# Patient Record
Sex: FEMALE | Race: BLACK OR AFRICAN AMERICAN | NOT HISPANIC OR LATINO
[De-identification: names, ages, dates, MRNs, and addresses within clinical notes are randomized per-mention and may not be internally consistent; named-entity substitution may affect disease eponyms.]

---

## 2017-03-30 ENCOUNTER — TRANSCRIPTION ENCOUNTER (OUTPATIENT)
Age: 62
End: 2017-03-30

## 2017-08-02 ENCOUNTER — OUTPATIENT (OUTPATIENT)
Dept: OUTPATIENT SERVICES | Facility: HOSPITAL | Age: 62
LOS: 1 days | End: 2017-08-02
Payer: MEDICARE

## 2017-08-02 ENCOUNTER — APPOINTMENT (OUTPATIENT)
Dept: ULTRASOUND IMAGING | Facility: IMAGING CENTER | Age: 62
End: 2017-08-02
Payer: MEDICARE

## 2017-08-02 DIAGNOSIS — Z90.710 ACQUIRED ABSENCE OF BOTH CERVIX AND UTERUS: Chronic | ICD-10-CM

## 2017-08-02 DIAGNOSIS — R94.5 ABNORMAL RESULTS OF LIVER FUNCTION STUDIES: ICD-10-CM

## 2017-08-02 DIAGNOSIS — Z86.010 PERSONAL HISTORY OF COLONIC POLYPS: ICD-10-CM

## 2017-08-02 DIAGNOSIS — M67.40 GANGLION, UNSPECIFIED SITE: Chronic | ICD-10-CM

## 2017-08-02 DIAGNOSIS — K21.9 GASTRO-ESOPHAGEAL REFLUX DISEASE WITHOUT ESOPHAGITIS: ICD-10-CM

## 2017-08-02 DIAGNOSIS — Z96.659 PRESENCE OF UNSPECIFIED ARTIFICIAL KNEE JOINT: Chronic | ICD-10-CM

## 2017-08-02 PROCEDURE — 76705 ECHO EXAM OF ABDOMEN: CPT

## 2017-08-02 PROCEDURE — 76705 ECHO EXAM OF ABDOMEN: CPT | Mod: 26,RT

## 2017-09-18 ENCOUNTER — TRANSCRIPTION ENCOUNTER (OUTPATIENT)
Age: 62
End: 2017-09-18

## 2018-01-02 ENCOUNTER — EMERGENCY (EMERGENCY)
Facility: HOSPITAL | Age: 63
LOS: 1 days | Discharge: HOME | End: 2018-01-02
Attending: EMERGENCY MEDICINE | Admitting: EMERGENCY MEDICINE
Payer: MEDICARE

## 2018-01-02 VITALS
SYSTOLIC BLOOD PRESSURE: 126 MMHG | RESPIRATION RATE: 20 BRPM | DIASTOLIC BLOOD PRESSURE: 73 MMHG | OXYGEN SATURATION: 98 % | HEART RATE: 89 BPM | TEMPERATURE: 98 F

## 2018-01-02 DIAGNOSIS — Z90.710 ACQUIRED ABSENCE OF BOTH CERVIX AND UTERUS: Chronic | ICD-10-CM

## 2018-01-02 DIAGNOSIS — Z96.659 PRESENCE OF UNSPECIFIED ARTIFICIAL KNEE JOINT: Chronic | ICD-10-CM

## 2018-01-02 DIAGNOSIS — M67.40 GANGLION, UNSPECIFIED SITE: Chronic | ICD-10-CM

## 2018-01-02 PROCEDURE — 99282 EMERGENCY DEPT VISIT SF MDM: CPT | Mod: 25,GC

## 2018-01-02 NOTE — ED PROVIDER NOTE - MEDICAL DECISION MAKING DETAILS
62F pmhx Obesity, HTN, HLD, DM, DENITA, Hypothyroid presents to ED with chief complaint of toothache. Toothache and gum pain yesterday, currently without any significant pain. No other issues at this time, ROS otherwise negative. Exam as above, no acute intraoral findings noted, patient in no distress, vitals stable. Will recommend pain control as needed, follow up this week with dental clinic. Case discussed with Attending SUSAN Samson MD, PGY1

## 2018-01-02 NOTE — ED PROVIDER NOTE - ENMT, MLM
Airway patent. Mouth with normal mucosa. Throat has no vesicles, no oropharyngeal exudates and uvula is midline. See above.

## 2018-01-02 NOTE — ED ADULT TRIAGE NOTE - CHIEF COMPLAINT QUOTE
Pt. presents to the [facility] ED with upper left tooth and gum pain. NAD noted. Pt. appears comfortable at triage.

## 2018-01-02 NOTE — ED PROVIDER NOTE - ATTENDING CONTRIBUTION TO CARE
pt presenting with L upper molar pain.  Started last night with eating hot food and has since resolved but is concerned it will return since nasal night CPAP machine was causing problems.  No fevers no problems swallowing.    Gen:  Well appearning in NAD  Head:  NC/AT  ENT:  Poor dentition.  L upper second molar with visible dilma.  No signs of abscess.  Resp: No distress   Ext: no deformities  Skin: warm and dry as visualized     pt with dental pain.  No sign of deep space abscess.  Will provide information with dental clinic follow up and contact information

## 2018-01-02 NOTE — ED PROVIDER NOTE - PLAN OF CARE
Follow up with Dental Clinic this week - call [phone] to make an appointment.  Return to ED for severe pain or other concerning symptoms.   Tylenol for pain control as needed per dosing on bottle.

## 2018-01-02 NOTE — ED PROVIDER NOTE - PHYSICAL EXAMINATION
Obese female  No apparent distress. Resting on bed.  Oral exam: No obvious periapical abscess noted on exam. Significant filled caries noted. Multiple cracked teeth noted, multiple teeth with decay noted bilaterally. No tooth #16 noted, decay and cracking noted to tooth #14, 15. No pain to palpation along gums upper/lower bilaterally.. No pain to forceful pressure on upper left molars. No pain with biting on tongue blade. Reports feeling more 'sensitive'. No patient discomfort noted during exam.

## 2018-01-02 NOTE — ED PROVIDER NOTE - CARE PLAN
Principal Discharge DX:	Toothache Principal Discharge DX:	Toothache  Instructions for follow-up, activity and diet:	Follow up with Dental Clinic this week - call [phone] to make an appointment.  Return to ED for severe pain or other concerning symptoms.   Tylenol for pain control as needed per dosing on bottle.

## 2018-01-02 NOTE — ED PROVIDER NOTE - OBJECTIVE STATEMENT
62F pmhx Obesity, HTN, HLD, DM, DENITA, Hypothyroid presents to ED with chief complaint of toothache. Per patient, was eating hot food yesterday and began to have pain along her upper left gum and molars. Pain was moderate intensity, at times felt pain radiating up to left eye and down left face. Patient reports pain was ongoing all day and worsening at night with use of her CPAP machine. Presents to ED this AM and reports that she does not currently have any pain, but that the upper left gum and back teeth do feel more sensitive than usual. Denies any fevers or chills, nausea or vomiting, diarrhea or constipation, chest pain, shortness of breath, abdominal pain, blurry vision, dizziness, headache, recent oral or facial trauma. Has not seen a dentist in over 1 year. Reports hx of extensive dental work, cavities. Denies tobacco or drug use, reports intermittent heavy alcohol use. No allergies. Meds include hyzar, glipizide, omeprazole, losartan.

## 2018-01-02 NOTE — ED PROVIDER NOTE - CONSTITUTIONAL, MLM
normal... Well appearing, OBESE, well nourished, awake, alert, oriented to person, place, time/situation and in no apparent distress.

## 2019-02-09 ENCOUNTER — EMERGENCY (EMERGENCY)
Facility: HOSPITAL | Age: 64
LOS: 1 days | Discharge: HOME | End: 2019-02-09
Attending: EMERGENCY MEDICINE | Admitting: EMERGENCY MEDICINE
Payer: MEDICARE

## 2019-02-09 VITALS
OXYGEN SATURATION: 97 % | RESPIRATION RATE: 19 BRPM | TEMPERATURE: 98 F | HEART RATE: 95 BPM | SYSTOLIC BLOOD PRESSURE: 116 MMHG | DIASTOLIC BLOOD PRESSURE: 73 MMHG

## 2019-02-09 VITALS
OXYGEN SATURATION: 98 % | HEIGHT: 63 IN | HEART RATE: 86 BPM | WEIGHT: 268.08 LBS | TEMPERATURE: 97 F | RESPIRATION RATE: 20 BRPM | SYSTOLIC BLOOD PRESSURE: 116 MMHG | DIASTOLIC BLOOD PRESSURE: 57 MMHG

## 2019-02-09 DIAGNOSIS — Z90.710 ACQUIRED ABSENCE OF BOTH CERVIX AND UTERUS: Chronic | ICD-10-CM

## 2019-02-09 DIAGNOSIS — Z96.659 PRESENCE OF UNSPECIFIED ARTIFICIAL KNEE JOINT: Chronic | ICD-10-CM

## 2019-02-09 DIAGNOSIS — M67.40 GANGLION, UNSPECIFIED SITE: Chronic | ICD-10-CM

## 2019-02-09 LAB
ALBUMIN SERPL ELPH-MCNC: 4.4 G/DL — SIGNIFICANT CHANGE UP (ref 3.3–5)
ALP SERPL-CCNC: 61 U/L — SIGNIFICANT CHANGE UP (ref 40–120)
ALT FLD-CCNC: 19 U/L — SIGNIFICANT CHANGE UP (ref 10–45)
ANION GAP SERPL CALC-SCNC: 14 MMOL/L — SIGNIFICANT CHANGE UP (ref 5–17)
AST SERPL-CCNC: 23 U/L — SIGNIFICANT CHANGE UP (ref 10–40)
BASOPHILS # BLD AUTO: 0.1 K/UL — SIGNIFICANT CHANGE UP (ref 0–0.2)
BASOPHILS NFR BLD AUTO: 1.7 % — SIGNIFICANT CHANGE UP (ref 0–2)
BILIRUB SERPL-MCNC: 0.7 MG/DL — SIGNIFICANT CHANGE UP (ref 0.2–1.2)
BUN SERPL-MCNC: 19 MG/DL — SIGNIFICANT CHANGE UP (ref 7–23)
CALCIUM SERPL-MCNC: 10.3 MG/DL — SIGNIFICANT CHANGE UP (ref 8.4–10.5)
CHLORIDE SERPL-SCNC: 102 MMOL/L — SIGNIFICANT CHANGE UP (ref 96–108)
CO2 SERPL-SCNC: 29 MMOL/L — SIGNIFICANT CHANGE UP (ref 22–31)
CREAT SERPL-MCNC: 0.78 MG/DL — SIGNIFICANT CHANGE UP (ref 0.5–1.3)
EOSINOPHIL # BLD AUTO: 0.1 K/UL — SIGNIFICANT CHANGE UP (ref 0–0.5)
EOSINOPHIL NFR BLD AUTO: 2 % — SIGNIFICANT CHANGE UP (ref 0–6)
ERYTHROCYTE [SEDIMENTATION RATE] IN BLOOD: 23 MM/HR — HIGH (ref 0–20)
GLUCOSE SERPL-MCNC: 114 MG/DL — HIGH (ref 70–99)
HCT VFR BLD CALC: 42.2 % — SIGNIFICANT CHANGE UP (ref 34.5–45)
HGB BLD-MCNC: 14.6 G/DL — SIGNIFICANT CHANGE UP (ref 11.5–15.5)
LYMPHOCYTES # BLD AUTO: 1.5 K/UL — SIGNIFICANT CHANGE UP (ref 1–3.3)
LYMPHOCYTES # BLD AUTO: 36.3 % — SIGNIFICANT CHANGE UP (ref 13–44)
MCHC RBC-ENTMCNC: 32.4 PG — SIGNIFICANT CHANGE UP (ref 27–34)
MCHC RBC-ENTMCNC: 34.5 GM/DL — SIGNIFICANT CHANGE UP (ref 32–36)
MCV RBC AUTO: 94 FL — SIGNIFICANT CHANGE UP (ref 80–100)
MONOCYTES # BLD AUTO: 0.6 K/UL — SIGNIFICANT CHANGE UP (ref 0–0.9)
MONOCYTES NFR BLD AUTO: 14.4 % — HIGH (ref 2–14)
NEUTROPHILS # BLD AUTO: 1.9 K/UL — SIGNIFICANT CHANGE UP (ref 1.8–7.4)
NEUTROPHILS NFR BLD AUTO: 45.6 % — SIGNIFICANT CHANGE UP (ref 43–77)
PLATELET # BLD AUTO: 260 K/UL — SIGNIFICANT CHANGE UP (ref 150–400)
POTASSIUM SERPL-MCNC: 3.9 MMOL/L — SIGNIFICANT CHANGE UP (ref 3.5–5.3)
POTASSIUM SERPL-SCNC: 3.9 MMOL/L — SIGNIFICANT CHANGE UP (ref 3.5–5.3)
PROT SERPL-MCNC: 7.8 G/DL — SIGNIFICANT CHANGE UP (ref 6–8.3)
RBC # BLD: 4.49 M/UL — SIGNIFICANT CHANGE UP (ref 3.8–5.2)
RBC # FLD: 12.5 % — SIGNIFICANT CHANGE UP (ref 10.3–14.5)
SODIUM SERPL-SCNC: 145 MMOL/L — SIGNIFICANT CHANGE UP (ref 135–145)
WBC # BLD: 4.1 K/UL — SIGNIFICANT CHANGE UP (ref 3.8–10.5)
WBC # FLD AUTO: 4.1 K/UL — SIGNIFICANT CHANGE UP (ref 3.8–10.5)

## 2019-02-09 PROCEDURE — 85027 COMPLETE CBC AUTOMATED: CPT

## 2019-02-09 PROCEDURE — 85652 RBC SED RATE AUTOMATED: CPT

## 2019-02-09 PROCEDURE — 99284 EMERGENCY DEPT VISIT MOD MDM: CPT | Mod: 25

## 2019-02-09 PROCEDURE — 36415 COLL VENOUS BLD VENIPUNCTURE: CPT

## 2019-02-09 PROCEDURE — 70450 CT HEAD/BRAIN W/O DYE: CPT

## 2019-02-09 PROCEDURE — 99284 EMERGENCY DEPT VISIT MOD MDM: CPT | Mod: GC

## 2019-02-09 PROCEDURE — 80053 COMPREHEN METABOLIC PANEL: CPT

## 2019-02-09 PROCEDURE — 70450 CT HEAD/BRAIN W/O DYE: CPT | Mod: 26

## 2019-02-09 RX ORDER — ACETAMINOPHEN 500 MG
650 TABLET ORAL ONCE
Qty: 0 | Refills: 0 | Status: DISCONTINUED | OUTPATIENT
Start: 2019-02-09 | End: 2019-02-13

## 2019-02-09 NOTE — ED PROVIDER NOTE - PROGRESS NOTE DETAILS
Dr. Oz Friedman, PGY-1: test results unremarkable for emergent pathology. pt informed of these results. ready for DC.

## 2019-02-09 NOTE — ED ADULT NURSE NOTE - OBJECTIVE STATEMENT
63 year old female presents to the ED ambulatory through waiting room complaining of frontal headache and right eye pressure x 1 week. PMH of HTN, HLD, GERD, hypothyroid, sleep apnea. Pt. states the intensity has not increased or decreased and she has not taken anything for pain, nor was she was doing anything specific when the pain started. Has never experienced this before. She localizes the pain to the R frontal and temporal region when asked. Denies visual changes. Feels that her R eyelid is more closed compared to the other. Denies fevers. Reports body aches and joint pain at night. Was seen by her PMD last week and blood work was done, was told by PMD that she did not have blood work consistent w/ temporal arteritis. Was seen by her neurologist yesterday who scheduled her for an MRI for next week. 20g peripheral IV placed in R ac and labs drawn and sent to lab. Patient undressed and placed into gown, call bell in hand and side rails up with bed in lowest position for safety. blanket provided. Comfort and safety provided.

## 2019-02-09 NOTE — ED PROVIDER NOTE - NS ED ROS FT
GENERAL: No fever or chills  EYES: No change in vision  HEENT: No trouble swallowing or speaking  CARDIAC: No chest pain  PULMONARY: No cough or SOB  GI: No abdominal pain, no nausea or no vomiting, no diarrhea or constipation  : No changes in urination  SKIN: No rashes  NEURO: +headache, no numbness  MSK: per HPI  Otherwise as HPI or negative.

## 2019-02-09 NOTE — ED PROVIDER NOTE - OBJECTIVE STATEMENT
64 y/o F w/ PMH of HTN, HLD, GERD, hypothyroid, sleep apnea presenting w/ headache and eye pain. Headache and eye pain is located on the R side. These symptoms began 1 week ago and have been constant since then. The intensity has not increased or decreased. She was not doing anything specific when the pain started. Has never experienced this before. She localizes the pain to the R frontal and temporal region when asked. Denies visual changes. Feels that her R eyelid is more closed compared to the other. Denies fevers. Reports body aches and joint pain at night. Was seen by her PMD last week and blood work was done, was told by PMD that she did not have blood work consistent w/ temporal arteritis. Was seen by her neurologist yesterday who scheduled her for an MRI for next week.    PMD- Dr. Lamont Ibarra  Neuro- Dr. Mallory

## 2019-02-09 NOTE — ED PROVIDER NOTE - MEDICAL DECISION MAKING DETAILS
62 y/o morbidly obese female w/ hx of DM, HTN, hypothyroid, sleep apnea presenting w/ 1 week hx of unilateral R temporal HA and joint pain w/ muscle aches. No fevers or chills. Seen by neurologist who recommended she have MRI brain which she is scheduled for next week. She is worried and wants to have MRI done today. PE is unremarkable. No tenderness in temporal area b/l. Non focal neuro exam. R/O temporal arteritis. Will obtain blood work, sed rate, give tylenol, CT non con head, re-eval.-ZR

## 2019-02-09 NOTE — ED PROVIDER NOTE - PHYSICAL EXAMINATION
Gen: NAD, AOx3, able to make needs known, non-toxic  Head: NCAT  HEENT: EOMI, oral mucosa moist, normal conjunctiva  Lung: CTAB, no respiratory distress, no wheezes/rhonchi/rales B/L, speaking in full sentences  CV: RRR, no murmurs  Abd: soft, NTND, no guarding  MSK: no visible deformities. no pain palpated along temporal areas b/l  Neuro: CN 2-12 intact b/l. str 5/5 x4 extremities. No focal sensory or motor deficits  Skin: Warm, well perfused  Psych: normal affect

## 2019-02-09 NOTE — ED ADULT NURSE NOTE - CHPI ED NUR SYMPTOMS NEG
no weakness/no tingling/no nausea/no pain/no vomiting/no dizziness/no fever/no chills/no decreased eating/drinking

## 2019-02-09 NOTE — ED ADULT NURSE NOTE - NSIMPLEMENTINTERV_GEN_ALL_ED
Implemented All Universal Safety Interventions:  [place] to call system. Call bell, personal items and telephone within reach. Instruct patient to call for assistance. Room bathroom lighting operational. Non-slip footwear when patient is off stretcher. Physically safe environment: no spills, clutter or unnecessary equipment. Stretcher in lowest position, wheels locked, appropriate side rails in place.

## 2019-02-09 NOTE — ED PROVIDER NOTE - NSFOLLOWUPINSTRUCTIONS_ED_ALL_ED_FT
1) Follow up with your doctor after receiving your MRI. Please make sure you get your MRI as scheduled by your neurologist.  2) Return to the ED immediately for new or worsening symptoms   3) Your test results from your ED visit were discussed with you prior to discharge  4) 1) Follow up with your doctor after receiving your MRI. Please make sure you get your MRI as scheduled by your neurologist.  2) Return to the ED immediately for new or worsening symptoms including severe headache, slurred speech or difficulty with speech, severe weakness in one or more extremities, or any other concerning symptoms   3) Your test results from your ED visit were discussed with you prior to discharge  4) Please take tylenol 650 mg every 4 hours as needed for pain. Please do not exceed more than 4,000mg of Tylenol in a day  5) You were provided with a copy of your test results

## 2019-02-13 ENCOUNTER — OUTPATIENT (OUTPATIENT)
Dept: OUTPATIENT SERVICES | Facility: HOSPITAL | Age: 64
LOS: 1 days | End: 2019-02-13
Payer: MEDICARE

## 2019-02-13 ENCOUNTER — APPOINTMENT (OUTPATIENT)
Dept: ULTRASOUND IMAGING | Facility: IMAGING CENTER | Age: 64
End: 2019-02-13
Payer: MEDICARE

## 2019-02-13 ENCOUNTER — APPOINTMENT (OUTPATIENT)
Dept: MRI IMAGING | Facility: IMAGING CENTER | Age: 64
End: 2019-02-13

## 2019-02-13 DIAGNOSIS — Z96.659 PRESENCE OF UNSPECIFIED ARTIFICIAL KNEE JOINT: Chronic | ICD-10-CM

## 2019-02-13 DIAGNOSIS — Z90.710 ACQUIRED ABSENCE OF BOTH CERVIX AND UTERUS: Chronic | ICD-10-CM

## 2019-02-13 DIAGNOSIS — M67.40 GANGLION, UNSPECIFIED SITE: Chronic | ICD-10-CM

## 2019-02-13 DIAGNOSIS — Z00.8 ENCOUNTER FOR OTHER GENERAL EXAMINATION: ICD-10-CM

## 2019-02-13 PROCEDURE — 70551 MRI BRAIN STEM W/O DYE: CPT | Mod: 26

## 2019-02-13 PROCEDURE — 76536 US EXAM OF HEAD AND NECK: CPT | Mod: 26

## 2019-02-13 PROCEDURE — 70551 MRI BRAIN STEM W/O DYE: CPT

## 2019-02-13 PROCEDURE — 76536 US EXAM OF HEAD AND NECK: CPT

## 2019-07-31 ENCOUNTER — APPOINTMENT (OUTPATIENT)
Dept: CT IMAGING | Facility: IMAGING CENTER | Age: 64
End: 2019-07-31
Payer: MEDICARE

## 2019-07-31 ENCOUNTER — OUTPATIENT (OUTPATIENT)
Dept: OUTPATIENT SERVICES | Facility: HOSPITAL | Age: 64
LOS: 1 days | End: 2019-07-31
Payer: MEDICARE

## 2019-07-31 DIAGNOSIS — Z00.8 ENCOUNTER FOR OTHER GENERAL EXAMINATION: ICD-10-CM

## 2019-07-31 DIAGNOSIS — Z90.710 ACQUIRED ABSENCE OF BOTH CERVIX AND UTERUS: Chronic | ICD-10-CM

## 2019-07-31 DIAGNOSIS — Z96.659 PRESENCE OF UNSPECIFIED ARTIFICIAL KNEE JOINT: Chronic | ICD-10-CM

## 2019-07-31 DIAGNOSIS — M67.40 GANGLION, UNSPECIFIED SITE: Chronic | ICD-10-CM

## 2019-07-31 PROCEDURE — 74178 CT ABD&PLV WO CNTR FLWD CNTR: CPT | Mod: 26

## 2019-07-31 PROCEDURE — 74178 CT ABD&PLV WO CNTR FLWD CNTR: CPT

## 2020-11-06 ENCOUNTER — RESULT REVIEW (OUTPATIENT)
Age: 65
End: 2020-11-06

## 2020-11-06 ENCOUNTER — APPOINTMENT (OUTPATIENT)
Dept: OBGYN | Facility: CLINIC | Age: 65
End: 2020-11-06
Payer: MEDICARE

## 2020-11-06 PROCEDURE — G0101: CPT

## 2020-12-05 ENCOUNTER — APPOINTMENT (OUTPATIENT)
Dept: MAMMOGRAPHY | Facility: IMAGING CENTER | Age: 65
End: 2020-12-05
Payer: MEDICARE

## 2020-12-05 ENCOUNTER — OUTPATIENT (OUTPATIENT)
Dept: OUTPATIENT SERVICES | Facility: HOSPITAL | Age: 65
LOS: 1 days | End: 2020-12-05
Payer: MEDICARE

## 2020-12-05 DIAGNOSIS — Z96.659 PRESENCE OF UNSPECIFIED ARTIFICIAL KNEE JOINT: Chronic | ICD-10-CM

## 2020-12-05 DIAGNOSIS — M67.40 GANGLION, UNSPECIFIED SITE: Chronic | ICD-10-CM

## 2020-12-05 DIAGNOSIS — Z90.710 ACQUIRED ABSENCE OF BOTH CERVIX AND UTERUS: Chronic | ICD-10-CM

## 2020-12-05 DIAGNOSIS — Z00.00 ENCOUNTER FOR GENERAL ADULT MEDICAL EXAMINATION WITHOUT ABNORMAL FINDINGS: ICD-10-CM

## 2020-12-05 PROCEDURE — 77067 SCR MAMMO BI INCL CAD: CPT | Mod: 26

## 2020-12-05 PROCEDURE — 77067 SCR MAMMO BI INCL CAD: CPT

## 2020-12-05 PROCEDURE — 77063 BREAST TOMOSYNTHESIS BI: CPT | Mod: 26

## 2020-12-05 PROCEDURE — 77063 BREAST TOMOSYNTHESIS BI: CPT

## 2022-06-28 NOTE — ED PROVIDER NOTE - NS ED ROS FT
Med history complete, reviewed medications and allergies with patient and confirmed medication list with doctor first med profile, all medication related questions answered  
toothache, gum pain: upper left

## 2022-07-29 ENCOUNTER — NON-APPOINTMENT (OUTPATIENT)
Age: 67
End: 2022-07-29

## 2022-07-29 ENCOUNTER — APPOINTMENT (OUTPATIENT)
Dept: OPHTHALMOLOGY | Facility: CLINIC | Age: 67
End: 2022-07-29

## 2022-07-29 PROCEDURE — 92004 COMPRE OPH EXAM NEW PT 1/>: CPT

## 2022-09-08 ENCOUNTER — OUTPATIENT (OUTPATIENT)
Dept: OUTPATIENT SERVICES | Facility: HOSPITAL | Age: 67
LOS: 1 days | End: 2022-09-08
Payer: MEDICARE

## 2022-09-08 ENCOUNTER — OUTPATIENT (OUTPATIENT)
Dept: OUTPATIENT SERVICES | Facility: HOSPITAL | Age: 67
LOS: 1 days | End: 2022-09-08

## 2022-09-08 VITALS
OXYGEN SATURATION: 96 % | HEART RATE: 88 BPM | TEMPERATURE: 98 F | DIASTOLIC BLOOD PRESSURE: 98 MMHG | RESPIRATION RATE: 16 BRPM | SYSTOLIC BLOOD PRESSURE: 146 MMHG | HEIGHT: 63 IN | WEIGHT: 235.01 LBS

## 2022-09-08 DIAGNOSIS — R31.29 OTHER MICROSCOPIC HEMATURIA: ICD-10-CM

## 2022-09-08 DIAGNOSIS — M67.40 GANGLION, UNSPECIFIED SITE: Chronic | ICD-10-CM

## 2022-09-08 DIAGNOSIS — Z96.659 PRESENCE OF UNSPECIFIED ARTIFICIAL KNEE JOINT: Chronic | ICD-10-CM

## 2022-09-08 DIAGNOSIS — Z90.710 ACQUIRED ABSENCE OF BOTH CERVIX AND UTERUS: Chronic | ICD-10-CM

## 2022-09-08 DIAGNOSIS — G47.33 OBSTRUCTIVE SLEEP APNEA (ADULT) (PEDIATRIC): ICD-10-CM

## 2022-09-08 DIAGNOSIS — I10 ESSENTIAL (PRIMARY) HYPERTENSION: ICD-10-CM

## 2022-09-08 DIAGNOSIS — K76.0 FATTY (CHANGE OF) LIVER, NOT ELSEWHERE CLASSIFIED: ICD-10-CM

## 2022-09-08 DIAGNOSIS — Z11.52 ENCOUNTER FOR SCREENING FOR COVID-19: ICD-10-CM

## 2022-09-08 DIAGNOSIS — Z01.818 ENCOUNTER FOR OTHER PREPROCEDURAL EXAMINATION: ICD-10-CM

## 2022-09-08 LAB
A1C WITH ESTIMATED AVERAGE GLUCOSE RESULT: 5.7 % — HIGH (ref 4–5.6)
ESTIMATED AVERAGE GLUCOSE: 117 MG/DL — HIGH (ref 68–114)
SARS-COV-2 RNA SPEC QL NAA+PROBE: SIGNIFICANT CHANGE UP

## 2022-09-08 PROCEDURE — C9803: CPT

## 2022-09-08 PROCEDURE — G0463: CPT

## 2022-09-08 PROCEDURE — U0005: CPT

## 2022-09-08 PROCEDURE — U0003: CPT

## 2022-09-08 PROCEDURE — 83036 HEMOGLOBIN GLYCOSYLATED A1C: CPT

## 2022-09-08 NOTE — H&P PST ADULT - NSICDXPASTSURGICALHX_GEN_ALL_CORE_FT
PAST SURGICAL HISTORY:  Ganglion cyst     S/P hysterectomy 1984    S/P knee replacement 2010 and 2011

## 2022-09-08 NOTE — H&P PST ADULT - ATTENDING COMMENTS
Atypical cytology during microhematuria workup and erythematous patches seen on office cystoscopy. OR today for bladder biopsy and fulguration.  R/b/A discussed. Consent reviewed and signed.    Christopher Monique MD  [facility]  [address], [facility] [address], [place] [address]  Office: [phone] Fax: [phone]

## 2022-09-08 NOTE — H&P PST ADULT - NEGATIVE ALLERGY TYPES
no indoor environmental allergies/no reactions to medicines/no reactions to food/no reactions to animals

## 2022-09-08 NOTE — H&P PST ADULT - NSICDXFAMILYHX_GEN_ALL_CORE_FT
FAMILY HISTORY:  Father  Still living? No  FH: hypertension, Age at diagnosis: Age Unknown  FH: liver cancer, Age at diagnosis: Age Unknown    Mother  Still living? Yes, Estimated age: Age Unknown  FH: hypertension, Age at diagnosis: Age Unknown  FH: type 2 diabetes, Age at diagnosis: Age Unknown    Sibling  Still living? No  FH: breast cancer, Age at diagnosis: Age Unknown

## 2022-09-08 NOTE — H&P PST ADULT - HISTORY OF PRESENT ILLNESS
Ms. Lewis, retired , 66 years old with PMH HTN, HLD, T2DM, DENITA on CPAP, Obesity, hypothyroidism on Synthroid, OA s/p bilateral knee replacements, dry eyes, every day ETOH drinking (quit for 2 years over COVID years) and recently resumed, has up to 8 glasses Bacardi Rum who most recently had c/o increased urinary frequency, cystoscopy revealing lesions on bladder wall now scheduled for cystoscopy and bladder biopsy with fulguration on 9/12/2022.    Denies s/s of COVID, no recent international travel or travel outside [facility] in last 2 weeks Ms. Lewis, retired , 66 years old with PMH HTN, HLD, T2DM, DENITA on CPAP, Obesity, hypothyroidism on Synthroid, OA s/p bilateral knee replacements, dry eyes, every day ETOH drinking (quit for 2 years over COVID years) and recently resumed, has up to 8 glasses Bacardi Rum who most recently had c/o increased urinary frequency, cystoscopy revealing lesions on bladder wall now scheduled for cystoscopy and bladder biopsy with fulguration on 9/12/2022.    Denies s/s of COVID, no recent international travel or travel outside [facility] in last 2 weeks  ******Pt saw a cardiologist, pt need a cardiac cath prior to surgery, Called [facility] co-ordinator for Dr. mancini - Relayed the message , surgery to be cancelled for 9/12/2022.********

## 2022-09-08 NOTE — H&P PST ADULT - RESPIRATORY
clear to auscultation bilaterally/no wheezes/no rhonchi/breath sounds equal/good air movement/respirations non-labored

## 2022-09-08 NOTE — H&P PST ADULT - NSICDXPASTMEDICALHX_GEN_ALL_CORE_FT
PAST MEDICAL HISTORY:  Chronic GERD     Diabetes 1.5, managed as type 2     ETOH abuse     HTN (hypertension)     Hypothyroid     Meningitis

## 2022-09-08 NOTE — H&P PST ADULT - ACTIVITY
ADLs, helps care for elderly mother, bathing, climbs stairs 2 flights, brings mom to all her appointments

## 2022-09-08 NOTE — H&P PST ADULT - PROBLEM SELECTOR PLAN 1
Scheduled for cystoscopy, bladder biopsy with fulguration on 9/12/2022  Preop instructions given  Urine cx in chart from PCP, negative  FS upon admission

## 2022-09-08 NOTE — H&P PST ADULT - ENMT COMMENTS
finger rub heard bilaterally, no redness/inflammation of mouth/pharynx.  FROM on neck.  No carotid bruits

## 2022-09-08 NOTE — H&P PST ADULT - PROBLEM SELECTOR PLAN 4
Encouraged to decrease ETOH intake prior to surgery  CMP results in chart  Email sent to surgeon and anesthesiologist

## 2022-09-13 PROBLEM — K21.9 GASTRO-ESOPHAGEAL REFLUX DISEASE WITHOUT ESOPHAGITIS: Chronic | Status: ACTIVE | Noted: 2022-09-08

## 2022-09-13 PROBLEM — G03.9 MENINGITIS, UNSPECIFIED: Chronic | Status: ACTIVE | Noted: 2022-09-08

## 2022-09-13 PROBLEM — F10.10 ALCOHOL ABUSE, UNCOMPLICATED: Chronic | Status: ACTIVE | Noted: 2022-09-08

## 2022-09-14 ENCOUNTER — NON-APPOINTMENT (OUTPATIENT)
Age: 67
End: 2022-09-14

## 2022-09-14 ENCOUNTER — APPOINTMENT (OUTPATIENT)
Dept: OPHTHALMOLOGY | Facility: CLINIC | Age: 67
End: 2022-09-14

## 2022-09-14 PROCEDURE — 92014 COMPRE OPH EXAM EST PT 1/>: CPT

## 2022-09-26 ENCOUNTER — OUTPATIENT (OUTPATIENT)
Dept: OUTPATIENT SERVICES | Facility: HOSPITAL | Age: 67
LOS: 1 days | End: 2022-09-26
Payer: MEDICARE

## 2022-09-26 DIAGNOSIS — Z11.52 ENCOUNTER FOR SCREENING FOR COVID-19: ICD-10-CM

## 2022-09-26 DIAGNOSIS — Z96.659 PRESENCE OF UNSPECIFIED ARTIFICIAL KNEE JOINT: Chronic | ICD-10-CM

## 2022-09-26 DIAGNOSIS — M67.40 GANGLION, UNSPECIFIED SITE: Chronic | ICD-10-CM

## 2022-09-26 DIAGNOSIS — Z90.710 ACQUIRED ABSENCE OF BOTH CERVIX AND UTERUS: Chronic | ICD-10-CM

## 2022-09-26 LAB — SARS-COV-2 RNA SPEC QL NAA+PROBE: SIGNIFICANT CHANGE UP

## 2022-09-26 PROCEDURE — C9803: CPT

## 2022-09-26 PROCEDURE — U0005: CPT

## 2022-09-26 PROCEDURE — U0003: CPT

## 2022-09-28 ENCOUNTER — TRANSCRIPTION ENCOUNTER (OUTPATIENT)
Age: 67
End: 2022-09-28

## 2022-09-29 ENCOUNTER — TRANSCRIPTION ENCOUNTER (OUTPATIENT)
Age: 67
End: 2022-09-29

## 2022-09-29 ENCOUNTER — OUTPATIENT (OUTPATIENT)
Dept: OUTPATIENT SERVICES | Facility: HOSPITAL | Age: 67
LOS: 1 days | End: 2022-09-29
Payer: MEDICARE

## 2022-09-29 ENCOUNTER — RESULT REVIEW (OUTPATIENT)
Age: 67
End: 2022-09-29

## 2022-09-29 VITALS
SYSTOLIC BLOOD PRESSURE: 142 MMHG | OXYGEN SATURATION: 96 % | DIASTOLIC BLOOD PRESSURE: 58 MMHG | RESPIRATION RATE: 18 BRPM | TEMPERATURE: 98 F | HEART RATE: 82 BPM

## 2022-09-29 VITALS
DIASTOLIC BLOOD PRESSURE: 94 MMHG | HEART RATE: 82 BPM | OXYGEN SATURATION: 95 % | RESPIRATION RATE: 16 BRPM | SYSTOLIC BLOOD PRESSURE: 164 MMHG | TEMPERATURE: 98 F

## 2022-09-29 DIAGNOSIS — Z90.710 ACQUIRED ABSENCE OF BOTH CERVIX AND UTERUS: Chronic | ICD-10-CM

## 2022-09-29 DIAGNOSIS — M67.40 GANGLION, UNSPECIFIED SITE: Chronic | ICD-10-CM

## 2022-09-29 DIAGNOSIS — R31.29 OTHER MICROSCOPIC HEMATURIA: ICD-10-CM

## 2022-09-29 DIAGNOSIS — Z96.659 PRESENCE OF UNSPECIFIED ARTIFICIAL KNEE JOINT: Chronic | ICD-10-CM

## 2022-09-29 LAB — GLUCOSE BLDC GLUCOMTR-MCNC: 92 MG/DL — SIGNIFICANT CHANGE UP (ref 70–99)

## 2022-09-29 PROCEDURE — 82962 GLUCOSE BLOOD TEST: CPT

## 2022-09-29 PROCEDURE — 88305 TISSUE EXAM BY PATHOLOGIST: CPT

## 2022-09-29 PROCEDURE — 94660 CPAP INITIATION&MGMT: CPT

## 2022-09-29 PROCEDURE — 88305 TISSUE EXAM BY PATHOLOGIST: CPT | Mod: 26

## 2022-09-29 PROCEDURE — 52224 CYSTOSCOPY AND TREATMENT: CPT

## 2022-09-29 DEVICE — CATH STEERABLE IRR ASPIRATION 70CM
Type: IMPLANTABLE DEVICE | Status: NON-FUNCTIONAL
Removed: 2022-09-29

## 2022-09-29 RX ORDER — PHENAZOPYRIDINE HCL 100 MG
100 TABLET ORAL EVERY 8 HOURS
Refills: 0 | Status: DISCONTINUED | OUTPATIENT
Start: 2022-09-29 | End: 2022-10-13

## 2022-09-29 RX ORDER — ONDANSETRON 8 MG/1
4 TABLET, FILM COATED ORAL ONCE
Refills: 0 | Status: DISCONTINUED | OUTPATIENT
Start: 2022-09-29 | End: 2022-09-29

## 2022-09-29 RX ORDER — FENTANYL CITRATE 50 UG/ML
25 INJECTION INTRAVENOUS
Refills: 0 | Status: DISCONTINUED | OUTPATIENT
Start: 2022-09-29 | End: 2022-09-29

## 2022-09-29 RX ADMIN — Medication 100 MILLIGRAM(S): at 17:56

## 2022-09-29 NOTE — ASU DISCHARGE PLAN (ADULT/PEDIATRIC) - CARE PROVIDER_API CALL
Christopher Monique (MD)  Urology  [address], [address]  [place] [address]  Phone: [phone]  Fax: [phone]  Follow Up Time: 1 week

## 2022-09-29 NOTE — ASU DISCHARGE PLAN (ADULT/PEDIATRIC) - NS MD DC FALL RISK RISK
For information on Fall & Injury Prevention, visit: [email or web address] OR  [email or web address] OR  https://www.cdc.gov/steadi/patient.html

## 2022-09-29 NOTE — ASU PATIENT PROFILE, ADULT - NS PRO ABUSE SCREEN AFRAID ANYONE YN
For information on Fall & Injury Prevention, visit: [email or web address] OR  [email or web address] OR  https://www.cdc.gov/steadi/patient.html no

## 2022-09-29 NOTE — ASU DISCHARGE PLAN (ADULT/PEDIATRIC) - ASU DC SPECIAL INSTRUCTIONSFT
GENERAL: It is common to have blood in your urine after your procedure. It may be pink or even red; inform your doctor if you have a significant amount of clot in the urine or if you are unable to void at all or if your catheter stops draining. The urine may clear and then become bloody again especially as you are more physically active. It is not uncommon to have some burning when you urinate, this will gradually improve. With a catheter in place, it is not uncommon to have occasional leakage or urine or blood around the catheter. Please call your urologist if this is excessive and/or the urine is not draining through the catheter into the bag.  BATHING: You may shower or bathe. If going home with taylor, shower only until catheter is removed.  DIET: You may resume your regular diet and regular medication regimen.  PAIN: You may take Tylenol (acetaminophen) 650-975mg and/or Motrin (ibuprofen) 400-600mg, both available over the counter, for pain every 6 hours as needed. Do not exceed 4000mg of Tylenol (acetaminophen) daily. You may alternate these medications such that you take one or the other every 3 hours for around the clock pain coverage.  ANTIBIOTICS: You may be given a prescription for an antibiotic, please take this medication as instructed and be sure to complete the entire course.  STOOL SOFTENERS: Do not allow yourself to become constipated as straining may cause bleeding. Take stool softeners or a laxative (ex. Miralax, Colace, Senokot, ExLax, etc), available over the counter, if needed.  ACTIVITY: No heavy lifting or strenuous exercise until you are evaluated at your post-operative appointment. Otherwise, you may return to your usual level of physical activity.  ANTICOAGULATION: If you are taking any blood thinning medications, please discuss with your urologist prior to restarting these medications unless otherwise specified.  FOLLOW-UP: If you did not already schedule your post-operative appointment, please call your urologist to schedule and follow-up appointment.  CALL YOUR UROLOGIST IF: You have any bleeding that does not stop, inability to void >8 hours, fever over 100.4 F, chills, persistent nausea/vomiting, changes in your incision concerning for infection, or if your pain is not controlled on your discharge pain medications.

## 2022-09-29 NOTE — ASU PATIENT PROFILE, ADULT - FALL HARM RISK - UNIVERSAL INTERVENTIONS
Bed in lowest position, wheels locked, appropriate side rails in place/Call bell, personal items and telephone in reach/Instruct patient to call for assistance before getting out of bed or chair/Non-slip footwear when patient is out of bed/[place] to call system/Physically safe environment - no spills, clutter or unnecessary equipment/Purposeful Proactive Rounding/Room/bathroom lighting operational, light cord in reach

## 2022-10-04 LAB — SURGICAL PATHOLOGY STUDY: SIGNIFICANT CHANGE UP

## 2023-01-27 ENCOUNTER — APPOINTMENT (OUTPATIENT)
Dept: OPHTHALMOLOGY | Facility: CLINIC | Age: 68
End: 2023-01-27

## 2023-03-11 ENCOUNTER — OUTPATIENT (OUTPATIENT)
Dept: OUTPATIENT SERVICES | Facility: HOSPITAL | Age: 68
LOS: 1 days | End: 2023-03-11
Payer: MEDICARE

## 2023-03-11 ENCOUNTER — APPOINTMENT (OUTPATIENT)
Dept: MAMMOGRAPHY | Facility: IMAGING CENTER | Age: 68
End: 2023-03-11
Payer: MEDICARE

## 2023-03-11 DIAGNOSIS — Z00.8 ENCOUNTER FOR OTHER GENERAL EXAMINATION: ICD-10-CM

## 2023-03-11 DIAGNOSIS — Z90.710 ACQUIRED ABSENCE OF BOTH CERVIX AND UTERUS: Chronic | ICD-10-CM

## 2023-03-11 DIAGNOSIS — Z96.659 PRESENCE OF UNSPECIFIED ARTIFICIAL KNEE JOINT: Chronic | ICD-10-CM

## 2023-03-11 DIAGNOSIS — M67.40 GANGLION, UNSPECIFIED SITE: Chronic | ICD-10-CM

## 2023-03-11 PROCEDURE — 77063 BREAST TOMOSYNTHESIS BI: CPT | Mod: 26

## 2023-03-11 PROCEDURE — 77063 BREAST TOMOSYNTHESIS BI: CPT

## 2023-03-11 PROCEDURE — 77067 SCR MAMMO BI INCL CAD: CPT

## 2023-03-11 PROCEDURE — 77067 SCR MAMMO BI INCL CAD: CPT | Mod: 26

## 2023-04-07 ENCOUNTER — OUTPATIENT (OUTPATIENT)
Dept: OUTPATIENT SERVICES | Facility: HOSPITAL | Age: 68
LOS: 1 days | End: 2023-04-07
Payer: MEDICARE

## 2023-04-07 VITALS
DIASTOLIC BLOOD PRESSURE: 88 MMHG | HEART RATE: 78 BPM | WEIGHT: 240.08 LBS | HEIGHT: 64 IN | TEMPERATURE: 98 F | RESPIRATION RATE: 16 BRPM | OXYGEN SATURATION: 97 % | SYSTOLIC BLOOD PRESSURE: 134 MMHG

## 2023-04-07 DIAGNOSIS — E66.01 MORBID (SEVERE) OBESITY DUE TO EXCESS CALORIES: ICD-10-CM

## 2023-04-07 DIAGNOSIS — Z98.890 OTHER SPECIFIED POSTPROCEDURAL STATES: Chronic | ICD-10-CM

## 2023-04-07 DIAGNOSIS — M67.40 GANGLION, UNSPECIFIED SITE: Chronic | ICD-10-CM

## 2023-04-07 DIAGNOSIS — E11.9 TYPE 2 DIABETES MELLITUS WITHOUT COMPLICATIONS: ICD-10-CM

## 2023-04-07 DIAGNOSIS — Z96.651 PRESENCE OF RIGHT ARTIFICIAL KNEE JOINT: Chronic | ICD-10-CM

## 2023-04-07 DIAGNOSIS — R31.29 OTHER MICROSCOPIC HEMATURIA: ICD-10-CM

## 2023-04-07 DIAGNOSIS — Z96.659 PRESENCE OF UNSPECIFIED ARTIFICIAL KNEE JOINT: Chronic | ICD-10-CM

## 2023-04-07 DIAGNOSIS — G47.33 OBSTRUCTIVE SLEEP APNEA (ADULT) (PEDIATRIC): ICD-10-CM

## 2023-04-07 DIAGNOSIS — Z90.710 ACQUIRED ABSENCE OF BOTH CERVIX AND UTERUS: Chronic | ICD-10-CM

## 2023-04-07 DIAGNOSIS — Z96.652 PRESENCE OF LEFT ARTIFICIAL KNEE JOINT: Chronic | ICD-10-CM

## 2023-04-07 DIAGNOSIS — F10.10 ALCOHOL ABUSE, UNCOMPLICATED: ICD-10-CM

## 2023-04-07 DIAGNOSIS — Z01.818 ENCOUNTER FOR OTHER PREPROCEDURAL EXAMINATION: ICD-10-CM

## 2023-04-07 LAB
A1C WITH ESTIMATED AVERAGE GLUCOSE RESULT: 5.8 % — HIGH (ref 4–5.6)
ALBUMIN SERPL ELPH-MCNC: 4.5 G/DL — SIGNIFICANT CHANGE UP (ref 3.3–5)
ALP SERPL-CCNC: 68 U/L — SIGNIFICANT CHANGE UP (ref 40–120)
ALT FLD-CCNC: 15 U/L — SIGNIFICANT CHANGE UP (ref 10–45)
ANION GAP SERPL CALC-SCNC: 11 MMOL/L — SIGNIFICANT CHANGE UP (ref 5–17)
AST SERPL-CCNC: 23 U/L — SIGNIFICANT CHANGE UP (ref 10–40)
BILIRUB SERPL-MCNC: 0.5 MG/DL — SIGNIFICANT CHANGE UP (ref 0.2–1.2)
BUN SERPL-MCNC: 23 MG/DL — SIGNIFICANT CHANGE UP (ref 7–23)
CALCIUM SERPL-MCNC: 9.5 MG/DL — SIGNIFICANT CHANGE UP (ref 8.4–10.5)
CHLORIDE SERPL-SCNC: 104 MMOL/L — SIGNIFICANT CHANGE UP (ref 96–108)
CO2 SERPL-SCNC: 28 MMOL/L — SIGNIFICANT CHANGE UP (ref 22–31)
CREAT SERPL-MCNC: 0.84 MG/DL — SIGNIFICANT CHANGE UP (ref 0.5–1.3)
EGFR: 76 ML/MIN/1.73M2 — SIGNIFICANT CHANGE UP
ESTIMATED AVERAGE GLUCOSE: 120 MG/DL — HIGH (ref 68–114)
GLUCOSE SERPL-MCNC: 84 MG/DL — SIGNIFICANT CHANGE UP (ref 70–99)
HCT VFR BLD CALC: 43.7 % — SIGNIFICANT CHANGE UP (ref 34.5–45)
HGB BLD-MCNC: 14.2 G/DL — SIGNIFICANT CHANGE UP (ref 11.5–15.5)
MCHC RBC-ENTMCNC: 30.9 PG — SIGNIFICANT CHANGE UP (ref 27–34)
MCHC RBC-ENTMCNC: 32.5 GM/DL — SIGNIFICANT CHANGE UP (ref 32–36)
MCV RBC AUTO: 95.2 FL — SIGNIFICANT CHANGE UP (ref 80–100)
NRBC # BLD: 0 /100 WBCS — SIGNIFICANT CHANGE UP (ref 0–0)
PLATELET # BLD AUTO: 242 K/UL — SIGNIFICANT CHANGE UP (ref 150–400)
POTASSIUM SERPL-MCNC: 3.8 MMOL/L — SIGNIFICANT CHANGE UP (ref 3.5–5.3)
POTASSIUM SERPL-SCNC: 3.8 MMOL/L — SIGNIFICANT CHANGE UP (ref 3.5–5.3)
PROT SERPL-MCNC: 7.4 G/DL — SIGNIFICANT CHANGE UP (ref 6–8.3)
RBC # BLD: 4.59 M/UL — SIGNIFICANT CHANGE UP (ref 3.8–5.2)
RBC # FLD: 14.1 % — SIGNIFICANT CHANGE UP (ref 10.3–14.5)
SODIUM SERPL-SCNC: 143 MMOL/L — SIGNIFICANT CHANGE UP (ref 135–145)
WBC # BLD: 4.22 K/UL — SIGNIFICANT CHANGE UP (ref 3.8–10.5)
WBC # FLD AUTO: 4.22 K/UL — SIGNIFICANT CHANGE UP (ref 3.8–10.5)

## 2023-04-07 PROCEDURE — 36415 COLL VENOUS BLD VENIPUNCTURE: CPT

## 2023-04-07 PROCEDURE — 87086 URINE CULTURE/COLONY COUNT: CPT

## 2023-04-07 PROCEDURE — 85027 COMPLETE CBC AUTOMATED: CPT

## 2023-04-07 PROCEDURE — 83036 HEMOGLOBIN GLYCOSYLATED A1C: CPT

## 2023-04-07 PROCEDURE — 80053 COMPREHEN METABOLIC PANEL: CPT

## 2023-04-07 PROCEDURE — G0463: CPT

## 2023-04-07 RX ORDER — SODIUM CHLORIDE 9 MG/ML
3 INJECTION INTRAMUSCULAR; INTRAVENOUS; SUBCUTANEOUS EVERY 8 HOURS
Refills: 0 | Status: DISCONTINUED | OUTPATIENT
Start: 2023-04-20 | End: 2023-05-04

## 2023-04-07 RX ORDER — LIDOCAINE HCL 20 MG/ML
0.2 VIAL (ML) INJECTION ONCE
Refills: 0 | Status: DISCONTINUED | OUTPATIENT
Start: 2023-04-20 | End: 2023-05-04

## 2023-04-07 RX ORDER — LISINOPRIL 2.5 MG/1
1 TABLET ORAL
Qty: 0 | Refills: 0 | DISCHARGE

## 2023-04-07 RX ORDER — ROSUVASTATIN CALCIUM 5 MG/1
1 TABLET ORAL
Qty: 0 | Refills: 0 | DISCHARGE

## 2023-04-07 NOTE — H&P PST ADULT - PROBLEM SELECTOR PLAN 3
Pt advised to reduce/limit ETOH intake prior to surgery  Dr. Kearney (Anesthesiologist) made aware  Surgeon notified via email Pt advised to reduce/limit ETOH intake prior to surgery  Dr. Kearney (Anesthesiologist) made aware

## 2023-04-07 NOTE — H&P PST ADULT - ATTENDING COMMENTS
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 OR today for TURBT. R/B/A discussed. Consent reviewed and signed.    Christopher Monique MD  [facility]  [address], [facility] [address], [place] [address]  Office: [phone] Fax: [phone]

## 2023-04-07 NOTE — H&P PST ADULT - PROBLEM SELECTOR PLAN 2
HGA1C ordered and obtained at [facility]  FS on admit  Hold Glipizide with last dose on 4/19/23 AM

## 2023-04-07 NOTE — H&P PST ADULT - PROBLEM SELECTOR PLAN 1
Pt is scheduled for a TURBT with Dr. Monique on 4/20/23  CBC, BMP, Urine Culture ordered and obtained at [facility]  Pending M/E 4/14/23  Pre-op eval with cardiologist and diagnostic work-up from prior surgery requested  Pt advised to hold ASA 81 mg x 7 days with last dose on 4/13/23

## 2023-04-07 NOTE — H&P PST ADULT - TEMPERATURE IN FAHRENHEIT (DEGREES F)
Problem: Patient Care Overview  Goal: Plan of Care/Patient Progress Review  Discharge Planner PT   Patient plan for discharge: hopeful to return home with spouse, when able  Current status: PT 7B: Eval complete, though limited. Treatment provided. Pt required ARIK supine>sit, ARIK-MODA sit>supine and CGA-SBA for scooting up in bed. Pt declined standing or walking trial, but reported feeling too tired to attempt. Will order walker for room in case pt may require; nursing notes mention balance concerns with walking in the room. Chair placed in room and encouraged pt to sit up later when further rested. Also encouraged pt to attempt walks with nursing. Pt performed supine strength ex with PT guidance, to reduce loss of strength with stay.   Barriers to return to prior living situation: weakness, reduced activity tolerance  Recommendations for discharge: further TBD, as pt reports she was tired, did not sleep well and feels weak, she had reduced activity tolerance. There is possibility Pt may require TCU if unable to safely meet basic mobility goals for home discharge, pending progress. Otherwise if pt able to meet goals but with further concerns may yet benefit from home PT referral.  Rationale for recommendations: see above       Entered by: Tabitha Lees 03/05/2018 9:43 AM            98

## 2023-04-07 NOTE — H&P PST ADULT - OTHER CARE PROVIDERS
Dr. Marcus Hager (Cardiologist) [phone] - Last visit 8/2023 for pre-op evaluation; Dr. Farhad Santos () [phone] - F/U 2022; Dr. Lucas Meneses (Pul) [phone] - last visit 1/2023 for F/U Dr. Marcus Hager (Cardiologist) [phone] - Last visit 8/2022 for pre-op evaluation; Dr. Farhad Santos () [phone] - F/U 2022; Dr. Lucas Meneses (Pul) [phone] - last visit 1/2023 for F/U

## 2023-04-07 NOTE — H&P PST ADULT - ASSESSMENT
DASI score: 5.72  DASI activity: Able to walk 2-3 blocks, ADLs, Grocery Shopping, 1 Flight of Stairs   Loose teeth or denture: Denies

## 2023-04-07 NOTE — H&P PST ADULT - CARDIOVASCULAR COMMENTS
HELM when walking < 1 block or climbing 1 flight of stairs - Pt denies chest pain, dyspnea at rest, dizziness or syncopal epidoes

## 2023-04-07 NOTE — H&P PST ADULT - NSALCOHOLMD_GEN_A_CORE_SD
email sent to surgeon's office/anesthesiologist email sent to surgeon/anesthesiologist Dr. Kearney made aware

## 2023-04-07 NOTE — H&P PST ADULT - NSICDXPASTSURGICALHX_GEN_ALL_CORE_FT
Gabapentin Counseling: I discussed with the patient the risks of gabapentin including but not limited to dizziness, somnolence, fatigue and ataxia. PAST SURGICAL HISTORY:  H/O excision of ganglion cyst     H/O total knee replacement, left     H/O transurethral resection of bladder tumor (TURBT)     History of vocal cord polypectomy     S/P hysterectomy 1984    S/P total knee replacement, right

## 2023-04-07 NOTE — H&P PST ADULT - NSICDXPASTMEDICALHX_GEN_ALL_CORE_FT
PAST MEDICAL HISTORY:  Cancer of bladder     Chronic GERD     Dry eyes     ETOH abuse     Fatty liver     Fibroids     HTN (hypertension)     Hypothyroid     Meningitis     Other microscopic hematuria     Trace cataracts     Type 2 diabetes mellitus

## 2023-04-07 NOTE — H&P PST ADULT - HISTORY OF PRESENT ILLNESS
67 year old female with PMH HTN, HLD, DMT2 (HGA1C 5.7 9/2022), DENITA on CPAP, Obesity, Hypothyroidism, Vocal Cord Polyp s/p Polypectomy (~1990), OA s/p B/L TKR, ETOH Abuse (daily) reports that she recently had a bladder tumor resection in September 2022 s/p BCG instillations x 6 and had a F/U cystoscopy in the office revealed a bladder lesion. Pt now presents to PST for scheduled Transurethral Resection of Bladder Tumor with Dr. Monique on 4/20/23.    **Of note, first TURBT procedure was cancelled due to abnormal cardiac work-up s/p cardiac angiogram (negative) and procedure was rescheduled for 9/29/22.**    Fully vaccinated with Covid Series  No recent hospitalizations over the last 3 months  Denies recent travel, exposure or Covid symptoms  No Covid + infections in the past 67 year old female with PMH HTN, HLD, DMT2 (HGA1C 5.7 9/2022), DENITA on CPAP, Obesity (BMI 41.2), Fatty Liver (monitored by GI), Hypothyroidism, Vocal Cord Polyp s/p Polypectomy (~1990), OA s/p B/L TKR and ETOH Abuse (8 liquor drinks daily) reports that she recently had a bladder tumor resection in September 2022 s/p BCG instillations x 6 and had a F/U cystoscopy in the office that revealed a subsequent bladder lesion. Pt denies hematuria, dysuria, flank pain, fever or chills. She does report increased urinary urgency and nocturia 2x per night. Pt now presents to PST for scheduled Transurethral Resection of Bladder Tumor with Dr. Monique on 4/20/23.    **As per patient, first TURBT procedure was cancelled due to abnormal stress test s/p cardiac angiogram (negative) and procedure was rescheduled for 9/29/22.**    Fully vaccinated with Covid Series  No recent hospitalizations over the last 3 months  Denies recent travel, exposure or Covid symptoms  No Covid + infections in the past 67 year old female with PMH HTN, HLD, DMT2 (HGA1C 5.7 9/2022), DENITA on CPAP, Obesity (BMI 41.2), Fatty Liver (monitored by GI), Hypothyroidism, Vocal Cord Polyp s/p Polypectomy (~1990 - no voice changes), OA s/p B/L TKR and ETOH Abuse (8 liquor drinks daily) reports that she recently had a bladder tumor resection in September 2022 s/p BCG instillations x 6 and had a F/U cystoscopy in the office that revealed a subsequent bladder lesion. Pt denies hematuria, dysuria, flank pain, fever or chills. She does report increased urinary urgency and nocturia 2x per night. Pt now presents to PST for scheduled Transurethral Resection of Bladder Tumor with Dr. Monique on 4/20/23.    **As per patient, first TURBT procedure was cancelled due to abnormal stress test s/p cardiac angiogram (negative) and procedure was rescheduled for 9/29/22.**    Fully vaccinated with Covid Series  No recent hospitalizations over the last 3 months  Denies recent travel, exposure or Covid symptoms  No Covid + infections in the past

## 2023-04-07 NOTE — H&P PST ADULT - PRO ARRIVE FROM
12/13/19 0840   Patient Assessment/Suction   Level of Consciousness (AVPU) alert   Respiratory Effort Normal;Unlabored   Expansion/Accessory Muscles/Retractions no retractions;no use of accessory muscles   All Lung Fields Breath Sounds coarse   Suction Method tracheal   $ Suction Charges Inline Suction Procedure Stat Charge   Secretions Amount moderate   Secretions Color white;yellow   Secretions Characteristics thick   PRE-TX-O2   O2 Device (Oxygen Therapy) Aerosol T-Tube  (emptied drain cup)   $ Is the patient on Low Flow Oxygen? Yes   Oxygen Concentration (%) 30   SpO2 95 %   Pulse Oximetry Type Continuous   $ Pulse Oximetry - Multiple Charge Pulse Oximetry - Multiple   Pulse (!) 55   Resp 20   Ready to Wean/Extubation Screen   FIO2<=50 (chart decimal) 0.3      home

## 2023-04-08 LAB
CULTURE RESULTS: SIGNIFICANT CHANGE UP
SPECIMEN SOURCE: SIGNIFICANT CHANGE UP

## 2023-04-19 ENCOUNTER — TRANSCRIPTION ENCOUNTER (OUTPATIENT)
Age: 68
End: 2023-04-19

## 2023-04-20 ENCOUNTER — TRANSCRIPTION ENCOUNTER (OUTPATIENT)
Age: 68
End: 2023-04-20

## 2023-04-20 ENCOUNTER — OUTPATIENT (OUTPATIENT)
Dept: INPATIENT UNIT | Facility: HOSPITAL | Age: 68
LOS: 1 days | End: 2023-04-20
Payer: MEDICARE

## 2023-04-20 VITALS
SYSTOLIC BLOOD PRESSURE: 111 MMHG | TEMPERATURE: 98 F | RESPIRATION RATE: 16 BRPM | DIASTOLIC BLOOD PRESSURE: 53 MMHG | OXYGEN SATURATION: 100 % | HEART RATE: 73 BPM

## 2023-04-20 VITALS
WEIGHT: 240.08 LBS | OXYGEN SATURATION: 97 % | SYSTOLIC BLOOD PRESSURE: 101 MMHG | DIASTOLIC BLOOD PRESSURE: 58 MMHG | RESPIRATION RATE: 18 BRPM | TEMPERATURE: 98 F | HEART RATE: 89 BPM | HEIGHT: 64 IN

## 2023-04-20 DIAGNOSIS — Z96.651 PRESENCE OF RIGHT ARTIFICIAL KNEE JOINT: Chronic | ICD-10-CM

## 2023-04-20 DIAGNOSIS — Z98.890 OTHER SPECIFIED POSTPROCEDURAL STATES: Chronic | ICD-10-CM

## 2023-04-20 DIAGNOSIS — Z96.652 PRESENCE OF LEFT ARTIFICIAL KNEE JOINT: Chronic | ICD-10-CM

## 2023-04-20 DIAGNOSIS — C67.9 MALIGNANT NEOPLASM OF BLADDER, UNSPECIFIED: ICD-10-CM

## 2023-04-20 DIAGNOSIS — R31.29 OTHER MICROSCOPIC HEMATURIA: ICD-10-CM

## 2023-04-20 DIAGNOSIS — Z90.710 ACQUIRED ABSENCE OF BOTH CERVIX AND UTERUS: Chronic | ICD-10-CM

## 2023-04-20 LAB — GLUCOSE BLDC GLUCOMTR-MCNC: 94 MG/DL — SIGNIFICANT CHANGE UP (ref 70–99)

## 2023-04-20 PROCEDURE — 82962 GLUCOSE BLOOD TEST: CPT

## 2023-04-20 PROCEDURE — 52234 CYSTOSCOPY AND TREATMENT: CPT

## 2023-04-20 PROCEDURE — C9399: CPT

## 2023-04-20 RX ORDER — SODIUM CHLORIDE 9 MG/ML
1000 INJECTION, SOLUTION INTRAVENOUS
Refills: 0 | Status: DISCONTINUED | OUTPATIENT
Start: 2023-04-20 | End: 2023-05-04

## 2023-04-20 RX ORDER — CEFAZOLIN SODIUM 1 G
2000 VIAL (EA) INJECTION ONCE
Refills: 0 | Status: COMPLETED | OUTPATIENT
Start: 2023-04-20 | End: 2023-04-20

## 2023-04-20 RX ORDER — FLUTICASONE PROPIONATE 50 MCG
1 SPRAY, SUSPENSION NASAL
Qty: 0 | Refills: 0 | DISCHARGE

## 2023-04-20 RX ORDER — FENTANYL CITRATE 50 UG/ML
50 INJECTION INTRAVENOUS
Refills: 0 | Status: DISCONTINUED | OUTPATIENT
Start: 2023-04-20 | End: 2023-04-20

## 2023-04-20 NOTE — ASU DISCHARGE PLAN (ADULT/PEDIATRIC) - DO NOT DRIVE IF TAKING PAIN MEDICATION
From  Dona Reddy To Sent  1/10/2018  5:59 PM   Sorry just one last thing! My body isn't currently going through a period of severe symptoms!     
We might have to have her start monitoring her symptoms and only send one message right before her follow up.  Please advise.  
noted  
NULL

## 2023-04-20 NOTE — PACU DISCHARGE NOTE - HYDRATION STATUS:
Chief Complaint   Patient presents with     Blood Draw     Labs drawn with  by rn.  VS taken.     Labs drawn with  by rn.  Pt tolerated well.  VS taken.  Pt checked in for next appt.    Jess Ferrera RN     Satisfactory

## 2023-04-20 NOTE — BRIEF OPERATIVE NOTE - NSICDXBRIEFPROCEDURE_GEN_ALL_CORE_FT
PROCEDURES:  Transurethral resection of bladder tumor (TURBT) with biopsy 20-Apr-2023 08:26:47  Christopher Monique

## 2023-04-20 NOTE — ASU DISCHARGE PLAN (ADULT/PEDIATRIC) - CARE PROVIDER_API CALL
Christopher Monique (MD)  Urology  [address], [address]  [place] [address]  Phone: [phone]  Fax: [phone]  Established Patient  Follow Up Time: 2 weeks

## 2023-04-20 NOTE — ASU PATIENT PROFILE, ADULT - NSICDXPASTSURGICALHX_GEN_ALL_CORE_FT
PAST SURGICAL HISTORY:  H/O excision of ganglion cyst     H/O total knee replacement, left     H/O transurethral resection of bladder tumor (TURBT)     History of vocal cord polypectomy     S/P hysterectomy 1984    S/P total knee replacement, right

## 2023-04-25 LAB — SURGICAL PATHOLOGY STUDY: SIGNIFICANT CHANGE UP

## 2023-04-26 ENCOUNTER — NON-APPOINTMENT (OUTPATIENT)
Age: 68
End: 2023-04-26

## 2023-04-26 ENCOUNTER — APPOINTMENT (OUTPATIENT)
Dept: OPHTHALMOLOGY | Facility: CLINIC | Age: 68
End: 2023-04-26
Payer: MEDICARE

## 2023-04-26 PROCEDURE — 92012 INTRM OPH EXAM EST PATIENT: CPT

## 2024-01-17 ENCOUNTER — EMERGENCY (EMERGENCY)
Facility: HOSPITAL | Age: 69
LOS: 1 days | Discharge: HOME | End: 2024-01-17
Payer: MEDICARE

## 2024-01-17 VITALS
DIASTOLIC BLOOD PRESSURE: 78 MMHG | HEART RATE: 73 BPM | OXYGEN SATURATION: 99 % | SYSTOLIC BLOOD PRESSURE: 120 MMHG | TEMPERATURE: 99 F | RESPIRATION RATE: 17 BRPM

## 2024-01-17 VITALS
RESPIRATION RATE: 18 BRPM | TEMPERATURE: 99 F | HEART RATE: 102 BPM | HEIGHT: 63 IN | SYSTOLIC BLOOD PRESSURE: 122 MMHG | DIASTOLIC BLOOD PRESSURE: 64 MMHG | OXYGEN SATURATION: 97 % | WEIGHT: 229.94 LBS

## 2024-01-17 DIAGNOSIS — Z96.652 PRESENCE OF LEFT ARTIFICIAL KNEE JOINT: Chronic | ICD-10-CM

## 2024-01-17 DIAGNOSIS — Z90.710 ACQUIRED ABSENCE OF BOTH CERVIX AND UTERUS: Chronic | ICD-10-CM

## 2024-01-17 DIAGNOSIS — Z98.890 OTHER SPECIFIED POSTPROCEDURAL STATES: Chronic | ICD-10-CM

## 2024-01-17 DIAGNOSIS — Z96.651 PRESENCE OF RIGHT ARTIFICIAL KNEE JOINT: Chronic | ICD-10-CM

## 2024-01-17 PROBLEM — R31.29 OTHER MICROSCOPIC HEMATURIA: Chronic | Status: ACTIVE | Noted: 2023-04-07

## 2024-01-17 PROBLEM — H04.123 DRY EYE SYNDROME OF BILATERAL LACRIMAL GLANDS: Chronic | Status: ACTIVE | Noted: 2023-04-07

## 2024-01-17 PROBLEM — C67.9 MALIGNANT NEOPLASM OF BLADDER, UNSPECIFIED: Chronic | Status: ACTIVE | Noted: 2023-04-07

## 2024-01-17 PROBLEM — E11.9 TYPE 2 DIABETES MELLITUS WITHOUT COMPLICATIONS: Chronic | Status: ACTIVE | Noted: 2023-04-07

## 2024-01-17 PROBLEM — H26.9 UNSPECIFIED CATARACT: Chronic | Status: ACTIVE | Noted: 2023-04-07

## 2024-01-17 PROBLEM — D21.9 BENIGN NEOPLASM OF CONNECTIVE AND OTHER SOFT TISSUE, UNSPECIFIED: Chronic | Status: ACTIVE | Noted: 2023-04-07

## 2024-01-17 PROBLEM — K76.0 FATTY (CHANGE OF) LIVER, NOT ELSEWHERE CLASSIFIED: Chronic | Status: ACTIVE | Noted: 2023-04-07

## 2024-01-17 PROCEDURE — 99283 EMERGENCY DEPT VISIT LOW MDM: CPT | Mod: FS

## 2024-01-17 PROCEDURE — 99283 EMERGENCY DEPT VISIT LOW MDM: CPT

## 2024-01-17 NOTE — ED PROVIDER NOTE - CLINICAL SUMMARY MEDICAL DECISION MAKING FREE TEXT BOX
Medical decision making:  Likely chemical versus viral conjunctivitis.  There may be some concern for exposure to bacteria-contaminated eyedrops that have been recalled, but there is no clinical evidence for scleral or corneal ulcer at this time.    That being said, the patient warrants further evaluation by ophthalmology, in clinic, which we will help arrange on an expedited outpatient basis.

## 2024-01-17 NOTE — ED PROVIDER NOTE - PHYSICAL EXAMINATION
Gen: AAO x 3, NAD  Skin: No rashes or lesions  HEENT: NC/AT, PERRLA, EOMI, MMM; IOP 10 b/l; no corneal abrasion or ulcer under fluorescin exam   Resp: unlabored CTAB  Cardiac: rrr s1s2, no murmurs, rubs or gallops  GI: ND, +BS, Soft, NT  Ext: no pedal edema, FROM in all extremities  Neuro: no focal deficits Gen: AAO x 3, NAD  Skin: No rashes or lesions  HEENT: NC/AT, PERRLA, EOMI, MMM; IOP 10 b/l; no corneal abrasion or ulcer under fluorescin exam; b/l 20/20   Resp: unlabored CTAB  Cardiac: rrr s1s2, no murmurs, rubs or gallops  GI: ND, +BS, Soft, NT  Ext: no pedal edema, FROM in all extremities  Neuro: no focal deficits

## 2024-01-17 NOTE — ED PROVIDER NOTE - NS ED ROS FT
Constitutional: No fever or chills  Eyes: No visual changes, +eye pain +redness  HEENT: No throat pain, ear pain, nasal pain. No nose bleeding.  CV: No chest pain or lower extremity edema  Resp: No SOB no cough  GI: No abd pain. No nausea or vomiting. No diarrhea. No constipation.   : No dysuria, hematuria.   MSK: No musculoskeletal pain  Skin: No rash  Neuro: No headache. No numbness or tingling. No weakness.

## 2024-01-17 NOTE — ED PROVIDER NOTE - NSFOLLOWUPINSTRUCTIONS_ED_ALL_ED_FT
Conjunctivitis    Conjunctivitis is an inflammation of the clear membrane that covers the white part of your eye and the inner surface of your eyelid (conjunctiva). Symptoms include eye redness, eye pain, tearing and drainage, crusting of eyelids, swollen eyelids, and light sensitivity. Conjunctivitis may be contagious and easily spread from person to person. It can be caused by a virus, bacteria, or as part of an allergic reaction; the treatment depends on the type of conjunctivitis suspected. Avoid touching or rubbing your eyes and wipe away any drainage gently with a warm wet washcloth. Do not wear contact lenses until the inflammation is gone – wear glasses until your health care provider says it is safe to wear contact again. Do not share towels or washcloths that may spread the infection and wash your hands frequently.    SEEK IMMEDIATE MEDICAL CARE IF YOU HAVE ANY OF THE FOLLOWING SYMPTOMS: increasing pain, blurry vision, blindness, fever, or facial pain/redness/swelling.     PLEASE follow up with your eye doctor as soon as possible. You can follow up with our eye clinic   [phone]; STOP using the over the counter Lubricating drops and STOP sharing the other drops with your mother.

## 2024-01-17 NOTE — ED ADULT NURSE NOTE - NSFALLUNIVINTERV_ED_ALL_ED
Bed/Stretcher in lowest position, wheels locked, appropriate side rails in place/Call bell, personal items and telephone in reach/Instruct patient to call for assistance before getting out of bed/chair/stretcher/Non-slip footwear applied when patient is off stretcher/[place] to call system/Physically safe environment - no spills, clutter or unnecessary equipment/Purposeful proactive rounding/Room/bathroom lighting operational, light cord in reach

## 2024-01-17 NOTE — ED PROVIDER NOTE - ATTENDING APP SHARED VISIT CONTRIBUTION OF CARE
Attending MD Ling:  patient seen and evaluated with the PA.  I was present for key portions of the History and Physical, and I agree with the Impression and Plan.      Patient is a 68-year-old female complaining of 6-7 she has days of bilateral eye purulent discharge and itchiness.  No blurry vision, no double vision, no eye pain.  Sensation is more irritation than it is pain.    Of note, patient utilizes lubricating eyedrops purchased from [employer] and [employer].  Unclear which date these were purchased.  Medical history: No diabetes, no chronic eye problems.    VS: wnl.   Gen:  Well appearing in NAD.    Head:  NC/AT.  Resp: No distress.    Ext: no deformities.  Skin: warm and dry as visualized.    Neuro: alert and oriented to person, place, time.  Eye exam:   Intraocular pressure: OD 16, OS 16  Extraocular movement is intact, pupils equal round reactive to light  Acuity: 20/20 OD and OS 20/20  Fluorescein exam: No corneal abrasions/ulcers, no scleral uptake appreciated    Medical decision making:  Likely chemical versus viral conjunctivitis.  Mother may be some concern for exposure to bacteria-contaminated eyedrops that have been recalled, there is no clinical evidence for scleral or corneal ulcer at this time.    That being said, the patient warrants further evaluation by ophthalmology, in clinic, which we will help arrange on an expedited basis. Attending MD Ling:  patient seen and evaluated with the PA.  I was present for key portions of the History and Physical, and I agree with the Impression and Plan.      Patient is a 68-year-old female complaining of 6-7 she has days of bilateral eye purulent discharge and itchiness.  No blurry vision, no double vision, no eye pain.  Sensation is more irritation than it is pain.    Of note, patient utilizes lubricating eyedrops purchased from [employer] and [employer].  Unclear which date these were purchased.  Medical history: No diabetes, no chronic eye problems.    VS: wnl.   Gen:  Well appearing in NAD.    Head:  NC/AT.  Resp: No distress.    Ext: no deformities.  Skin: warm and dry as visualized.    Neuro: alert and oriented to person, place, time.  Eye exam:   Intraocular pressure: OD 16, OS 16  Extraocular movement is intact, pupils equal round reactive to light  Acuity: 20/20 OD and OS 20/20  Fluorescein exam: No corneal abrasions/ulcers, no scleral uptake appreciated    Medical decision making:  Likely chemical versus viral conjunctivitis.  There may be some concern for exposure to bacteria-contaminated eyedrops that have been recalled, but there is no clinical evidence for scleral or corneal ulcer at this time.    That being said, the patient warrants further evaluation by ophthalmology, in clinic, which we will help arrange on an expedited outpatient basis.

## 2024-01-17 NOTE — ED PROVIDER NOTE - PROGRESS NOTE DETAILS
spoke to Ophto and pt would like to follow up with her  doctor. offered follow up today at the Eye clinic but declined. Advised to stop using the lubricating drops and sharing the other drops. Strict return precautions advised. Case discussed with Dr. Ling. JESSI Barboza

## 2024-01-17 NOTE — ED PROVIDER NOTE - OBJECTIVE STATEMENT
68yof pmhx of HTN HLD DM Hypothyroid, hx of dry eyes, using Restasis (rx of Mother- also here for similar symx) and Drugstore brand lubricant drops here with one week of watery discharge from b/l eyes with itchiness and crusting with purulent discharge in the morning only. NO vision changes. no URI symx. No cough cold or congestion. No fever or chills. no headache.

## 2024-01-17 NOTE — ED ADULT NURSE NOTE - OBJECTIVE STATEMENT
Pt is a 68 yr old female coming from home for eye redness. Pt endorses 1 week of eye redness and itchiness- endorses having dry eye in the past and using OTC drops/her family's eye drops. PT denies any other complaints at this time. pt is ambulatory with her rollator and independent.

## 2024-01-17 NOTE — ED PROVIDER NOTE - NSICDXFAMILYHX_GEN_ALL_CORE_FT
FAMILY HISTORY:  Father  Still living? No  FH: hypertension, Age at diagnosis: Age Unknown  FH: liver cancer, Age at diagnosis: Age Unknown    Mother  Still living? Yes, Estimated age: Age Unknown  Family history of DVT, Age at diagnosis: Age Unknown  FH: hypertension, Age at diagnosis: Age Unknown  FH: type 2 diabetes, Age at diagnosis: Age Unknown    Sibling  Still living? No  FH: breast cancer, Age at diagnosis: Age Unknown

## 2024-01-24 ENCOUNTER — APPOINTMENT (OUTPATIENT)
Dept: OPHTHALMOLOGY | Facility: CLINIC | Age: 69
End: 2024-01-24
Payer: MEDICARE

## 2024-01-24 ENCOUNTER — NON-APPOINTMENT (OUTPATIENT)
Age: 69
End: 2024-01-24

## 2024-01-24 PROCEDURE — 92014 COMPRE OPH EXAM EST PT 1/>: CPT

## 2024-05-17 ENCOUNTER — OUTPATIENT (OUTPATIENT)
Dept: OUTPATIENT SERVICES | Facility: HOSPITAL | Age: 69
LOS: 1 days | End: 2024-05-17
Payer: MEDICARE

## 2024-05-17 VITALS
RESPIRATION RATE: 18 BRPM | TEMPERATURE: 98 F | WEIGHT: 216.05 LBS | HEIGHT: 63 IN | SYSTOLIC BLOOD PRESSURE: 124 MMHG | HEART RATE: 83 BPM | OXYGEN SATURATION: 96 % | DIASTOLIC BLOOD PRESSURE: 80 MMHG

## 2024-05-17 DIAGNOSIS — R31.29 OTHER MICROSCOPIC HEMATURIA: ICD-10-CM

## 2024-05-17 DIAGNOSIS — Z01.818 ENCOUNTER FOR OTHER PREPROCEDURAL EXAMINATION: ICD-10-CM

## 2024-05-17 DIAGNOSIS — Z98.890 OTHER SPECIFIED POSTPROCEDURAL STATES: Chronic | ICD-10-CM

## 2024-05-17 DIAGNOSIS — Z96.651 PRESENCE OF RIGHT ARTIFICIAL KNEE JOINT: Chronic | ICD-10-CM

## 2024-05-17 DIAGNOSIS — Z96.652 PRESENCE OF LEFT ARTIFICIAL KNEE JOINT: Chronic | ICD-10-CM

## 2024-05-17 DIAGNOSIS — Z90.710 ACQUIRED ABSENCE OF BOTH CERVIX AND UTERUS: Chronic | ICD-10-CM

## 2024-05-17 DIAGNOSIS — G47.33 OBSTRUCTIVE SLEEP APNEA (ADULT) (PEDIATRIC): ICD-10-CM

## 2024-05-17 LAB
A1C WITH ESTIMATED AVERAGE GLUCOSE RESULT: 5.4 % — SIGNIFICANT CHANGE UP (ref 4–5.6)
ALBUMIN SERPL ELPH-MCNC: 4.4 G/DL — SIGNIFICANT CHANGE UP (ref 3.3–5)
ALP SERPL-CCNC: 93 U/L — SIGNIFICANT CHANGE UP (ref 40–120)
ALT FLD-CCNC: 20 U/L — SIGNIFICANT CHANGE UP (ref 10–45)
ANION GAP SERPL CALC-SCNC: 15 MMOL/L — SIGNIFICANT CHANGE UP (ref 5–17)
AST SERPL-CCNC: 27 U/L — SIGNIFICANT CHANGE UP (ref 10–40)
BILIRUB SERPL-MCNC: 1 MG/DL — SIGNIFICANT CHANGE UP (ref 0.2–1.2)
BUN SERPL-MCNC: 15 MG/DL — SIGNIFICANT CHANGE UP (ref 7–23)
CALCIUM SERPL-MCNC: 10.5 MG/DL — SIGNIFICANT CHANGE UP (ref 8.4–10.5)
CHLORIDE SERPL-SCNC: 105 MMOL/L — SIGNIFICANT CHANGE UP (ref 96–108)
CO2 SERPL-SCNC: 25 MMOL/L — SIGNIFICANT CHANGE UP (ref 22–31)
CREAT SERPL-MCNC: 1.2 MG/DL — SIGNIFICANT CHANGE UP (ref 0.5–1.3)
EGFR: 49 ML/MIN/1.73M2 — LOW
ESTIMATED AVERAGE GLUCOSE: 108 MG/DL — SIGNIFICANT CHANGE UP (ref 68–114)
GLUCOSE SERPL-MCNC: 73 MG/DL — SIGNIFICANT CHANGE UP (ref 70–99)
HCT VFR BLD CALC: 42.4 % — SIGNIFICANT CHANGE UP (ref 34.5–45)
HCV AB S/CO SERPL IA: 0.09 S/CO — SIGNIFICANT CHANGE UP (ref 0–0.99)
HCV AB SERPL-IMP: SIGNIFICANT CHANGE UP
HGB BLD-MCNC: 13.8 G/DL — SIGNIFICANT CHANGE UP (ref 11.5–15.5)
INR BLD: 1.07 RATIO — SIGNIFICANT CHANGE UP (ref 0.85–1.18)
MCHC RBC-ENTMCNC: 31 PG — SIGNIFICANT CHANGE UP (ref 27–34)
MCHC RBC-ENTMCNC: 32.5 GM/DL — SIGNIFICANT CHANGE UP (ref 32–36)
MCV RBC AUTO: 95.3 FL — SIGNIFICANT CHANGE UP (ref 80–100)
NRBC # BLD: 0 /100 WBCS — SIGNIFICANT CHANGE UP (ref 0–0)
PLATELET # BLD AUTO: 271 K/UL — SIGNIFICANT CHANGE UP (ref 150–400)
POTASSIUM SERPL-MCNC: 3.2 MMOL/L — LOW (ref 3.5–5.3)
POTASSIUM SERPL-SCNC: 3.2 MMOL/L — LOW (ref 3.5–5.3)
PROT SERPL-MCNC: 7.8 G/DL — SIGNIFICANT CHANGE UP (ref 6–8.3)
PROTHROM AB SERPL-ACNC: 11.7 SEC — SIGNIFICANT CHANGE UP (ref 9.5–13)
RBC # BLD: 4.45 M/UL — SIGNIFICANT CHANGE UP (ref 3.8–5.2)
RBC # FLD: 14.1 % — SIGNIFICANT CHANGE UP (ref 10.3–14.5)
SODIUM SERPL-SCNC: 145 MMOL/L — SIGNIFICANT CHANGE UP (ref 135–145)
WBC # BLD: 4.87 K/UL — SIGNIFICANT CHANGE UP (ref 3.8–10.5)
WBC # FLD AUTO: 4.87 K/UL — SIGNIFICANT CHANGE UP (ref 3.8–10.5)

## 2024-05-17 PROCEDURE — 87086 URINE CULTURE/COLONY COUNT: CPT

## 2024-05-17 PROCEDURE — 85610 PROTHROMBIN TIME: CPT

## 2024-05-17 PROCEDURE — 86803 HEPATITIS C AB TEST: CPT

## 2024-05-17 PROCEDURE — G0463: CPT

## 2024-05-17 PROCEDURE — 83036 HEMOGLOBIN GLYCOSYLATED A1C: CPT

## 2024-05-17 PROCEDURE — 36415 COLL VENOUS BLD VENIPUNCTURE: CPT

## 2024-05-17 PROCEDURE — 80053 COMPREHEN METABOLIC PANEL: CPT

## 2024-05-17 PROCEDURE — 85027 COMPLETE CBC AUTOMATED: CPT

## 2024-05-17 RX ORDER — LEVOTHYROXINE SODIUM 125 MCG
1 TABLET ORAL
Qty: 0 | Refills: 0 | DISCHARGE

## 2024-05-17 RX ORDER — BENAZEPRIL HYDROCHLORIDE 40 MG/1
1 TABLET ORAL
Refills: 0 | DISCHARGE

## 2024-05-17 RX ORDER — LIDOCAINE HCL 20 MG/ML
0.2 VIAL (ML) INJECTION ONCE
Refills: 0 | Status: DISCONTINUED | OUTPATIENT
Start: 2024-05-30 | End: 2024-06-13

## 2024-05-17 RX ORDER — SODIUM CHLORIDE 9 MG/ML
3 INJECTION INTRAMUSCULAR; INTRAVENOUS; SUBCUTANEOUS EVERY 8 HOURS
Refills: 0 | Status: DISCONTINUED | OUTPATIENT
Start: 2024-05-30 | End: 2024-06-13

## 2024-05-17 RX ORDER — AMLODIPINE BESYLATE 2.5 MG/1
1 TABLET ORAL
Refills: 0 | DISCHARGE

## 2024-05-17 NOTE — H&P PST ADULT - FUNCTIONAL STATUS
LATOSHA METS: 5.07, takes her time but is able to walk 2 blocks and climb a flight of stairs without SOB/chest pain but does get HELM if she exerts herself further than that, takes care of her home and her 93 year old mother/4-10 METS

## 2024-05-17 NOTE — H&P PST ADULT - HEMATOLOGY/LYMPHATICS
negative 5-Fu Counseling: 5-Fluorouracil Counseling:  I discussed with the patient the risks of 5-fluorouracil including but not limited to erythema, scaling, itching, weeping, crusting, and pain.

## 2024-05-17 NOTE — H&P PST ADULT - NSICDXPROCEDURE_GEN_ALL_CORE_FT
PROCEDURES:  Transurethral resection of bladder tumor (TURBT) with biopsy 17-May-2024 07:46:32  Benita Diggs

## 2024-05-17 NOTE — H&P PST ADULT - HISTORY OF PRESENT ILLNESS
68 year old female with PMHx of HTN, HLD, T2DM, fatty liver disease, hypothyroidism, dry eyes (on daily drops), DENITA on CPAP, vocal cord polyp s/p polypectomy (~1990, no voice changes), OA s/p B/L TKR, ETOH abuse, bladder tumor s/p TURBT (4/2023) now presents to [facility] for cystoscopy, transurethral resection of bladder tumor with bipolar left stent insertion, retrograde pyelogram with fluoroscopy on 5/30/24. Patient denies fever, chills, SOB, chest pain.

## 2024-05-17 NOTE — H&P PST ADULT - ATTENDING COMMENTS
Recurrent bladder tumor. OR today for TURBT and possible left stent insertion, RTPG. R/B discussed. Consent reviewed and signed. Marked on left.    Christopher Monique MD  [facility]  [address], [address], [place] [address]  Office: [phone] Fax: [phone]

## 2024-05-17 NOTE — H&P PST ADULT - NSICDXPASTMEDICALHX_GEN_ALL_CORE_FT
PAST MEDICAL HISTORY:  Cancer of bladder     Chronic GERD     Dry eyes     ETOH abuse     Fatty liver     Fibroids     HTN (hypertension)     Hypothyroid     Meningitis     Microscopic hematuria     DENITA on CPAP     Other microscopic hematuria     Trace cataracts     Type 2 diabetes mellitus

## 2024-05-17 NOTE — H&P PST ADULT - PROBLEM SELECTOR PLAN 1
Planned for cystoscopy, transurethral resection of bladder tumor with bipolar left stent insertion and retrograde pyelogram with fluoroscopy on 5/30/24. Surgical instructions reviewed and provided to patient. CBC, CMP, HgbA1C, Hepatitis C, PT/INR, Urine Culture obtained at [facility].    Patient to be seen by PCP for medical evaluation 5/28. Patient to also obtain Cardiac evaluation as well.     Last dose of Glipizide: 5/29/24  Continue all other medications

## 2024-05-18 LAB
CULTURE RESULTS: SIGNIFICANT CHANGE UP
SPECIMEN SOURCE: SIGNIFICANT CHANGE UP

## 2024-05-29 ENCOUNTER — TRANSCRIPTION ENCOUNTER (OUTPATIENT)
Age: 69
End: 2024-05-29

## 2024-05-30 ENCOUNTER — TRANSCRIPTION ENCOUNTER (OUTPATIENT)
Age: 69
End: 2024-05-30

## 2024-05-30 ENCOUNTER — OUTPATIENT (OUTPATIENT)
Dept: OUTPATIENT SERVICES | Facility: HOSPITAL | Age: 69
LOS: 1 days | End: 2024-05-30
Payer: MEDICARE

## 2024-05-30 VITALS
SYSTOLIC BLOOD PRESSURE: 128 MMHG | DIASTOLIC BLOOD PRESSURE: 71 MMHG | RESPIRATION RATE: 17 BRPM | OXYGEN SATURATION: 96 % | HEART RATE: 66 BPM

## 2024-05-30 VITALS
OXYGEN SATURATION: 97 % | TEMPERATURE: 98 F | HEIGHT: 63 IN | SYSTOLIC BLOOD PRESSURE: 116 MMHG | WEIGHT: 216.05 LBS | HEART RATE: 72 BPM | RESPIRATION RATE: 16 BRPM | DIASTOLIC BLOOD PRESSURE: 75 MMHG

## 2024-05-30 DIAGNOSIS — D49.4 NEOPLASM OF UNSPECIFIED BEHAVIOR OF BLADDER: ICD-10-CM

## 2024-05-30 DIAGNOSIS — Z98.890 OTHER SPECIFIED POSTPROCEDURAL STATES: Chronic | ICD-10-CM

## 2024-05-30 DIAGNOSIS — Z96.651 PRESENCE OF RIGHT ARTIFICIAL KNEE JOINT: Chronic | ICD-10-CM

## 2024-05-30 DIAGNOSIS — R31.29 OTHER MICROSCOPIC HEMATURIA: ICD-10-CM

## 2024-05-30 DIAGNOSIS — Z90.710 ACQUIRED ABSENCE OF BOTH CERVIX AND UTERUS: Chronic | ICD-10-CM

## 2024-05-30 DIAGNOSIS — Z96.652 PRESENCE OF LEFT ARTIFICIAL KNEE JOINT: Chronic | ICD-10-CM

## 2024-05-30 LAB — GLUCOSE BLDC GLUCOMTR-MCNC: 79 MG/DL — SIGNIFICANT CHANGE UP (ref 70–99)

## 2024-05-30 PROCEDURE — 88305 TISSUE EXAM BY PATHOLOGIST: CPT

## 2024-05-30 PROCEDURE — C2617: CPT

## 2024-05-30 PROCEDURE — 82962 GLUCOSE BLOOD TEST: CPT

## 2024-05-30 PROCEDURE — C1769: CPT

## 2024-05-30 PROCEDURE — C1758: CPT

## 2024-05-30 PROCEDURE — 76000 FLUOROSCOPY <1 HR PHYS/QHP: CPT

## 2024-05-30 PROCEDURE — 52235 CYSTOSCOPY AND TREATMENT: CPT

## 2024-05-30 PROCEDURE — 88305 TISSUE EXAM BY PATHOLOGIST: CPT | Mod: 26

## 2024-05-30 DEVICE — STENT URET INLAY OPTIMA 6FRX24CM
Type: IMPLANTABLE DEVICE | Status: NON-FUNCTIONAL
Removed: 2024-05-30

## 2024-05-30 DEVICE — GWIRE ZEBRA 038/150 STRT
Type: IMPLANTABLE DEVICE | Status: NON-FUNCTIONAL
Removed: 2024-05-30

## 2024-05-30 DEVICE — GUIDEWIRE SENSOR DUAL-FLEX NITINOL STRAIGHT .035" X 150CM
Type: IMPLANTABLE DEVICE | Status: NON-FUNCTIONAL
Removed: 2024-05-30

## 2024-05-30 DEVICE — URETERAL CATH OPEN END 5FR 70CM
Type: IMPLANTABLE DEVICE | Status: NON-FUNCTIONAL
Removed: 2024-05-30

## 2024-05-30 RX ORDER — ROSUVASTATIN CALCIUM 5 MG/1
1 TABLET ORAL
Refills: 0 | DISCHARGE

## 2024-05-30 RX ORDER — LEVOTHYROXINE SODIUM 125 MCG
1 TABLET ORAL
Refills: 0 | DISCHARGE

## 2024-05-30 RX ORDER — OXYCODONE HYDROCHLORIDE 5 MG/1
5 TABLET ORAL ONCE
Refills: 0 | Status: DISCONTINUED | OUTPATIENT
Start: 2024-05-30 | End: 2024-05-30

## 2024-05-30 RX ORDER — ONDANSETRON 8 MG/1
4 TABLET, FILM COATED ORAL ONCE
Refills: 0 | Status: DISCONTINUED | OUTPATIENT
Start: 2024-05-30 | End: 2024-06-13

## 2024-05-30 RX ORDER — CEFAZOLIN SODIUM 1 G
2000 VIAL (EA) INJECTION ONCE
Refills: 0 | Status: COMPLETED | OUTPATIENT
Start: 2024-05-30 | End: 2024-05-30

## 2024-05-30 RX ORDER — AMLODIPINE BESYLATE AND BENAZEPRIL HYDROCHLORIDE 10; 20 MG/1; MG/1
1 CAPSULE ORAL
Refills: 0 | DISCHARGE

## 2024-05-30 RX ORDER — ASPIRIN/CALCIUM CARB/MAGNESIUM 324 MG
1 TABLET ORAL
Refills: 0 | DISCHARGE

## 2024-05-30 RX ORDER — OMEPRAZOLE 10 MG/1
1 CAPSULE, DELAYED RELEASE ORAL
Qty: 0 | Refills: 0 | DISCHARGE

## 2024-05-30 RX ORDER — OXYCODONE HYDROCHLORIDE 5 MG/1
10 TABLET ORAL ONCE
Refills: 0 | Status: DISCONTINUED | OUTPATIENT
Start: 2024-05-30 | End: 2024-05-30

## 2024-05-30 RX ORDER — CYCLOSPORINE 0.5 MG/ML
1 EMULSION OPHTHALMIC
Qty: 0 | Refills: 0 | DISCHARGE

## 2024-05-30 RX ORDER — IPRATROPIUM/ALBUTEROL SULFATE 18-103MCG
3 AEROSOL WITH ADAPTER (GRAM) INHALATION ONCE
Refills: 0 | Status: DISCONTINUED | OUTPATIENT
Start: 2024-05-30 | End: 2024-06-13

## 2024-05-30 RX ADMIN — OXYCODONE HYDROCHLORIDE 5 MILLIGRAM(S): 5 TABLET ORAL at 13:01

## 2024-05-30 RX ADMIN — OXYCODONE HYDROCHLORIDE 5 MILLIGRAM(S): 5 TABLET ORAL at 12:28

## 2024-05-30 NOTE — ASU DISCHARGE PLAN (ADULT/PEDIATRIC) - NURSING INSTRUCTIONS
OK to take Tylenol/Acetaminophen 1000 mg  at 4 PM for pain and every 6 hours after as needed. OK to take Motrin/Ibuprofen 400 mg  anytime after discharge from Unit  for pain and every 6 hours after as needed.

## 2024-05-30 NOTE — BRIEF OPERATIVE NOTE - NSICDXBRIEFPROCEDURE_GEN_ALL_CORE_FT
PROCEDURES:  Transurethral resection of bladder tumor (TURBT) with biopsy 30-May-2024 11:13:41  Christopher Monique  Cystoureteroscopy, with retrograde pyelogram and stent insertion 30-May-2024 11:13:54  Christopher Monique

## 2024-05-30 NOTE — ASU DISCHARGE PLAN (ADULT/PEDIATRIC) - CARE PROVIDER_API CALL
Christopher Monique  Urology  [address], [address]  [place] [address]  Phone: [phone]  Fax: [phone]  Established Patient  Follow Up Time: 1 week

## 2024-05-30 NOTE — ASU DISCHARGE PLAN (ADULT/PEDIATRIC) - ASU DC SPECIAL INSTRUCTIONSFT
You've been discharged with a catheter. Empty leg bag when filled. Hydrate well. Follow up next week for catheter and stent removal in office.

## 2024-05-30 NOTE — ASU PATIENT PROFILE, ADULT - NSALCOHOLFREQ_GEN_A_CORE_SD
29), Weight in k.8 (-28), Weight in k.9 (-4 or more times/wk daily

## 2024-06-03 LAB — SURGICAL PATHOLOGY STUDY: SIGNIFICANT CHANGE UP

## 2024-08-15 ENCOUNTER — INPATIENT (INPATIENT)
Facility: HOSPITAL | Age: 69
LOS: 28 days | Discharge: NURSING, REHAB OR LONG-TERM CARE | End: 2024-09-13
Attending: STUDENT IN AN ORGANIZED HEALTH CARE EDUCATION/TRAINING PROGRAM | Admitting: STUDENT IN AN ORGANIZED HEALTH CARE EDUCATION/TRAINING PROGRAM
Payer: MEDICARE

## 2024-08-15 VITALS
HEART RATE: 98 BPM | DIASTOLIC BLOOD PRESSURE: 47 MMHG | TEMPERATURE: 99 F | OXYGEN SATURATION: 100 % | RESPIRATION RATE: 20 BRPM | SYSTOLIC BLOOD PRESSURE: 68 MMHG

## 2024-08-15 DIAGNOSIS — Z96.652 PRESENCE OF LEFT ARTIFICIAL KNEE JOINT: Chronic | ICD-10-CM

## 2024-08-15 DIAGNOSIS — Z98.890 OTHER SPECIFIED POSTPROCEDURAL STATES: Chronic | ICD-10-CM

## 2024-08-15 DIAGNOSIS — Z90.710 ACQUIRED ABSENCE OF BOTH CERVIX AND UTERUS: Chronic | ICD-10-CM

## 2024-08-15 DIAGNOSIS — Z96.651 PRESENCE OF RIGHT ARTIFICIAL KNEE JOINT: Chronic | ICD-10-CM

## 2024-08-15 LAB
ALBUMIN SERPL ELPH-MCNC: 3 G/DL — LOW (ref 3.3–5)
ALP SERPL-CCNC: 75 U/L — SIGNIFICANT CHANGE UP (ref 40–120)
ALT FLD-CCNC: 12 U/L — SIGNIFICANT CHANGE UP (ref 4–33)
ANION GAP SERPL CALC-SCNC: 21 MMOL/L — HIGH (ref 7–14)
ANISOCYTOSIS BLD QL: SLIGHT — SIGNIFICANT CHANGE UP
APTT BLD: 31 SEC — SIGNIFICANT CHANGE UP (ref 24.5–35.6)
AST SERPL-CCNC: 26 U/L — SIGNIFICANT CHANGE UP (ref 4–32)
BASOPHILS # BLD AUTO: 0 K/UL — SIGNIFICANT CHANGE UP (ref 0–0.2)
BASOPHILS NFR BLD AUTO: 0 % — SIGNIFICANT CHANGE UP (ref 0–2)
BILIRUB SERPL-MCNC: 1 MG/DL — SIGNIFICANT CHANGE UP (ref 0.2–1.2)
BLOOD GAS VENOUS COMPREHENSIVE RESULT: SIGNIFICANT CHANGE UP
BUN SERPL-MCNC: 46 MG/DL — HIGH (ref 7–23)
CALCIUM SERPL-MCNC: 8.9 MG/DL — SIGNIFICANT CHANGE UP (ref 8.4–10.5)
CHLORIDE SERPL-SCNC: 100 MMOL/L — SIGNIFICANT CHANGE UP (ref 98–107)
CO2 SERPL-SCNC: 18 MMOL/L — LOW (ref 22–31)
CREAT SERPL-MCNC: 5.34 MG/DL — HIGH (ref 0.5–1.3)
EGFR: 8 ML/MIN/1.73M2 — LOW
EOSINOPHIL # BLD AUTO: 0 K/UL — SIGNIFICANT CHANGE UP (ref 0–0.5)
EOSINOPHIL NFR BLD AUTO: 0 % — SIGNIFICANT CHANGE UP (ref 0–6)
GIANT PLATELETS BLD QL SMEAR: PRESENT — SIGNIFICANT CHANGE UP
GLUCOSE SERPL-MCNC: 96 MG/DL — SIGNIFICANT CHANGE UP (ref 70–99)
HCT VFR BLD CALC: 31.2 % — LOW (ref 34.5–45)
HGB BLD-MCNC: 10.2 G/DL — LOW (ref 11.5–15.5)
IANC: 21.91 K/UL — HIGH (ref 1.8–7.4)
INR BLD: 1.06 RATIO — SIGNIFICANT CHANGE UP (ref 0.85–1.18)
LYMPHOCYTES # BLD AUTO: 1.13 K/UL — SIGNIFICANT CHANGE UP (ref 1–3.3)
LYMPHOCYTES # BLD AUTO: 4.4 % — LOW (ref 13–44)
MACROCYTES BLD QL: SLIGHT — SIGNIFICANT CHANGE UP
MANUAL SMEAR VERIFICATION: SIGNIFICANT CHANGE UP
MCHC RBC-ENTMCNC: 30.7 PG — SIGNIFICANT CHANGE UP (ref 27–34)
MCHC RBC-ENTMCNC: 32.7 GM/DL — SIGNIFICANT CHANGE UP (ref 32–36)
MCV RBC AUTO: 94 FL — SIGNIFICANT CHANGE UP (ref 80–100)
MONOCYTES # BLD AUTO: 2.25 K/UL — HIGH (ref 0–0.9)
MONOCYTES NFR BLD AUTO: 8.8 % — SIGNIFICANT CHANGE UP (ref 2–14)
NEUTROPHILS # BLD AUTO: 22.23 K/UL — HIGH (ref 1.8–7.4)
NEUTROPHILS NFR BLD AUTO: 79.8 % — HIGH (ref 43–77)
NEUTS BAND # BLD: 7 % — HIGH (ref 0–6)
PLAT MORPH BLD: NORMAL — SIGNIFICANT CHANGE UP
PLATELET # BLD AUTO: 486 K/UL — HIGH (ref 150–400)
PLATELET COUNT - ESTIMATE: NORMAL — SIGNIFICANT CHANGE UP
POLYCHROMASIA BLD QL SMEAR: SLIGHT — SIGNIFICANT CHANGE UP
POTASSIUM SERPL-MCNC: 3.8 MMOL/L — SIGNIFICANT CHANGE UP (ref 3.5–5.3)
POTASSIUM SERPL-SCNC: 3.8 MMOL/L — SIGNIFICANT CHANGE UP (ref 3.5–5.3)
PROT SERPL-MCNC: 6.9 G/DL — SIGNIFICANT CHANGE UP (ref 6–8.3)
PROTHROM AB SERPL-ACNC: 11.9 SEC — SIGNIFICANT CHANGE UP (ref 9.5–13)
RBC # BLD: 3.32 M/UL — LOW (ref 3.8–5.2)
RBC # FLD: 14.3 % — SIGNIFICANT CHANGE UP (ref 10.3–14.5)
RBC BLD AUTO: ABNORMAL
SODIUM SERPL-SCNC: 139 MMOL/L — SIGNIFICANT CHANGE UP (ref 135–145)
WBC # BLD: 25.61 K/UL — HIGH (ref 3.8–10.5)
WBC # FLD AUTO: 25.61 K/UL — HIGH (ref 3.8–10.5)

## 2024-08-15 PROCEDURE — 99285 EMERGENCY DEPT VISIT HI MDM: CPT

## 2024-08-15 PROCEDURE — 74177 CT ABD & PELVIS W/CONTRAST: CPT | Mod: 26,MC

## 2024-08-15 RX ORDER — PIPERACILLIN SODIUM AND TAZOBACTAM SODIUM 3; .375 G/15ML; G/15ML
3.38 INJECTION, POWDER, FOR SOLUTION INTRAVENOUS ONCE
Refills: 0 | Status: COMPLETED | OUTPATIENT
Start: 2024-08-15 | End: 2024-08-15

## 2024-08-15 RX ORDER — SODIUM CHLORIDE 9 MG/ML
1000 INJECTION INTRAMUSCULAR; INTRAVENOUS; SUBCUTANEOUS ONCE
Refills: 0 | Status: COMPLETED | OUTPATIENT
Start: 2024-08-15 | End: 2024-08-15

## 2024-08-15 RX ORDER — VANCOMYCIN/0.9 % SOD CHLORIDE 1.75G/25
1000 PLASTIC BAG, INJECTION (ML) INTRAVENOUS ONCE
Refills: 0 | Status: COMPLETED | OUTPATIENT
Start: 2024-08-15 | End: 2024-08-15

## 2024-08-15 RX ORDER — ACETAMINOPHEN 325 MG/1
1000 TABLET ORAL ONCE
Refills: 0 | Status: COMPLETED | OUTPATIENT
Start: 2024-08-15 | End: 2024-08-15

## 2024-08-15 RX ADMIN — PIPERACILLIN SODIUM AND TAZOBACTAM SODIUM 200 GRAM(S): 3; .375 INJECTION, POWDER, FOR SOLUTION INTRAVENOUS at 22:45

## 2024-08-15 RX ADMIN — ACETAMINOPHEN 400 MILLIGRAM(S): 325 TABLET ORAL at 22:16

## 2024-08-15 RX ADMIN — Medication 250 MILLIGRAM(S): at 23:40

## 2024-08-15 RX ADMIN — SODIUM CHLORIDE 1000 MILLILITER(S): 9 INJECTION INTRAMUSCULAR; INTRAVENOUS; SUBCUTANEOUS at 23:01

## 2024-08-15 RX ADMIN — SODIUM CHLORIDE 1000 MILLILITER(S): 9 INJECTION INTRAMUSCULAR; INTRAVENOUS; SUBCUTANEOUS at 21:20

## 2024-08-15 NOTE — ED PROVIDER NOTE - PHYSICAL EXAMINATION
GENERAL: Alert. No acute distress.   EYES: EOMI grossly normal. Anicteric.   HENT: dry mucous membranes.   RESP: No conversation dyspnea, no resp distress, CTAB   CARDIOVASCULAR:regular rate, tachycardia  ABDOMEN: soft, non distended, generalized tender to palpation  MSK: ROM grossly normal in all 4 extremities. No deformities  SKIN: warm and dry  NEUROLOGIC: Alert and oriented x3,   PSYCHIATRIC: Cooperative. Appropriate mood and affect

## 2024-08-15 NOTE — ED PROVIDER NOTE - PROGRESS NOTE DETAILS
Milli De La O) [facility] PGY-3: oral temp boarderline, pt refused rectal temp. Will treat fever, emprical coverage

## 2024-08-15 NOTE — ED ADULT TRIAGE NOTE - CHIEF COMPLAINT QUOTE
Pt is coming from home s/p bladder removal on 08/06 for bladder cancer. pt has ileal conduit, has history of HTN, Type DM, hypothyroidism, sleep apnea comes in generalized weakness since 4 dauys ago, constipated for 4 days, BP was 88/40 in the field , BP 68./47 upon arrival. pt is sent to room 1. Fs 180 in the field

## 2024-08-15 NOTE — ED ADULT NURSE REASSESSMENT NOTE - NS ED NURSE REASSESS COMMENT FT1
pt returned from ct scan via stretcher in NAD, RR even and unlabored. pt reports chills, oral temp as documented. pt refusing rectal temp. MD made aware. medicated as per orders.

## 2024-08-15 NOTE — ED ADULT NURSE NOTE - NSFALLRISKINTERV_ED_ALL_ED
Assistance OOB with selected safe patient handling equipment if applicable/Assistance with ambulation/Communicate fall risk and risk factors to all staff, patient, and family/Provide patient with walking aids/Provide visual cue: yellow wristband, yellow gown, etc/Reinforce activity limits and safety measures with patient and family/Call bell, personal items and telephone in reach/Instruct patient to call for assistance before getting out of bed/chair/stretcher/Non-slip footwear applied when patient is off stretcher/[place] to call system/Physically safe environment - no spills, clutter or unnecessary equipment/Purposeful Proactive Rounding/Room/bathroom lighting operational, light cord in reach

## 2024-08-15 NOTE — ED PROVIDER NOTE - NS ED ROS FT
CONSTITUTIONAL: No fever, +chill  HEENT: Denies changes in vision and hearing.  RESPIRATORY: Denies SOB and cough.  CV: Deneis CP.   GI: + abdominal pain, nausea, vomiting and constipation  : hematuria, dysuria  SKIN: Denies rash   NEUROLOGICAL: lightheadedness

## 2024-08-15 NOTE — ED PROVIDER NOTE - ATTENDING CONTRIBUTION TO CARE
67 yo F with h/o HTN and bladder cancer, s/p bladder removal with ileal conduit construction on Aug 6 at [facility] who presents to the ED c/o 4 days of generalized weakness and decreased appetite. Pt left the hospital after surgery 5 days ago and has been feeling poorly since then. Reports that she hasn't really been eating or drinking anything and is now feeling very weak. She does have a h/o HTN and took her antihypertensives at home this morning despite feeling like her blood pressure was already low. She is having abdominal pain since the surgery but no n/v/d, fever, chills. In the ED pt hypotensive, tachycardic, and borderline febrile- septic work-up initiated including fluids, broad spectrum abx, and CT abd/pelvis given recent surgery

## 2024-08-15 NOTE — ED PROVIDER NOTE - OBJECTIVE STATEMENT
69 yo F, with PmHx of HTN and bladder cancer, s/p bladder removal with ileal conduit construction on Aug 6 at [facility], here for generalized weakness. Per pt, she had her surgery on Aug 6th, d/c home on Aug 10th. Since d/c, only have 1-2 bowel movement, and had significantly decrease PO intake due to decreased apptite. No fever, some chill. +bloody discharge from urethra, some burning. Today, called EMS as she felt very weak and unwell. BP 80s/50s for EMS. Unable to get access. Bought into room 1, attg and myself called to bedside.

## 2024-08-15 NOTE — ED PROVIDER NOTE - CLINICAL SUMMARY MEDICAL DECISION MAKING FREE TEXT BOX
67 yo F, with PmHx of HTN and bladder cancer, s/p bladder removal with ileal conduit construction on Aug 6 at [facility], here for generalized weakness. hypotensive, tachycardic. ddx: sepsis, post op complication, dehydration. Also feel today presentation is worsen by pt taking her HTN medication while already normaltensive this AM. Likely admit for sepsis.

## 2024-08-15 NOTE — ED PROVIDER NOTE - NS_EDPROVIDERDISPOUSERTYPE_ED_A_ED
Dr. Sharp -- see Nethub message are you able to send a new RX or do we need patient to call insurance to check alternatives?  .Breann Yanez R.N.    Dr. Sharp,     I just spoke with my 3 month prescription supplier, [facility]. They advised the JESSICA Patch is out of stock until October.      The women I spoke to said they have other brands of estradiol transdermal systems in stock. Upon receipt of a new prescription request, the order will be filled.     Best regards,  Mariam Arellano    
New script sent  
Attending Attestation (For Attendings USE Only)...

## 2024-08-15 NOTE — ED ADULT NURSE NOTE - OBJECTIVE STATEMENT
hx of bladder removal surgery on 8/6 d/t bladder CA. pt reports chills and generalized weakness since discharged from hospital. pt noted to be hypotensive by EMS. pt A&OX4, able to speak in clear completes sentences, VALLECILLO equal bilaterally. pt connected to cardiac monitor, vitals as documented. 20G iv placed to right AC, blood drawn and sent to lab. iv fluids infusing well without complications, iv site WNL. safety measures maintained, side rails up x2. call bell in place at bedside

## 2024-08-16 DIAGNOSIS — A41.9 SEPSIS, UNSPECIFIED ORGANISM: ICD-10-CM

## 2024-08-16 PROBLEM — R31.29 OTHER MICROSCOPIC HEMATURIA: Chronic | Status: ACTIVE | Noted: 2024-05-17

## 2024-08-16 PROBLEM — G47.33 OBSTRUCTIVE SLEEP APNEA (ADULT) (PEDIATRIC): Chronic | Status: ACTIVE | Noted: 2024-05-17

## 2024-08-16 LAB
ADD ON TEST-SPECIMEN IN LAB: SIGNIFICANT CHANGE UP
ANION GAP SERPL CALC-SCNC: 18 MMOL/L — HIGH (ref 7–14)
APPEARANCE UR: ABNORMAL
APTT BLD: 29.5 SEC — SIGNIFICANT CHANGE UP (ref 24.5–35.6)
BACTERIA # UR AUTO: ABNORMAL /HPF
BILIRUB UR-MCNC: ABNORMAL
BLD GP AB SCN SERPL QL: NEGATIVE — SIGNIFICANT CHANGE UP
BLOOD GAS ARTERIAL COMPREHENSIVE RESULT: SIGNIFICANT CHANGE UP
BUN SERPL-MCNC: 41 MG/DL — HIGH (ref 7–23)
CALCIUM SERPL-MCNC: 8.4 MG/DL — SIGNIFICANT CHANGE UP (ref 8.4–10.5)
CHLORIDE SERPL-SCNC: 100 MMOL/L — SIGNIFICANT CHANGE UP (ref 98–107)
CO2 SERPL-SCNC: 18 MMOL/L — LOW (ref 22–31)
COARSE GRAN CASTS #/AREA URNS AUTO: PRESENT
COLOR SPEC: ABNORMAL
CREAT SERPL-MCNC: 4.42 MG/DL — HIGH (ref 0.5–1.3)
DIFF PNL FLD: ABNORMAL
EGFR: 10 ML/MIN/1.73M2 — LOW
GLUCOSE BLDC GLUCOMTR-MCNC: 111 MG/DL — HIGH (ref 70–99)
GLUCOSE BLDC GLUCOMTR-MCNC: 60 MG/DL — LOW (ref 70–99)
GLUCOSE BLDC GLUCOMTR-MCNC: 87 MG/DL — SIGNIFICANT CHANGE UP (ref 70–99)
GLUCOSE BLDC GLUCOMTR-MCNC: 97 MG/DL — SIGNIFICANT CHANGE UP (ref 70–99)
GLUCOSE SERPL-MCNC: 88 MG/DL — SIGNIFICANT CHANGE UP (ref 70–99)
GLUCOSE UR QL: NEGATIVE MG/DL — SIGNIFICANT CHANGE UP
HCT VFR BLD CALC: 29.6 % — LOW (ref 34.5–45)
HGB BLD-MCNC: 10 G/DL — LOW (ref 11.5–15.5)
INR BLD: 1.09 RATIO — SIGNIFICANT CHANGE UP (ref 0.85–1.18)
KETONES UR-MCNC: NEGATIVE MG/DL — SIGNIFICANT CHANGE UP
LEUKOCYTE ESTERASE UR-ACNC: ABNORMAL
MAGNESIUM SERPL-MCNC: 1.6 MG/DL — SIGNIFICANT CHANGE UP (ref 1.6–2.6)
MCHC RBC-ENTMCNC: 31.3 PG — SIGNIFICANT CHANGE UP (ref 27–34)
MCHC RBC-ENTMCNC: 33.8 GM/DL — SIGNIFICANT CHANGE UP (ref 32–36)
MCV RBC AUTO: 92.5 FL — SIGNIFICANT CHANGE UP (ref 80–100)
MRSA PCR RESULT.: SIGNIFICANT CHANGE UP
NITRITE UR-MCNC: POSITIVE
NRBC # BLD: 0 /100 WBCS — SIGNIFICANT CHANGE UP (ref 0–0)
NRBC # FLD: 0 K/UL — SIGNIFICANT CHANGE UP (ref 0–0)
NT-PROBNP SERPL-SCNC: 230 PG/ML — SIGNIFICANT CHANGE UP
PH UR: 6 — SIGNIFICANT CHANGE UP (ref 5–8)
PHOSPHATE SERPL-MCNC: 3.1 MG/DL — SIGNIFICANT CHANGE UP (ref 2.5–4.5)
PLATELET # BLD AUTO: 556 K/UL — HIGH (ref 150–400)
POTASSIUM SERPL-MCNC: 3 MMOL/L — LOW (ref 3.5–5.3)
POTASSIUM SERPL-SCNC: 3 MMOL/L — LOW (ref 3.5–5.3)
PROT UR-MCNC: 100 MG/DL
PROTHROM AB SERPL-ACNC: 12.3 SEC — SIGNIFICANT CHANGE UP (ref 9.5–13)
RBC # BLD: 3.2 M/UL — LOW (ref 3.8–5.2)
RBC # FLD: 14.3 % — SIGNIFICANT CHANGE UP (ref 10.3–14.5)
RBC CASTS # UR COMP ASSIST: >50 /HPF — SIGNIFICANT CHANGE UP (ref 0–4)
REVIEW: SIGNIFICANT CHANGE UP
RH IG SCN BLD-IMP: POSITIVE — SIGNIFICANT CHANGE UP
S AUREUS DNA NOSE QL NAA+PROBE: SIGNIFICANT CHANGE UP
SODIUM SERPL-SCNC: 136 MMOL/L — SIGNIFICANT CHANGE UP (ref 135–145)
SP GR SPEC: 1.03 — SIGNIFICANT CHANGE UP (ref 1–1.03)
SQUAMOUS # UR AUTO: 13 /HPF — HIGH (ref 0–5)
TROPONIN T, HIGH SENSITIVITY RESULT: 46 NG/L — SIGNIFICANT CHANGE UP
UROBILINOGEN FLD QL: 1 MG/DL — SIGNIFICANT CHANGE UP (ref 0.2–1)
WBC # BLD: 36.31 K/UL — HIGH (ref 3.8–10.5)
WBC # FLD AUTO: 36.31 K/UL — HIGH (ref 3.8–10.5)
WBC UR QL: >50 /HPF — SIGNIFICANT CHANGE UP (ref 0–5)

## 2024-08-16 PROCEDURE — 99292 CRITICAL CARE ADDL 30 MIN: CPT | Mod: 25

## 2024-08-16 PROCEDURE — 36556 INSERT NON-TUNNEL CV CATH: CPT | Mod: GC

## 2024-08-16 PROCEDURE — 36620 INSERTION CATHETER ARTERY: CPT

## 2024-08-16 PROCEDURE — 71045 X-RAY EXAM CHEST 1 VIEW: CPT | Mod: 26

## 2024-08-16 PROCEDURE — 76937 US GUIDE VASCULAR ACCESS: CPT | Mod: 26

## 2024-08-16 PROCEDURE — 99291 CRITICAL CARE FIRST HOUR: CPT | Mod: 25

## 2024-08-16 PROCEDURE — 99292 CRITICAL CARE ADDL 30 MIN: CPT | Mod: FS,25

## 2024-08-16 PROCEDURE — 99291 CRITICAL CARE FIRST HOUR: CPT | Mod: FS,25

## 2024-08-16 PROCEDURE — 99223 1ST HOSP IP/OBS HIGH 75: CPT

## 2024-08-16 RX ORDER — SODIUM CHLORIDE 9 MG/ML
10 INJECTION INTRAMUSCULAR; INTRAVENOUS; SUBCUTANEOUS
Refills: 0 | Status: DISCONTINUED | OUTPATIENT
Start: 2024-08-16 | End: 2024-08-18

## 2024-08-16 RX ORDER — DEXTROSE 15 G/33 G
12.5 GEL IN PACKET (GRAM) ORAL ONCE
Refills: 0 | Status: DISCONTINUED | OUTPATIENT
Start: 2024-08-16 | End: 2024-08-16

## 2024-08-16 RX ORDER — POTASSIUM CHLORIDE 10 MEQ
20 TABLET, EXT RELEASE, PARTICLES/CRYSTALS ORAL ONCE
Refills: 0 | Status: COMPLETED | OUTPATIENT
Start: 2024-08-16 | End: 2024-08-16

## 2024-08-16 RX ORDER — MEROPENEM 500 MG/10ML
500 INJECTION, POWDER, FOR SOLUTION INTRAVENOUS EVERY 12 HOURS
Refills: 0 | Status: COMPLETED | OUTPATIENT
Start: 2024-08-16 | End: 2024-08-19

## 2024-08-16 RX ORDER — ACETAMINOPHEN 325 MG/1
1000 TABLET ORAL EVERY 6 HOURS
Refills: 0 | Status: DISCONTINUED | OUTPATIENT
Start: 2024-08-16 | End: 2024-08-20

## 2024-08-16 RX ORDER — ROPIVACAINE IN 0.9% SOD CHL/PF 0.1 %
0.05 PLASTIC BAG, INJECTION (ML) EPIDURAL
Qty: 8 | Refills: 0 | Status: DISCONTINUED | OUTPATIENT
Start: 2024-08-16 | End: 2024-08-16

## 2024-08-16 RX ORDER — DEXTROSE 15 G/33 G
25 GEL IN PACKET (GRAM) ORAL ONCE
Refills: 0 | Status: DISCONTINUED | OUTPATIENT
Start: 2024-08-16 | End: 2024-08-16

## 2024-08-16 RX ORDER — HYDROMORPHONE HYDROCHLORIDE 2 MG/1
1 TABLET ORAL EVERY 4 HOURS
Refills: 0 | Status: DISCONTINUED | OUTPATIENT
Start: 2024-08-16 | End: 2024-08-16

## 2024-08-16 RX ORDER — FLUCONAZOLE 150 MG/1
TABLET ORAL
Refills: 0 | Status: DISCONTINUED | OUTPATIENT
Start: 2024-08-16 | End: 2024-08-19

## 2024-08-16 RX ORDER — GLUCAGON INJECTION, SOLUTION 1 MG/.2ML
1 INJECTION, SOLUTION SUBCUTANEOUS ONCE
Refills: 0 | Status: DISCONTINUED | OUTPATIENT
Start: 2024-08-16 | End: 2024-08-18

## 2024-08-16 RX ORDER — GLUCAGON INJECTION, SOLUTION 1 MG/.2ML
1 INJECTION, SOLUTION SUBCUTANEOUS ONCE
Refills: 0 | Status: DISCONTINUED | OUTPATIENT
Start: 2024-08-16 | End: 2024-08-16

## 2024-08-16 RX ORDER — FLUCONAZOLE 150 MG/1
200 TABLET ORAL EVERY 24 HOURS
Refills: 0 | Status: DISCONTINUED | OUTPATIENT
Start: 2024-08-17 | End: 2024-08-19

## 2024-08-16 RX ORDER — PIPERACILLIN SODIUM AND TAZOBACTAM SODIUM 3; .375 G/15ML; G/15ML
3.38 INJECTION, POWDER, FOR SOLUTION INTRAVENOUS EVERY 12 HOURS
Refills: 0 | Status: DISCONTINUED | OUTPATIENT
Start: 2024-08-16 | End: 2024-08-16

## 2024-08-16 RX ORDER — PANTOPRAZOLE SODIUM 40 MG
40 TABLET, DELAYED RELEASE (ENTERIC COATED) ORAL
Refills: 0 | Status: DISCONTINUED | OUTPATIENT
Start: 2024-08-16 | End: 2024-08-20

## 2024-08-16 RX ORDER — DEXTROSE 15 G/33 G
15 GEL IN PACKET (GRAM) ORAL ONCE
Refills: 0 | Status: DISCONTINUED | OUTPATIENT
Start: 2024-08-16 | End: 2024-08-16

## 2024-08-16 RX ORDER — CHLORHEXIDINE GLUCONATE 40 MG/ML
1 SOLUTION TOPICAL
Refills: 0 | Status: DISCONTINUED | OUTPATIENT
Start: 2024-08-16 | End: 2024-08-19

## 2024-08-16 RX ORDER — LEVOTHYROXINE SODIUM 100 MCG
100 TABLET ORAL DAILY
Refills: 0 | Status: DISCONTINUED | OUTPATIENT
Start: 2024-08-16 | End: 2024-08-21

## 2024-08-16 RX ORDER — HYDROMORPHONE HYDROCHLORIDE 2 MG/1
0.5 TABLET ORAL EVERY 4 HOURS
Refills: 0 | Status: DISCONTINUED | OUTPATIENT
Start: 2024-08-16 | End: 2024-08-16

## 2024-08-16 RX ORDER — ACETAMINOPHEN 325 MG/1
1000 TABLET ORAL ONCE
Refills: 0 | Status: DISCONTINUED | OUTPATIENT
Start: 2024-08-16 | End: 2024-08-16

## 2024-08-16 RX ORDER — LEVOTHYROXINE SODIUM 100 MCG
70 TABLET ORAL AT BEDTIME
Refills: 0 | Status: DISCONTINUED | OUTPATIENT
Start: 2024-08-16 | End: 2024-08-16

## 2024-08-16 RX ORDER — LIDOCAINE HCL 20 MG/ML
30 VIAL (ML) INJECTION ONCE
Refills: 0 | Status: COMPLETED | OUTPATIENT
Start: 2024-08-16 | End: 2024-08-16

## 2024-08-16 RX ORDER — FENTANYL CITRATE 50 UG/ML
50 INJECTION INTRAMUSCULAR; INTRAVENOUS ONCE
Refills: 0 | Status: DISCONTINUED | OUTPATIENT
Start: 2024-08-16 | End: 2024-08-16

## 2024-08-16 RX ORDER — ONDANSETRON 2 MG/ML
4 INJECTION, SOLUTION INTRAMUSCULAR; INTRAVENOUS ONCE
Refills: 0 | Status: COMPLETED | OUTPATIENT
Start: 2024-08-16 | End: 2024-08-16

## 2024-08-16 RX ORDER — CYCLOSPORINE 0.05 %
1 DROPPERETTE, SINGLE-USE DROP DISPENSER OPHTHALMIC (EYE)
Refills: 0 | Status: DISCONTINUED | OUTPATIENT
Start: 2024-08-16 | End: 2024-09-13

## 2024-08-16 RX ORDER — ASPIRIN 81 MG
81 TABLET, DELAYED RELEASE (ENTERIC COATED) ORAL DAILY
Refills: 0 | Status: DISCONTINUED | OUTPATIENT
Start: 2024-08-16 | End: 2024-08-21

## 2024-08-16 RX ORDER — HEPARIN SODIUM,BOVINE 1000/ML
5000 VIAL (ML) INJECTION EVERY 8 HOURS
Refills: 0 | Status: DISCONTINUED | OUTPATIENT
Start: 2024-08-16 | End: 2024-08-17

## 2024-08-16 RX ORDER — POTASSIUM CHLORIDE 10 MEQ
20 TABLET, EXT RELEASE, PARTICLES/CRYSTALS ORAL
Refills: 0 | Status: DISCONTINUED | OUTPATIENT
Start: 2024-08-16 | End: 2024-08-16

## 2024-08-16 RX ORDER — FLUCONAZOLE 150 MG/1
200 TABLET ORAL ONCE
Refills: 0 | Status: COMPLETED | OUTPATIENT
Start: 2024-08-16 | End: 2024-08-16

## 2024-08-16 RX ORDER — OXYCODONE HYDROCHLORIDE 5 MG/1
2.5 TABLET ORAL EVERY 4 HOURS
Refills: 0 | Status: DISCONTINUED | OUTPATIENT
Start: 2024-08-16 | End: 2024-08-20

## 2024-08-16 RX ORDER — OXYCODONE HYDROCHLORIDE 5 MG/1
5 TABLET ORAL EVERY 4 HOURS
Refills: 0 | Status: DISCONTINUED | OUTPATIENT
Start: 2024-08-16 | End: 2024-08-20

## 2024-08-16 RX ORDER — CHLORHEXIDINE GLUCONATE 40 MG/ML
1 SOLUTION TOPICAL DAILY
Refills: 0 | Status: DISCONTINUED | OUTPATIENT
Start: 2024-08-16 | End: 2024-08-20

## 2024-08-16 RX ORDER — ACETAMINOPHEN 325 MG/1
1000 TABLET ORAL EVERY 6 HOURS
Refills: 0 | Status: DISCONTINUED | OUTPATIENT
Start: 2024-08-16 | End: 2024-08-16

## 2024-08-16 RX ORDER — ROPIVACAINE IN 0.9% SOD CHL/PF 0.1 %
0.05 PLASTIC BAG, INJECTION (ML) EPIDURAL
Qty: 16 | Refills: 0 | Status: DISCONTINUED | OUTPATIENT
Start: 2024-08-16 | End: 2024-08-18

## 2024-08-16 RX ADMIN — ONDANSETRON 4 MILLIGRAM(S): 2 INJECTION, SOLUTION INTRAMUSCULAR; INTRAVENOUS at 01:23

## 2024-08-16 RX ADMIN — Medication 120 MILLILITER(S): at 05:47

## 2024-08-16 RX ADMIN — Medication 5000 UNIT(S): at 23:02

## 2024-08-16 RX ADMIN — OXYCODONE HYDROCHLORIDE 2.5 MILLIGRAM(S): 5 TABLET ORAL at 18:38

## 2024-08-16 RX ADMIN — ACETAMINOPHEN 400 MILLIGRAM(S): 325 TABLET ORAL at 05:47

## 2024-08-16 RX ADMIN — Medication 25 GRAM(S): at 14:31

## 2024-08-16 RX ADMIN — Medication 6.56 MICROGRAM(S)/KG/MIN: at 02:47

## 2024-08-16 RX ADMIN — FLUCONAZOLE 100 MILLIGRAM(S): 150 TABLET ORAL at 14:15

## 2024-08-16 RX ADMIN — Medication 100 MILLIEQUIVALENT(S): at 12:15

## 2024-08-16 RX ADMIN — ACETAMINOPHEN 1000 MILLIGRAM(S): 325 TABLET ORAL at 23:02

## 2024-08-16 RX ADMIN — OXYCODONE HYDROCHLORIDE 2.5 MILLIGRAM(S): 5 TABLET ORAL at 18:08

## 2024-08-16 RX ADMIN — HYDROMORPHONE HYDROCHLORIDE 0.5 MILLIGRAM(S): 2 TABLET ORAL at 10:58

## 2024-08-16 RX ADMIN — HYDROMORPHONE HYDROCHLORIDE 0.5 MILLIGRAM(S): 2 TABLET ORAL at 10:28

## 2024-08-16 RX ADMIN — Medication 5000 UNIT(S): at 12:16

## 2024-08-16 RX ADMIN — Medication 1000 MILLILITER(S): at 08:15

## 2024-08-16 RX ADMIN — Medication 81 MILLIGRAM(S): at 12:16

## 2024-08-16 RX ADMIN — Medication 4.36 MICROGRAM(S)/KG/MIN: at 12:16

## 2024-08-16 RX ADMIN — Medication 4.36 MICROGRAM(S)/KG/MIN: at 19:33

## 2024-08-16 RX ADMIN — Medication 6.56 MICROGRAM(S)/KG/MIN: at 05:46

## 2024-08-16 RX ADMIN — ACETAMINOPHEN 1000 MILLIGRAM(S): 325 TABLET ORAL at 23:32

## 2024-08-16 RX ADMIN — PIPERACILLIN SODIUM AND TAZOBACTAM SODIUM 25 GRAM(S): 3; .375 INJECTION, POWDER, FOR SOLUTION INTRAVENOUS at 06:30

## 2024-08-16 RX ADMIN — Medication 40 MILLIGRAM(S): at 23:02

## 2024-08-16 RX ADMIN — MEROPENEM 100 MILLIGRAM(S): 500 INJECTION, POWDER, FOR SOLUTION INTRAVENOUS at 18:10

## 2024-08-16 RX ADMIN — ACETAMINOPHEN 1000 MILLIGRAM(S): 325 TABLET ORAL at 18:08

## 2024-08-16 RX ADMIN — FENTANYL CITRATE 50 MICROGRAM(S): 50 INJECTION INTRAMUSCULAR; INTRAVENOUS at 01:24

## 2024-08-16 RX ADMIN — ACETAMINOPHEN 1000 MILLIGRAM(S): 325 TABLET ORAL at 07:00

## 2024-08-16 RX ADMIN — Medication 25 GRAM(S): at 12:15

## 2024-08-16 RX ADMIN — ACETAMINOPHEN 400 MILLIGRAM(S): 325 TABLET ORAL at 12:16

## 2024-08-16 RX ADMIN — Medication 5000 UNIT(S): at 05:46

## 2024-08-16 RX ADMIN — CHLORHEXIDINE GLUCONATE 1 APPLICATION(S): 40 SOLUTION TOPICAL at 12:16

## 2024-08-16 NOTE — H&P ADULT - NSHPLABSRESULTS_GEN_ALL_CORE
10.2   25.61 )-----------( 486      ( 15 Aug 2024 21:05 )             31.2   Comprehensive Metabolic Panel (08.15.24 @ 21:05)    Sodium: 139 mmol/L    Potassium: 3.8: SPECIMEN MILDLY HEMOLYZED mmol/L    Chloride: 100 mmol/L    Carbon Dioxide: 18 mmol/L    Anion Gap: 21 mmol/L    Blood Urea Nitrogen: 46 mg/dL    Creatinine: 5.34 mg/dL    Glucose: 96 mg/dL    Calcium: 8.9 mg/dL    Protein Total: 6.9: SPECIMEN MILDLY HEMOLYZED g/dL    Albumin: 3.0 g/dL    Bilirubin Total: 1.0 mg/dL    Alkaline Phosphatase: 75 U/L    Aspartate Aminotransferase (AST/SGOT): 26: SPECIMEN MILDLY HEMOLYZED U/L    Alanine Aminotransferase (ALT/SGPT): 12: SPECIMEN MILDLY HEMOLYZED U/L    eGFR: 8: The estimated glomerular filtration rate (eGFR) is calculated using the  2021 CKD-EPI creatinine equation, which does not have a coefficient for  race and is validated in individuals 18 years of age and older (N Engl J  Med 2021; 385:5684-7974). Creatinine-based eGFR may be inaccurate in  various situations including but not limited to extremes of muscle mass,  altered dietary protein intake, or medications that affect renal tubular  creatinine secretion. mL/min/1.73m2  < from: CT Abdomen and Pelvis w/ IV Cont (08.15.24 @ 21:53) >      IMPRESSION:  Expected postoperative changes status post cystectomywith ileal conduit   creation including small amount of complex fluid and free air in the   pelvis. No organized fluid collection.  Distended ileal conduit with narrowing at the exiting ostomy site and   proximally at the site of ureteral insertion. Mild bilateral   hydroureteronephrosis with delayed nephrograms. No significant   perinephric stranding.    < end of copied text >

## 2024-08-16 NOTE — CONSULT NOTE ADULT - SUBJECTIVE AND OBJECTIVE BOX
=====================================================                                                             SICU Consultation Note                                                             =====================================================  Patient is a 68y old  Female who presents with a chief complaint of Sepsis s/p cystectomy (16 Aug 2024 03:34)    HPI: 68y female  ***      Allergies: No Known Allergies    PAST MEDICAL & SURGICAL HISTORY:  Hypothyroid      HTN (hypertension)      Chronic GERD      Meningitis      ETOH abuse      Type 2 diabetes mellitus      Trace cataracts      Dry eyes      Fatty liver      Other microscopic hematuria      Cancer of bladder      Fibroids      Microscopic hematuria      DENITA on CPAP      S/P hysterectomy  1984      History of vocal cord polypectomy      H/O total knee replacement, left      S/P total knee replacement, right      H/O excision of ganglion cyst      H/O transurethral resection of bladder tumor (TURBT)        FAMILY HISTORY:  FH: type 2 diabetes (Mother)    FH: hypertension (Mother, Father)    FH: liver cancer (Father)    FH: breast cancer (Sibling)    Family history of DVT (Mother)      SOCIAL HISTORY:    ADVANCE DIRECTIVES: Presumed Full Code    REVIEW OF SYSTEMS:    General: Non-Contributory  Skin/Breast: Non-Contributory  Ophthalmologic: Non-Contributory  ENMT: Non-Contributory  Respiratory and Thorax: Non-Contributory  Cardiovascular: Non-Contributory  Gastrointestinal: Non-Contributory  Genitourinary: Non-Contributory  Musculoskeletal: Non-Contributory  Neurological: Non-Contributory  Psychiatric: Non-Contributory  Hematology/Lymphatics: Non-Contributory  Endocrine: Non-Contributory  Allergic/Immunologic: Non-Contributory  HOME MEDICATIONS:   CURRENT MEDICATIONS:   --------------------------------------------------------------------------------------  Neurologic Medications    Respiratory Medications    Cardiovascular Medications  norepinephrine Infusion 0.05 MICROgram(s)/kG/Min IV Continuous <Continuous>    Gastrointestinal Medications    Genitourinary Medications    Hematologic/Oncologic Medications  aspirin  chewable 81 milliGRAM(s) Oral daily    Antimicrobial/Immunologic Medications    Endocrine/Metabolic Medications    Topical/Other Medications    --------------------------------------------------------------------------------------  VITAL SIGNS, INS/OUTS (last 24 hours):  --------------------------------------------------------------------------------------  ((Insert SICU Vitals / Is+Os here)) ***  --------------------------------------------------------------------------------------    EXAM  NEUROLOGY  RASS:   	GCS:    Exam: Normal, NAD, alert, oriented x 3, no focal deficits.   HEENT  Exam: Normocephalic, atraumatic.  EOMI   RESPIRATORY  Exam: Lungs clear to auscultation, Normal expansion/effort.    Mechanical Ventilation:   CARDIOVASCULAR  Exam: S1, S2.  Regular rate and rhythm.  Peripheral edema    GI/NUTRITION  Exam: Abdomen soft, Non-tender, Non-distended.  Gastrostomy / Jejunostomy / Nasogastric tube in place.  Colostomy / Ileostomy.    Wound:    Current Diet:  NPO  VASCULAR  Exam: Extremities warm, pink, well-perfused.   MUSCULOSKELETAL  Exam: All extremities moving spontaneously without limitations.    SKIN:  Exam: Good skin turgor, no skin breakdown.    METABOLIC/FLUIDS/ELECTROLYTES    HEMATOLOGIC  [x] DVT Prophylaxis: aspirin  chewable 81 milliGRAM(s) Oral daily    Transfusions:	[] PRBC	[] Platelets		[] FFP	[] Cryoprecipitate  INFECTIOUS DISEASE  Antimicrobials/Immunologic Medications:    Day #  	of    ***  Tubes/Lines/Drains  ***  [x] Peripheral IV  [] Central Venous Line     	[] R	[] L	[] IJ	[] Fem	[] SC	Date Placed:   [] Arterial Line		[] R	[] L	[] Fem	[] Rad	[] Ax	Date Placed:   [] PICC:         	[] Midline		[] Mediport  [] Urinary Catheter		Date Placed:     LABS  --------------------------------------------------------------------------------------  ((Insert SICU Labs here))***  --------------------------------------------------------------------------------------  OTHER LABS  IMAGING RESULTS  Echo:   CT:                                                                 =====================================================                                                             SICU Consultation Note                                                             =====================================================  Patient is a 68y old  Female who presents with a chief complaint of Sepsis s/p cystectomy (16 Aug 2024 03:34)    HPI: 67 yo F, with PmHx of HTN and bladder cancer, s/p bladder removal with ileal conduit construction on Aug 6 at [facility], here for generalized weakness. Per pt, she had her surgery on Aug 6th, d/c home on Aug 10th. Since d/c, only have 1-2 bowel movement, and had significantly decrease PO intake due to decreased apptite. No fever, some chill. +bloody discharge from urethra, some burning. Today, called EMS as she felt very weak and unwell. BP 80s/50s for EMS. Patient s/p cystectomy with Dr. Thompson at [facility] on August 6th. Patient states she had been doing well until about the last week. This last week she has been having decreased PO intake and on and off fevers. She states her surgeon knew of this any only recommended tylenol. Also states she had stents previously that came out on their own. She was supposed to see him tomorrow. Presents to the ED now due to lower abdominal pain.       Allergies: No Known Allergies    PAST MEDICAL & SURGICAL HISTORY:  Hypothyroid      HTN (hypertension)      Chronic GERD      Meningitis      ETOH abuse      Type 2 diabetes mellitus      Trace cataracts      Dry eyes      Fatty liver      Other microscopic hematuria      Cancer of bladder      Fibroids      Microscopic hematuria      DENITA on CPAP      S/P hysterectomy  1984      History of vocal cord polypectomy      H/O total knee replacement, left      S/P total knee replacement, right      H/O excision of ganglion cyst      H/O transurethral resection of bladder tumor (TURBT)        FAMILY HISTORY:  FH: type 2 diabetes (Mother)    FH: hypertension (Mother, Father)    FH: liver cancer (Father)    FH: breast cancer (Sibling)    Family history of DVT (Mother)      SOCIAL HISTORY:    ADVANCE DIRECTIVES: Presumed Full Code    REVIEW OF SYSTEMS:    General: Non-Contributory  Skin/Breast: Non-Contributory  Ophthalmologic: Non-Contributory  ENMT: Non-Contributory  Respiratory and Thorax: Non-Contributory  Cardiovascular: Non-Contributory  Gastrointestinal: Non-Contributory  Genitourinary: Non-Contributory  Musculoskeletal: Non-Contributory  Neurological: Non-Contributory  Psychiatric: Non-Contributory  Hematology/Lymphatics: Non-Contributory  Endocrine: Non-Contributory  Allergic/Immunologic: Non-Contributory  HOME MEDICATIONS:   CURRENT MEDICATIONS:   --------------------------------------------------------------------------------------  Neurologic Medications    Respiratory Medications    Cardiovascular Medications  norepinephrine Infusion 0.05 MICROgram(s)/kG/Min IV Continuous <Continuous>    Gastrointestinal Medications    Genitourinary Medications    Hematologic/Oncologic Medications  aspirin  chewable 81 milliGRAM(s) Oral daily    Antimicrobial/Immunologic Medications    Endocrine/Metabolic Medications    Topical/Other Medications    --------------------------------------------------------------------------------------  VITAL SIGNS, INS/OUTS (last 24 hours):  --------------------------------------------------------------------------------------  ((Insert SICU Vitals / Is+Os here)) ***  --------------------------------------------------------------------------------------    EXAM  NEUROLOGY  RASS:   	GCS:    Exam: Normal, NAD, alert, oriented x 3, no focal deficits.   HEENT  Exam: Normocephalic, atraumatic.  EOMI   RESPIRATORY  Exam: Lungs clear to auscultation, Normal expansion/effort.    Mechanical Ventilation:   CARDIOVASCULAR  Exam: S1, S2.  Regular rate and rhythm.  Peripheral edema    GI/NUTRITION  Exam: Abdomen soft, Non-tender, Non-distended.  Gastrostomy / Jejunostomy / Nasogastric tube in place.  Colostomy / Ileostomy.    Wound:    Current Diet:  NPO  VASCULAR  Exam: Extremities warm, pink, well-perfused.   MUSCULOSKELETAL  Exam: All extremities moving spontaneously without limitations.    SKIN:  Exam: Good skin turgor, no skin breakdown.    METABOLIC/FLUIDS/ELECTROLYTES    HEMATOLOGIC  [x] DVT Prophylaxis: aspirin  chewable 81 milliGRAM(s) Oral daily    Transfusions:	[] PRBC	[] Platelets		[] FFP	[] Cryoprecipitate  INFECTIOUS DISEASE  Antimicrobials/Immunologic Medications:    Day #  	of    ***  Tubes/Lines/Drains  ***  [x] Peripheral IV  [] Central Venous Line     	[] R	[] L	[] IJ	[] Fem	[] SC	Date Placed:   [] Arterial Line		[] R	[] L	[] Fem	[] Rad	[] Ax	Date Placed:   [] PICC:         	[] Midline		[] Mediport  [] Urinary Catheter		Date Placed:     LABS  --------------------------------------------------------------------------------------  ((Insert SICU Labs here))***  --------------------------------------------------------------------------------------  OTHER LABS  IMAGING RESULTS  Echo:   CT:                                                                 =====================================================                                                             SICU Consultation Note                                                             =====================================================  Patient is a 68y old  Female who presents with a chief complaint of Sepsis s/p cystectomy (16 Aug 2024 03:34)    HPI: 69 yo F, with PmHx of HTN and bladder cancer, s/p bladder removal with ileal conduit construction on Aug 6 at [facility], here for generalized weakness. Per pt, she had her surgery on Aug 6th, d/c home on Aug 10th. Since d/c, only have 1-2 bowel movement, and had significantly decrease PO intake due to decreased apptite. No fever, some chill. +bloody discharge from urethra, some burning. Today, called EMS as she felt very weak and unwell. BP 80s/50s for EMS. Patient s/p cystectomy with Dr. Thompson at [facility] on August 6th. Patient states she had been doing well until about the last week. This last week she has been having decreased PO intake and on and off fevers. She states her surgeon knew of this any only recommended tylenol. Also states she had stents previously that came out on their own. She was supposed to see him tomorrow. Presents to the ED now due to lower abdominal pain.       Allergies: No Known Allergies    PAST MEDICAL & SURGICAL HISTORY:  Hypothyroid      HTN (hypertension)      Chronic GERD      Meningitis      ETOH abuse      Type 2 diabetes mellitus      Trace cataracts      Dry eyes      Fatty liver      Other microscopic hematuria      Cancer of bladder      Fibroids      Microscopic hematuria      DENITA on CPAP      S/P hysterectomy  1984      History of vocal cord polypectomy      H/O total knee replacement, left      S/P total knee replacement, right      H/O excision of ganglion cyst      H/O transurethral resection of bladder tumor (TURBT)        FAMILY HISTORY:  FH: type 2 diabetes (Mother)    FH: hypertension (Mother, Father)    FH: liver cancer (Father)    FH: breast cancer (Sibling)    Family history of DVT (Mother)      SOCIAL HISTORY:    ADVANCE DIRECTIVES: Presumed Full Code    REVIEW OF SYSTEMS:    General: Non-Contributory  Skin/Breast: Non-Contributory  Ophthalmologic: Non-Contributory  ENMT: Non-Contributory  Respiratory and Thorax: Non-Contributory  Cardiovascular: Non-Contributory  Gastrointestinal: Non-Contributory  Genitourinary: Non-Contributory  Musculoskeletal: Non-Contributory  Neurological: Non-Contributory  Psychiatric: Non-Contributory  Hematology/Lymphatics: Non-Contributory  Endocrine: Non-Contributory  Allergic/Immunologic: Non-Contributory  HOME MEDICATIONS:   CURRENT MEDICATIONS:   --------------------------------------------------------------------------------------  Neurologic Medications    Respiratory Medications    Cardiovascular Medications  norepinephrine Infusion 0.05 MICROgram(s)/kG/Min IV Continuous <Continuous>    Gastrointestinal Medications    Genitourinary Medications    Hematologic/Oncologic Medications  aspirin  chewable 81 milliGRAM(s) Oral daily    Antimicrobial/Immunologic Medications    Endocrine/Metabolic Medications    Topical/Other Medications    --------------------------------------------------------------------------------------  VITAL SIGNS, INS/OUTS (last 24 hours):  --------------------------------------------------------------------------------------  ICU Vital Signs Last 24 Hrs  T(C): 37.4 (15 Aug 2024 21:00), Max: 37.4 (15 Aug 2024 21:00)  T(F): 99.4 (15 Aug 2024 21:00), Max: 99.4 (15 Aug 2024 21:00)  HR: 104 (16 Aug 2024 03:20) (90 - 113)  BP: 115/49 (16 Aug 2024 03:20) (68/47 - 115/49)  BP(mean): 63 (16 Aug 2024 03:20) (51 - 76)  ABP: --  ABP(mean): --  RR: 29 (16 Aug 2024 03:20) (18 - 29)  SpO2: 97% (16 Aug 2024 03:20) (95% - 100%)    O2 Parameters below as of 16 Aug 2024 03:20  Patient On (Oxygen Delivery Method): BiPAP/CPAP    --------------------------------------------------------------------------------------    EXAM  NEUROLOGY  RASS:   	GCS:    Exam: Normal, NAD, alert, oriented x 3, no focal deficits.   HEENT  Exam: Normocephalic, atraumatic.  EOMI   RESPIRATORY  Exam: Lungs clear to auscultation, Normal expansion/effort.    Mechanical Ventilation:   CARDIOVASCULAR  Exam: S1, S2.  Regular rate and rhythm.  Peripheral edema    GI/NUTRITION  Exam: Abdomen soft, Non-tender, Non-distended.  Gastrostomy / Jejunostomy / Nasogastric tube in place.  Colostomy / Ileostomy.    Wound:    Current Diet:  NPO  VASCULAR  Exam: Extremities warm, pink, well-perfused.   MUSCULOSKELETAL  Exam: All extremities moving spontaneously without limitations.    SKIN:  Exam: Good skin turgor, no skin breakdown.    METABOLIC/FLUIDS/ELECTROLYTES    HEMATOLOGIC  [x] DVT Prophylaxis: aspirin  chewable 81 milliGRAM(s) Oral daily    Transfusions:	[] PRBC	[] Platelets		[] FFP	[] Cryoprecipitate  INFECTIOUS DISEASE  Antimicrobials/Immunologic Medications:    Day #  	of    ***  Tubes/Lines/Drains  ***  [x] Peripheral IV  [] Central Venous Line     	[] R	[] L	[] IJ	[] Fem	[] SC	Date Placed:   [] Arterial Line		[] R	[] L	[] Fem	[] Rad	[] Ax	Date Placed:   [] PICC:         	[] Midline		[] Mediport  [] Urinary Catheter		Date Placed:     LABS  --------------------------------------------------------------------------------------                        10.2   25.61 )-----------( 486      ( 15 Aug 2024 21:05 )             31.2     08-15    139  |  100  |  46<H>  ----------------------------<  96  3.8   |  18<L>  |  5.34<H>    Ca    8.9      15 Aug 2024 21:05    TPro  6.9  /  Alb  3.0<L>  /  TBili  1.0  /  DBili  x   /  AST  26  /  ALT  12  /  AlkPhos  75  08-15    --------------------------------------------------------------------------------------  OTHER LABS  IMAGING RESULTS  Echo:   CT:                                                                 =====================================================                                                             SICU Consultation Note                                                             =====================================================  Patient is a 68y old  Female who presents with a chief complaint of Sepsis s/p cystectomy (16 Aug 2024 03:34)    HPI: 69 yo F, with PmHx of HTN and bladder cancer, s/p cystectomy with ileal conduit construction on Aug 6 at [facility] presenting with hypotension and feeling of weakness at home. States patient's sister who is a nurse took her BP, states blood pressure was systolics 70s. Also states has had poor PO intake and weakness since surgery         Allergies: No Known Allergies    PAST MEDICAL & SURGICAL HISTORY:  Hypothyroid      HTN (hypertension)      Chronic GERD      Meningitis      ETOH abuse      Type 2 diabetes mellitus      Trace cataracts      Dry eyes      Fatty liver      Other microscopic hematuria      Cancer of bladder      Fibroids      Microscopic hematuria      DENITA on CPAP      S/P hysterectomy  1984      History of vocal cord polypectomy      H/O total knee replacement, left      S/P total knee replacement, right      H/O excision of ganglion cyst      H/O transurethral resection of bladder tumor (TURBT)        FAMILY HISTORY:  FH: type 2 diabetes (Mother)    FH: hypertension (Mother, Father)    FH: liver cancer (Father)    FH: breast cancer (Sibling)    Family history of DVT (Mother)      SOCIAL HISTORY:    ADVANCE DIRECTIVES: Presumed Full Code    REVIEW OF SYSTEMS:    General: Non-Contributory  Skin/Breast: Non-Contributory  Ophthalmologic: Non-Contributory  ENMT: Non-Contributory  Respiratory and Thorax: Non-Contributory  Cardiovascular: Non-Contributory  Gastrointestinal: Non-Contributory  Genitourinary: Non-Contributory  Musculoskeletal: Non-Contributory  Neurological: Non-Contributory  Psychiatric: Non-Contributory  Hematology/Lymphatics: Non-Contributory  Endocrine: Non-Contributory  Allergic/Immunologic: Non-Contributory  HOME MEDICATIONS:   CURRENT MEDICATIONS:   --------------------------------------------------------------------------------------  Neurologic Medications    Respiratory Medications    Cardiovascular Medications  norepinephrine Infusion 0.05 MICROgram(s)/kG/Min IV Continuous <Continuous>    Gastrointestinal Medications    Genitourinary Medications    Hematologic/Oncologic Medications  aspirin  chewable 81 milliGRAM(s) Oral daily    Antimicrobial/Immunologic Medications    Endocrine/Metabolic Medications    Topical/Other Medications    --------------------------------------------------------------------------------------  VITAL SIGNS, INS/OUTS (last 24 hours):  --------------------------------------------------------------------------------------  ICU Vital Signs Last 24 Hrs  T(C): 37.4 (15 Aug 2024 21:00), Max: 37.4 (15 Aug 2024 21:00)  T(F): 99.4 (15 Aug 2024 21:00), Max: 99.4 (15 Aug 2024 21:00)  HR: 104 (16 Aug 2024 03:20) (90 - 113)  BP: 115/49 (16 Aug 2024 03:20) (68/47 - 115/49)  BP(mean): 63 (16 Aug 2024 03:20) (51 - 76)  ABP: --  ABP(mean): --  RR: 29 (16 Aug 2024 03:20) (18 - 29)  SpO2: 97% (16 Aug 2024 03:20) (95% - 100%)    O2 Parameters below as of 16 Aug 2024 03:20  Patient On (Oxygen Delivery Method): BiPAP/CPAP    --------------------------------------------------------------------------------------    EXAM  NEUROLOGY  RASS:   	GCS:    Exam: Normal, NAD, alert, oriented x 3, no focal deficits.   HEENT  Exam: Normocephalic, atraumatic.  EOMI   RESPIRATORY  Exam: Lungs clear to auscultation, Normal expansion/effort.    Mechanical Ventilation:   CARDIOVASCULAR  Exam: S1, S2.  Regular rate and rhythm.  Peripheral edema    GI/NUTRITION  Exam: Abdomen soft, Non-tender, Non-distended.  Gastrostomy / Jejunostomy / Nasogastric tube in place.  Colostomy / Ileostomy.    Wound:    Current Diet:  NPO  VASCULAR  Exam: Extremities warm, pink, well-perfused.   MUSCULOSKELETAL  Exam: All extremities moving spontaneously without limitations.    SKIN:  Exam: Good skin turgor, no skin breakdown.    METABOLIC/FLUIDS/ELECTROLYTES    HEMATOLOGIC  [x] DVT Prophylaxis: aspirin  chewable 81 milliGRAM(s) Oral daily    Transfusions:	[] PRBC	[] Platelets		[] FFP	[] Cryoprecipitate  INFECTIOUS DISEASE  Antimicrobials/Immunologic Medications:    Day #  	of    ***  Tubes/Lines/Drains  ***  [x] Peripheral IV  [] Central Venous Line     	[] R	[] L	[] IJ	[] Fem	[] SC	Date Placed:   [] Arterial Line		[] R	[] L	[] Fem	[] Rad	[] Ax	Date Placed:   [] PICC:         	[] Midline		[] Mediport  [] Urinary Catheter		Date Placed:     LABS  --------------------------------------------------------------------------------------                        10.2   25.61 )-----------( 486      ( 15 Aug 2024 21:05 )             31.2     08-15    139  |  100  |  46<H>  ----------------------------<  96  3.8   |  18<L>  |  5.34<H>    Ca    8.9      15 Aug 2024 21:05    TPro  6.9  /  Alb  3.0<L>  /  TBili  1.0  /  DBili  x   /  AST  26  /  ALT  12  /  AlkPhos  75  08-15    --------------------------------------------------------------------------------------  OTHER LABS  IMAGING RESULTS  Echo:   CT:                                                                 =====================================================                                                             SICU Consultation Note                                                             =====================================================  Patient is a 68y old  Female who presents with a chief complaint of Sepsis s/p cystectomy (16 Aug 2024 03:34)    HPI: 69 yo F, with PmHx of HTN and bladder cancer, s/p cystectomy with ileal conduit construction on Aug 6 at [facility] presenting with hypotension and feeling of weakness at home. States patient's sister who is a nurse took her BP, states blood pressure was systolics 70s. Also states has had poor PO intake and weakness since surgery         Allergies: No Known Allergies    PAST MEDICAL & SURGICAL HISTORY:  Hypothyroid      HTN (hypertension)      Chronic GERD      Meningitis      ETOH abuse      Type 2 diabetes mellitus      Trace cataracts      Dry eyes      Fatty liver      Other microscopic hematuria      Cancer of bladder      Fibroids      Microscopic hematuria      DENITA on CPAP      S/P hysterectomy  1984      History of vocal cord polypectomy      H/O total knee replacement, left      S/P total knee replacement, right      H/O excision of ganglion cyst      H/O transurethral resection of bladder tumor (TURBT)        FAMILY HISTORY:  FH: type 2 diabetes (Mother)    FH: hypertension (Mother, Father)    FH: liver cancer (Father)    FH: breast cancer (Sibling)    Family history of DVT (Mother)      SOCIAL HISTORY:    ADVANCE DIRECTIVES: Presumed Full Code    REVIEW OF SYSTEMS:    General: Non-Contributory  Skin/Breast: Non-Contributory  Ophthalmologic: Non-Contributory  ENMT: Non-Contributory  Respiratory and Thorax: Non-Contributory  Cardiovascular: Non-Contributory  Gastrointestinal: Non-Contributory  Genitourinary: Non-Contributory  Musculoskeletal: Non-Contributory  Neurological: Non-Contributory  Psychiatric: Non-Contributory  Hematology/Lymphatics: Non-Contributory  Endocrine: Non-Contributory  Allergic/Immunologic: Non-Contributory  HOME MEDICATIONS:   CURRENT MEDICATIONS:   --------------------------------------------------------------------------------------  Neurologic Medications    Respiratory Medications    Cardiovascular Medications  norepinephrine Infusion 0.05 MICROgram(s)/kG/Min IV Continuous <Continuous>    Gastrointestinal Medications    Genitourinary Medications    Hematologic/Oncologic Medications  aspirin  chewable 81 milliGRAM(s) Oral daily    Antimicrobial/Immunologic Medications    Endocrine/Metabolic Medications    Topical/Other Medications    --------------------------------------------------------------------------------------  VITAL SIGNS, INS/OUTS (last 24 hours):  --------------------------------------------------------------------------------------  ICU Vital Signs Last 24 Hrs  T(C): 37.4 (15 Aug 2024 21:00), Max: 37.4 (15 Aug 2024 21:00)  T(F): 99.4 (15 Aug 2024 21:00), Max: 99.4 (15 Aug 2024 21:00)  HR: 104 (16 Aug 2024 03:20) (90 - 113)  BP: 115/49 (16 Aug 2024 03:20) (68/47 - 115/49)  BP(mean): 63 (16 Aug 2024 03:20) (51 - 76)  ABP: --  ABP(mean): --  RR: 29 (16 Aug 2024 03:20) (18 - 29)  SpO2: 97% (16 Aug 2024 03:20) (95% - 100%)    O2 Parameters below as of 16 Aug 2024 03:20  Patient On (Oxygen Delivery Method): BiPAP/CPAP    --------------------------------------------------------------------------------------    EXAM  NEUROLOGY  Exam: Normal, NAD, alert, oriented x 3, no focal deficits.   HEENT  Exam: Normocephalic, atraumatic.  EOMI   RESPIRATORY  Exam: Lungs clear to auscultation, Normal expansion/effort.  on BiPAP  CARDIOVASCULAR  Exam: S1, S2.  Regular rate and rhythm.      GI/NUTRITION  Exam: Abdomen soft, Non-tender, Non-distended.     Current Diet:  NPO  VASCULAR  Exam: Extremities warm, pink, well-perfused.   MUSCULOSKELETAL  Exam: All extremities moving spontaneously without limitations.    SKIN:  Exam: Good skin turgor, no skin breakdown.    METABOLIC/FLUIDS/ELECTROLYTES    HEMATOLOGIC  [x] DVT Prophylaxis: aspirin  chewable 81 milliGRAM(s) Oral daily    Transfusions:	[] PRBC	[] Platelets		[] FFP	[] Cryoprecipitate  INFECTIOUS DISEASE  Antimicrobials/Immunologic Medications:      Tubes/Lines/Drains  ***  [x] Peripheral IV  [] Central Venous Line     	[] R	[] L	[] IJ	[] Fem	[] SC	Date Placed:   [X] Arterial Line		[] R	[] L	[] Fem	[] Rad	[] Ax	Date Placed:   [] PICC:         	[] Midline		[] Mediport  [] Urinary Catheter		Date Placed:     LABS  --------------------------------------------------------------------------------------                        10.2   25.61 )-----------( 486      ( 15 Aug 2024 21:05 )             31.2     08-15    139  |  100  |  46<H>  ----------------------------<  96  3.8   |  18<L>  |  5.34<H>    Ca    8.9      15 Aug 2024 21:05    TPro  6.9  /  Alb  3.0<L>  /  TBili  1.0  /  DBili  x   /  AST  26  /  ALT  12  /  AlkPhos  75  08-15    --------------------------------------------------------------------------------------  OTHER LABS  IMAGING RESULTS  Echo:   CT:

## 2024-08-16 NOTE — H&P ADULT - ASSESSMENT
67 yo F, with PmHx of HTN and bladder cancer, s/p bladder removal with ileal conduit construction on Aug 6 at [facility], with Dr. Thompson here for generalized weakness.    CT with mild hydro and complex fluid and free air in the pelvis. No organized fluid collection. Cr 5.4. High suspicion for urine leak given patients elevated Cr and respiratory status.     Plan  - admit to Dr. Alcazar  - SICU consult given requiring pressors   - f/u cultures  - urology to place taylor in conduit  - trend Cr  - zosyn     Discussed with Dr. Alcazar

## 2024-08-16 NOTE — H&P ADULT - ATTENDING COMMENTS
67 yo F h/o HTN and bladder CA, s/p cystectomy/IC creation on 8/6/2024 at [facility] with Dr. Thompson (d/c on 8/10) presented to ED 8/16 w/ generalized weakness and some lower abdominal pain.  Sepsis 2/2 UTI/pyelo and urinary retention with poss stoma stenosis  IC Stoma output has increased with fluids and stomal catheter  Cont abx, supportive care  F/u cxs  Appreciate SICU care.

## 2024-08-16 NOTE — PROGRESS NOTE ADULT - SUBJECTIVE AND OBJECTIVE BOX
Subjective: Pt offers no complaints this AM, states has not had BM since d/c from OSH 8/10 and no flatus x 2-3 days, N/V      Objective:    Vital signs  T(C): , Max: 38.1 (08-16-24 @ 05:25)  HR: 107 (08-16-24 @ 07:00)  BP: 107/62 (08-16-24 @ 06:15)  SpO2: 100% (08-16-24 @ 07:00)  Wt(kg): --    Output   IC: 150  08-15 @ 07:01  -  08-16 @ 07:00  --------------------------------------------------------  IN: 821.6 mL / OUT: 150 mL / NET: 671.6 mL        Gen: NAD on BiPAP  Abd: incisions without infection, some resolving lower abdominal ecchymoses, soft, minimal LLQ tenderness, stoma with some fibrinous exudate, 14fr catheter placed easily with prompt return of concentrated urine, no CVAT      Labs                        10.0   36.31 )-----------( 556      ( 16 Aug 2024 06:11 )             29.6     16 Aug 2024 06:11    136    |  100    |  41     ----------------------------<  88     3.0     |  18     |  4.42     Ca    8.4        16 Aug 2024 06:11  Phos  3.1       16 Aug 2024 06:11  Mg     1.60      16 Aug 2024 06:11         Subjective: Pt offers no complaints this AM, states has not had BM since d/c from OSH 8/10 and no flatus x 2-3 days, no N/V now      Objective:    Vital signs  T(C): , Max: 38.1 (08-16-24 @ 05:25)  HR: 107 (08-16-24 @ 07:00)  BP: 107/62 (08-16-24 @ 06:15)  SpO2: 100% (08-16-24 @ 07:00)  Wt(kg): --    Output   IC: 150  08-15 @ 07:01  -  08-16 @ 07:00  --------------------------------------------------------  IN: 821.6 mL / OUT: 150 mL / NET: 671.6 mL        Gen: NAD on BiPAP  Abd: incisions without infection, some resolving lower abdominal ecchymoses, soft, minimal LLQ tenderness, stoma with some fibrinous exudate, 14fr catheter placed easily with prompt return of concentrated urine, no CVAT      Labs                        10.0   36.31 )-----------( 556      ( 16 Aug 2024 06:11 )             29.6     16 Aug 2024 06:11    136    |  100    |  41     ----------------------------<  88     3.0     |  18     |  4.42     Ca    8.4        16 Aug 2024 06:11  Phos  3.1       16 Aug 2024 06:11  Mg     1.60      16 Aug 2024 06:11

## 2024-08-16 NOTE — ED ADULT NURSE REASSESSMENT NOTE - NS ED NURSE REASSESS COMMENT FT1
urology consulted. pt placed on bipap by RT, tolerating well. pt appears more comfortable at this time, denies pain. vitals as documented, MD made aware. urology consulted. pt placed on bipap by RT, tolerating well. pt appears more comfortable at this time, denies pain. vitals as documented, + hypotension and tachycardia noted, MD made aware. pt remains A&OX4, states increasing dizziness. safety measures maintained, side rails up x2

## 2024-08-16 NOTE — PROGRESS NOTE ADULT - SUBJECTIVE AND OBJECTIVE BOX
Patient s/p cystectomy and ileal conduit, stents dislodged, with mild hydronephrosis, requiring levophed postoperatively.  N mentating, pain controlled  resp on bipap, wean to nasal cannula  cv levophed 0.5, wean down, plan to start vasopressin, place central line, vasoplegic shock, given 1 additional liter fluid bolus  gi npo, ivf   gu/renal ileal conduit functioning  heme vte ppx  id meropenem and diflucan, f/u urine cultures  endo no changes    The patient is a critical care patient with life threatening hemodynamic and metabolic instability in SICU.  I have personally interviewed when possible and examined the patient, reviewed data and laboratory tests/x-rays and all pertinent electronic images.  I was physically present for the key portions of the evaluation and management (E/M) service provided.   The SICU team has a constant risk benefit analyzes discussion with the primary team, all consultants, House Staff and PA's on all decisions.  These diagnoses are unrelated to the surgical procedure noted above.  I meet with family if needed to get further history, discuss the case and make care decisions for this patient who might not be able to participate.  Time involved in performance of separately billable procedures was not counted toward my critical care time. There is no overlap.  I spent 55-75 minutes ( 0800Hrs-0915Hrs in AM/ 1600Hrs-1715Hrs in PM, or other time indicated) of critical care time for the diagnoses, assessment, plan and interventions.  This time excludes time spent on separate procedures and teaching.

## 2024-08-16 NOTE — PATIENT PROFILE ADULT - NSPRESCRALCFREQ_GEN_A_NUR
patient has not drank anything since July 28th, 2024. Previously drank over ten drinks a day for over 50 years/4 or more times a week

## 2024-08-16 NOTE — H&P ADULT - HISTORY OF PRESENT ILLNESS
67 yo F, with PmHx of HTN and bladder cancer, s/p bladder removal with ileal conduit construction on Aug 6 at [facility], here for generalized weakness. Per pt, she had her surgery on Aug 6th, d/c home on Aug 10th. Since d/c, only have 1-2 bowel movement, and had significantly decrease PO intake due to decreased apptite. No fever, some chill. +bloody discharge from urethra, some burning. Today, called EMS as she felt very weak and unwell. BP 80s/50s for EMS. Unable to get access. Bought into room 1, attg and myself called to bedside.    Patient s/p cystectomy with Dr. Thompson at [facility] on August 6th. Patient states she had been doing well until about the last week. This last week she has been having decreased PO intake and on and off fevers. She states her surgeon knew of this any only recommended tylenol. Also states she had stents previously that came out on their own. She was supposed to see him tomorrow. Presents to the ED now due to lower abdominal pain.

## 2024-08-16 NOTE — PROGRESS NOTE ADULT - ASSESSMENT
69 yo F h/o HTN and bladder CA, s/p cystectomy/IC creation on 8/6/2024 at [facility] with Dr. Thompson (d/c on 8/10) presented to ED 8/16 w/ generalized weakness and some lower abdominal pain. Since d/c, only have 1-2 bowel movement, and had significantly decrease PO intake due to decreased appetite, intermittent fevers/chills, +bloody discharge from urethra, some burning. Pt stated stents fell out yesterday.  BP 80s/50s for EMS.  Pt remained hypotensive in ED, started on pressors, cultures sent, placed on zosyn and BiPAP and admitted to SICU. CT revealed: Expected postoperative changes status post cystectomy with ileal conduit creation including small amount of complex fluid and free air in the pelvis. No organized fluid collection. Distended ileal conduit with narrowing at the exiting ostomy site and proximally at the site of ureteral insertion. Mild bilateral hydroureteronephrosis with delayed nephrograms. No significant perinephric stranding.    8/16: still requiring pressor support and on BiPAP, pt states she feels better,14fr catheter placed in stoma, Bcx with GPC/GNR, Cr to 4.42 from 5.34    Plan:  -AM labs reviewed, hypokalemia, hypomagnesemia  -BCx: GPC/GNR  -pressor and respiratory support prn  -continue zosyn  -please sent Ucx obtained from catheterized stoma  -IVF  -bowel regimen  -appreciate SICU care  -message left for Dr. Thompson  -DVT prophy, IS, OOB       69 yo F h/o HTN and bladder CA, s/p cystectomy/IC creation on 8/6/2024 at [facility] with Dr. Thompson (d/c on 8/10) presented to ED 8/16 w/ generalized weakness and some lower abdominal pain. Since d/c, only have 1-2 bowel movement, and had significantly decrease PO intake due to decreased appetite, intermittent fevers/chills, +bloody discharge from urethra, some burning. Pt stated stents fell out yesterday.  BP 80s/50s for EMS.  Pt remained hypotensive in ED, started on pressors, cultures sent, placed on zosyn and BiPAP and admitted to SICU. CT revealed: Expected postoperative changes status post cystectomy with ileal conduit creation including small amount of complex fluid and free air in the pelvis. No organized fluid collection. Distended ileal conduit with narrowing at the exiting ostomy site and proximally at the site of ureteral insertion. Mild bilateral hydroureteronephrosis with delayed nephrograms. No significant perinephric stranding.    8/16: still requiring pressor support and on BiPAP, pt states she feels better,14fr catheter placed in stoma, Bcx with GPC/GNR, Cr to 4.42 from 5.34    Plan:  -AM labs reviewed, hypokalemia, hypomagnesemia  -BCx: GPC/GNR  -pressor and respiratory support prn  -continue zosyn  -please sent Ucx obtained from catheterized stoma  -IVF  -strict I&Os  -NPO for now  -bowel regimen  -appreciate SICU care  -message left for Dr. Thompson  -DVT prophy, IS, OOB       67 yo F h/o HTN and bladder CA, s/p cystectomy/IC creation on 8/6/2024 at [facility] with Dr. Thompson (d/c on 8/10) presented to ED 8/16 w/ generalized weakness and some lower abdominal pain. Since d/c, only have 1-2 bowel movement, and had significantly decrease PO intake due to decreased appetite, intermittent fevers/chills, +bloody discharge from urethra, some burning. Pt stated stents fell out yesterday.  BP 80s/50s for EMS.  Pt remained hypotensive in ED, started on pressors, cultures sent, placed on zosyn and BiPAP and admitted to SICU. CT revealed: Expected postoperative changes status post cystectomy with ileal conduit creation including small amount of complex fluid and free air in the pelvis. No organized fluid collection. Distended ileal conduit with narrowing at the exiting ostomy site and proximally at the site of ureteral insertion. Mild bilateral hydroureteronephrosis with delayed nephrograms. No significant perinephric stranding.    8/16: still requiring pressor support and on BiPAP, pt states she feels better,14fr catheter placed in stoma, Bcx with GPC/GNR, Cr to 4.42 from 5.34    Plan:  -AM labs reviewed, hypokalemia, hypomagnesemia  -BCx: GPC/GNR  -pressor and respiratory support prn  -continue zosyn  -please sent Ucx that we obtained from catheterized stoma  -IVF  -strict I&Os  -NPO for now  -bowel regimen  -appreciate SICU care  -message left for Dr. Thompson  -DVT prophy, IS, OOB       69 yo F h/o HTN and bladder CA, s/p cystectomy/IC creation on 8/6/2024 at [facility] with Dr. Thompson (d/c on 8/10) presented to ED 8/16 w/ generalized weakness and some lower abdominal pain. Since d/c, only have 1-2 bowel movement, and had significantly decrease PO intake due to decreased appetite, intermittent fevers/chills, +bloody discharge from urethra, some burning. Pt stated stents fell out yesterday.  BP 80s/50s for EMS.  Pt remained hypotensive in ED, started on pressors, cultures sent, placed on zosyn and BiPAP and admitted to SICU. CT revealed: Expected postoperative changes status post cystectomy with ileal conduit creation including small amount of complex fluid and free air in the pelvis. No organized fluid collection. Distended ileal conduit with narrowing at the exiting ostomy site and proximally at the site of ureteral insertion. Mild bilateral hydroureteronephrosis with delayed nephrograms. No significant perinephric stranding.    8/16: still requiring pressor support and on BiPAP, pt states she feels better,14fr catheter placed in stoma, Bcx with GPC/GNR, Cr to 4.42 from 5.34    Plan:  -AM labs reviewed, hypokalemia, hypomagnesemia  -BCx, ucx pend  -pressor and respiratory support prn  -continue zosyn  -please sent Ucx that we obtained from catheterized stoma  -IVF  -strict I&Os  -NPO for now  -bowel regimen  -appreciate SICU care  -message left for Dr. Thompson  -DVT prophy, IS, OOB

## 2024-08-16 NOTE — ED ADULT NURSE REASSESSMENT NOTE - NS ED NURSE REASSESS COMMENT FT1
report received from break coverage RN. pt uncomfortable in stretcher, repositioned and pillow and blankets provided. reporting 7/10 abdominal pain, MD made aware. vitals as documented. maintained on cardiac monitor. iv abx infusing well without complications, iv site WNL. side rails up x2 report received from break coverage RN. pt uncomfortable in stretcher, repositioned and pillow and blankets provided. reporting 7/10 abdominal pain, MD made aware. vitals as documented. maintained on cardiac monitor. iv abx infusing well without complications, iv site WNL. side rails up x2. unable to send urine specimen d/t not having urostomy changing equipment for clean catch, MD made aware

## 2024-08-16 NOTE — H&P ADULT - NSHPPHYSICALEXAM_GEN_ALL_CORE
Gen: Laying in bed, NAD,   Neuro: alert and awake  Resp: Unlabored breathing  CV: normal rate, regular rhythm  Abd: soft, pain to palpation, ileal conduit with cranberry color urine, pink stoma  Ext: no tenderness on active motion

## 2024-08-16 NOTE — ED ADULT NURSE REASSESSMENT NOTE - NS ED NURSE REASSESS COMMENT FT1
pt dry heaving, MD made aware, pt medicated as per orders. pt uncomfortable in stretcher and having respiratory distress, pt placed on 15L NRB, MD made aware and at bedside. skin dry and cold, + tachycardia noted in 120's. pt reporting pain, verbal order from MD to continue with pain meds, iv flushing well. pt titrated down to 6L NC. pt states hx of sleep apnea, o2 sat decreasing to 80s when drowsy, o2 sat increasing to 95% when awake, MD made aware. side rails up x2

## 2024-08-16 NOTE — CONSULT NOTE ADULT - ASSESSMENT
ASSESSMENT:  68y Female ***    PLAN:  NEUROLOGIC   - Pain control:   -     RESPIRATORY   - Monitor SpO2 goal >92%  - Incentive spirometry     CARDIOVASCULAR   - Monitor hemodynamics   -     GASTROINTESTINAL   - Diet:   -     /RENAL   - IV fluids: LR @ 125cc/hr  - Maintain taylor catheter, strict Is/Os  - Monitor electrolytes, replete PRN    HEMATOLOGIC  - Monitor H/H   - DVT ppx: Lovenox/SQH & SCD's    INFECTIOUS DISEASE  - Monitor fever / WBC    ENDOCRINE  - Monitor gluc    LINES  - IJ CVC (  /  )  - A line (  /  )  - Taylor (  /  )  - PIV     DISPO:    ASSESSMENT:  69 yo F, with PmHx of HTN and bladder cancer, s/p bladder removal with ileal conduit construction on Aug 6 at [facility], here for generalized weakness. Per pt, she had her surgery on Aug 6th, d/c home on Aug 10th. Since d/c, only have 1-2 bowel movement, and had significantly decrease PO intake due to decreased apptite. No fever, some chill. +bloody discharge from urethra, some burning. Today, called EMS as she felt very weak and unwell. BP 80s/50s for EMS. Patient s/p cystectomy with Dr. Thompson at [facility] on August 6th. Patient states she had been doing well until about the last week. This last week she has been having decreased PO intake and on and off fevers. She states her surgeon knew of this any only recommended tylenol. Also states she had stents previously that came out on their own. She was supposed to see him tomorrow. Presents to the ED now due to lower abdominal pain. SICU consulted for hypotension requiring pressors and desaturation.     PLAN:  NEUROLOGIC   - Pain control:   -     RESPIRATORY   - Monitor SpO2 goal >92%  - Incentive spirometry     CARDIOVASCULAR   - Monitor hemodynamics   -     GASTROINTESTINAL   - Diet:   -     /RENAL   - IV fluids: LR @ 125cc/hr  - Maintain taylor catheter, strict Is/Os  - Monitor electrolytes, replete PRN    HEMATOLOGIC  - Monitor H/H   - DVT ppx: Lovenox/SQH & SCD's    INFECTIOUS DISEASE  - Monitor fever / WBC    ENDOCRINE  - Monitor gluc    LINES  - IJ CVC (  /  )  - A line (  /  )  - Tayolr (  /  )  - PIV     DISPO: SICU   ASSESSMENT:  67 yo F, with PmHx of HTN and bladder cancer, s/p bladder removal with ileal conduit construction on Aug 6 at [facility], here for generalized weakness. Per pt, she had her surgery on Aug 6th, d/c home on Aug 10th. Since d/c, only have 1-2 bowel movement, and had significantly decrease PO intake due to decreased apptite. No fever, some chill. +bloody discharge from urethra, some burning. Today, called EMS as she felt very weak and unwell. BP 80s/50s for EMS. Patient s/p cystectomy with Dr. Thompson at [facility] on August 6th. Patient states she had been doing well until about the last week. This last week she has been having decreased PO intake and on and off fevers. She states her surgeon knew of this any only recommended tylenol. Also states she had stents previously that came out on their own. She was supposed to see him tomorrow. Presents to the ED now due to lower abdominal pain. SICU consulted for hypotension requiring pressors and desaturation on BiPAP.     PLAN:  NEUROLOGIC   - Pain control: Tylenol, Dilaudid  - AOx3    RESPIRATORY   - Monitor SpO2 goal >92%  - Incentive spirometry     CARDIOVASCULAR   - Monitor hemodynamics   - Levo 0.5  - ASA    GASTROINTESTINAL   - Diet: NPO    /RENAL   - IV fluids: LR @ 120  - Maintain strict Is/Os  - Monitor electrolytes, replete PRN    HEMATOLOGIC  - Monitor H/H   - DVT ppx: SQH & SCD's    INFECTIOUS DISEASE  - Monitor fever / WBC  - Zosyn    ENDOCRINE  - Monitor gluc  - Levothyroxine    LINES  - A line   - PIV     DISPO: SICU

## 2024-08-16 NOTE — ED ADULT NURSE REASSESSMENT NOTE - NS ED NURSE REASSESS COMMENT FT1
assumed pt care at 3am for titration of levo gtt. pt s/p bladder removal with ileal conduit. pt on BiPAP and tolerating it well. stoma dressing leaking at multiple sites, old dressing removed, site cleaned, Cavilon applied and new dressing placed. pt care provided and pt repositioned in bed. assumed pt care at 3am for titration of levo gtt. pt s/p bladder removal with ileal conduit. pt on BiPAP and tolerating it well. stoma dressing leaking at multiple sites, old dressing removed, site cleaned, Cavilon applied and new dressing placed. stoma appears healthy has dark red/brown liquid output. pt care provided and pt repositioned in bed. assumed pt care at 3am for titration of levo gtt, using a starting weight of 70kg. pt s/p bladder removal with ileal conduit. pt on BiPAP and tolerating it well. stoma dressing leaking at multiple sites, old dressing removed, site cleaned, Cavilon applied and new dressing placed. stoma appears healthy has dark red/brown liquid output. pt care provided and pt repositioned in bed.

## 2024-08-16 NOTE — CONSULT NOTE ADULT - SUBJECTIVE AND OBJECTIVE BOX
Vascular & Interventional Radiology Brief Consult Note    Evaluate for Procedure: Pelvic fluid drainage    HPI: 69 yo F, with PmHx of HTN and bladder cancer, s/p bladder removal with ileal conduit construction on Aug 6 at [facility], pod 10, with Dr. Thompson here for generalized weakness.   IR consulted for pelvic fluid drainage. Febrile on admission.    Allergies: No Known Allergies    Medications (Abx/Cardiac/Anticoagulation/Blood Products)    heparin   Injectable: 5000 Unit(s) SubCutaneous (08-16 @ 05:46)  norepinephrine Infusion: 6.56 mL/Hr IV Continuous (08-16 @ 02:48)  piperacillin/tazobactam IVPB..: 25 mL/Hr IV Intermittent (08-16 @ 06:30)  piperacillin/tazobactam IVPB...: 200 mL/Hr IV Intermittent (08-15 @ 22:45)  vancomycin  IVPB.: 250 mL/Hr IV Intermittent (08-15 @ 23:40)    Data:  160  93  T(C): 38.1  HR: 104  BP: 107/62  RR: 17  SpO2: 100%    -WBC 36.31 / HgB 10.0 / Hct 29.6 / Plt 556  -Na 136 / Cl 100 / BUN 41 / Glucose 88  -K 3.0 / CO2 18 / Cr 4.42  -ALT -- / Alk Phos -- / T.Bili --  -INR1.09    Imaging: Reviewed with attending. Small volume of pelvic fluid, not well accessible via drainage. No organized fluid collection.  "IMPRESSION:  Expected postoperative changes status post cystectomy with ileal conduit creation including small amount of complex fluid and free air in the pelvis. No organized fluid collection."    Assessment/Plan:   -69 yo F, with PmHx of HTN and bladder cancer, s/p bladder removal with ileal conduit construction on Aug 6 at [facility] pod 10, with Dr. Thompson here for generalized weakness.   IR consulted for pelvic fluid drainage.  No organized fluid collection for drainage. Fluid may be related to postoperative changes.   IR will defer procedure.  left message for     ---------------------------------------------  - Nonemergent consults:  [facility] order "Consult- Interventional Radiology", no page required  - Emergent issues (pager): [facility] [phone]; [facility] [phone]; [phone]; DO NOT PAGE FOR SCHEDULING QUESTIONS  - Scheduling questions 8am-6pm : [facility] [phone]; [facility] [phone],     Please note that urgent / emergent procedures take priority in the hospital. If a procedure is non-urgent and outpatient scheduling is preferable, please contact the following:     For outpatient IR Booking:   [facility]: [phone]  [facility]: [phone]           Vascular & Interventional Radiology Brief Consult Note    Evaluate for Procedure: Pelvic fluid drainage    HPI: 69 yo F, with PmHx of HTN and bladder cancer, s/p bladder removal with ileal conduit construction on Aug 6 at [facility], pod 10, with Dr. Thompson here for generalized weakness.   IR consulted for pelvic fluid drainage. Febrile on admission.    Allergies: No Known Allergies    Medications (Abx/Cardiac/Anticoagulation/Blood Products)    heparin   Injectable: 5000 Unit(s) SubCutaneous (08-16 @ 05:46)  norepinephrine Infusion: 6.56 mL/Hr IV Continuous (08-16 @ 02:48)  piperacillin/tazobactam IVPB..: 25 mL/Hr IV Intermittent (08-16 @ 06:30)  piperacillin/tazobactam IVPB...: 200 mL/Hr IV Intermittent (08-15 @ 22:45)  vancomycin  IVPB.: 250 mL/Hr IV Intermittent (08-15 @ 23:40)    Data:  160  93  T(C): 38.1  HR: 104  BP: 107/62  RR: 17  SpO2: 100%    -WBC 36.31 / HgB 10.0 / Hct 29.6 / Plt 556  -Na 136 / Cl 100 / BUN 41 / Glucose 88  -K 3.0 / CO2 18 / Cr 4.42  -ALT -- / Alk Phos -- / T.Bili --  -INR1.09    Imaging: Reviewed with attending. Small volume of pelvic fluid, not well accessible via drainage. No organized fluid collection.  "IMPRESSION:  Expected postoperative changes status post cystectomy with ileal conduit creation including small amount of complex fluid and free air in the pelvis. No organized fluid collection."    Assessment/Plan:   -69 yo F, with PmHx of HTN and bladder cancer, s/p bladder removal with ileal conduit construction on Aug 6 at [facility] pod 10, with Dr. Thompson here for generalized weakness.   IR consulted for pelvic fluid drainage.  No organized fluid collection for drainage. Fluid may be related to postoperative changes.   IR will defer procedure.  Discussed with primary team    ---------------------------------------------  - Nonemergent consults:  [facility] order "Consult- Interventional Radiology", no page required  - Emergent issues (pager): [facility] [phone]; [facility] [phone]; [phone]; DO NOT PAGE FOR SCHEDULING QUESTIONS  - Scheduling questions 8am-6pm : [facility] [phone]; [facility] [phone],     Please note that urgent / emergent procedures take priority in the hospital. If a procedure is non-urgent and outpatient scheduling is preferable, please contact the following:     For outpatient IR Booking:   [facility]: [phone]  [facility]: [phone]

## 2024-08-16 NOTE — ED ADULT NURSE REASSESSMENT NOTE - NS ED NURSE REASSESS COMMENT FT1
report given to SICU RN by Berta ED RN. pt transported to SICU via stretcher in NAD with ED RN, ED tech, and RT at bedside. safety measures maintained, side rails up x2

## 2024-08-16 NOTE — ED ADULT NURSE REASSESSMENT NOTE - NS ED NURSE REASSESS COMMENT FT1
pt reports stated weight of 200lbs, weight changed in Alaris pump and dose changed to match previous rate.

## 2024-08-17 LAB
A1C WITH ESTIMATED AVERAGE GLUCOSE RESULT: 5.1 % — SIGNIFICANT CHANGE UP (ref 4–5.6)
ADD ON TEST-SPECIMEN IN LAB: SIGNIFICANT CHANGE UP
ANION GAP SERPL CALC-SCNC: 15 MMOL/L — HIGH (ref 7–14)
BLOOD GAS ARTERIAL - LYTES,HGB,ICA,LACT RESULT: SIGNIFICANT CHANGE UP
BUN SERPL-MCNC: 42 MG/DL — HIGH (ref 7–23)
CALCIUM SERPL-MCNC: 8.1 MG/DL — LOW (ref 8.4–10.5)
CHLORIDE SERPL-SCNC: 101 MMOL/L — SIGNIFICANT CHANGE UP (ref 98–107)
CHLORIDE UR-SCNC: 29 MMOL/L — SIGNIFICANT CHANGE UP
CO2 SERPL-SCNC: 18 MMOL/L — LOW (ref 22–31)
CREAT ?TM UR-MCNC: 88 MG/DL — SIGNIFICANT CHANGE UP
CREAT SERPL-MCNC: 4.68 MG/DL — HIGH (ref 0.5–1.3)
EGFR: 10 ML/MIN/1.73M2 — LOW
ESTIMATED AVERAGE GLUCOSE: 100 — SIGNIFICANT CHANGE UP
GLUCOSE BLDC GLUCOMTR-MCNC: 104 MG/DL — HIGH (ref 70–99)
GLUCOSE BLDC GLUCOMTR-MCNC: 116 MG/DL — HIGH (ref 70–99)
GLUCOSE BLDC GLUCOMTR-MCNC: 80 MG/DL — SIGNIFICANT CHANGE UP (ref 70–99)
GLUCOSE BLDC GLUCOMTR-MCNC: 88 MG/DL — SIGNIFICANT CHANGE UP (ref 70–99)
GLUCOSE SERPL-MCNC: 110 MG/DL — HIGH (ref 70–99)
HCT VFR BLD CALC: 26.5 % — LOW (ref 34.5–45)
HGB BLD-MCNC: 9.2 G/DL — LOW (ref 11.5–15.5)
MAGNESIUM SERPL-MCNC: 2.4 MG/DL — SIGNIFICANT CHANGE UP (ref 1.6–2.6)
MCHC RBC-ENTMCNC: 31.6 PG — SIGNIFICANT CHANGE UP (ref 27–34)
MCHC RBC-ENTMCNC: 34.7 GM/DL — SIGNIFICANT CHANGE UP (ref 32–36)
MCV RBC AUTO: 91.1 FL — SIGNIFICANT CHANGE UP (ref 80–100)
NRBC # BLD: 0 /100 WBCS — SIGNIFICANT CHANGE UP (ref 0–0)
NRBC # FLD: 0 K/UL — SIGNIFICANT CHANGE UP (ref 0–0)
PHOSPHATE SERPL-MCNC: 4.1 MG/DL — SIGNIFICANT CHANGE UP (ref 2.5–4.5)
PLATELET # BLD AUTO: 447 K/UL — HIGH (ref 150–400)
POTASSIUM SERPL-MCNC: 4.4 MMOL/L — SIGNIFICANT CHANGE UP (ref 3.5–5.3)
POTASSIUM SERPL-SCNC: 4.4 MMOL/L — SIGNIFICANT CHANGE UP (ref 3.5–5.3)
POTASSIUM UR-SCNC: 25.5 MMOL/L — SIGNIFICANT CHANGE UP
RBC # BLD: 2.91 M/UL — LOW (ref 3.8–5.2)
RBC # FLD: 14.6 % — HIGH (ref 10.3–14.5)
SODIUM SERPL-SCNC: 134 MMOL/L — LOW (ref 135–145)
SODIUM UR-SCNC: 56 MMOL/L — SIGNIFICANT CHANGE UP
WBC # BLD: 35.39 K/UL — HIGH (ref 3.8–10.5)
WBC # FLD AUTO: 35.39 K/UL — HIGH (ref 3.8–10.5)

## 2024-08-17 PROCEDURE — 99232 SBSQ HOSP IP/OBS MODERATE 35: CPT

## 2024-08-17 PROCEDURE — 76770 US EXAM ABDO BACK WALL COMP: CPT | Mod: 26

## 2024-08-17 PROCEDURE — 99291 CRITICAL CARE FIRST HOUR: CPT | Mod: 25

## 2024-08-17 RX ORDER — VASOPRESSIN 20 [USP'U]/ML
0.03 INJECTION INTRAVENOUS
Qty: 40 | Refills: 0 | Status: DISCONTINUED | OUTPATIENT
Start: 2024-08-17 | End: 2024-08-18

## 2024-08-17 RX ORDER — ALBUMIN (HUMAN) 5 G/20ML
250 SOLUTION INTRAVENOUS ONCE
Refills: 0 | Status: COMPLETED | OUTPATIENT
Start: 2024-08-17 | End: 2024-08-17

## 2024-08-17 RX ADMIN — ACETAMINOPHEN 1000 MILLIGRAM(S): 325 TABLET ORAL at 19:00

## 2024-08-17 RX ADMIN — ACETAMINOPHEN 1000 MILLIGRAM(S): 325 TABLET ORAL at 11:03

## 2024-08-17 RX ADMIN — Medication 100 MICROGRAM(S): at 06:16

## 2024-08-17 RX ADMIN — ACETAMINOPHEN 1000 MILLIGRAM(S): 325 TABLET ORAL at 18:01

## 2024-08-17 RX ADMIN — VASOPRESSIN 4.5 UNIT(S)/MIN: 20 INJECTION INTRAVENOUS at 22:45

## 2024-08-17 RX ADMIN — Medication 81 MILLIGRAM(S): at 11:03

## 2024-08-17 RX ADMIN — Medication 1 DROP(S): at 18:02

## 2024-08-17 RX ADMIN — OXYCODONE HYDROCHLORIDE 5 MILLIGRAM(S): 5 TABLET ORAL at 03:48

## 2024-08-17 RX ADMIN — Medication 5000 UNIT(S): at 06:16

## 2024-08-17 RX ADMIN — ALBUMIN (HUMAN) 125 MILLILITER(S): 5 SOLUTION INTRAVENOUS at 08:26

## 2024-08-17 RX ADMIN — ACETAMINOPHEN 1000 MILLIGRAM(S): 325 TABLET ORAL at 06:46

## 2024-08-17 RX ADMIN — Medication 1 DROP(S): at 06:18

## 2024-08-17 RX ADMIN — Medication 4.36 MICROGRAM(S)/KG/MIN: at 12:03

## 2024-08-17 RX ADMIN — VASOPRESSIN 4.5 UNIT(S)/MIN: 20 INJECTION INTRAVENOUS at 18:01

## 2024-08-17 RX ADMIN — Medication 100 MILLILITER(S): at 10:03

## 2024-08-17 RX ADMIN — ACETAMINOPHEN 1000 MILLIGRAM(S): 325 TABLET ORAL at 23:08

## 2024-08-17 RX ADMIN — MEROPENEM 100 MILLIGRAM(S): 500 INJECTION, POWDER, FOR SOLUTION INTRAVENOUS at 06:18

## 2024-08-17 RX ADMIN — Medication 4.36 MICROGRAM(S)/KG/MIN: at 22:45

## 2024-08-17 RX ADMIN — CHLORHEXIDINE GLUCONATE 1 APPLICATION(S): 40 SOLUTION TOPICAL at 11:22

## 2024-08-17 RX ADMIN — MEROPENEM 100 MILLIGRAM(S): 500 INJECTION, POWDER, FOR SOLUTION INTRAVENOUS at 18:02

## 2024-08-17 RX ADMIN — Medication 1000 MILLILITER(S): at 10:03

## 2024-08-17 RX ADMIN — Medication 500 MILLILITER(S): at 21:00

## 2024-08-17 RX ADMIN — Medication 100 MILLILITER(S): at 18:02

## 2024-08-17 RX ADMIN — VASOPRESSIN 4.5 UNIT(S)/MIN: 20 INJECTION INTRAVENOUS at 11:00

## 2024-08-17 RX ADMIN — OXYCODONE HYDROCHLORIDE 5 MILLIGRAM(S): 5 TABLET ORAL at 13:52

## 2024-08-17 RX ADMIN — FLUCONAZOLE 100 MILLIGRAM(S): 150 TABLET ORAL at 11:00

## 2024-08-17 RX ADMIN — ACETAMINOPHEN 1000 MILLIGRAM(S): 325 TABLET ORAL at 06:16

## 2024-08-17 RX ADMIN — OXYCODONE HYDROCHLORIDE 5 MILLIGRAM(S): 5 TABLET ORAL at 13:22

## 2024-08-17 RX ADMIN — OXYCODONE HYDROCHLORIDE 5 MILLIGRAM(S): 5 TABLET ORAL at 04:18

## 2024-08-17 RX ADMIN — Medication 4.36 MICROGRAM(S)/KG/MIN: at 18:01

## 2024-08-17 RX ADMIN — Medication 40 MILLIGRAM(S): at 06:16

## 2024-08-17 NOTE — DIETITIAN INITIAL EVALUATION ADULT - PERTINENT LABORATORY DATA
08-17    134<L>  |  101  |  42<H>  ----------------------------<  110<H>  4.4   |  18<L>  |  4.68<H>    Ca    8.1<L>      17 Aug 2024 00:54  Phos  4.1     08-17  Mg     2.40     08-17    TPro  6.9  /  Alb  3.0<L>  /  TBili  1.0  /  DBili  x   /  AST  26  /  ALT  12  /  AlkPhos  75  08-15  POCT Blood Glucose.: 88 mg/dL (08-17-24 @ 07:57)  A1C with Estimated Average Glucose Result: 5.1 % (08-17-24 @ 00:54)  A1C with Estimated Average Glucose Result: 5.4 % (05-17-24 @ 09:46)

## 2024-08-17 NOTE — DIETITIAN INITIAL EVALUATION ADULT - NS FNS DIET ORDER
Diet, Regular:   Supplement Feeding Modality:  Oral  Ensure Enlive Cans or Servings Per Day:  2       Frequency:  Two Times a day (08-16-24 @ 11:56)

## 2024-08-17 NOTE — PROGRESS NOTE ADULT - SUBJECTIVE AND OBJECTIVE BOX
SICU Daily Progress Note  =====================================================  Interval/Overnight Events:         ALLERGIES:  No Known Allergies      --------------------------------------------------------------------------------------    MEDICATIONS:    Neurologic Medications  acetaminophen     Tablet .. 1000 milliGRAM(s) Oral every 6 hours  oxyCODONE    IR 2.5 milliGRAM(s) Oral every 4 hours PRN Moderate Pain (4 - 6)  oxyCODONE    IR 5 milliGRAM(s) Oral every 4 hours PRN Severe Pain (7 - 10)    Respiratory Medications    Cardiovascular Medications  norepinephrine Infusion 0.05 MICROgram(s)/kG/Min IV Continuous <Continuous>    Gastrointestinal Medications  pantoprazole    Tablet 40 milliGRAM(s) Oral before breakfast  sodium chloride 0.9% lock flush 10 milliLiter(s) IV Push every 1 hour PRN Pre/post blood products, medications, blood draw, and to maintain line patency    Genitourinary Medications    Hematologic/Oncologic Medications  aspirin  chewable 81 milliGRAM(s) Oral daily  heparin   Injectable 5000 Unit(s) SubCutaneous every 8 hours    Antimicrobial/Immunologic Medications  fluconAZOLE IVPB      fluconAZOLE IVPB 200 milliGRAM(s) IV Intermittent every 24 hours  meropenem  IVPB 500 milliGRAM(s) IV Intermittent every 12 hours    Endocrine/Metabolic Medications  atorvastatin 40 milliGRAM(s) Oral at bedtime  glucagon  Injectable 1 milliGRAM(s) IntraMuscular once  insulin lispro (ADMELOG) corrective regimen sliding scale   SubCutaneous three times a day before meals  insulin lispro (ADMELOG) corrective regimen sliding scale   SubCutaneous at bedtime  levothyroxine 100 MICROGram(s) Oral daily    Topical/Other Medications  chlorhexidine 2% Cloths 1 Application(s) Topical daily  chlorhexidine 4% Liquid 1 Application(s) Topical <User Schedule>  cycloSPORINE (RESTASIS) 0.05% Emulsion 1 Drop(s) Both EYES two times a day    --------------------------------------------------------------------------------------    VITAL SIGNS:  T(C): 36.8 (08-17-24 @ 00:00), Max: 38.1 (08-16-24 @ 05:25)  HR: 87 (08-17-24 @ 01:00) (80 - 115)  BP: 91/64 (08-17-24 @ 01:00) (77/52 - 117/42)  RR: 9 (08-17-24 @ 01:00) (9 - 30)  SpO2: 100% (08-17-24 @ 01:00) (96% - 100%)  --------------------------------------------------------------------------------------    INS AND OUTS:    08-15-24 @ 07:01  -  08-16-24 @ 07:00  --------------------------------------------------------  IN: 802.4 mL / OUT: 150 mL / NET: 652.4 mL    08-16-24 @ 07:01  -  08-17-24 @ 01:42  --------------------------------------------------------  IN: 2854.2 mL / OUT: 920 mL / NET: 1934.2 mL      --------------------------------------------------------------------------------------    EXAM    NEURO: NAD,   HEENT: NC/AT  RESPIRATORY: nonlabored respirations, normal CW expansion  CARDIO: RRR, S1S2  ABDOMEN: soft, nontender, nondistended, +/- NGT, ostomy, surgical dressing c/d/i, TOLU drain output  EXTREMITIES: normal strength, no deformities    --------------------------------------------------------------------------------------    LABS      -------------------------------------------------------------------------------------- SICU Daily Progress Note  =====================================================  Interval/Overnight Events:       - remains on vaso  - 4L NC  - CPAP tonight  - Gabriela and diflucan (per Jay)  - Urology to place taylor    ALLERGIES:  No Known Allergies      --------------------------------------------------------------------------------------    MEDICATIONS:    Neurologic Medications  acetaminophen     Tablet .. 1000 milliGRAM(s) Oral every 6 hours  oxyCODONE    IR 2.5 milliGRAM(s) Oral every 4 hours PRN Moderate Pain (4 - 6)  oxyCODONE    IR 5 milliGRAM(s) Oral every 4 hours PRN Severe Pain (7 - 10)    Respiratory Medications    Cardiovascular Medications  norepinephrine Infusion 0.05 MICROgram(s)/kG/Min IV Continuous <Continuous>    Gastrointestinal Medications  pantoprazole    Tablet 40 milliGRAM(s) Oral before breakfast  sodium chloride 0.9% lock flush 10 milliLiter(s) IV Push every 1 hour PRN Pre/post blood products, medications, blood draw, and to maintain line patency    Genitourinary Medications    Hematologic/Oncologic Medications  aspirin  chewable 81 milliGRAM(s) Oral daily  heparin   Injectable 5000 Unit(s) SubCutaneous every 8 hours    Antimicrobial/Immunologic Medications  fluconAZOLE IVPB      fluconAZOLE IVPB 200 milliGRAM(s) IV Intermittent every 24 hours  meropenem  IVPB 500 milliGRAM(s) IV Intermittent every 12 hours    Endocrine/Metabolic Medications  atorvastatin 40 milliGRAM(s) Oral at bedtime  glucagon  Injectable 1 milliGRAM(s) IntraMuscular once  insulin lispro (ADMELOG) corrective regimen sliding scale   SubCutaneous three times a day before meals  insulin lispro (ADMELOG) corrective regimen sliding scale   SubCutaneous at bedtime  levothyroxine 100 MICROGram(s) Oral daily    Topical/Other Medications  chlorhexidine 2% Cloths 1 Application(s) Topical daily  chlorhexidine 4% Liquid 1 Application(s) Topical <User Schedule>  cycloSPORINE (RESTASIS) 0.05% Emulsion 1 Drop(s) Both EYES two times a day    --------------------------------------------------------------------------------------    VITAL SIGNS:  T(C): 36.8 (08-17-24 @ 00:00), Max: 38.1 (08-16-24 @ 05:25)  HR: 87 (08-17-24 @ 01:00) (80 - 115)  BP: 91/64 (08-17-24 @ 01:00) (77/52 - 117/42)  RR: 9 (08-17-24 @ 01:00) (9 - 30)  SpO2: 100% (08-17-24 @ 01:00) (96% - 100%)  --------------------------------------------------------------------------------------    INS AND OUTS:    08-15-24 @ 07:01  -  08-16-24 @ 07:00  --------------------------------------------------------  IN: 802.4 mL / OUT: 150 mL / NET: 652.4 mL    08-16-24 @ 07:01  -  08-17-24 @ 01:42  --------------------------------------------------------  IN: 2854.2 mL / OUT: 920 mL / NET: 1934.2 mL      --------------------------------------------------------------------------------------    EXAM    NEUROLOGY  Exam: Normal, NAD, alert, oriented x 3, no focal deficits.   HEENT  Exam: Normocephalic, atraumatic.  EOMI   RESPIRATORY  Exam: Lungs clear to auscultation, Normal expansion/effort.  on BiPAP  CARDIOVASCULAR  Exam: S1, S2.  Regular rate and rhythm.      GI/NUTRITION  Exam: Abdomen soft, Non-tender, Non-distended.     Current Diet:  NPO  VASCULAR  Exam: Extremities warm, pink, well-perfused.   MUSCULOSKELETAL  Exam: All extremities moving spontaneously without limitations.    SKIN:  Exam: Good skin turgor, no skin breakdown.     --------------------------------------------------------------------------------------    LABS                          9.2    35.39 )-----------( 447      ( 17 Aug 2024 00:54 )             26.5     08-17    x   |  x   |  42<H>  ----------------------------<  110<H>  x    |  18<L>  |  4.68<H>    Ca    8.1<L>      17 Aug 2024 00:54  Phos  3.1     08-16  Mg     2.40     08-17    TPro  6.9  /  Alb  3.0<L>  /  TBili  1.0  /  DBili  x   /  AST  26  /  ALT  12  /  AlkPhos  75  08-15    --------------------------------------------------------------------------------------

## 2024-08-17 NOTE — PROGRESS NOTE ADULT - SUBJECTIVE AND OBJECTIVE BOX
Overnight events:  - remains on vaso  - 4L NC  - CPAP tonight  - Gabriela and diflucan (per Jay)  - d/c UO, given albumin and IVF restarted    Subjective:  Pt offers no complaints, tolerating diet    Objective:    Vital signs  T(C): , Max: 37.6 (08-16-24 @ 16:00)  HR: 89 (08-17-24 @ 10:00)  BP: 82/42 (08-17-24 @ 08:15)  SpO2: 97% (08-17-24 @ 10:00)  Wt(kg): --    Output   IC: 280  08-16 @ 07:01  -  08-17 @ 07:00  --------------------------------------------------------  IN: 3045.8 mL / OUT: 1005 mL / NET: 2040.8 mL    08-17 @ 07:01  -  08-17 @ 10:31  --------------------------------------------------------  IN: 389.3 mL / OUT: 25 mL / NET: 364.3 mL        Gen: NAD on nasal canula  Abd: incisions without infection, some resolving lower abdominal ecchymoses, soft, minimal LLQ tenderness, stoma with some fibrinous exudate, 14fr catheter advanced easily after balloon deflated, balloon reinflated w/ 2cc no CVAT      Labs                        9.2    35.39 )-----------( 447      ( 17 Aug 2024 00:54 )             26.5     17 Aug 2024 00:54    134    |  101    |  42     ----------------------------<  110    4.4     |  18     |  4.68     Ca    8.1        17 Aug 2024 00:54  Phos  4.1       17 Aug 2024 00:54  Mg     2.40      17 Aug 2024 00:54

## 2024-08-17 NOTE — PROGRESS NOTE ADULT - ASSESSMENT
ASSESSMENT:  67 yo F, with PmHx of HTN and bladder cancer, s/p bladder removal with ileal conduit construction on Aug 6 at [facility], here for generalized weakness. Per pt, she had her surgery on Aug 6th, d/c home on Aug 10th. Since d/c, only have 1-2 bowel movement, and had significantly decrease PO intake due to decreased apptite. No fever, some chill. +bloody discharge from urethra, some burning. Today, called EMS as she felt very weak and unwell. BP 80s/50s for EMS. Patient s/p cystectomy with Dr. Thompson at [facility] on August 6th. Patient states she had been doing well until about the last week. This last week she has been having decreased PO intake and on and off fevers. She states her surgeon knew of this any only recommended tylenol. Also states she had stents previously that came out on their own. She was supposed to see him tomorrow. Presents to the ED now due to lower abdominal pain. SICU consulted for hypotension requiring pressors and desaturation on BiPAP.     PLAN  NEUROLOGIC   - Pain control: Tylenol, Oxy  - AOx3    RESPIRATORY   - Monitor SpO2 goal >92%  - Incentive spirometry   - BIPAP >> 4L NC  - CPAP at night    CARDIOVASCULAR   - Monitor hemodynamics   - Levo 0.2  - ASA    GASTROINTESTINAL   - Diet: Reg    /RENAL   - IVL  - Maintain strict Is/Os  - Monitor electrolytes, replete PRN  - UA/Cx from conduit sent    HEMATOLOGIC  - Monitor H/H   - DVT ppx: SQH & SCD's    INFECTIOUS DISEASE  - Monitor fever / WBC  - Zosyn >> Gabriela  - add Diflucan per Dr. Thompson    ENDOCRINE  - Monitor gluc  - Levothyroxine    LINES  - A line   - PIV   - R IJ CVC    DISPO: SICU

## 2024-08-17 NOTE — PROGRESS NOTE ADULT - ASSESSMENT
69 yo F h/o HTN and bladder CA, s/p cystectomy/IC creation on 8/6/2024 at [facility] with Dr. Thompson (d/c on 8/10) presented to ED 8/16 w/ generalized weakness and some lower abdominal pain. Since d/c, only have 1-2 bowel movement, and had significantly decrease PO intake due to decreased appetite, intermittent fevers/chills, +bloody discharge from urethra, some burning. Pt stated stents fell out yesterday.  BP 80s/50s for EMS.  Pt remained hypotensive in ED, started on pressors, cultures sent, placed on zosyn and BiPAP and admitted to SICU. CT revealed: Expected postoperative changes status post cystectomy with ileal conduit creation including small amount of complex fluid and free air in the pelvis. No organized fluid collection. Distended ileal conduit with narrowing at the exiting ostomy site and proximally at the site of ureteral insertion. Mild bilateral hydroureteronephrosis with delayed nephrograms. No significant perinephric stranding.    8/16: still requiring pressor support and on BiPAP, pt states she feels better,14fr catheter placed in stoma, Bcx with GPC/GNR, Cr to 4.42 from 5.34, abx changed to leila and diflucan  8/17: still requiring pressor support and supplement oxygen but feels well, decr UO overnight, given albumin and IVF restarted, Cr up slightly to 4.68, electrolytes normal    Plan:  -AM labs reviewed  -please order RBUS  -IR consult for b/l nephroureteral tubes  -BCx; NGTD  -f/u Ucx  -pressor and respiratory support prn  -continue diflucan and meropenem  -IVF  -strict I&Os  -NPO for now pending IR consult  -bowel regimen  -appreciate SICU care  -IS, OOB

## 2024-08-17 NOTE — DIETITIAN NUTRITION RISK NOTIFICATION - TREATMENT: THE FOLLOWING DIET HAS BEEN RECOMMENDED
Diet, Regular:   Supplement Feeding Modality:  Oral  Ensure Enlive Cans or Servings Per Day:  2       Frequency:  Two Times a day (08-16-24 @ 11:56) [Active]

## 2024-08-17 NOTE — DIETITIAN INITIAL EVALUATION ADULT - ORAL INTAKE PTA/DIET HISTORY
Pt with recent surgery and hospitalizations which have led to poor po intakes for one month and subsequent reported weight loss 30 lbs x 7 months.  Prior to cancer dx and sx Pt reports to eating better.

## 2024-08-17 NOTE — CHART NOTE - NSCHARTNOTEFT_GEN_A_CORE
IR Follow-Up     69 yo female s/p ideal conduit with sepsis, decreased urine output. conduit catheterized. imaging reviewed & discussed with urology. no need for PCNs at this time. reconsult PRN    --  Rakesh Birmingham DO/KARLOS/JUAN C, PGY-6 Chief Resident  Vascular and Interventional Radiology   Available on [employer] Teams    For EMERGENT inquiries/questions:  IR Pager ([facility]): [phone]  IR Pager ([facility]): [phone] ; [phone]    For non-emergent consults/questions:   Please place a sunrise order "Consult- Interventional Radiology" with an appropriate callback number    For questions about scheduling during appropriate work hours, call IR :  [facility]: [phone]  [facility]: [phone]    For outpatient IR booking:  [facility]: [phone]  [facility]: [phone]

## 2024-08-17 NOTE — DIETITIAN INITIAL EVALUATION ADULT - PERTINENT MEDS FT
MEDICATIONS  (STANDING):  acetaminophen     Tablet .. 1000 milliGRAM(s) Oral every 6 hours  aspirin  chewable 81 milliGRAM(s) Oral daily  atorvastatin 40 milliGRAM(s) Oral at bedtime  chlorhexidine 2% Cloths 1 Application(s) Topical daily  chlorhexidine 4% Liquid 1 Application(s) Topical <User Schedule>  cycloSPORINE (RESTASIS) 0.05% Emulsion 1 Drop(s) Both EYES two times a day  fluconAZOLE IVPB      fluconAZOLE IVPB 200 milliGRAM(s) IV Intermittent every 24 hours  glucagon  Injectable 1 milliGRAM(s) IntraMuscular once  heparin   Injectable 5000 Unit(s) SubCutaneous every 8 hours  insulin lispro (ADMELOG) corrective regimen sliding scale   SubCutaneous three times a day before meals  insulin lispro (ADMELOG) corrective regimen sliding scale   SubCutaneous at bedtime  lactated ringers. 1000 milliLiter(s) (100 mL/Hr) IV Continuous <Continuous>  levothyroxine 100 MICROGram(s) Oral daily  meropenem  IVPB 500 milliGRAM(s) IV Intermittent every 12 hours  norepinephrine Infusion 0.05 MICROgram(s)/kG/Min (4.36 mL/Hr) IV Continuous <Continuous>  pantoprazole    Tablet 40 milliGRAM(s) Oral before breakfast    MEDICATIONS  (PRN):  oxyCODONE    IR 2.5 milliGRAM(s) Oral every 4 hours PRN Moderate Pain (4 - 6)  oxyCODONE    IR 5 milliGRAM(s) Oral every 4 hours PRN Severe Pain (7 - 10)  sodium chloride 0.9% lock flush 10 milliLiter(s) IV Push every 1 hour PRN Pre/post blood products, medications, blood draw, and to maintain line patency

## 2024-08-17 NOTE — DIETITIAN INITIAL EVALUATION ADULT - ADD RECOMMEND
Recommend daily multivitamin supplement.  Recommend daily multivitamin supplement.     Consider adding bowel regimen.

## 2024-08-17 NOTE — DIETITIAN INITIAL EVALUATION ADULT - OTHER INFO
Patient endorsed poor appetite since last surgery and hospitalization at outside hospital 11 days ago.  Pt has not have had a bowel movement in the last 5 days. Denies chewing, swallowing difficulties. Reports positive nausea but no emesis. Current stated weight 200 lbs. HIE weight hx reviewed, (1/24) 229 lbs. (5/24) 216 lbs.   Pt is ordered for Regular diet and Ensure Plus 2x daily. Encouraged increasing PO intakes as able and supplementing with therapeutic drink. Pt prefers chocolate flavor.

## 2024-08-18 LAB
ANION GAP SERPL CALC-SCNC: 15 MMOL/L — HIGH (ref 7–14)
BUN SERPL-MCNC: 50 MG/DL — HIGH (ref 7–23)
CALCIUM SERPL-MCNC: 8 MG/DL — LOW (ref 8.4–10.5)
CHLORIDE SERPL-SCNC: 101 MMOL/L — SIGNIFICANT CHANGE UP (ref 98–107)
CO2 SERPL-SCNC: 20 MMOL/L — LOW (ref 22–31)
CREAT SERPL-MCNC: 4.95 MG/DL — HIGH (ref 0.5–1.3)
EGFR: 9 ML/MIN/1.73M2 — LOW
GLUCOSE BLDC GLUCOMTR-MCNC: 106 MG/DL — HIGH (ref 70–99)
GLUCOSE BLDC GLUCOMTR-MCNC: 113 MG/DL — HIGH (ref 70–99)
GLUCOSE BLDC GLUCOMTR-MCNC: 40 MG/DL — CRITICAL LOW (ref 70–99)
GLUCOSE BLDC GLUCOMTR-MCNC: 59 MG/DL — LOW (ref 70–99)
GLUCOSE BLDC GLUCOMTR-MCNC: 61 MG/DL — LOW (ref 70–99)
GLUCOSE BLDC GLUCOMTR-MCNC: 84 MG/DL — SIGNIFICANT CHANGE UP (ref 70–99)
GLUCOSE BLDC GLUCOMTR-MCNC: 92 MG/DL — SIGNIFICANT CHANGE UP (ref 70–99)
GLUCOSE SERPL-MCNC: 97 MG/DL — SIGNIFICANT CHANGE UP (ref 70–99)
HCT VFR BLD CALC: 25.2 % — LOW (ref 34.5–45)
HGB BLD-MCNC: 8.4 G/DL — LOW (ref 11.5–15.5)
MAGNESIUM SERPL-MCNC: 2.2 MG/DL — SIGNIFICANT CHANGE UP (ref 1.6–2.6)
MCHC RBC-ENTMCNC: 31.1 PG — SIGNIFICANT CHANGE UP (ref 27–34)
MCHC RBC-ENTMCNC: 33.3 GM/DL — SIGNIFICANT CHANGE UP (ref 32–36)
MCV RBC AUTO: 93.3 FL — SIGNIFICANT CHANGE UP (ref 80–100)
NRBC # BLD: 0 /100 WBCS — SIGNIFICANT CHANGE UP (ref 0–0)
NRBC # FLD: 0 K/UL — SIGNIFICANT CHANGE UP (ref 0–0)
PHOSPHATE SERPL-MCNC: 5.3 MG/DL — HIGH (ref 2.5–4.5)
PLATELET # BLD AUTO: 366 K/UL — SIGNIFICANT CHANGE UP (ref 150–400)
POTASSIUM SERPL-MCNC: 4.9 MMOL/L — SIGNIFICANT CHANGE UP (ref 3.5–5.3)
POTASSIUM SERPL-SCNC: 4.9 MMOL/L — SIGNIFICANT CHANGE UP (ref 3.5–5.3)
RBC # BLD: 2.7 M/UL — LOW (ref 3.8–5.2)
RBC # FLD: 15.8 % — HIGH (ref 10.3–14.5)
SODIUM SERPL-SCNC: 136 MMOL/L — SIGNIFICANT CHANGE UP (ref 135–145)
WBC # BLD: 30.27 K/UL — HIGH (ref 3.8–10.5)
WBC # FLD AUTO: 30.27 K/UL — HIGH (ref 3.8–10.5)

## 2024-08-18 PROCEDURE — 74176 CT ABD & PELVIS W/O CONTRAST: CPT | Mod: 26

## 2024-08-18 PROCEDURE — 99291 CRITICAL CARE FIRST HOUR: CPT

## 2024-08-18 PROCEDURE — 99232 SBSQ HOSP IP/OBS MODERATE 35: CPT

## 2024-08-18 RX ORDER — HEPARIN SODIUM,BOVINE 1000/ML
5000 VIAL (ML) INJECTION EVERY 8 HOURS
Refills: 0 | Status: DISCONTINUED | OUTPATIENT
Start: 2024-08-18 | End: 2024-08-20

## 2024-08-18 RX ORDER — ROPIVACAINE IN 0.9% SOD CHL/PF 0.1 %
0.05 PLASTIC BAG, INJECTION (ML) EPIDURAL
Qty: 16 | Refills: 0 | Status: DISCONTINUED | OUTPATIENT
Start: 2024-08-18 | End: 2024-08-19

## 2024-08-18 RX ORDER — ROPIVACAINE IN 0.9% SOD CHL/PF 0.1 %
0.02 PLASTIC BAG, INJECTION (ML) EPIDURAL
Qty: 8 | Refills: 0 | Status: DISCONTINUED | OUTPATIENT
Start: 2024-08-18 | End: 2024-08-18

## 2024-08-18 RX ADMIN — MEROPENEM 100 MILLIGRAM(S): 500 INJECTION, POWDER, FOR SOLUTION INTRAVENOUS at 06:31

## 2024-08-18 RX ADMIN — MEROPENEM 100 MILLIGRAM(S): 500 INJECTION, POWDER, FOR SOLUTION INTRAVENOUS at 18:16

## 2024-08-18 RX ADMIN — OXYCODONE HYDROCHLORIDE 5 MILLIGRAM(S): 5 TABLET ORAL at 06:48

## 2024-08-18 RX ADMIN — Medication 4.36 MICROGRAM(S)/KG/MIN: at 18:16

## 2024-08-18 RX ADMIN — CHLORHEXIDINE GLUCONATE 1 APPLICATION(S): 40 SOLUTION TOPICAL at 12:28

## 2024-08-18 RX ADMIN — Medication 40 MILLIGRAM(S): at 22:59

## 2024-08-18 RX ADMIN — OXYCODONE HYDROCHLORIDE 5 MILLIGRAM(S): 5 TABLET ORAL at 19:02

## 2024-08-18 RX ADMIN — Medication 1 DROP(S): at 18:15

## 2024-08-18 RX ADMIN — ACETAMINOPHEN 1000 MILLIGRAM(S): 325 TABLET ORAL at 06:24

## 2024-08-18 RX ADMIN — OXYCODONE HYDROCHLORIDE 5 MILLIGRAM(S): 5 TABLET ORAL at 19:30

## 2024-08-18 RX ADMIN — OXYCODONE HYDROCHLORIDE 5 MILLIGRAM(S): 5 TABLET ORAL at 06:27

## 2024-08-18 RX ADMIN — ACETAMINOPHEN 1000 MILLIGRAM(S): 325 TABLET ORAL at 23:02

## 2024-08-18 RX ADMIN — ACETAMINOPHEN 1000 MILLIGRAM(S): 325 TABLET ORAL at 12:23

## 2024-08-18 RX ADMIN — Medication 5000 UNIT(S): at 22:59

## 2024-08-18 RX ADMIN — Medication 1 DROP(S): at 06:31

## 2024-08-18 RX ADMIN — Medication 4.36 MICROGRAM(S)/KG/MIN: at 19:04

## 2024-08-18 RX ADMIN — FLUCONAZOLE 100 MILLIGRAM(S): 150 TABLET ORAL at 12:23

## 2024-08-18 RX ADMIN — Medication 40 MILLIGRAM(S): at 06:24

## 2024-08-18 RX ADMIN — Medication 81 MILLIGRAM(S): at 12:23

## 2024-08-18 RX ADMIN — Medication 500 MILLILITER(S): at 02:30

## 2024-08-18 RX ADMIN — ACETAMINOPHEN 1000 MILLIGRAM(S): 325 TABLET ORAL at 18:15

## 2024-08-18 NOTE — PROGRESS NOTE ADULT - SUBJECTIVE AND OBJECTIVE BOX
SICU Daily Progress Note  =====================================================  Interval/Overnight Events:  - Remains on levo/vaso  - UOP decreased to 0/hr        - Uro concerned for obstruction: US negative for BL hydro        - R/O anastomotic leak: CT w/ contrast in stoma (ordered, not performed)  - 2L NC  - +1L LR +250 5% Albumin for increasing pressors and decreasing output in AM  - R radial a line dc'd 2/2 dysfunction -- cuff pressures correlating  - 500cc LR bolus overnight    Allergies: No Known Allergies      MEDICATIONS:   --------------------------------------------------------------------------------------  Neurologic Medications  acetaminophen     Tablet .. 1000 milliGRAM(s) Oral every 6 hours  oxyCODONE    IR 5 milliGRAM(s) Oral every 4 hours PRN Severe Pain (7 - 10)  oxyCODONE    IR 2.5 milliGRAM(s) Oral every 4 hours PRN Moderate Pain (4 - 6)    Respiratory Medications    Cardiovascular Medications  norepinephrine Infusion 0.05 MICROgram(s)/kG/Min IV Continuous <Continuous>    Gastrointestinal Medications  lactated ringers. 1000 milliLiter(s) IV Continuous <Continuous>  pantoprazole    Tablet 40 milliGRAM(s) Oral before breakfast  sodium chloride 0.9% lock flush 10 milliLiter(s) IV Push every 1 hour PRN Pre/post blood products, medications, blood draw, and to maintain line patency    Genitourinary Medications    Hematologic/Oncologic Medications  aspirin  chewable 81 milliGRAM(s) Oral daily    Antimicrobial/Immunologic Medications  fluconAZOLE IVPB      fluconAZOLE IVPB 200 milliGRAM(s) IV Intermittent every 24 hours  meropenem  IVPB 500 milliGRAM(s) IV Intermittent every 12 hours    Endocrine/Metabolic Medications  atorvastatin 40 milliGRAM(s) Oral at bedtime  glucagon  Injectable 1 milliGRAM(s) IntraMuscular once  insulin lispro (ADMELOG) corrective regimen sliding scale   SubCutaneous three times a day before meals  insulin lispro (ADMELOG) corrective regimen sliding scale   SubCutaneous at bedtime  levothyroxine 100 MICROGram(s) Oral daily  vasopressin Infusion 0.03 Unit(s)/Min IV Continuous <Continuous>    Topical/Other Medications  chlorhexidine 2% Cloths 1 Application(s) Topical daily  chlorhexidine 4% Liquid 1 Application(s) Topical <User Schedule>  cycloSPORINE (RESTASIS) 0.05% Emulsion 1 Drop(s) Both EYES two times a day    --------------------------------------------------------------------------------------    VITAL SIGNS, INS/OUTS (last 24 hours):  --------------------------------------------------------------------------------------  T(C): 36.6 (08-17-24 @ 20:00), Max: 37.5 (08-17-24 @ 04:00)  HR: 73 (08-17-24 @ 21:15) (73 - 99)  BP: 85/62 (08-17-24 @ 21:15) (82/42 - 106/74)  BP(mean): 72 (08-17-24 @ 21:15) (52 - 86)  ABP: 113/105 (08-17-24 @ 15:15) (72/45 - 121/65)  ABP(mean): 109 (08-17-24 @ 15:15) (57 - 113)  RR: 15 (08-17-24 @ 21:15) (9 - 28)  SpO2: 100% (08-17-24 @ 21:15) (94% - 100%)  CVP(mm Hg): --  CI: --  CAPILLARY BLOOD GLUCOSE      POCT Blood Glucose.: 116 mg/dL (17 Aug 2024 22:44)  POCT Blood Glucose.: 104 mg/dL (17 Aug 2024 17:58)  POCT Blood Glucose.: 80 mg/dL (17 Aug 2024 11:29)  POCT Blood Glucose.: 88 mg/dL (17 Aug 2024 07:57)   N/A    08-16 @ 07:01  -  08-17 @ 07:00  --------------------------------------------------------  IN:    IV PiggyBack: 250 mL    Lactated Ringers: 1200 mL    Lactated Ringers Bolus: 1000 mL    Norepinephrine: 184.7 mL    Norepinephrine: 361.1 mL    Oral Fluid: 50 mL  Total IN: 3045.8 mL    OUT:    Urostomy (mL): 1005 mL  Total OUT: 1005 mL    Total NET: 2040.8 mL      08-17 @ 07:01 - 08-18 @ 00:02  --------------------------------------------------------  IN:    IV PiggyBack: 300 mL    Lactated Ringers: 1300 mL    Lactated Ringers Bolus: 1500 mL    Norepinephrine: 129.7 mL    Oral Fluid: 50 mL    Vasopressin: 54 mL  Total IN: 3333.7 mL    OUT:    Urostomy (mL): 335 mL  Total OUT: 335 mL    Total NET: 2998.7 mL        --------------------------------------------------------------------------------------    EXAM  NEUROLOGY  Exam: Normal, NAD, alert, oriented x3, no focal deficits.   HEENT  Exam: Normocephalic, atraumatic, EOMI.    RESPIRATORY  Exam: Lungs clear to auscultation, Normal expansion/effort.   CARDIOVASCULAR  Exam: S1, S2.  Regular rate and rhythm.     GI/NUTRITION  Exam: Abdomen soft, Non-tender, Non-distended.  Ileal conduit  Current Diet:  Reg CC  VASCULAR  Exam: Extremities warm, pink, well-perfused.   MUSCULOSKELETAL  Exam: All extremities moving spontaneously without limitations.   SKIN  Exam: Good skin turgor, no skin breakdown.     METABOLIC/FLUIDS/ELECTROLYTES  lactated ringers. 1000 milliLiter(s) IV Continuous <Continuous>      HEMATOLOGIC  [x] VTE Prophylaxis: aspirin  chewable 81 milliGRAM(s) Oral daily    Transfusions:	[] PRBC	[] Platelets		[] FFP	[] Cryoprecipitate    INFECTIOUS DISEASE  Antimicrobials/Immunologic Medications:  fluconAZOLE IVPB      fluconAZOLE IVPB 200 milliGRAM(s) IV Intermittent every 24 hours  meropenem  IVPB 500 milliGRAM(s) IV Intermittent every 12 hours      LABS  --------------------------------------------------------------------------------------  CBC (08-17 @ 00:54)                              9.2<L>                         35.39<H>  )----------------(  447<H>     --    % Neutrophils, --    % Lymphocytes, ANC: --                                  26.5<L>                BMP (08-17 @ 00:54)             134<L>  |  101     |  42<H> 		Ca++ --      Ca 8.1<L>             ---------------------------------( 110<H>		Mg 2.40               4.4     |  18<L>   |  4.68<H>			Ph 4.1           ABG (08-17 @ 00:54)     7.32<L> / 41 / 142<H> / 21 / -4.7<L> / 99.1<H>%     Lactate:         -> .Blood Blood-Peripheral Culture (08-15 @ 21:15)     NG    NG    No growth at 24 hours    -> .Blood Blood-Peripheral Culture (08-15 @ 21:05)     NG    NG    No growth at 24 hours      --------------------------------------------------------------------------------------    OTHER LABORATORY:     IMAGING STUDIES:   CXR:    SICU Daily Progress Note  =====================================================  Interval/Overnight Events:  - Off vaso, on levo  - UOP decreased to 0/hr        - Uro concerned for obstruction: US negative for BL hydro        - R/O anastomotic leak: CT w/ contrast in stoma (ordered, not performed)  - 2L NC  - +1L LR +250 5% Albumin for increasing pressors and decreasing output in AM  - R radial a line dc'd 2/2 dysfunction -- cuff pressures correlating  - 500cc x2 LR bolus overnight    Allergies: No Known Allergies      MEDICATIONS:   --------------------------------------------------------------------------------------  Neurologic Medications  acetaminophen     Tablet .. 1000 milliGRAM(s) Oral every 6 hours  oxyCODONE    IR 5 milliGRAM(s) Oral every 4 hours PRN Severe Pain (7 - 10)  oxyCODONE    IR 2.5 milliGRAM(s) Oral every 4 hours PRN Moderate Pain (4 - 6)    Respiratory Medications    Cardiovascular Medications  norepinephrine Infusion 0.05 MICROgram(s)/kG/Min IV Continuous <Continuous>    Gastrointestinal Medications  lactated ringers. 1000 milliLiter(s) IV Continuous <Continuous>  pantoprazole    Tablet 40 milliGRAM(s) Oral before breakfast  sodium chloride 0.9% lock flush 10 milliLiter(s) IV Push every 1 hour PRN Pre/post blood products, medications, blood draw, and to maintain line patency    Genitourinary Medications    Hematologic/Oncologic Medications  aspirin  chewable 81 milliGRAM(s) Oral daily    Antimicrobial/Immunologic Medications  fluconAZOLE IVPB      fluconAZOLE IVPB 200 milliGRAM(s) IV Intermittent every 24 hours  meropenem  IVPB 500 milliGRAM(s) IV Intermittent every 12 hours    Endocrine/Metabolic Medications  atorvastatin 40 milliGRAM(s) Oral at bedtime  glucagon  Injectable 1 milliGRAM(s) IntraMuscular once  insulin lispro (ADMELOG) corrective regimen sliding scale   SubCutaneous three times a day before meals  insulin lispro (ADMELOG) corrective regimen sliding scale   SubCutaneous at bedtime  levothyroxine 100 MICROGram(s) Oral daily  vasopressin Infusion 0.03 Unit(s)/Min IV Continuous <Continuous>    Topical/Other Medications  chlorhexidine 2% Cloths 1 Application(s) Topical daily  chlorhexidine 4% Liquid 1 Application(s) Topical <User Schedule>  cycloSPORINE (RESTASIS) 0.05% Emulsion 1 Drop(s) Both EYES two times a day    --------------------------------------------------------------------------------------    VITAL SIGNS, INS/OUTS (last 24 hours):  --------------------------------------------------------------------------------------  T(C): 36.6 (08-17-24 @ 20:00), Max: 37.5 (08-17-24 @ 04:00)  HR: 73 (08-17-24 @ 21:15) (73 - 99)  BP: 85/62 (08-17-24 @ 21:15) (82/42 - 106/74)  BP(mean): 72 (08-17-24 @ 21:15) (52 - 86)  ABP: 113/105 (08-17-24 @ 15:15) (72/45 - 121/65)  ABP(mean): 109 (08-17-24 @ 15:15) (57 - 113)  RR: 15 (08-17-24 @ 21:15) (9 - 28)  SpO2: 100% (08-17-24 @ 21:15) (94% - 100%)  CVP(mm Hg): --  CI: --  CAPILLARY BLOOD GLUCOSE      POCT Blood Glucose.: 116 mg/dL (17 Aug 2024 22:44)  POCT Blood Glucose.: 104 mg/dL (17 Aug 2024 17:58)  POCT Blood Glucose.: 80 mg/dL (17 Aug 2024 11:29)  POCT Blood Glucose.: 88 mg/dL (17 Aug 2024 07:57)   N/A    08-16 @ 07:01  -  08-17 @ 07:00  --------------------------------------------------------  IN:    IV PiggyBack: 250 mL    Lactated Ringers: 1200 mL    Lactated Ringers Bolus: 1000 mL    Norepinephrine: 184.7 mL    Norepinephrine: 361.1 mL    Oral Fluid: 50 mL  Total IN: 3045.8 mL    OUT:    Urostomy (mL): 1005 mL  Total OUT: 1005 mL    Total NET: 2040.8 mL      08-17 @ 07:01  -  08-18 @ 00:02  --------------------------------------------------------  IN:    IV PiggyBack: 300 mL    Lactated Ringers: 1300 mL    Lactated Ringers Bolus: 1500 mL    Norepinephrine: 129.7 mL    Oral Fluid: 50 mL    Vasopressin: 54 mL  Total IN: 3333.7 mL    OUT:    Urostomy (mL): 335 mL  Total OUT: 335 mL    Total NET: 2998.7 mL        --------------------------------------------------------------------------------------    EXAM  NEUROLOGY  Exam: Normal, NAD, alert, oriented x3, no focal deficits.   HEENT  Exam: Normocephalic, atraumatic, EOMI.    RESPIRATORY  Exam: Lungs clear to auscultation, Normal expansion/effort.   CARDIOVASCULAR  Exam: S1, S2.  Regular rate and rhythm.     GI/NUTRITION  Exam: Abdomen soft, Non-tender, Non-distended.  Ileal conduit  Current Diet:  Reg CC  VASCULAR  Exam: Extremities warm, pink, well-perfused.   MUSCULOSKELETAL  Exam: All extremities moving spontaneously without limitations.   SKIN  Exam: Good skin turgor, no skin breakdown.     METABOLIC/FLUIDS/ELECTROLYTES  lactated ringers. 1000 milliLiter(s) IV Continuous <Continuous>      HEMATOLOGIC  [x] VTE Prophylaxis: aspirin  chewable 81 milliGRAM(s) Oral daily    Transfusions:	[] PRBC	[] Platelets		[] FFP	[] Cryoprecipitate    INFECTIOUS DISEASE  Antimicrobials/Immunologic Medications:  fluconAZOLE IVPB      fluconAZOLE IVPB 200 milliGRAM(s) IV Intermittent every 24 hours  meropenem  IVPB 500 milliGRAM(s) IV Intermittent every 12 hours      LABS  --------------------------------------------------------------------------------------  CBC (08-17 @ 00:54)                              9.2<L>                         35.39<H>  )----------------(  447<H>     --    % Neutrophils, --    % Lymphocytes, ANC: --                                  26.5<L>                BMP (08-17 @ 00:54)             134<L>  |  101     |  42<H> 		Ca++ --      Ca 8.1<L>             ---------------------------------( 110<H>		Mg 2.40               4.4     |  18<L>   |  4.68<H>			Ph 4.1           ABG (08-17 @ 00:54)     7.32<L> / 41 / 142<H> / 21 / -4.7<L> / 99.1<H>%     Lactate:         -> .Blood Blood-Peripheral Culture (08-15 @ 21:15)     NG    NG    No growth at 24 hours    -> .Blood Blood-Peripheral Culture (08-15 @ 21:05)     NG    NG    No growth at 24 hours      --------------------------------------------------------------------------------------    OTHER LABORATORY:     IMAGING STUDIES:   CXR:    SICU Daily Progress Note  =====================================================  Interval/Overnight Events:  - Off vaso, on levo  - UOP decreased to 0/hr        - Uro concerned for obstruction: US negative for BL hydro        - IR consulted for nephrostomies: Uro and IR deferring 2/2 to no hydro        - R/O anastomotic leak: CT w/ contrast in stoma (ordered, not performed)  - 2L NC  - +1L LR +250 5% Albumin for increasing pressors and decreasing output in AM  - R radial a line dc'd 2/2 dysfunction -- cuff pressures correlating  - 500cc x2 LR bolus overnight    Allergies: No Known Allergies      MEDICATIONS:   --------------------------------------------------------------------------------------  Neurologic Medications  acetaminophen     Tablet .. 1000 milliGRAM(s) Oral every 6 hours  oxyCODONE    IR 5 milliGRAM(s) Oral every 4 hours PRN Severe Pain (7 - 10)  oxyCODONE    IR 2.5 milliGRAM(s) Oral every 4 hours PRN Moderate Pain (4 - 6)    Respiratory Medications    Cardiovascular Medications  norepinephrine Infusion 0.05 MICROgram(s)/kG/Min IV Continuous <Continuous>    Gastrointestinal Medications  lactated ringers. 1000 milliLiter(s) IV Continuous <Continuous>  pantoprazole    Tablet 40 milliGRAM(s) Oral before breakfast  sodium chloride 0.9% lock flush 10 milliLiter(s) IV Push every 1 hour PRN Pre/post blood products, medications, blood draw, and to maintain line patency    Genitourinary Medications    Hematologic/Oncologic Medications  aspirin  chewable 81 milliGRAM(s) Oral daily    Antimicrobial/Immunologic Medications  fluconAZOLE IVPB      fluconAZOLE IVPB 200 milliGRAM(s) IV Intermittent every 24 hours  meropenem  IVPB 500 milliGRAM(s) IV Intermittent every 12 hours    Endocrine/Metabolic Medications  atorvastatin 40 milliGRAM(s) Oral at bedtime  glucagon  Injectable 1 milliGRAM(s) IntraMuscular once  insulin lispro (ADMELOG) corrective regimen sliding scale   SubCutaneous three times a day before meals  insulin lispro (ADMELOG) corrective regimen sliding scale   SubCutaneous at bedtime  levothyroxine 100 MICROGram(s) Oral daily  vasopressin Infusion 0.03 Unit(s)/Min IV Continuous <Continuous>    Topical/Other Medications  chlorhexidine 2% Cloths 1 Application(s) Topical daily  chlorhexidine 4% Liquid 1 Application(s) Topical <User Schedule>  cycloSPORINE (RESTASIS) 0.05% Emulsion 1 Drop(s) Both EYES two times a day    --------------------------------------------------------------------------------------    VITAL SIGNS, INS/OUTS (last 24 hours):  --------------------------------------------------------------------------------------  T(C): 36.6 (08-17-24 @ 20:00), Max: 37.5 (08-17-24 @ 04:00)  HR: 73 (08-17-24 @ 21:15) (73 - 99)  BP: 85/62 (08-17-24 @ 21:15) (82/42 - 106/74)  BP(mean): 72 (08-17-24 @ 21:15) (52 - 86)  ABP: 113/105 (08-17-24 @ 15:15) (72/45 - 121/65)  ABP(mean): 109 (08-17-24 @ 15:15) (57 - 113)  RR: 15 (08-17-24 @ 21:15) (9 - 28)  SpO2: 100% (08-17-24 @ 21:15) (94% - 100%)  CVP(mm Hg): --  CI: --  CAPILLARY BLOOD GLUCOSE      POCT Blood Glucose.: 116 mg/dL (17 Aug 2024 22:44)  POCT Blood Glucose.: 104 mg/dL (17 Aug 2024 17:58)  POCT Blood Glucose.: 80 mg/dL (17 Aug 2024 11:29)  POCT Blood Glucose.: 88 mg/dL (17 Aug 2024 07:57)   N/A    08-16 @ 07:01 - 08-17 @ 07:00  --------------------------------------------------------  IN:    IV PiggyBack: 250 mL    Lactated Ringers: 1200 mL    Lactated Ringers Bolus: 1000 mL    Norepinephrine: 184.7 mL    Norepinephrine: 361.1 mL    Oral Fluid: 50 mL  Total IN: 3045.8 mL    OUT:    Urostomy (mL): 1005 mL  Total OUT: 1005 mL    Total NET: 2040.8 mL      08-17 @ 07:01  -  08-18 @ 00:02  --------------------------------------------------------  IN:    IV PiggyBack: 300 mL    Lactated Ringers: 1300 mL    Lactated Ringers Bolus: 1500 mL    Norepinephrine: 129.7 mL    Oral Fluid: 50 mL    Vasopressin: 54 mL  Total IN: 3333.7 mL    OUT:    Urostomy (mL): 335 mL  Total OUT: 335 mL    Total NET: 2998.7 mL        --------------------------------------------------------------------------------------    EXAM  NEUROLOGY  Exam: Normal, NAD, alert, oriented x3, no focal deficits.   HEENT  Exam: Normocephalic, atraumatic, EOMI.    RESPIRATORY  Exam: Lungs clear to auscultation, Normal expansion/effort.   CARDIOVASCULAR  Exam: S1, S2.  Regular rate and rhythm.     GI/NUTRITION  Exam: Abdomen soft, Non-tender, Non-distended.  Ileal conduit  Current Diet:  Reg CC  VASCULAR  Exam: Extremities warm, pink, well-perfused.   MUSCULOSKELETAL  Exam: All extremities moving spontaneously without limitations.   SKIN  Exam: Good skin turgor, no skin breakdown.     METABOLIC/FLUIDS/ELECTROLYTES  lactated ringers. 1000 milliLiter(s) IV Continuous <Continuous>      HEMATOLOGIC  [x] VTE Prophylaxis: aspirin  chewable 81 milliGRAM(s) Oral daily    Transfusions:	[] PRBC	[] Platelets		[] FFP	[] Cryoprecipitate    INFECTIOUS DISEASE  Antimicrobials/Immunologic Medications:  fluconAZOLE IVPB      fluconAZOLE IVPB 200 milliGRAM(s) IV Intermittent every 24 hours  meropenem  IVPB 500 milliGRAM(s) IV Intermittent every 12 hours      LABS  --------------------------------------------------------------------------------------  CBC (08-17 @ 00:54)                              9.2<L>                         35.39<H>  )----------------(  447<H>     --    % Neutrophils, --    % Lymphocytes, ANC: --                                  26.5<L>                BMP (08-17 @ 00:54)             134<L>  |  101     |  42<H> 		Ca++ --      Ca 8.1<L>             ---------------------------------( 110<H>		Mg 2.40               4.4     |  18<L>   |  4.68<H>			Ph 4.1           ABG (08-17 @ 00:54)     7.32<L> / 41 / 142<H> / 21 / -4.7<L> / 99.1<H>%     Lactate:         -> .Blood Blood-Peripheral Culture (08-15 @ 21:15)     NG    NG    No growth at 24 hours    -> .Blood Blood-Peripheral Culture (08-15 @ 21:05)     NG    NG    No growth at 24 hours      --------------------------------------------------------------------------------------    OTHER LABORATORY:     IMAGING STUDIES:   CXR:

## 2024-08-18 NOTE — PROGRESS NOTE ADULT - ASSESSMENT
67 yo F h/o HTN and bladder CA, s/p cystectomy/IC creation on 8/6/2024 at [facility] with Dr. Thompson (d/c on 8/10) presented to ED 8/16 w/ generalized weakness and some lower abdominal pain. Since d/c, only have 1-2 bowel movement, and had significantly decrease PO intake due to decreased appetite, intermittent fevers/chills, +bloody discharge from urethra, some burning. Pt stated stents fell out yesterday.  BP 80s/50s for EMS.  Pt remained hypotensive in ED, started on pressors, cultures sent, placed on zosyn and BiPAP and admitted to SICU. CT revealed: Expected postoperative changes status post cystectomy with ileal conduit creation including small amount of complex fluid and free air in the pelvis. No organized fluid collection. Distended ileal conduit with narrowing at the exiting ostomy site and proximally at the site of ureteral insertion. Mild bilateral hydroureteronephrosis with delayed nephrograms. No significant perinephric stranding.    8/16: still requiring pressor support and on BiPAP, pt states she feels better,14fr catheter placed in stoma, Bcx with GPC/GNR, Cr to 4.42 from 5.34, abx changed to leila and diflucan  8/17: still requiring pressor support and supplement oxygen but feels well, decr UO overnight, given albumin and IVF restarted, Cr up slightly to 4.68, electrolytes normal  8/18: RBUS done, no signs of hydro. IR consulted, no need for NTs as no hydro. UOP still very low (overnight 10 cc/hour), given 2 boluses with no improvement. Cr up to 4.95 from 4.68. CT loopogram of conduit pending today to evaluate for leak. still requiring pressors. Ucx from conduit now growing e.coli, sensitivities pending.     Plan:  -AM labs reviewed  -BCx; NGTD  -f/u Ucx: e.coli   -f/u CT   -pressor and respiratory support prn  -continue diflucan and meropenem  -IVF  -strict I&Os  -bowel regimen  -appreciate SICU care  -IS, OOB

## 2024-08-18 NOTE — PROGRESS NOTE ADULT - SUBJECTIVE AND OBJECTIVE BOX
Overnight events:  - Off vaso, on levo  - UOP decreased to 0/hr        - Uro concerned for obstruction: US negative for BL hydro        - IR consulted for nephrostomies: Uro and IR deferring 2/2 to no hydro        - R/O anastomotic leak: CT w/ contrast in stoma (ordered, not performed)  - 2L NC  - +1L LR +250 5% Albumin for increasing pressors and decreasing output in AM  - R radial a line dc'd 2/2 dysfunction -- cuff pressures correlating  - 500cc x2 LR bolus overnight    Subjective:  Pt offers no complaints, tolerating diet      Vital signs  T(F): , Max: 98.3 (08-18-24 @ 04:00)  HR: 88 (08-18-24 @ 08:00)  BP: 88/65 (08-18-24 @ 08:00)  SpO2: 100% (08-18-24 @ 08:00)  Wt(kg): --    Output     OUT:    Urostomy (mL): 391 mL  Total OUT: 391 mL    Total NET: -391 mL      OUT:    Urostomy (mL): 15 mL  Total OUT: 15 mL    Total NET: -15 mL        Gen: NAD on nasal canula  Abd: incisions without infection, some resolving lower abdominal ecchymoses, soft, minimal LLQ tenderness, stoma with some fibrinous exudate, 14fr catheter in place in ostomy bag     Labs      08-18 @ 01:00    WBC 30.27 / Hct 25.2  / SCr 4.95     08-17 @ 00:54    WBC 35.39 / Hct 26.5  / SCr 4.68         Culture - Urine (collected 08-16-24 @ 10:30)  Source: .Urine Ileal Conduit  Preliminary Report (08-18-24 @ 06:56):    50,000 - 99,000 CFU/mL Escherichia coli    Culture - Blood (collected 08-15-24 @ 21:15)  Source: .Blood Blood-Peripheral  Preliminary Report (08-18-24 @ 01:02):    No growth at 48 Hours    Culture - Blood (collected 08-15-24 @ 21:05)  Source: .Blood Blood-Peripheral  Preliminary Report (08-18-24 @ 01:02):    No growth at 48 Hours        Urine Cx: ?  Blood Cx: ?    Imaging

## 2024-08-18 NOTE — PROGRESS NOTE ADULT - ASSESSMENT
67 yo F, with PmHx of HTN and bladder cancer, s/p bladder removal with ileal conduit construction on Aug 6 at [facility], here for generalized weakness. Per pt, she had her surgery on Aug 6th, d/c home on Aug 10th. Since d/c, only have 1-2 bowel movement, and had significantly decrease PO intake due to decreased apptite. No fever, some chill. +bloody discharge from urethra, some burning. Today, called EMS as she felt very weak and unwell. BP 80s/50s for EMS. Patient s/p cystectomy with Dr. Thompson at [facility] on August 6th. Patient states she had been doing well until about the last week. This last week she has been having decreased PO intake and on and off fevers. Also states she had stents previously that came out on their own. Presents to the ED due to lower abdominal pain. SICU consulted for hypotension requiring pressors and desaturation on BiPAP.     NEUROLOGIC   - Pain control: Tylenol, Oxy  - AOx3  - Recent history of alcohol use    RESPIRATORY   - Monitor SpO2 goal >92%  - Incentive spirometry   - 2L NC  - CPAP at night    CARDIOVASCULAR   - Monitor hemodynamics   - Levo 0.07 / Vaso .03  - ASA    GASTROINTESTINAL   - Diet: Reg, CC    /RENAL  - CT w/ ileal conduit contrast, pending  - US kidney: negative for hydronephrosis   - LR @ 100  - Maintain strict Is/Os  - Monitor electrolytes, replete PRN  - UA positive   - Urine cx pending    HEMATOLOGIC  - Monitor H/H   - DVT ppx: SQH & SCD's    INFECTIOUS DISEASE  - Monitor fever / WBC  - Meropenem  - Diflucan    ENDOCRINE  - Monitor gluc  - LAINE  - Levothyroxine    LINES  - PIV   - R IJ CVC    DISPO: SICU

## 2024-08-18 NOTE — PROGRESS NOTE ADULT - ATTENDING COMMENTS
I have personally interviewed and examined this patient, reviewed pertinent labs and imaging on SICU rounds.    50   minutes in total were spent in providing direct critical care for the diagnoses, assessment and plan outlined below.  This patient suffers from a critical illness that acutely impairs one or more vital organ systems such that there is a high probability of life threatening or imminent deterioration of the patient's condition.  These diagnoses are unrelated to the surgical procedure.    Additionally, time spent in teaching or the performance of separately billable procedures was not counted toward my critical care time.  There is no overlap.  Time included review of vitals, labs, imaging, discussion with consultants (ABDON), and coordination with the radiology suite for CT scan.    68F with HTN and bladder cancer, s/p bladder removal with ileal conduit construction on Aug 6 at [facility] ([facility], he is aware), here for management of septic shock with hypotension and BENIGNO.    ICU Vital Signs Last 24 Hrs  T(C): 36.8 (18 Aug 2024 04:00), Max: 36.8 (18 Aug 2024 04:00)  T(F): 98.3 (18 Aug 2024 04:00), Max: 98.3 (18 Aug 2024 04:00)  HR: 79 (18 Aug 2024 11:00) (68 - 93)  BP: 62/47 (18 Aug 2024 11:00) (62/47 - 117/73)-->back on norepi infusion  BP(mean): 52 (18 Aug 2024 11:00) (52 - 90)  ABP: 113/105 (17 Aug 2024 15:15) (101/57 - 113/105)  ABP(mean): 109 (17 Aug 2024 15:15) (75 - 113)  RR: 30 (18 Aug 2024 11:00) (14 - 30)  SpO2: 99% (18 Aug 2024 11:00) (94% - 100%)    O2 Parameters below as of 18 Aug 2024 07:00  Patient On (Oxygen Delivery Method): nasal cannula  O2 Flow (L/min): 2    awake, alert  RRR  decreased BS bases  appropriate surgical incisional tenderness, urostomy pink                          8.4    30.27 )-----------( 366      ( 18 Aug 2024 01:00 )             25.2   08-18    136  |  101  |  50<H>  ----------------------------<  97  4.9   |  20<L>  |  4.95<H>    Ca    8.0<L>      18 Aug 2024 01:00  Phos  5.3     08-18  Mg     2.20     08-18    ABG - ( 17 Aug 2024 00:54 )  pH, Arterial: 7.32  pH, Blood: x     /  pCO2: 41    /  pO2: 142   / HCO3: 21    / Base Excess: -4.7  /  SaO2: 99.1      MEDICATIONS  (STANDING):  acetaminophen     Tablet .. 1000 milliGRAM(s) Oral every 6 hours  aspirin  chewable 81 milliGRAM(s) Oral daily  atorvastatin 40 milliGRAM(s) Oral at bedtime  chlorhexidine 2% Cloths 1 Application(s) Topical daily  chlorhexidine 4% Liquid 1 Application(s) Topical <User Schedule>  cycloSPORINE (RESTASIS) 0.05% Emulsion 1 Drop(s) Both EYES two times a day  fluconAZOLE IVPB      fluconAZOLE IVPB 200 milliGRAM(s) IV Intermittent every 24 hours  insulin lispro (ADMELOG) corrective regimen sliding scale   SubCutaneous three times a day before meals  insulin lispro (ADMELOG) corrective regimen sliding scale   SubCutaneous at bedtime  lactated ringers. 1000 milliLiter(s) (100 mL/Hr) IV Continuous <Continuous>  levothyroxine 100 MICROGram(s) Oral daily  meropenem  IVPB 500 milliGRAM(s) IV Intermittent every 12 hours  norepinephrine Infusion 0.02 MICROgram(s)/kG/Min (3.49 mL/Hr) IV Continuous <Continuous>  pantoprazole    Tablet 40 milliGRAM(s) Oral before breakfast    CT scan images reviewed, pending final radiology read

## 2024-08-18 NOTE — PHYSICAL THERAPY INITIAL EVALUATION ADULT - GAIT DEVIATIONS NOTED, PT EVAL
forward flexed posture with downward gaze/decreased velocity of limb motion/decreased step length/decreased weight-shifting ability

## 2024-08-18 NOTE — PHYSICAL THERAPY INITIAL EVALUATION ADULT - PATIENT PROFILE REVIEW, REHAB EVAL
PT initial evaluation received and chart review completed. Pt agreeable to participate in PT evaluation. ACTIVITY:/yes PT initial evaluation received and chart review completed. Pt agreeable to participate in PT evaluation. ACTIVITY: OOB WITH ASSISTANCE/yes

## 2024-08-18 NOTE — PROGRESS NOTE ADULT - ATTENDING COMMENTS
Sepsis 2/2 UTI, BENIGNO  CT pouchogram - prelim reviewed, no obvious significant extravasation, possible tiny wisp but no collection noted, B/l VUR noted explaining b/l hydro  Appr SICU care, f/u cxs, IV abx

## 2024-08-19 LAB
ALBUMIN SERPL ELPH-MCNC: 2.5 G/DL — LOW (ref 3.3–5)
ALP SERPL-CCNC: 247 U/L — HIGH (ref 40–120)
ALT FLD-CCNC: 21 U/L — SIGNIFICANT CHANGE UP (ref 4–33)
ANION GAP SERPL CALC-SCNC: 15 MMOL/L — HIGH (ref 7–14)
ANION GAP SERPL CALC-SCNC: 16 MMOL/L — HIGH (ref 7–14)
ANION GAP SERPL CALC-SCNC: 19 MMOL/L — HIGH (ref 7–14)
APTT BLD: 49.3 SEC — HIGH (ref 24.5–35.6)
AST SERPL-CCNC: 111 U/L — HIGH (ref 4–32)
BILIRUB SERPL-MCNC: 0.5 MG/DL — SIGNIFICANT CHANGE UP (ref 0.2–1.2)
BLOOD GAS ARTERIAL - LYTES,HGB,ICA,LACT RESULT: SIGNIFICANT CHANGE UP
BLOOD GAS VENOUS COMPREHENSIVE RESULT: SIGNIFICANT CHANGE UP
BUN SERPL-MCNC: 59 MG/DL — HIGH (ref 7–23)
BUN SERPL-MCNC: 65 MG/DL — HIGH (ref 7–23)
BUN SERPL-MCNC: 67 MG/DL — HIGH (ref 7–23)
CALCIUM SERPL-MCNC: 7.7 MG/DL — LOW (ref 8.4–10.5)
CALCIUM SERPL-MCNC: 7.8 MG/DL — LOW (ref 8.4–10.5)
CALCIUM SERPL-MCNC: 7.9 MG/DL — LOW (ref 8.4–10.5)
CHLORIDE SERPL-SCNC: 101 MMOL/L — SIGNIFICANT CHANGE UP (ref 98–107)
CHLORIDE SERPL-SCNC: 102 MMOL/L — SIGNIFICANT CHANGE UP (ref 98–107)
CHLORIDE SERPL-SCNC: 102 MMOL/L — SIGNIFICANT CHANGE UP (ref 98–107)
CO2 SERPL-SCNC: 15 MMOL/L — LOW (ref 22–31)
CO2 SERPL-SCNC: 16 MMOL/L — LOW (ref 22–31)
CO2 SERPL-SCNC: 18 MMOL/L — LOW (ref 22–31)
CREAT SERPL-MCNC: 5.84 MG/DL — HIGH (ref 0.5–1.3)
CREAT SERPL-MCNC: 6.46 MG/DL — HIGH (ref 0.5–1.3)
CREAT SERPL-MCNC: 6.52 MG/DL — HIGH (ref 0.5–1.3)
EGFR: 6 ML/MIN/1.73M2 — LOW
EGFR: 7 ML/MIN/1.73M2 — LOW
EGFR: 7 ML/MIN/1.73M2 — LOW
GLUCOSE BLDC GLUCOMTR-MCNC: 57 MG/DL — LOW (ref 70–99)
GLUCOSE BLDC GLUCOMTR-MCNC: 65 MG/DL — LOW (ref 70–99)
GLUCOSE BLDC GLUCOMTR-MCNC: 82 MG/DL — SIGNIFICANT CHANGE UP (ref 70–99)
GLUCOSE BLDC GLUCOMTR-MCNC: 84 MG/DL — SIGNIFICANT CHANGE UP (ref 70–99)
GLUCOSE BLDC GLUCOMTR-MCNC: 84 MG/DL — SIGNIFICANT CHANGE UP (ref 70–99)
GLUCOSE BLDC GLUCOMTR-MCNC: 93 MG/DL — SIGNIFICANT CHANGE UP (ref 70–99)
GLUCOSE SERPL-MCNC: 100 MG/DL — HIGH (ref 70–99)
GLUCOSE SERPL-MCNC: 100 MG/DL — HIGH (ref 70–99)
GLUCOSE SERPL-MCNC: 99 MG/DL — SIGNIFICANT CHANGE UP (ref 70–99)
HCT VFR BLD CALC: 23.4 % — LOW (ref 34.5–45)
HCT VFR BLD CALC: 23.8 % — LOW (ref 34.5–45)
HCT VFR BLD CALC: 25.5 % — LOW (ref 34.5–45)
HGB BLD-MCNC: 7.9 G/DL — LOW (ref 11.5–15.5)
HGB BLD-MCNC: 8.3 G/DL — LOW (ref 11.5–15.5)
HGB BLD-MCNC: 8.5 G/DL — LOW (ref 11.5–15.5)
INR BLD: 1.17 RATIO — SIGNIFICANT CHANGE UP (ref 0.85–1.18)
MAGNESIUM SERPL-MCNC: 2.1 MG/DL — SIGNIFICANT CHANGE UP (ref 1.6–2.6)
MAGNESIUM SERPL-MCNC: 2.2 MG/DL — SIGNIFICANT CHANGE UP (ref 1.6–2.6)
MAGNESIUM SERPL-MCNC: 2.2 MG/DL — SIGNIFICANT CHANGE UP (ref 1.6–2.6)
MCHC RBC-ENTMCNC: 30.8 PG — SIGNIFICANT CHANGE UP (ref 27–34)
MCHC RBC-ENTMCNC: 30.9 PG — SIGNIFICANT CHANGE UP (ref 27–34)
MCHC RBC-ENTMCNC: 31 PG — SIGNIFICANT CHANGE UP (ref 27–34)
MCHC RBC-ENTMCNC: 33.3 GM/DL — SIGNIFICANT CHANGE UP (ref 32–36)
MCHC RBC-ENTMCNC: 33.8 GM/DL — SIGNIFICANT CHANGE UP (ref 32–36)
MCHC RBC-ENTMCNC: 34.9 GM/DL — SIGNIFICANT CHANGE UP (ref 32–36)
MCV RBC AUTO: 88.8 FL — SIGNIFICANT CHANGE UP (ref 80–100)
MCV RBC AUTO: 91.4 FL — SIGNIFICANT CHANGE UP (ref 80–100)
MCV RBC AUTO: 92.4 FL — SIGNIFICANT CHANGE UP (ref 80–100)
NRBC # BLD: 0 /100 WBCS — SIGNIFICANT CHANGE UP (ref 0–0)
NRBC # FLD: 0 K/UL — SIGNIFICANT CHANGE UP (ref 0–0)
PHOSPHATE SERPL-MCNC: 5.4 MG/DL — HIGH (ref 2.5–4.5)
PHOSPHATE SERPL-MCNC: 5.5 MG/DL — HIGH (ref 2.5–4.5)
PHOSPHATE SERPL-MCNC: 5.6 MG/DL — HIGH (ref 2.5–4.5)
PLATELET # BLD AUTO: 350 K/UL — SIGNIFICANT CHANGE UP (ref 150–400)
PLATELET # BLD AUTO: 384 K/UL — SIGNIFICANT CHANGE UP (ref 150–400)
PLATELET # BLD AUTO: 388 K/UL — SIGNIFICANT CHANGE UP (ref 150–400)
POTASSIUM SERPL-MCNC: 4.6 MMOL/L — SIGNIFICANT CHANGE UP (ref 3.5–5.3)
POTASSIUM SERPL-MCNC: 4.7 MMOL/L — SIGNIFICANT CHANGE UP (ref 3.5–5.3)
POTASSIUM SERPL-MCNC: 4.9 MMOL/L — SIGNIFICANT CHANGE UP (ref 3.5–5.3)
POTASSIUM SERPL-SCNC: 4.6 MMOL/L — SIGNIFICANT CHANGE UP (ref 3.5–5.3)
POTASSIUM SERPL-SCNC: 4.7 MMOL/L — SIGNIFICANT CHANGE UP (ref 3.5–5.3)
POTASSIUM SERPL-SCNC: 4.9 MMOL/L — SIGNIFICANT CHANGE UP (ref 3.5–5.3)
PROT SERPL-MCNC: 5.9 G/DL — LOW (ref 6–8.3)
PROTHROM AB SERPL-ACNC: 13 SEC — SIGNIFICANT CHANGE UP (ref 9.5–13)
RBC # BLD: 2.56 M/UL — LOW (ref 3.8–5.2)
RBC # BLD: 2.68 M/UL — LOW (ref 3.8–5.2)
RBC # BLD: 2.76 M/UL — LOW (ref 3.8–5.2)
RBC # FLD: 16 % — HIGH (ref 10.3–14.5)
RBC # FLD: 16.1 % — HIGH (ref 10.3–14.5)
RBC # FLD: 16.3 % — HIGH (ref 10.3–14.5)
SODIUM SERPL-SCNC: 133 MMOL/L — LOW (ref 135–145)
SODIUM SERPL-SCNC: 135 MMOL/L — SIGNIFICANT CHANGE UP (ref 135–145)
SODIUM SERPL-SCNC: 136 MMOL/L — SIGNIFICANT CHANGE UP (ref 135–145)
WBC # BLD: 14.16 K/UL — HIGH (ref 3.8–10.5)
WBC # BLD: 15.09 K/UL — HIGH (ref 3.8–10.5)
WBC # BLD: 18.8 K/UL — HIGH (ref 3.8–10.5)
WBC # FLD AUTO: 14.16 K/UL — HIGH (ref 3.8–10.5)
WBC # FLD AUTO: 15.09 K/UL — HIGH (ref 3.8–10.5)
WBC # FLD AUTO: 18.8 K/UL — HIGH (ref 3.8–10.5)

## 2024-08-19 PROCEDURE — 99291 CRITICAL CARE FIRST HOUR: CPT | Mod: 24

## 2024-08-19 PROCEDURE — 99232 SBSQ HOSP IP/OBS MODERATE 35: CPT

## 2024-08-19 RX ORDER — CHLORHEXIDINE GLUCONATE 40 MG/ML
1 SOLUTION TOPICAL DAILY
Refills: 0 | Status: DISCONTINUED | OUTPATIENT
Start: 2024-08-19 | End: 2024-08-30

## 2024-08-19 RX ORDER — ALBUMIN (HUMAN) 5 G/20ML
250 SOLUTION INTRAVENOUS ONCE
Refills: 0 | Status: COMPLETED | OUTPATIENT
Start: 2024-08-19 | End: 2024-08-19

## 2024-08-19 RX ORDER — ROPIVACAINE IN 0.9% SOD CHL/PF 0.1 %
0.05 PLASTIC BAG, INJECTION (ML) EPIDURAL
Qty: 16 | Refills: 0 | Status: DISCONTINUED | OUTPATIENT
Start: 2024-08-19 | End: 2024-08-27

## 2024-08-19 RX ORDER — ROPIVACAINE IN 0.9% SOD CHL/PF 0.1 %
0.02 PLASTIC BAG, INJECTION (ML) EPIDURAL
Qty: 8 | Refills: 0 | Status: DISCONTINUED | OUTPATIENT
Start: 2024-08-19 | End: 2024-08-19

## 2024-08-19 RX ADMIN — Medication 1 DROP(S): at 21:43

## 2024-08-19 RX ADMIN — CHLORHEXIDINE GLUCONATE 1 APPLICATION(S): 40 SOLUTION TOPICAL at 12:00

## 2024-08-19 RX ADMIN — ACETAMINOPHEN 1000 MILLIGRAM(S): 325 TABLET ORAL at 11:50

## 2024-08-19 RX ADMIN — Medication 40 MILLIGRAM(S): at 21:46

## 2024-08-19 RX ADMIN — OXYCODONE HYDROCHLORIDE 2.5 MILLIGRAM(S): 5 TABLET ORAL at 09:54

## 2024-08-19 RX ADMIN — Medication 5000 UNIT(S): at 21:43

## 2024-08-19 RX ADMIN — Medication 5000 UNIT(S): at 14:33

## 2024-08-19 RX ADMIN — Medication 40 MILLIGRAM(S): at 06:09

## 2024-08-19 RX ADMIN — ACETAMINOPHEN 1000 MILLIGRAM(S): 325 TABLET ORAL at 06:09

## 2024-08-19 RX ADMIN — ACETAMINOPHEN 1000 MILLIGRAM(S): 325 TABLET ORAL at 18:04

## 2024-08-19 RX ADMIN — MEROPENEM 100 MILLIGRAM(S): 500 INJECTION, POWDER, FOR SOLUTION INTRAVENOUS at 06:09

## 2024-08-19 RX ADMIN — Medication 5000 UNIT(S): at 06:09

## 2024-08-19 RX ADMIN — Medication 1 DROP(S): at 06:09

## 2024-08-19 RX ADMIN — Medication 81 MILLIGRAM(S): at 11:51

## 2024-08-19 RX ADMIN — OXYCODONE HYDROCHLORIDE 2.5 MILLIGRAM(S): 5 TABLET ORAL at 10:15

## 2024-08-19 RX ADMIN — ALBUMIN (HUMAN) 125 MILLILITER(S): 5 SOLUTION INTRAVENOUS at 16:33

## 2024-08-19 RX ADMIN — Medication 4.36 MICROGRAM(S)/KG/MIN: at 19:31

## 2024-08-19 NOTE — PROGRESS NOTE ADULT - SUBJECTIVE AND OBJECTIVE BOX
SICU Daily Progress Note  =====================================================  Interval/Overnight Events:  - CT cystogram completed 8/18 - f/u read  - Off all pressors in early AM, needed small dose for CT, held 8/18 PM  - Hypoglycemic x2 -> improved with food/juice  - IVL  - SQH restarted    Allergies: No Known Allergies      MEDICATIONS:   --------------------------------------------------------------------------------------  Neurologic Medications  acetaminophen     Tablet .. 1000 milliGRAM(s) Oral every 6 hours  oxyCODONE    IR 2.5 milliGRAM(s) Oral every 4 hours PRN Moderate Pain (4 - 6)  oxyCODONE    IR 5 milliGRAM(s) Oral every 4 hours PRN Severe Pain (7 - 10)    Respiratory Medications    Cardiovascular Medications  norepinephrine Infusion 0.05 MICROgram(s)/kG/Min IV Continuous <Continuous>    Gastrointestinal Medications  pantoprazole    Tablet 40 milliGRAM(s) Oral before breakfast    Genitourinary Medications    Hematologic/Oncologic Medications  aspirin  chewable 81 milliGRAM(s) Oral daily  heparin   Injectable 5000 Unit(s) SubCutaneous every 8 hours    Antimicrobial/Immunologic Medications  fluconAZOLE IVPB      fluconAZOLE IVPB 200 milliGRAM(s) IV Intermittent every 24 hours  meropenem  IVPB 500 milliGRAM(s) IV Intermittent every 12 hours    Endocrine/Metabolic Medications  atorvastatin 40 milliGRAM(s) Oral at bedtime  insulin lispro (ADMELOG) corrective regimen sliding scale   SubCutaneous three times a day before meals  insulin lispro (ADMELOG) corrective regimen sliding scale   SubCutaneous at bedtime  levothyroxine 100 MICROGram(s) Oral daily    Topical/Other Medications  chlorhexidine 2% Cloths 1 Application(s) Topical daily  chlorhexidine 4% Liquid 1 Application(s) Topical <User Schedule>  cycloSPORINE (RESTASIS) 0.05% Emulsion 1 Drop(s) Both EYES two times a day    --------------------------------------------------------------------------------------    VITAL SIGNS, INS/OUTS (last 24 hours):  --------------------------------------------------------------------------------------  T(C): 36.2 (08-18-24 @ 20:00), Max: 36.8 (08-18-24 @ 04:00)  HR: 89 (08-18-24 @ 23:30) (79 - 99)  BP: 96/66 (08-18-24 @ 23:00) (62/47 - 134/69)  BP(mean): 76 (08-18-24 @ 23:00) (52 - 99)  ABP: --  ABP(mean): --  RR: 18 (08-18-24 @ 23:00) (13 - 30)  SpO2: 100% (08-18-24 @ 23:30) (97% - 100%)  CVP(mm Hg): --  CI: --  CAPILLARY BLOOD GLUCOSE      POCT Blood Glucose.: 84 mg/dL (18 Aug 2024 22:57)  POCT Blood Glucose.: 106 mg/dL (18 Aug 2024 17:05)  POCT Blood Glucose.: 92 mg/dL (18 Aug 2024 13:20)  POCT Blood Glucose.: 59 mg/dL (18 Aug 2024 12:20)  POCT Blood Glucose.: 40 mg/dL (18 Aug 2024 12:18)  POCT Blood Glucose.: 113 mg/dL (18 Aug 2024 09:25)  POCT Blood Glucose.: 61 mg/dL (18 Aug 2024 08:09)   N/A    08-17 @ 07:01 - 08-18 @ 07:00  --------------------------------------------------------  IN:    IV PiggyBack: 300 mL    Lactated Ringers: 2200 mL    Lactated Ringers Bolus: 2000 mL    Norepinephrine: 172.9 mL    Oral Fluid: 50 mL    Vasopressin: 54 mL  Total IN: 4776.9 mL    OUT:    Urostomy (mL): 391 mL  Total OUT: 391 mL    Total NET: 4385.9 mL      08-18 @ 07:01 - 08-19 @ 00:13  --------------------------------------------------------  IN:    IV PiggyBack: 50 mL    Lactated Ringers: 500 mL    Norepinephrine: 44 mL  Total IN: 594 mL    OUT:    Urostomy (mL): 91 mL  Total OUT: 91 mL    Total NET: 503 mL        --------------------------------------------------------------------------------------    EXAM  NEUROLOGY  Exam: Normal, NAD, alert, oriented x3, no focal deficits.   HEENT  Exam: Normocephalic, atraumatic, EOMI.  RESPIRATORY  Exam: Normal expansion/effort.  CARDIOVASCULAR  Exam: Regular rate and rhythm.     GI/NUTRITION  Exam: Abdomen soft, Non-tender, Non-distended. Ileal conduit   VASCULAR  Exam: Extremities warm, pink, well-perfused.  MUSCULOSKELETAL  Exam: All extremities moving spontaneously without limitations.  SKIN  Exam: Good skin turgor, no skin breakdown.     METABOLIC/FLUIDS/ELECTROLYTES      HEMATOLOGIC  [x] VTE Prophylaxis: aspirin  chewable 81 milliGRAM(s) Oral daily  heparin   Injectable 5000 Unit(s) SubCutaneous every 8 hours    Transfusions:	[] PRBC	[] Platelets		[] FFP	[] Cryoprecipitate    INFECTIOUS DISEASE  Antimicrobials/Immunologic Medications:  fluconAZOLE IVPB      fluconAZOLE IVPB 200 milliGRAM(s) IV Intermittent every 24 hours  meropenem  IVPB 500 milliGRAM(s) IV Intermittent every 12 hours    LABS  --------------------------------------------------------------------------------------  CBC (08-18 @ 01:00)                              8.4<L>                         30.27<H>  )----------------(  366        --    % Neutrophils, --    % Lymphocytes, ANC: --                                  25.2<L>                BMP (08-18 @ 01:00)             136     |  101     |  50<H> 		Ca++ --      Ca 8.0<L>             ---------------------------------( 97    		Mg 2.20               4.9     |  20<L>   |  4.95<H>			Ph 5.3<H>            -> .Urine Ileal Conduit Culture (08-16 @ 10:30)     NG    NG    50,000 - 99,000 CFU/mL Escherichia coli<!>    -> .Blood Blood-Peripheral Culture (08-15 @ 21:15)     NG    NG    No growth at 48 Hours    -> .Blood Blood-Peripheral Culture (08-15 @ 21:05)     NG    NG    No growth at 48 Hours      --------------------------------------------------------------------------------------    OTHER LABORATORY:     IMAGING STUDIES:   CXR:

## 2024-08-19 NOTE — SBIRT NOTE ADULT - NSSBIRTALCNOACTINTDET_GEN_A_CORE
Patient long history of alcohol use. Patient reports last drink was 7/28/24. Patient reports hx of inpatient rehab admissions. Patient declined any referrals or interventions at this time. SW provided emotional support.

## 2024-08-19 NOTE — PROGRESS NOTE ADULT - ATTENDING COMMENTS
I have personally interviewed and examined this patient, reviewed pertinent labs and imaging on SICU rounds.    50   minutes in total were spent in providing direct critical care for the diagnoses, assessment and plan outlined below.  This patient suffers from a critical illness that acutely impairs one or more vital organ systems such that there is a high probability of life threatening or imminent deterioration of the patient's condition.  These diagnoses are unrelated to the surgical procedure.    Additionally, time spent in teaching or the performance of separately billable procedures was not counted toward my critical care time.  There is no overlap.  Time included review of vitals, labs, imaging, discussion with consultants.    68F HTN bladder cancer, s/p bladder removal with ileal conduit construction on Aug 6 at [facility], here for management of postop E. Coli urosepsis.  Oliguric BENIGNO, presumed ATN.  On weaning dose of vasopressor.    ICU Vital Signs Last 24 Hrs  T(C): 36.1 (19 Aug 2024 12:00), Max: 36.8 (19 Aug 2024 00:00)  T(F): 97 (19 Aug 2024 12:00), Max: 98.3 (19 Aug 2024 00:00)  HR: 98 (19 Aug 2024 12:00) (75 - 103)  BP: 96/68 (19 Aug 2024 12:00) (80/48 - 134/69)  BP(mean): 77 (19 Aug 2024 12:00) (60 - 99)  RR: 30 (19 Aug 2024 12:00) (13 - 30)  SpO2: 100% (19 Aug 2024 12:00) (81% - 100%)    O2 Parameters below as of 19 Aug 2024 04:00  Patient On (Oxygen Delivery Method): BiPAP/CPAP    O2 Concentration (%): 40    awake, cheerful  decreased BS at bases  RRR  urostomy pink  scant peripheral edema                          7.9    18.80 )-----------( 350      ( 19 Aug 2024 01:00 )             23.4   08-19    133<L>  |  101  |  59<H>  ----------------------------<  100<H>  4.6   |  16<L>  |  5.84<H>    Ca    7.7<L>      19 Aug 2024 01:00  Phos  5.5     08-19  Mg     2.10     08-19    imaging reviewed    MEDICATIONS  (STANDING):  acetaminophen     Tablet .. 1000 milliGRAM(s) Oral every 6 hours  aspirin  chewable 81 milliGRAM(s) Oral daily  atorvastatin 40 milliGRAM(s) Oral at bedtime  chlorhexidine 2% Cloths 1 Application(s) Topical daily  chlorhexidine 2% Cloths 1 Application(s) Topical daily  cycloSPORINE (RESTASIS) 0.05% Emulsion 1 Drop(s) Both EYES two times a day  heparin   Injectable 5000 Unit(s) SubCutaneous every 8 hours  insulin lispro (ADMELOG) corrective regimen sliding scale   SubCutaneous three times a day before meals  insulin lispro (ADMELOG) corrective regimen sliding scale   SubCutaneous at bedtime  levothyroxine 100 MICROGram(s) Oral daily  pantoprazole    Tablet 40 milliGRAM(s) Oral before breakfast

## 2024-08-19 NOTE — PROGRESS NOTE ADULT - SUBJECTIVE AND OBJECTIVE BOX
Subjective  Patient seen and examined at bedside  Tolerating diet.    Objective    Vital signs  T(F): , Max: 98.3 (08-19-24 @ 00:00)  HR: 90 (08-19-24 @ 08:58)  BP: 106/50 (08-19-24 @ 08:00)  SpO2: 100% (08-19-24 @ 08:58)  Wt(kg): --    Output     OUT:    Urostomy (mL): 103 mL  Total OUT: 103 mL    Total NET: -103 mL      OUT:    Urostomy (mL): 15 mL  Total OUT: 15 mL    Total NET: -15 mL          Gen: NAD  Abd: soft, nontender, nondistended, conduit stoma pink.   : taylor secured into ileal-conduit in place, w/ minimal urine drained.     Labs      08-19 @ 01:00    WBC 18.80 / Hct 23.4  / SCr 5.84     08-18 @ 01:00    WBC 30.27 / Hct 25.2  / SCr 4.95         Culture - Urine (collected 08-16-24 @ 10:30)  Source: .Urine Ileal Conduit  Preliminary Report (08-18-24 @ 06:56):    50,000 - 99,000 CFU/mL Escherichia coli    Culture - Blood (collected 08-15-24 @ 21:15)  Source: .Blood Blood-Peripheral  Preliminary Report (08-19-24 @ 01:01):    No growth at 72 Hours    Culture - Blood (collected 08-15-24 @ 21:05)  Source: .Blood Blood-Peripheral  Preliminary Report (08-19-24 @ 01:01):    No growth at 72 Hours        Urine Cx: ?  Blood Cx: ?    Imaging  CTAP 8/18  No evidence of anastomotic leak.

## 2024-08-19 NOTE — PROGRESS NOTE ADULT - ATTENDING COMMENTS
Sepsis 2/2 UTI, BENIGNO  CT pouchogram - reviewed no obvious significant extravasation, possible tiny wisp but no collection noted, B/l VUR noted explaining b/l hydro  Appr SICU care, f/u cxs, IV abx  Nephrology c/s

## 2024-08-19 NOTE — PROGRESS NOTE ADULT - ASSESSMENT
67 yo F, with PmHx of HTN and bladder cancer, s/p bladder removal with ileal conduit construction on Aug 6 at [facility], here for generalized weakness. Per pt, she had her surgery on Aug 6th, d/c home on Aug 10th. Since d/c, only have 1-2 bowel movement, and had significantly decrease PO intake due to decreased apptite. No fever, some chill. +bloody discharge from urethra, some burning. Today, called EMS as she felt very weak and unwell. BP 80s/50s for EMS. Patient s/p cystectomy with Dr. Thompson at [facility] on August 6th. Been having decreased PO intake and on and off fevers. Also states she had stents previously that came out on their own. Presents to the ED now due to lower abdominal pain. SICU consulted for hypotension requiring pressors and desaturation on BiPAP.     NEUROLOGIC   - Pain control: Tylenol, Oxy  - AOx3  - Recent history of alcohol use    RESPIRATORY   - Monitor SpO2 goal >92%  - Incentive spirometry   - 2L NC  - CPAP at night    CARDIOVASCULAR   - Monitor hemodynamics  - Pressors held 8/18 PM  - ASA    GASTROINTESTINAL   - Diet: Reg, CC    /RENAL  - CT w/ ileal conduit contrast completed -> no leak  - IVL  - US kidney: negative for hydronephrosis   - Maintain strict Is/Os  - Monitor electrolytes, replete PRN  - UA/Cx from conduit sent -> E. coli  - Urine lytes -> intrinsic    HEMATOLOGIC  - Monitor H/H   - DVT ppx: SQH & SCD's    INFECTIOUS DISEASE  - Monitor fever / WBC  - Meropenem  - Diflucan    ENDOCRINE  - Monitor gluc  - LAINE  - Levothyroxine    LINES  - PIV   - R IJ CVC    DISPO: SICU

## 2024-08-19 NOTE — PROGRESS NOTE ADULT - ASSESSMENT
69 yo F h/o HTN and bladder CA, s/p cystectomy/IC creation on 8/6/2024 at [facility] with Dr. Thompson (d/c on 8/10) presented to ED 8/16 w/ generalized weakness and some lower abdominal pain. Since d/c, only have 1-2 bowel movement, and had significantly decrease PO intake due to decreased appetite, intermittent fevers/chills, +bloody discharge from urethra, some burning. Pt stated stents fell out yesterday.  BP 80s/50s for EMS.  Pt remained hypotensive in ED, started on pressors, cultures sent, placed on zosyn and BiPAP and admitted to SICU. CT revealed: Expected postoperative changes status post cystectomy with ileal conduit creation including small amount of complex fluid and free air in the pelvis. No organized fluid collection. Distended ileal conduit with narrowing at the exiting ostomy site and proximally at the site of ureteral insertion. Mild bilateral hydroureteronephrosis with delayed nephrograms. No significant perinephric stranding.    8/16: still requiring pressor support and on BiPAP, pt states she feels better,14fr catheter placed in stoma, Bcx with GPC/GNR, Cr to 4.42 from 5.34, abx changed to leila and diflucan  8/17: still requiring pressor support and supplement oxygen but feels well, decr UO overnight, given albumin and IVF restarted, Cr up slightly to 4.68, electrolytes normal  8/18: RBUS done, no signs of hydro. IR consulted, no need for NTs as no hydro. UOP still very low (overnight 10 cc/hour), given 2 boluses with no improvement. Cr up to 4.95 from 4.68. CT loopogram of conduit pending today to evaluate for leak. still requiring pressors. Ucx from conduit now growing e.coli, sensitivities pending.   8/19: CT loopogram w/ no anastamotic leak. Cr continues to rise w/ low UOP. UA on admission w/ coarse granular cast. Constellation of signs and symptoms suggestive of Acute tubular necrosis    Plan:  -Recommend Nephrology consult to evaluate for ATN  -AM labs reviewed  -BCx; NGTD  -f/u Ucx: e.coli sens  -pressor and respiratory support prn  -continue diflucan and meropenem  -IVF  -strict I&Os  -bowel regimen  -appreciate SICU care  -IS, OOB

## 2024-08-20 DIAGNOSIS — N17.9 ACUTE KIDNEY FAILURE, UNSPECIFIED: ICD-10-CM

## 2024-08-20 DIAGNOSIS — E87.20 ACIDOSIS, UNSPECIFIED: ICD-10-CM

## 2024-08-20 LAB
-  AMOXICILLIN/CLAVULANIC ACID: SIGNIFICANT CHANGE UP
-  AMPICILLIN/SULBACTAM: SIGNIFICANT CHANGE UP
-  AMPICILLIN: SIGNIFICANT CHANGE UP
-  AZTREONAM: SIGNIFICANT CHANGE UP
-  CEFAZOLIN: SIGNIFICANT CHANGE UP
-  CEFEPIME: SIGNIFICANT CHANGE UP
-  CEFOXITIN: SIGNIFICANT CHANGE UP
-  CEFTRIAXONE: SIGNIFICANT CHANGE UP
-  CEFUROXIME: SIGNIFICANT CHANGE UP
-  CIPROFLOXACIN: SIGNIFICANT CHANGE UP
-  ERTAPENEM: SIGNIFICANT CHANGE UP
-  GENTAMICIN: SIGNIFICANT CHANGE UP
-  IMIPENEM: SIGNIFICANT CHANGE UP
-  LEVOFLOXACIN: SIGNIFICANT CHANGE UP
-  MEROPENEM: SIGNIFICANT CHANGE UP
-  NITROFURANTOIN: SIGNIFICANT CHANGE UP
-  PIPERACILLIN/TAZOBACTAM: SIGNIFICANT CHANGE UP
-  TOBRAMYCIN: SIGNIFICANT CHANGE UP
-  TRIMETHOPRIM/SULFAMETHOXAZOLE: SIGNIFICANT CHANGE UP
ALBUMIN SERPL ELPH-MCNC: 2.4 G/DL — LOW (ref 3.3–5)
ALP SERPL-CCNC: 262 U/L — HIGH (ref 40–120)
ALT FLD-CCNC: 22 U/L — SIGNIFICANT CHANGE UP (ref 4–33)
ANION GAP SERPL CALC-SCNC: 17 MMOL/L — HIGH (ref 7–14)
ANION GAP SERPL CALC-SCNC: 17 MMOL/L — HIGH (ref 7–14)
ANION GAP SERPL CALC-SCNC: 22 MMOL/L — HIGH (ref 7–14)
APTT BLD: 36.3 SEC — HIGH (ref 24.5–35.6)
AST SERPL-CCNC: 129 U/L — HIGH (ref 4–32)
BILIRUB SERPL-MCNC: 0.5 MG/DL — SIGNIFICANT CHANGE UP (ref 0.2–1.2)
BLOOD GAS ARTERIAL - LYTES,HGB,ICA,LACT RESULT: SIGNIFICANT CHANGE UP
BLOOD GAS ARTERIAL COMPREHENSIVE RESULT: SIGNIFICANT CHANGE UP
BUN SERPL-MCNC: 65 MG/DL — HIGH (ref 7–23)
BUN SERPL-MCNC: 71 MG/DL — HIGH (ref 7–23)
BUN SERPL-MCNC: 71 MG/DL — HIGH (ref 7–23)
CALCIUM SERPL-MCNC: 7.8 MG/DL — LOW (ref 8.4–10.5)
CALCIUM SERPL-MCNC: 7.9 MG/DL — LOW (ref 8.4–10.5)
CALCIUM SERPL-MCNC: 8 MG/DL — LOW (ref 8.4–10.5)
CHLORIDE SERPL-SCNC: 101 MMOL/L — SIGNIFICANT CHANGE UP (ref 98–107)
CHLORIDE SERPL-SCNC: 102 MMOL/L — SIGNIFICANT CHANGE UP (ref 98–107)
CHLORIDE SERPL-SCNC: 102 MMOL/L — SIGNIFICANT CHANGE UP (ref 98–107)
CO2 SERPL-SCNC: 15 MMOL/L — LOW (ref 22–31)
CO2 SERPL-SCNC: 15 MMOL/L — LOW (ref 22–31)
CO2 SERPL-SCNC: 16 MMOL/L — LOW (ref 22–31)
CREAT SERPL-MCNC: 6.61 MG/DL — HIGH (ref 0.5–1.3)
CREAT SERPL-MCNC: 6.71 MG/DL — HIGH (ref 0.5–1.3)
CREAT SERPL-MCNC: 6.83 MG/DL — HIGH (ref 0.5–1.3)
CULTURE RESULTS: ABNORMAL
EGFR: 6 ML/MIN/1.73M2 — LOW
GAS PNL BLDV: SIGNIFICANT CHANGE UP
GLUCOSE BLDC GLUCOMTR-MCNC: 108 MG/DL — HIGH (ref 70–99)
GLUCOSE BLDC GLUCOMTR-MCNC: 117 MG/DL — HIGH (ref 70–99)
GLUCOSE BLDC GLUCOMTR-MCNC: 124 MG/DL — HIGH (ref 70–99)
GLUCOSE BLDC GLUCOMTR-MCNC: 46 MG/DL — CRITICAL LOW (ref 70–99)
GLUCOSE BLDC GLUCOMTR-MCNC: 61 MG/DL — LOW (ref 70–99)
GLUCOSE BLDC GLUCOMTR-MCNC: 63 MG/DL — LOW (ref 70–99)
GLUCOSE BLDC GLUCOMTR-MCNC: 64 MG/DL — LOW (ref 70–99)
GLUCOSE BLDC GLUCOMTR-MCNC: 79 MG/DL — SIGNIFICANT CHANGE UP (ref 70–99)
GLUCOSE BLDC GLUCOMTR-MCNC: 83 MG/DL — SIGNIFICANT CHANGE UP (ref 70–99)
GLUCOSE SERPL-MCNC: 143 MG/DL — HIGH (ref 70–99)
GLUCOSE SERPL-MCNC: 56 MG/DL — LOW (ref 70–99)
GLUCOSE SERPL-MCNC: 92 MG/DL — SIGNIFICANT CHANGE UP (ref 70–99)
HCT VFR BLD CALC: 24.2 % — LOW (ref 34.5–45)
HCT VFR BLD CALC: 26.1 % — LOW (ref 34.5–45)
HGB BLD-MCNC: 8.3 G/DL — LOW (ref 11.5–15.5)
HGB BLD-MCNC: 9.1 G/DL — LOW (ref 11.5–15.5)
INR BLD: 1.21 RATIO — HIGH (ref 0.85–1.18)
MAGNESIUM SERPL-MCNC: 2 MG/DL — SIGNIFICANT CHANGE UP (ref 1.6–2.6)
MAGNESIUM SERPL-MCNC: 2.2 MG/DL — SIGNIFICANT CHANGE UP (ref 1.6–2.6)
MAGNESIUM SERPL-MCNC: 2.2 MG/DL — SIGNIFICANT CHANGE UP (ref 1.6–2.6)
MCHC RBC-ENTMCNC: 30.7 PG — SIGNIFICANT CHANGE UP (ref 27–34)
MCHC RBC-ENTMCNC: 31.1 PG — SIGNIFICANT CHANGE UP (ref 27–34)
MCHC RBC-ENTMCNC: 34.3 GM/DL — SIGNIFICANT CHANGE UP (ref 32–36)
MCHC RBC-ENTMCNC: 34.9 GM/DL — SIGNIFICANT CHANGE UP (ref 32–36)
MCV RBC AUTO: 89.1 FL — SIGNIFICANT CHANGE UP (ref 80–100)
MCV RBC AUTO: 89.6 FL — SIGNIFICANT CHANGE UP (ref 80–100)
METHOD TYPE: SIGNIFICANT CHANGE UP
NRBC # BLD: 0 /100 WBCS — SIGNIFICANT CHANGE UP (ref 0–0)
NRBC # BLD: 0 /100 WBCS — SIGNIFICANT CHANGE UP (ref 0–0)
NRBC # FLD: 0 K/UL — SIGNIFICANT CHANGE UP (ref 0–0)
NRBC # FLD: 0 K/UL — SIGNIFICANT CHANGE UP (ref 0–0)
ORGANISM # SPEC MICROSCOPIC CNT: ABNORMAL
ORGANISM # SPEC MICROSCOPIC CNT: ABNORMAL
PHOSPHATE SERPL-MCNC: 5.6 MG/DL — HIGH (ref 2.5–4.5)
PHOSPHATE SERPL-MCNC: 6 MG/DL — HIGH (ref 2.5–4.5)
PHOSPHATE SERPL-MCNC: 6.1 MG/DL — HIGH (ref 2.5–4.5)
PLATELET # BLD AUTO: 364 K/UL — SIGNIFICANT CHANGE UP (ref 150–400)
PLATELET # BLD AUTO: 384 K/UL — SIGNIFICANT CHANGE UP (ref 150–400)
POTASSIUM SERPL-MCNC: 4.5 MMOL/L — SIGNIFICANT CHANGE UP (ref 3.5–5.3)
POTASSIUM SERPL-MCNC: 4.6 MMOL/L — SIGNIFICANT CHANGE UP (ref 3.5–5.3)
POTASSIUM SERPL-MCNC: 4.7 MMOL/L — SIGNIFICANT CHANGE UP (ref 3.5–5.3)
POTASSIUM SERPL-SCNC: 4.5 MMOL/L — SIGNIFICANT CHANGE UP (ref 3.5–5.3)
POTASSIUM SERPL-SCNC: 4.6 MMOL/L — SIGNIFICANT CHANGE UP (ref 3.5–5.3)
POTASSIUM SERPL-SCNC: 4.7 MMOL/L — SIGNIFICANT CHANGE UP (ref 3.5–5.3)
PROT SERPL-MCNC: 5.9 G/DL — LOW (ref 6–8.3)
PROTHROM AB SERPL-ACNC: 13.6 SEC — HIGH (ref 9.5–13)
RBC # BLD: 2.7 M/UL — LOW (ref 3.8–5.2)
RBC # BLD: 2.93 M/UL — LOW (ref 3.8–5.2)
RBC # FLD: 16.4 % — HIGH (ref 10.3–14.5)
RBC # FLD: 16.8 % — HIGH (ref 10.3–14.5)
SODIUM SERPL-SCNC: 134 MMOL/L — LOW (ref 135–145)
SODIUM SERPL-SCNC: 134 MMOL/L — LOW (ref 135–145)
SODIUM SERPL-SCNC: 139 MMOL/L — SIGNIFICANT CHANGE UP (ref 135–145)
SPECIMEN SOURCE: SIGNIFICANT CHANGE UP
WBC # BLD: 13.83 K/UL — HIGH (ref 3.8–10.5)
WBC # BLD: 21.91 K/UL — HIGH (ref 3.8–10.5)
WBC # FLD AUTO: 13.83 K/UL — HIGH (ref 3.8–10.5)
WBC # FLD AUTO: 21.91 K/UL — HIGH (ref 3.8–10.5)

## 2024-08-20 PROCEDURE — 71045 X-RAY EXAM CHEST 1 VIEW: CPT | Mod: 26,76

## 2024-08-20 PROCEDURE — 36556 INSERT NON-TUNNEL CV CATH: CPT | Mod: GC

## 2024-08-20 PROCEDURE — 93010 ELECTROCARDIOGRAM REPORT: CPT | Mod: 76

## 2024-08-20 PROCEDURE — 99291 CRITICAL CARE FIRST HOUR: CPT | Mod: 24,25

## 2024-08-20 PROCEDURE — 99292 CRITICAL CARE ADDL 30 MIN: CPT | Mod: 25

## 2024-08-20 PROCEDURE — 71275 CT ANGIOGRAPHY CHEST: CPT | Mod: 26

## 2024-08-20 PROCEDURE — 99232 SBSQ HOSP IP/OBS MODERATE 35: CPT

## 2024-08-20 PROCEDURE — 74177 CT ABD & PELVIS W/CONTRAST: CPT | Mod: 26

## 2024-08-20 PROCEDURE — 99223 1ST HOSP IP/OBS HIGH 75: CPT

## 2024-08-20 RX ORDER — OXYCODONE HYDROCHLORIDE 5 MG/1
2.5 TABLET ORAL EVERY 4 HOURS
Refills: 0 | Status: DISCONTINUED | OUTPATIENT
Start: 2024-08-20 | End: 2024-08-20

## 2024-08-20 RX ORDER — ACETAMINOPHEN 325 MG/1
1000 TABLET ORAL EVERY 6 HOURS
Refills: 0 | Status: COMPLETED | OUTPATIENT
Start: 2024-08-20 | End: 2024-08-21

## 2024-08-20 RX ORDER — MIDAZOLAM HYDROCHLORIDE 5 MG/ML
2 INJECTION, SOLUTION INTRAMUSCULAR; INTRAVENOUS ONCE
Refills: 0 | Status: DISCONTINUED | OUTPATIENT
Start: 2024-08-20 | End: 2024-08-20

## 2024-08-20 RX ORDER — MIDAZOLAM HYDROCHLORIDE 5 MG/ML
1 INJECTION, SOLUTION INTRAMUSCULAR; INTRAVENOUS EVERY 4 HOURS
Refills: 0 | Status: DISCONTINUED | OUTPATIENT
Start: 2024-08-20 | End: 2024-08-21

## 2024-08-20 RX ORDER — DEXTROSE 15 G/33 G
50 GEL IN PACKET (GRAM) ORAL ONCE
Refills: 0 | Status: COMPLETED | OUTPATIENT
Start: 2024-08-20 | End: 2024-08-20

## 2024-08-20 RX ORDER — MEROPENEM 500 MG/10ML
500 INJECTION, POWDER, FOR SOLUTION INTRAVENOUS EVERY 24 HOURS
Refills: 0 | Status: DISCONTINUED | OUTPATIENT
Start: 2024-08-20 | End: 2024-08-21

## 2024-08-20 RX ORDER — CHLORHEXIDINE GLUCONATE 40 MG/ML
15 SOLUTION TOPICAL EVERY 12 HOURS
Refills: 0 | Status: DISCONTINUED | OUTPATIENT
Start: 2024-08-20 | End: 2024-08-23

## 2024-08-20 RX ORDER — HEPARIN SODIUM,BOVINE 1000/ML
1700 VIAL (ML) INJECTION
Qty: 25000 | Refills: 0 | Status: DISCONTINUED | OUTPATIENT
Start: 2024-08-20 | End: 2024-08-24

## 2024-08-20 RX ORDER — SODIUM BICARBONATE 84 MG/ML
0.16 INJECTION, SOLUTION INTRAVENOUS
Qty: 150 | Refills: 0 | Status: DISCONTINUED | OUTPATIENT
Start: 2024-08-20 | End: 2024-08-20

## 2024-08-20 RX ORDER — VASOPRESSIN 20 [USP'U]/ML
0.03 INJECTION INTRAVENOUS
Qty: 40 | Refills: 0 | Status: DISCONTINUED | OUTPATIENT
Start: 2024-08-20 | End: 2024-08-20

## 2024-08-20 RX ORDER — DEXMEDETOMIDINE HYDROCHLORIDE IN 0.9% SODIUM CHLORIDE 4 UG/ML
0.2 INJECTION INTRAVENOUS
Qty: 400 | Refills: 0 | Status: DISCONTINUED | OUTPATIENT
Start: 2024-08-20 | End: 2024-08-23

## 2024-08-20 RX ORDER — VASOPRESSIN 20 [USP'U]/ML
0.03 INJECTION INTRAVENOUS
Qty: 40 | Refills: 0 | Status: DISCONTINUED | OUTPATIENT
Start: 2024-08-20 | End: 2024-08-24

## 2024-08-20 RX ADMIN — ACETAMINOPHEN 1000 MILLIGRAM(S): 325 TABLET ORAL at 00:00

## 2024-08-20 RX ADMIN — Medication 81 MILLIGRAM(S): at 11:52

## 2024-08-20 RX ADMIN — Medication 4.36 MICROGRAM(S)/KG/MIN: at 19:40

## 2024-08-20 RX ADMIN — Medication 17 UNIT(S)/HR: at 23:32

## 2024-08-20 RX ADMIN — Medication 5000 UNIT(S): at 21:45

## 2024-08-20 RX ADMIN — Medication 75 MILLILITER(S): at 22:29

## 2024-08-20 RX ADMIN — Medication 4.36 MICROGRAM(S)/KG/MIN: at 07:58

## 2024-08-20 RX ADMIN — MIDAZOLAM HYDROCHLORIDE 2 MILLIGRAM(S): 5 INJECTION, SOLUTION INTRAMUSCULAR; INTRAVENOUS at 17:03

## 2024-08-20 RX ADMIN — SODIUM BICARBONATE 100 MEQ/KG/HR: 84 INJECTION, SOLUTION INTRAVENOUS at 07:58

## 2024-08-20 RX ADMIN — ACETAMINOPHEN 1000 MILLIGRAM(S): 325 TABLET ORAL at 11:53

## 2024-08-20 RX ADMIN — ACETAMINOPHEN 400 MILLIGRAM(S): 325 TABLET ORAL at 23:33

## 2024-08-20 RX ADMIN — ACETAMINOPHEN 1000 MILLIGRAM(S): 325 TABLET ORAL at 00:18

## 2024-08-20 RX ADMIN — VASOPRESSIN 4.5 UNIT(S)/MIN: 20 INJECTION INTRAVENOUS at 18:39

## 2024-08-20 RX ADMIN — Medication 5000 UNIT(S): at 14:20

## 2024-08-20 RX ADMIN — Medication 50 MILLILITER(S): at 22:20

## 2024-08-20 RX ADMIN — VASOPRESSIN 4.5 UNIT(S)/MIN: 20 INJECTION INTRAVENOUS at 19:40

## 2024-08-20 RX ADMIN — MIDAZOLAM HYDROCHLORIDE 2 MILLIGRAM(S): 5 INJECTION, SOLUTION INTRAMUSCULAR; INTRAVENOUS at 18:39

## 2024-08-20 RX ADMIN — ACETAMINOPHEN 1000 MILLIGRAM(S): 325 TABLET ORAL at 05:36

## 2024-08-20 RX ADMIN — Medication 5000 UNIT(S): at 05:37

## 2024-08-20 RX ADMIN — MEROPENEM 100 MILLIGRAM(S): 500 INJECTION, POWDER, FOR SOLUTION INTRAVENOUS at 11:52

## 2024-08-20 RX ADMIN — Medication 100 MICROGRAM(S): at 06:32

## 2024-08-20 RX ADMIN — Medication 1 DROP(S): at 05:37

## 2024-08-20 RX ADMIN — DEXMEDETOMIDINE HYDROCHLORIDE IN 0.9% SODIUM CHLORIDE 4.65 MICROGRAM(S)/KG/HR: 4 INJECTION INTRAVENOUS at 19:40

## 2024-08-20 RX ADMIN — Medication 40 MILLIGRAM(S): at 21:45

## 2024-08-20 RX ADMIN — DEXMEDETOMIDINE HYDROCHLORIDE IN 0.9% SODIUM CHLORIDE 4.65 MICROGRAM(S)/KG/HR: 4 INJECTION INTRAVENOUS at 18:39

## 2024-08-20 RX ADMIN — Medication 40 MILLIGRAM(S): at 05:36

## 2024-08-20 RX ADMIN — CHLORHEXIDINE GLUCONATE 1 APPLICATION(S): 40 SOLUTION TOPICAL at 11:53

## 2024-08-20 NOTE — PROGRESS NOTE ADULT - SUBJECTIVE AND OBJECTIVE BOX
Subjective  Patient seen and examined at the bedside. Pt aware she may need CRRT given her ATN and continued hypotension    Objective    Vital signs  T(F): , Max: 98.8 (08-20-24 @ 00:00)  HR: 88 (08-20-24 @ 10:00)  BP: 90/47 (08-20-24 @ 00:00)  SpO2: 100% (08-20-24 @ 10:00)  Wt(kg): --    Output     OUT:    Urostomy (mL): 220 mL  Total OUT: 220 mL    Total NET: -220 mL      OUT:    Urostomy (mL): 55 mL  Total OUT: 55 mL    Total NET: -55 mL          Gen: NAD  Abd: soft, nontender, nondistended, ileal conduit in place w/ taylor draining CYU. Stoma pink on appearance  : Condom catheter secured in place, draining CYU. Foreskin not retracted    Labs      08-20 @ 00:30    WBC 13.83 / Hct 24.2  / SCr 6.61     08-19 @ 19:33    WBC 14.16 / Hct 23.8  / SCr 6.52         Culture - Urine (collected 08-16-24 @ 10:30)  Source: .Urine Ileal Conduit  Final Report (08-20-24 @ 10:35):    50,000 - 99,000 CFU/mL Escherichia coli  Organism: Escherichia coli (08-20-24 @ 10:35)  Organism: Escherichia coli (08-20-24 @ 10:35)      -  Levofloxacin: R >4      -  Tobramycin: S <=2      -  Nitrofurantoin: S <=32 Should not be used to treat pyelonephritis      -  Aztreonam: S <=4      -  Gentamicin: S <=2      -  Cefazolin: S <=2 For uncomplicated UTI with K. pneumoniae, E. coli, or P. mirablis: NAHOMY <=16 is sensitive and NAHOMY >=32 is resistant. This also predicts results for oral agents cefaclor, cefdinir, cefpodoxime, cefprozil, cefuroxime axetil, cephalexin and locarbef for uncomplicated UTI. Note that some isolates may be susceptible to these agents while testing resistant to cefazolin.      -  Cefepime: S <=2      -  Piperacillin/Tazobactam: S <=8      -  Ciprofloxacin: R >2      -  Imipenem: S <=1      -  Ceftriaxone: S <=1      -  Ampicillin: S <=8 These ampicillin results predict results for amoxicillin      Method Type: NAHOMY      -  Meropenem: S <=1      -  Ampicillin/Sulbactam: S <=4/2      -  Cefoxitin: S <=8      -  Cefuroxime: S <=4      -  Amoxicillin/Clavulanic Acid: S <=8/4      -  Trimethoprim/Sulfamethoxazole: S <=0.5/9.5      -  Ertapenem: S <=0.5    Culture - Blood (collected 08-15-24 @ 21:15)  Source: .Blood Blood-Peripheral  Preliminary Report (08-20-24 @ 01:01):    No growth at 4 days    Culture - Blood (collected 08-15-24 @ 21:05)  Source: .Blood Blood-Peripheral  Preliminary Report (08-20-24 @ 01:01):    No growth at 4 days         Subjective  Patient seen and examined at the bedside. Pt aware she may need CRRT given her ATN and continued BENIGNO    Objective    Vital signs  T(F): , Max: 98.8 (08-20-24 @ 00:00)  HR: 88 (08-20-24 @ 10:00)  BP: 90/47 (08-20-24 @ 00:00)  SpO2: 100% (08-20-24 @ 10:00)  Wt(kg): --    Output     OUT:    Urostomy (mL): 220 mL  Total OUT: 220 mL    Total NET: -220 mL      OUT:    Urostomy (mL): 55 mL  Total OUT: 55 mL    Total NET: -55 mL          Gen: NAD  Abd: soft, nontender, nondistended, ileal conduit in place w/ taylor draining CYU. Stoma pink on appearance  : Condom catheter secured in place, draining CYU. Foreskin not retracted    Labs      08-20 @ 00:30    WBC 13.83 / Hct 24.2  / SCr 6.61     08-19 @ 19:33    WBC 14.16 / Hct 23.8  / SCr 6.52         Culture - Urine (collected 08-16-24 @ 10:30)  Source: .Urine Ileal Conduit  Final Report (08-20-24 @ 10:35):    50,000 - 99,000 CFU/mL Escherichia coli  Organism: Escherichia coli (08-20-24 @ 10:35)  Organism: Escherichia coli (08-20-24 @ 10:35)      -  Levofloxacin: R >4      -  Tobramycin: S <=2      -  Nitrofurantoin: S <=32 Should not be used to treat pyelonephritis      -  Aztreonam: S <=4      -  Gentamicin: S <=2      -  Cefazolin: S <=2 For uncomplicated UTI with K. pneumoniae, E. coli, or P. mirablis: NAHOMY <=16 is sensitive and NAHOMY >=32 is resistant. This also predicts results for oral agents cefaclor, cefdinir, cefpodoxime, cefprozil, cefuroxime axetil, cephalexin and locarbef for uncomplicated UTI. Note that some isolates may be susceptible to these agents while testing resistant to cefazolin.      -  Cefepime: S <=2      -  Piperacillin/Tazobactam: S <=8      -  Ciprofloxacin: R >2      -  Imipenem: S <=1      -  Ceftriaxone: S <=1      -  Ampicillin: S <=8 These ampicillin results predict results for amoxicillin      Method Type: NAHOMY      -  Meropenem: S <=1      -  Ampicillin/Sulbactam: S <=4/2      -  Cefoxitin: S <=8      -  Cefuroxime: S <=4      -  Amoxicillin/Clavulanic Acid: S <=8/4      -  Trimethoprim/Sulfamethoxazole: S <=0.5/9.5      -  Ertapenem: S <=0.5    Culture - Blood (collected 08-15-24 @ 21:15)  Source: .Blood Blood-Peripheral  Preliminary Report (08-20-24 @ 01:01):    No growth at 4 days    Culture - Blood (collected 08-15-24 @ 21:05)  Source: .Blood Blood-Peripheral  Preliminary Report (08-20-24 @ 01:01):    No growth at 4 days

## 2024-08-20 NOTE — CHART NOTE - NSCHARTNOTEFT_GEN_A_CORE
Notified by SICU team that patient with respiratory distress likely 2/2 volume overload vs r/o PE. Now currently intubated on levophed and planned for CTA Chest today. Will initiate CRRT this evening, goal net negative as tolerated. Consent obtained this morning and placed in patient's chart. Pt requires temporary HD catheter placement prior to initiation. Monitor labs q4-6 hours.

## 2024-08-20 NOTE — PROGRESS NOTE ADULT - SUBJECTIVE AND OBJECTIVE BOX
SICU Daily Progress Note  =====================================================  Interval/Overnight Events:  - levo 0.08  - A-line  - Cr uptrending (6.5) - nephro consulted   - if spikes fever, restart leila  - NC 3L    Allergies: No Known Allergies      MEDICATIONS:   --------------------------------------------------------------------------------------  Neurologic Medications  acetaminophen     Tablet .. 1000 milliGRAM(s) Oral every 6 hours  oxyCODONE    IR 2.5 milliGRAM(s) Oral every 4 hours PRN Moderate Pain (4 - 6)  oxyCODONE    IR 5 milliGRAM(s) Oral every 4 hours PRN Severe Pain (7 - 10)    Respiratory Medications    Cardiovascular Medications  norepinephrine Infusion 0.05 MICROgram(s)/kG/Min IV Continuous <Continuous>    Gastrointestinal Medications  pantoprazole    Tablet 40 milliGRAM(s) Oral before breakfast    Genitourinary Medications    Hematologic/Oncologic Medications  aspirin  chewable 81 milliGRAM(s) Oral daily  heparin   Injectable 5000 Unit(s) SubCutaneous every 8 hours    Antimicrobial/Immunologic Medications  fluconAZOLE IVPB      fluconAZOLE IVPB 200 milliGRAM(s) IV Intermittent every 24 hours  meropenem  IVPB 500 milliGRAM(s) IV Intermittent every 12 hours    Endocrine/Metabolic Medications  atorvastatin 40 milliGRAM(s) Oral at bedtime  insulin lispro (ADMELOG) corrective regimen sliding scale   SubCutaneous three times a day before meals  insulin lispro (ADMELOG) corrective regimen sliding scale   SubCutaneous at bedtime  levothyroxine 100 MICROGram(s) Oral daily    Topical/Other Medications  chlorhexidine 2% Cloths 1 Application(s) Topical daily  chlorhexidine 4% Liquid 1 Application(s) Topical <User Schedule>  cycloSPORINE (RESTASIS) 0.05% Emulsion 1 Drop(s) Both EYES two times a day    --------------------------------------------------------------------------------------    VITAL SIGNS, INS/OUTS (last 24 hours):  --------------------------------------------------------------------------------------  T(C): 36.2 (08-18-24 @ 20:00), Max: 36.8 (08-18-24 @ 04:00)  HR: 89 (08-18-24 @ 23:30) (79 - 99)  BP: 96/66 (08-18-24 @ 23:00) (62/47 - 134/69)  BP(mean): 76 (08-18-24 @ 23:00) (52 - 99)  ABP: --  ABP(mean): --  RR: 18 (08-18-24 @ 23:00) (13 - 30)  SpO2: 100% (08-18-24 @ 23:30) (97% - 100%)  CVP(mm Hg): --  CI: --  CAPILLARY BLOOD GLUCOSE      POCT Blood Glucose.: 84 mg/dL (18 Aug 2024 22:57)  POCT Blood Glucose.: 106 mg/dL (18 Aug 2024 17:05)  POCT Blood Glucose.: 92 mg/dL (18 Aug 2024 13:20)  POCT Blood Glucose.: 59 mg/dL (18 Aug 2024 12:20)  POCT Blood Glucose.: 40 mg/dL (18 Aug 2024 12:18)  POCT Blood Glucose.: 113 mg/dL (18 Aug 2024 09:25)  POCT Blood Glucose.: 61 mg/dL (18 Aug 2024 08:09)   N/A    08-17 @ 07:01  -  08-18 @ 07:00  --------------------------------------------------------  IN:    IV PiggyBack: 300 mL    Lactated Ringers: 2200 mL    Lactated Ringers Bolus: 2000 mL    Norepinephrine: 172.9 mL    Oral Fluid: 50 mL    Vasopressin: 54 mL  Total IN: 4776.9 mL    OUT:    Urostomy (mL): 391 mL  Total OUT: 391 mL    Total NET: 4385.9 mL      08-18 @ 07:01 - 08-19 @ 00:13  --------------------------------------------------------  IN:    IV PiggyBack: 50 mL    Lactated Ringers: 500 mL    Norepinephrine: 44 mL  Total IN: 594 mL    OUT:    Urostomy (mL): 91 mL  Total OUT: 91 mL    Total NET: 503 mL        --------------------------------------------------------------------------------------    EXAM  NEUROLOGY  Exam: Normal, NAD, alert, oriented x3, no focal deficits.   HEENT  Exam: Normocephalic, atraumatic, EOMI.  RESPIRATORY  Exam: Normal expansion/effort.  CARDIOVASCULAR  Exam: Regular rate and rhythm.     GI/NUTRITION  Exam: Abdomen soft, Non-tender, Non-distended. Ileal conduit   VASCULAR  Exam: Extremities warm, pink, well-perfused.  MUSCULOSKELETAL  Exam: All extremities moving spontaneously without limitations.  SKIN  Exam: Good skin turgor, no skin breakdown.     METABOLIC/FLUIDS/ELECTROLYTES      HEMATOLOGIC  [x] VTE Prophylaxis: aspirin  chewable 81 milliGRAM(s) Oral daily  heparin   Injectable 5000 Unit(s) SubCutaneous every 8 hours    Transfusions:	[] PRBC	[] Platelets		[] FFP	[] Cryoprecipitate    INFECTIOUS DISEASE  Antimicrobials/Immunologic Medications:  fluconAZOLE IVPB      fluconAZOLE IVPB 200 milliGRAM(s) IV Intermittent every 24 hours  meropenem  IVPB 500 milliGRAM(s) IV Intermittent every 12 hours    LABS  --------------------------------------------------------------------------------------  CBC (08-18 @ 01:00)                              8.4<L>                         30.27<H>  )----------------(  366        --    % Neutrophils, --    % Lymphocytes, ANC: --                                  25.2<L>                BMP (08-18 @ 01:00)             136     |  101     |  50<H> 		Ca++ --      Ca 8.0<L>             ---------------------------------( 97    		Mg 2.20               4.9     |  20<L>   |  4.95<H>			Ph 5.3<H>            -> .Urine Ileal Conduit Culture (08-16 @ 10:30)     NG    NG    50,000 - 99,000 CFU/mL Escherichia coli<!>    -> .Blood Blood-Peripheral Culture (08-15 @ 21:15)     NG    NG    No growth at 48 Hours    -> .Blood Blood-Peripheral Culture (08-15 @ 21:05)     NG    NG    No growth at 48 Hours      --------------------------------------------------------------------------------------    OTHER LABORATORY:     IMAGING STUDIES:   CXR:

## 2024-08-20 NOTE — PROGRESS NOTE ADULT - ASSESSMENT
67 yo F, with PmHx of HTN and bladder cancer, s/p bladder removal with ileal conduit construction on Aug 6 at [facility], here for generalized weakness. Per pt, she had her surgery on Aug 6th, d/c home on Aug 10th. Since d/c, only have 1-2 bowel movement, and had significantly decrease PO intake due to decreased apptite. No fever, some chill. +bloody discharge from urethra, some burning. Today, called EMS as she felt very weak and unwell. BP 80s/50s for EMS. Patient s/p cystectomy with Dr. Thompson at [facility] on August 6th. Been having decreased PO intake and on and off fevers. Also states she had stents previously that came out on their own. Presents to the ED now due to lower abdominal pain. SICU consulted for hypotension requiring pressors and desaturation on BiPAP.       NEUROLOGIC   - Pain control: Tylenol, wean Oxy 5mg --> 2.5mg  - AOx3  - Recent history of alcohol use    RESPIRATORY   - Monitor SpO2 goal >92%  - Incentive spirometry   - 3L NC  - CPAP at night  - DO NOT fluid overload!    CARDIOVASCULAR   - Monitor hemodynamics  - Levo 0.04  - ASA  - atorvastatin 40mg    GASTROINTESTINAL   - Diet: Reg, CC  - Protonix    /RENAL  - CT w/ ileal conduit contrast completed -> no leak  - IVL (DO NOT FLUID LOAD)  - US kidney: negative for hydronephrosis   - Maintain strict Is/Os  - Monitor standing weights  - Monitor electrolytes, replete PRN  - UA/Cx from conduit sent -> E. coli  - Urine lytes -> intrinsic    HEMATOLOGIC  - Monitor H/H   - DVT ppx: SQH & SCD's    INFECTIOUS DISEASE  - Monitor fever / WBC  - Meropenem complete (but if spikes fever, restart)  - d/c Diflucan    ENDOCRINE  - Monitor gluc  - LAINE  - Levothyroxine    LINES  - PIV   - R IJ CVC  - A line    DISPO: SICU   67 yo F, with PmHx of HTN and bladder cancer, s/p bladder removal with ileal conduit construction on Aug 6 at [facility], here for generalized weakness. Per pt, she had her surgery on Aug 6th, d/c home on Aug 10th. Since d/c, only have 1-2 bowel movement, and had significantly decrease PO intake due to decreased apptite. No fever, some chill. +bloody discharge from urethra, some burning. Today, called EMS as she felt very weak and unwell. BP 80s/50s for EMS. Patient s/p cystectomy with Dr. Thompson at [facility] on August 6th. Been having decreased PO intake and on and off fevers. Also states she had stents previously that came out on their own. Presents to the ED now due to lower abdominal pain. SICU consulted for hypotension requiring pressors and desaturation on BiPAP.       NEUROLOGIC   - Pain control: Tylenol, Oxy 5mg severe, 2.5mg moderate  - AOx3  - Recent history of alcohol use, SBIRT consulted, no referral or intervention    RESPIRATORY   - Monitor SpO2 goal >92%  - Incentive spirometry   - 3L NC  - CPAP at night  - DO NOT fluid overload!    CARDIOVASCULAR   - Monitor hemodynamics  - Levo 0.09  - ASA  - Atorvastatin 40mg    GASTROINTESTINAL   - Diet: Reg, CC  - Protonix    /RENAL  - CT w/ ileal conduit contrast completed -> no leak  - IVL (DO NOT FLUID LOAD)  - US kidney: negative for hydronephrosis   - Maintain strict Is/Os  - Monitor standing weights  - Monitor electrolytes, replete PRN  - UA/Cx from conduit sent -> E. coli  - Urine lytes -> intrinsic  - Nephro consult, no urgent CRRT, recommends Bicarb gtt (D5 + 150 mEq NaHCO3) @ 100ml/min    HEMATOLOGIC  - Monitor H/H   - DVT ppx: SQH & SCD's    INFECTIOUS DISEASE  - Monitor fever / WBC  - Restarted meropenem (8/16-8/27)  - D/c Diflucan    ENDOCRINE  - Monitor gluc  - LAINE  - Levothyroxine    LINES  - PIV   - R IJ CVC  - A line    DISPO: SICU

## 2024-08-20 NOTE — PROGRESS NOTE ADULT - ATTENDING COMMENTS
I have personally interviewed and examined this patient, reviewed pertinent labs and imaging on SICU rounds.    50  minutes in total were spent in providing direct critical care for the diagnoses, assessment and plan outlined below.  This patient suffers from a critical illness that acutely impairs one or more vital organ systems such that there is a high probability of life threatening or imminent deterioration of the patient's condition.  These diagnoses are unrelated to the surgical procedure.    Additionally, time spent in teaching or the performance of separately billable procedures was not counted toward my critical care time.  There is no overlap.  Time included review of vitals, labs, imaging, discussion with consultants/surrogate decision-makers.    68F HTN bladder cancer, s/p bladder removal with ileal conduit construction on Aug 6 at [facility], here for management of postop E. Coli urosepsis.  Oliguric BENIGNO, presumed ATN.  On weaning dose of vasopressor.    ICU Vital Signs Last 24 Hrs  T(C): 36.5 (20 Aug 2024 12:00), Max: 37.1 (20 Aug 2024 00:00)  T(F): 97.7 (20 Aug 2024 12:00), Max: 98.8 (20 Aug 2024 00:00)  HR: 82 (20 Aug 2024 12:00) (80 - 102)  BP: 90/47 (20 Aug 2024 00:00) (77/45 - 100/51)  BP(mean): 60 (20 Aug 2024 00:00) (55 - 69)  ABP: 105/59 (20 Aug 2024 12:00) (85/46 - 106/62)  ABP(mean): 79 (20 Aug 2024 12:00) (63 - 81)  RR: 21 (20 Aug 2024 12:00) (0 - 25)  SpO2: 100% (20 Aug 2024 12:00) (95% - 100%)    O2 Parameters below as of 20 Aug 2024 08:00  Patient On (Oxygen Delivery Method): nasal cannula  O2 Flow (L/min): 3    awake, alert, eating breakfast  RRR  distant BS  urostomy pink, dark urine                          8.3    13.83 )-----------( 384      ( 20 Aug 2024 00:30 )             24.2   08-20    134<L>  |  102  |  65<H>  ----------------------------<  92  4.7   |  15<L>  |  6.61<H>    Ca    7.9<L>      20 Aug 2024 00:30  Phos  5.6     08-20  Mg     2.20     08-20    TPro  5.9<L>  /  Alb  2.4<L>  /  TBili  0.5  /  DBili  x   /  AST  129<H>  /  ALT  22  /  AlkPhos  262<H>  08-20  MEDICATIONS  (STANDING):  acetaminophen     Tablet .. 1000 milliGRAM(s) Oral every 6 hours  aspirin  chewable 81 milliGRAM(s) Oral daily  atorvastatin 40 milliGRAM(s) Oral at bedtime  chlorhexidine 2% Cloths 1 Application(s) Topical daily  chlorhexidine 2% Cloths 1 Application(s) Topical daily  cycloSPORINE (RESTASIS) 0.05% Emulsion 1 Drop(s) Both EYES two times a day  heparin   Injectable 5000 Unit(s) SubCutaneous every 8 hours  insulin lispro (ADMELOG) corrective regimen sliding scale   SubCutaneous three times a day before meals  insulin lispro (ADMELOG) corrective regimen sliding scale   SubCutaneous at bedtime  levothyroxine 100 MICROGram(s) Oral daily  meropenem  IVPB 500 milliGRAM(s) IV Intermittent every 24 hours  norepinephrine Infusion 0.05 MICROgram(s)/kG/Min (4.36 mL/Hr) IV Continuous <Continuous>  pantoprazole    Tablet 40 milliGRAM(s) Oral before breakfast  sodium bicarbonate  Infusion 0.161 mEq/kG/Hr (100 mL/Hr) IV Continuous <Continuous>

## 2024-08-20 NOTE — CONSULT NOTE ADULT - ASSESSMENT
69 y/o F with Hx of DM, HTN, AUD with multiple rehab admissions, hypothyroidism, bladder cancer s/p cystectomy with ileal conduit construction on 8/6/24  presented with hypotension and lower abdominal pain with BENIGNO and metabolic acidosis.

## 2024-08-20 NOTE — CONSULT NOTE ADULT - SUBJECTIVE AND OBJECTIVE BOX
[facility] DIVISION OF KIDNEY DISEASES AND HYPERTENSION -- [phone]  -- INITIAL CONSULT NOTE  --------------------------------------------------------------------------------  HPI: 69 y/o F with Hx of HTN, DENITA on CPAP, Hypothyroidism, GERD, DM, AUD with multiple rehab admissions, bladder cancer s/p cystectomy with Ileal conduit creation on 8/6/24 in [facility]  (Dr. Thompson) and pt was discharged on 8/10/24. She was okay for 4-5 days after discharge and then pt was feeling weak afterwards at home and presented with hypotension ( Systolic 70s at home) and intermittent fevers along with lower abdominal pain. As the BP was low at home they called EMS and EMS BP was 80/50. She had 1-2 bowel movements after the discharge from [facility] and has poor PO intake.  Her sister was an RN and she was taking care of the patient after the discharge. There were 2 stents placed and they came out on their own and she was supposed to visit Urology on 8/16 at [facility].   Nephrology was consulted for management of BENIGNO and acidosis. Pt never had hx of kidney disease and never followed any nephrologist. She reports compliance with medications and CPAP. At the time of presentation Scr was 5.34 ( Aug 15, 2024) , She received IV abx and fluids and it initially improved to 4.42 and then started worsening gradually to 6.61 today. Pt is oliguric. She received IV contrast for CT on 8/15/24. There was a fluid collection and IR was consulted - mentioned that there is no  organized fluid collection for drainage. Fluid may be related to postoperative changes. Pt remained on IV vasopressors and her BP remained in 90/60 range. At the time of examination, pt is on CPAP and was resting comfortably. Denies any chest pain, abdominal pain, diarrhea, nausea, vomiting.    PAST HISTORY  --------------------------------------------------------------------------------  PAST MEDICAL & SURGICAL HISTORY:  Hypothyroid  HTN (hypertension)      Chronic GERD      Meningitis      ETOH abuse      Type 2 diabetes mellitus      Trace cataracts      Dry eyes      Fatty liver      Other microscopic hematuria      Cancer of bladder      Fibroids      Microscopic hematuria      DENITA on CPAP      S/P hysterectomy  1984      History of vocal cord polypectomy      H/O total knee replacement, left      S/P total knee replacement, right      H/O excision of ganglion cyst      H/O transurethral resection of bladder tumor (TURBT)        FAMILY HISTORY:  FH: type 2 diabetes (Mother)    FH: hypertension (Mother, Father)    FH: liver cancer (Father)    FH: breast cancer (Sibling)    Family history of DVT (Mother)      PAST SOCIAL HISTORY:    ALLERGIES & MEDICATIONS  --------------------------------------------------------------------------------  Allergies    No Known Allergies    Intolerances      Standing Inpatient Medications  acetaminophen     Tablet .. 1000 milliGRAM(s) Oral every 6 hours  aspirin  chewable 81 milliGRAM(s) Oral daily  atorvastatin 40 milliGRAM(s) Oral at bedtime  chlorhexidine 2% Cloths 1 Application(s) Topical daily  chlorhexidine 2% Cloths 1 Application(s) Topical daily  cycloSPORINE (RESTASIS) 0.05% Emulsion 1 Drop(s) Both EYES two times a day  heparin   Injectable 5000 Unit(s) SubCutaneous every 8 hours  insulin lispro (ADMELOG) corrective regimen sliding scale   SubCutaneous three times a day before meals  insulin lispro (ADMELOG) corrective regimen sliding scale   SubCutaneous at bedtime  levothyroxine 100 MICROGram(s) Oral daily  norepinephrine Infusion 0.05 MICROgram(s)/kG/Min IV Continuous <Continuous>  pantoprazole    Tablet 40 milliGRAM(s) Oral before breakfast    PRN Inpatient Medications  oxyCODONE    IR 5 milliGRAM(s) Oral every 4 hours PRN  oxyCODONE    IR 2.5 milliGRAM(s) Oral every 4 hours PRN      REVIEW OF SYSTEMS  --------------------------------------------------------------------------------  Gen: No fevers/chills  Skin: No rashes  Head/Eyes/Ears: Normal hearing,   Respiratory: No dyspnea, cough  CV: No chest pain  GI: No abdominal pain, diarrhea  : No dysuria, hematuria  MSK: No  edema  Heme: No easy bruising or bleeding  Psych: No significant depression    All other systems were reviewed and are negative, except as noted.    VITALS/PHYSICAL EXAM  --------------------------------------------------------------------------------  T(C): 37.1 (08-20-24 @ 00:00), Max: 37.1 (08-20-24 @ 00:00)  HR: 92 (08-20-24 @ 01:00) (75 - 103)  BP: 90/47 (08-20-24 @ 00:00) (77/45 - 134/61)  RR: 20 (08-20-24 @ 01:00) (0 - 30)  SpO2: 100% (08-20-24 @ 01:00) (81% - 100%)  Wt(kg): --        08-18-24 @ 07:01  -  08-19-24 @ 07:00  --------------------------------------------------------  IN: 602.1 mL / OUT: 103 mL / NET: 499.1 mL    08-19-24 @ 07:01  -  08-20-24 @ 02:07  --------------------------------------------------------  IN: 282.9 mL / OUT: 195 mL / NET: 87.9 mL      Physical Exam:  Gen: NAD  HEENT: MMM  Pulm: Lower zone decreased breath sounds  CV: S1S2  Abd: Soft, +BS, Ileal conduit on the Rt lower quadrant with taylor in place.   Ext: No LE edema B/L  Neuro: Awake  Skin: Warm and dry  Vascular access: Rt IJ CVC     LABS/STUDIES  --------------------------------------------------------------------------------              8.3    13.83 >-----------<  384      [08-20-24 @ 00:30]              24.2     134  |  102  |  65  ----------------------------<  92      [08-20-24 @ 00:30]  4.7   |  15  |  6.61        Ca     7.9     [08-20-24 @ 00:30]      Mg     2.20     [08-20-24 @ 00:30]      Phos  5.6     [08-20-24 @ 00:30]    TPro  5.9  /  Alb  2.4  /  TBili  0.5  /  DBili  x   /  AST  129  /  ALT  22  /  AlkPhos  262  [08-20-24 @ 00:30]    PT/INR: PT 13.0 , INR 1.17       [08-19-24 @ 19:33]  PTT: 49.3       [08-19-24 @ 19:33]      Creatinine Trend:  SCr 6.61 [08-20 @ 00:30]  SCr 6.52 [08-19 @ 19:33]  SCr 6.46 [08-19 @ 15:19]  SCr 5.84 [08-19 @ 01:00]  SCr 4.95 [08-18 @ 01:00]     Urine Creatinine 88      [08-17-24 @ 14:05]  Urine Sodium 56      [08-17-24 @ 14:05]  Urine Potassium 25.5      [08-17-24 @ 14:05]  Urine Chloride 29      [08-17-24 @ 14:05]      HCV 0.09, Nonreact      [05-17-24 @ 09:46]

## 2024-08-20 NOTE — PROGRESS NOTE ADULT - ASSESSMENT
67 yo F h/o HTN and bladder CA, s/p cystectomy/IC creation on 8/6/2024 at [facility] with Dr. Thompson (d/c on 8/10) presented to ED 8/16 w/ generalized weakness and some lower abdominal pain. Since d/c, only have 1-2 bowel movement, and had significantly decrease PO intake due to decreased appetite, intermittent fevers/chills, +bloody discharge from urethra, some burning. Pt stated stents fell out yesterday.  BP 80s/50s for EMS.  Pt remained hypotensive in ED, started on pressors, cultures sent, placed on zosyn and BiPAP and admitted to SICU. CT revealed: Expected postoperative changes status post cystectomy with ileal conduit creation including small amount of complex fluid and free air in the pelvis. No organized fluid collection. Distended ileal conduit with narrowing at the exiting ostomy site and proximally at the site of ureteral insertion. Mild bilateral hydroureteronephrosis with delayed nephrograms. No significant perinephric stranding.    8/16: still requiring pressor support and on BiPAP, pt states she feels better,14fr catheter placed in stoma, Bcx with GPC/GNR, Cr to 4.42 from 5.34, abx changed to leila and diflucan  8/17: still requiring pressor support and supplement oxygen but feels well, decr UO overnight, given albumin and IVF restarted, Cr up slightly to 4.68, electrolytes normal  8/18: RBUS done, no signs of hydro. IR consulted, no need for NTs as no hydro. UOP still very low (overnight 10 cc/hour), given 2 boluses with no improvement. Cr up to 4.95 from 4.68. CT loopogram of conduit pending today to evaluate for leak. still requiring pressors. Ucx from conduit now growing e.coli, sensitivities pending.   8/19: CT loopogram w/ no anastamotic leak. Cr continues to rise w/ low UOP. UA on admission w/ coarse granular cast. Constellation of signs and symptoms suggestive of Acute tubular necrosis  8/20 Urine culture w/ e.coli sensitivities w/ resistance to floroquinolones. Pt back on abx. Pt w/ slightly increased urine output    Plan:  -Appreciate Nephrology reccs   -AM labs reviewed  -BCx; NGTD  -pressor and respiratory support prn  -continue meropenem  -strict I&Os  -bowel regimen  -appreciate SICU care  -IS, OOB    Urology  #[id number]

## 2024-08-21 ENCOUNTER — RESULT REVIEW (OUTPATIENT)
Age: 69
End: 2024-08-21

## 2024-08-21 LAB
ALBUMIN SERPL ELPH-MCNC: 2.3 G/DL — LOW (ref 3.3–5)
ALP SERPL-CCNC: 715 U/L — HIGH (ref 40–120)
ALT FLD-CCNC: 25 U/L — SIGNIFICANT CHANGE UP (ref 4–33)
ANION GAP SERPL CALC-SCNC: 16 MMOL/L — HIGH (ref 7–14)
APTT BLD: >200 SEC — CRITICAL HIGH (ref 24.5–35.6)
APTT BLD: >200 SEC — CRITICAL HIGH (ref 24.5–35.6)
APTT BLD: >200 SEC — SIGNIFICANT CHANGE UP (ref 24.5–35.6)
AST SERPL-CCNC: 181 U/L — HIGH (ref 4–32)
BASOPHILS # BLD AUTO: 0 K/UL — SIGNIFICANT CHANGE UP (ref 0–0.2)
BASOPHILS NFR BLD AUTO: 0 % — SIGNIFICANT CHANGE UP (ref 0–2)
BILIRUB DIRECT SERPL-MCNC: 0.5 MG/DL — HIGH (ref 0–0.3)
BILIRUB INDIRECT FLD-MCNC: 0.2 MG/DL — SIGNIFICANT CHANGE UP (ref 0–1)
BILIRUB SERPL-MCNC: 0.7 MG/DL — SIGNIFICANT CHANGE UP (ref 0.2–1.2)
BLOOD GAS ARTERIAL - LYTES,HGB,ICA,LACT RESULT: SIGNIFICANT CHANGE UP
BLOOD GAS ARTERIAL COMPREHENSIVE RESULT: SIGNIFICANT CHANGE UP
BUN SERPL-MCNC: 44 MG/DL — HIGH (ref 7–23)
CALCIUM SERPL-MCNC: 7.8 MG/DL — LOW (ref 8.4–10.5)
CHLORIDE SERPL-SCNC: 100 MMOL/L — SIGNIFICANT CHANGE UP (ref 98–107)
CO2 SERPL-SCNC: 19 MMOL/L — LOW (ref 22–31)
CREAT SERPL-MCNC: 4.38 MG/DL — HIGH (ref 0.5–1.3)
CULTURE RESULTS: SIGNIFICANT CHANGE UP
CULTURE RESULTS: SIGNIFICANT CHANGE UP
EGFR: 10 ML/MIN/1.73M2 — LOW
EOSINOPHIL # BLD AUTO: 0 K/UL — SIGNIFICANT CHANGE UP (ref 0–0.5)
EOSINOPHIL NFR BLD AUTO: 0 % — SIGNIFICANT CHANGE UP (ref 0–6)
GLUCOSE BLDC GLUCOMTR-MCNC: 115 MG/DL — HIGH (ref 70–99)
GLUCOSE BLDC GLUCOMTR-MCNC: 132 MG/DL — HIGH (ref 70–99)
GLUCOSE BLDC GLUCOMTR-MCNC: 134 MG/DL — HIGH (ref 70–99)
GLUCOSE SERPL-MCNC: 133 MG/DL — HIGH (ref 70–99)
HCT VFR BLD CALC: 23.8 % — LOW (ref 34.5–45)
HGB BLD-MCNC: 8.4 G/DL — LOW (ref 11.5–15.5)
IANC: 22.05 K/UL — HIGH (ref 1.8–7.4)
INR BLD: 1.45 RATIO — HIGH (ref 0.85–1.18)
LYMPHOCYTES # BLD AUTO: 2.9 K/UL — SIGNIFICANT CHANGE UP (ref 1–3.3)
LYMPHOCYTES # BLD AUTO: 9.5 % — LOW (ref 13–44)
MAGNESIUM SERPL-MCNC: 2 MG/DL — SIGNIFICANT CHANGE UP (ref 1.6–2.6)
MCHC RBC-ENTMCNC: 30.5 PG — SIGNIFICANT CHANGE UP (ref 27–34)
MCHC RBC-ENTMCNC: 35.3 GM/DL — SIGNIFICANT CHANGE UP (ref 32–36)
MCV RBC AUTO: 86.5 FL — SIGNIFICANT CHANGE UP (ref 80–100)
MONOCYTES # BLD AUTO: 3.73 K/UL — HIGH (ref 0–0.9)
MONOCYTES NFR BLD AUTO: 12.2 % — SIGNIFICANT CHANGE UP (ref 2–14)
NEUTROPHILS # BLD AUTO: 23.93 K/UL — HIGH (ref 1.8–7.4)
NEUTROPHILS NFR BLD AUTO: 77.4 % — HIGH (ref 43–77)
PHOSPHATE SERPL-MCNC: 4.2 MG/DL — SIGNIFICANT CHANGE UP (ref 2.5–4.5)
PLATELET # BLD AUTO: 352 K/UL — SIGNIFICANT CHANGE UP (ref 150–400)
POTASSIUM SERPL-MCNC: 4.5 MMOL/L — SIGNIFICANT CHANGE UP (ref 3.5–5.3)
POTASSIUM SERPL-SCNC: 4.5 MMOL/L — SIGNIFICANT CHANGE UP (ref 3.5–5.3)
PROT SERPL-MCNC: 5.5 G/DL — LOW (ref 6–8.3)
PROTHROM AB SERPL-ACNC: 16.1 SEC — HIGH (ref 9.5–13)
RBC # BLD: 2.75 M/UL — LOW (ref 3.8–5.2)
RBC # FLD: 16.5 % — HIGH (ref 10.3–14.5)
SODIUM SERPL-SCNC: 135 MMOL/L — SIGNIFICANT CHANGE UP (ref 135–145)
SPECIMEN SOURCE: SIGNIFICANT CHANGE UP
SPECIMEN SOURCE: SIGNIFICANT CHANGE UP
WBC # BLD: 30.56 K/UL — HIGH (ref 3.8–10.5)
WBC # FLD AUTO: 30.56 K/UL — HIGH (ref 3.8–10.5)

## 2024-08-21 PROCEDURE — 99232 SBSQ HOSP IP/OBS MODERATE 35: CPT

## 2024-08-21 PROCEDURE — 71045 X-RAY EXAM CHEST 1 VIEW: CPT | Mod: 26

## 2024-08-21 PROCEDURE — 93970 EXTREMITY STUDY: CPT | Mod: 26

## 2024-08-21 PROCEDURE — 99291 CRITICAL CARE FIRST HOUR: CPT | Mod: 24

## 2024-08-21 PROCEDURE — 99233 SBSQ HOSP IP/OBS HIGH 50: CPT

## 2024-08-21 RX ORDER — MEROPENEM 500 MG/10ML
1000 INJECTION, POWDER, FOR SOLUTION INTRAVENOUS EVERY 8 HOURS
Refills: 0 | Status: DISCONTINUED | OUTPATIENT
Start: 2024-08-21 | End: 2024-08-23

## 2024-08-21 RX ORDER — ACETAMINOPHEN 325 MG/1
1000 TABLET ORAL EVERY 6 HOURS
Refills: 0 | Status: COMPLETED | OUTPATIENT
Start: 2024-08-22 | End: 2024-08-22

## 2024-08-21 RX ORDER — PHENOBARBITAL 15 MG/1
130 TABLET ORAL
Refills: 0 | Status: DISCONTINUED | OUTPATIENT
Start: 2024-08-21 | End: 2024-08-23

## 2024-08-21 RX ORDER — PHENOBARBITAL 15 MG/1
500 TABLET ORAL ONCE
Refills: 0 | Status: DISCONTINUED | OUTPATIENT
Start: 2024-08-21 | End: 2024-08-21

## 2024-08-21 RX ORDER — LEVOTHYROXINE SODIUM 100 MCG
70 TABLET ORAL
Refills: 0 | Status: DISCONTINUED | OUTPATIENT
Start: 2024-08-21 | End: 2024-08-26

## 2024-08-21 RX ORDER — ASPIRIN 81 MG
81 TABLET, DELAYED RELEASE (ENTERIC COATED) ORAL DAILY
Refills: 0 | Status: DISCONTINUED | OUTPATIENT
Start: 2024-08-21 | End: 2024-09-04

## 2024-08-21 RX ADMIN — MEROPENEM 100 MILLIGRAM(S): 500 INJECTION, POWDER, FOR SOLUTION INTRAVENOUS at 14:15

## 2024-08-21 RX ADMIN — Medication 4.36 MICROGRAM(S)/KG/MIN: at 07:07

## 2024-08-21 RX ADMIN — PHENOBARBITAL 207.7 MILLIGRAM(S): 15 TABLET ORAL at 09:33

## 2024-08-21 RX ADMIN — Medication 100 MICROGRAM(S): at 05:16

## 2024-08-21 RX ADMIN — DEXMEDETOMIDINE HYDROCHLORIDE IN 0.9% SODIUM CHLORIDE 4.65 MICROGRAM(S)/KG/HR: 4 INJECTION INTRAVENOUS at 07:08

## 2024-08-21 RX ADMIN — PHENOBARBITAL 130 MILLIGRAM(S): 15 TABLET ORAL at 17:20

## 2024-08-21 RX ADMIN — ACETAMINOPHEN 400 MILLIGRAM(S): 325 TABLET ORAL at 05:16

## 2024-08-21 RX ADMIN — DEXMEDETOMIDINE HYDROCHLORIDE IN 0.9% SODIUM CHLORIDE 4.65 MICROGRAM(S)/KG/HR: 4 INJECTION INTRAVENOUS at 17:20

## 2024-08-21 RX ADMIN — ACETAMINOPHEN 400 MILLIGRAM(S): 325 TABLET ORAL at 12:16

## 2024-08-21 RX ADMIN — MIDAZOLAM HYDROCHLORIDE 1 MILLIGRAM(S): 5 INJECTION, SOLUTION INTRAMUSCULAR; INTRAVENOUS at 07:07

## 2024-08-21 RX ADMIN — Medication 40 MILLILITER(S): at 07:08

## 2024-08-21 RX ADMIN — VASOPRESSIN 4.5 UNIT(S)/MIN: 20 INJECTION INTRAVENOUS at 07:07

## 2024-08-21 RX ADMIN — ACETAMINOPHEN 1000 MILLIGRAM(S): 325 TABLET ORAL at 06:00

## 2024-08-21 RX ADMIN — Medication 14 UNIT(S)/HR: at 07:08

## 2024-08-21 RX ADMIN — MEROPENEM 100 MILLIGRAM(S): 500 INJECTION, POWDER, FOR SOLUTION INTRAVENOUS at 21:00

## 2024-08-21 RX ADMIN — CHLORHEXIDINE GLUCONATE 1 APPLICATION(S): 40 SOLUTION TOPICAL at 12:16

## 2024-08-21 RX ADMIN — CHLORHEXIDINE GLUCONATE 15 MILLILITER(S): 40 SOLUTION TOPICAL at 05:16

## 2024-08-21 RX ADMIN — PHENOBARBITAL 130 MILLIGRAM(S): 15 TABLET ORAL at 20:57

## 2024-08-21 RX ADMIN — ACETAMINOPHEN 1000 MILLIGRAM(S): 325 TABLET ORAL at 00:00

## 2024-08-21 RX ADMIN — Medication 81 MILLIGRAM(S): at 12:16

## 2024-08-21 RX ADMIN — Medication 11 UNIT(S)/HR: at 17:29

## 2024-08-21 RX ADMIN — Medication 1 DROP(S): at 17:20

## 2024-08-21 RX ADMIN — CHLORHEXIDINE GLUCONATE 15 MILLILITER(S): 40 SOLUTION TOPICAL at 17:20

## 2024-08-21 RX ADMIN — ACETAMINOPHEN 400 MILLIGRAM(S): 325 TABLET ORAL at 17:20

## 2024-08-21 RX ADMIN — Medication 1 DROP(S): at 05:17

## 2024-08-21 NOTE — PROCEDURE NOTE - PROCEDURE DATE TIME, MLM
19-Aug-2024 13:54
20-Aug-2024 19:00
16-Aug-2024 11:35
19-Aug-2024
16-Aug-2024 06:40
20-Aug-2024 16:00

## 2024-08-21 NOTE — PROGRESS NOTE ADULT - ATTENDING COMMENTS
critically ill.  labs meds vitals chart reviewed.  still oliguric.  recommend continue CRRT with goal of net negative fluid balance.  plan discussed with family and with primary team.

## 2024-08-21 NOTE — PROVIDER CONTACT NOTE (HYPOGLYCEMIA EVENT) - NS PROVIDER CONTACT BACKGROUND-HYPO
Age: 68y    Gender: Female    POCT Blood Glucose:  132 mg/dL (08-21-24 @ 05:03)  117 mg/dL (08-20-24 @ 23:37)  124 mg/dL (08-20-24 @ 22:50)  61 mg/dL (08-20-24 @ 22:15)  63 mg/dL (08-20-24 @ 22:13)  46 mg/dL (08-20-24 @ 22:11)  79 mg/dL (08-20-24 @ 18:30)  108 mg/dL (08-20-24 @ 11:51)      eMAR:atorvastatin   40 milliGRAM(s) Oral (08-20-24 @ 21:45)    dextrose 50% Injectable   50 milliLiter(s) IV Push (08-20-24 @ 22:20)    levothyroxine   100 MICROGram(s) Oral (08-21-24 @ 05:16)   100 MICROGram(s) Oral (08-20-24 @ 06:32)    vasopressin Infusion   4.5 mL/Hr IV Continuous (08-20-24 @ 18:40)   4.5 mL/Hr IV Continuous (08-20-24 @ 16:52)

## 2024-08-21 NOTE — PROGRESS NOTE ADULT - SUBJECTIVE AND OBJECTIVE BOX
SICU Daily Progress Note  =====================================================  Interval/Overnight Events:         ALLERGIES:  No Known Allergies      --------------------------------------------------------------------------------------    MEDICATIONS:    Neurologic Medications  acetaminophen   IVPB .. 1000 milliGRAM(s) IV Intermittent every 6 hours  dexMEDEtomidine Infusion 0.2 MICROgram(s)/kG/Hr IV Continuous <Continuous>  midazolam Injectable 1 milliGRAM(s) IV Push every 4 hours PRN Breakthrough Agitation    Respiratory Medications    Cardiovascular Medications  norepinephrine Infusion 0.05 MICROgram(s)/kG/Min IV Continuous <Continuous>    Gastrointestinal Medications  dextrose 5% + sodium chloride 0.45%. 1000 milliLiter(s) IV Continuous <Continuous>    Genitourinary Medications    Hematologic/Oncologic Medications  aspirin  chewable 81 milliGRAM(s) Oral daily  heparin  Infusion 1700 Unit(s)/Hr IV Continuous <Continuous>    Antimicrobial/Immunologic Medications  meropenem  IVPB 500 milliGRAM(s) IV Intermittent every 24 hours    Endocrine/Metabolic Medications  atorvastatin 40 milliGRAM(s) Oral at bedtime  insulin lispro (ADMELOG) corrective regimen sliding scale   SubCutaneous every 6 hours  levothyroxine 100 MICROGram(s) Oral daily  vasopressin Infusion 0.03 Unit(s)/Min IV Continuous <Continuous>    Topical/Other Medications  chlorhexidine 0.12% Liquid 15 milliLiter(s) Oral Mucosa every 12 hours  chlorhexidine 2% Cloths 1 Application(s) Topical daily  CRRT Treatment    <Continuous>  cycloSPORINE (RESTASIS) 0.05% Emulsion 1 Drop(s) Both EYES two times a day  PrismaSATE Dialysate BGK 4 / 2.5 5000 milliLiter(s) CRRT <Continuous>  PrismaSOL Filtration BGK 4 / 2.5 5000 milliLiter(s) CRRT <Continuous>  PrismaSOL Filtration BGK 4 / 2.5 5000 milliLiter(s) CRRT <Continuous>    --------------------------------------------------------------------------------------    VITAL SIGNS:  T(C): 38.3 (08-20-24 @ 21:00), Max: 38.4 (08-20-24 @ 18:00)  HR: 99 (08-21-24 @ 00:00) (80 - 152)  BP: --  RR: 12 (08-21-24 @ 00:00) (12 - 30)  SpO2: 100% (08-21-24 @ 00:00) (79% - 100%)  --------------------------------------------------------------------------------------    INS AND OUTS:    08-19-24 @ 07:01  -  08-20-24 @ 07:00  --------------------------------------------------------  IN: 408.1 mL / OUT: 220 mL / NET: 188.1 mL    08-20-24 @ 07:01  -  08-21-24 @ 00:22  --------------------------------------------------------  IN: 1617.4 mL / OUT: 250 mL / NET: 1367.4 mL      --------------------------------------------------------------------------------------    EXAM    NEUROLOGY  Exam: Normal, NAD, alert, oriented x3, no focal deficits.     HEENT  Exam: Normocephalic, atraumatic, EOMI.    RESPIRATORY  Exam: Normal expansion/effort.    CARDIOVASCULAR  Exam: Regular rate and rhythm.       GI/NUTRITION  Exam: Abdomen soft, Non-tender, Non-distended. Ileal conduit     VASCULAR  Exam: Extremities warm, pink, well-perfused. JACQUELINE brandt     MUSCULOSKELETAL  Exam: All extremities moving spontaneously without limitations.    SKIN  Exam: Good skin turgor, no skin breakdown.       --------------------------------------------------------------------------------------    LABS                          9.1    21.91 )-----------( 364      ( 20 Aug 2024 21:30 )             26.1     08-20    139  |  102  |  71<H>  ----------------------------<  56<L>  4.5   |  15<L>  |  6.83<H>    Ca    7.8<L>      20 Aug 2024 21:30  Phos  6.1     08-20  Mg     2.00     08-20    TPro  5.9<L>  /  Alb  2.4<L>  /  TBili  0.5  /  DBili  x   /  AST  129<H>  /  ALT  22  /  AlkPhos  262<H>  08-20    -------------------------------------------------------------------------------------- SICU Daily Progress Note  =====================================================  Interval/Overnight Events:       - CTA showing possible PE  - Heparin gtt started  - Shiley placed, CRRT net even  - Minimal urine output    ALLERGIES:  No Known Allergies      --------------------------------------------------------------------------------------    MEDICATIONS:    Neurologic Medications  acetaminophen   IVPB .. 1000 milliGRAM(s) IV Intermittent every 6 hours  dexMEDEtomidine Infusion 0.2 MICROgram(s)/kG/Hr IV Continuous <Continuous>  midazolam Injectable 1 milliGRAM(s) IV Push every 4 hours PRN Breakthrough Agitation    Respiratory Medications    Cardiovascular Medications  norepinephrine Infusion 0.05 MICROgram(s)/kG/Min IV Continuous <Continuous>    Gastrointestinal Medications  dextrose 5% + sodium chloride 0.45%. 1000 milliLiter(s) IV Continuous <Continuous>    Genitourinary Medications    Hematologic/Oncologic Medications  aspirin  chewable 81 milliGRAM(s) Oral daily  heparin  Infusion 1700 Unit(s)/Hr IV Continuous <Continuous>    Antimicrobial/Immunologic Medications  meropenem  IVPB 500 milliGRAM(s) IV Intermittent every 24 hours    Endocrine/Metabolic Medications  atorvastatin 40 milliGRAM(s) Oral at bedtime  insulin lispro (ADMELOG) corrective regimen sliding scale   SubCutaneous every 6 hours  levothyroxine 100 MICROGram(s) Oral daily  vasopressin Infusion 0.03 Unit(s)/Min IV Continuous <Continuous>    Topical/Other Medications  chlorhexidine 0.12% Liquid 15 milliLiter(s) Oral Mucosa every 12 hours  chlorhexidine 2% Cloths 1 Application(s) Topical daily  CRRT Treatment    <Continuous>  cycloSPORINE (RESTASIS) 0.05% Emulsion 1 Drop(s) Both EYES two times a day  PrismaSATE Dialysate BGK 4 / 2.5 5000 milliLiter(s) CRRT <Continuous>  PrismaSOL Filtration BGK 4 / 2.5 5000 milliLiter(s) CRRT <Continuous>  PrismaSOL Filtration BGK 4 / 2.5 5000 milliLiter(s) CRRT <Continuous>    --------------------------------------------------------------------------------------    VITAL SIGNS:  T(C): 38.3 (08-20-24 @ 21:00), Max: 38.4 (08-20-24 @ 18:00)  HR: 99 (08-21-24 @ 00:00) (80 - 152)  BP: --  RR: 12 (08-21-24 @ 00:00) (12 - 30)  SpO2: 100% (08-21-24 @ 00:00) (79% - 100%)  --------------------------------------------------------------------------------------    INS AND OUTS:    08-19-24 @ 07:01  -  08-20-24 @ 07:00  --------------------------------------------------------  IN: 408.1 mL / OUT: 220 mL / NET: 188.1 mL    08-20-24 @ 07:01  -  08-21-24 @ 00:22  --------------------------------------------------------  IN: 1617.4 mL / OUT: 250 mL / NET: 1367.4 mL      --------------------------------------------------------------------------------------    EXAM    NEUROLOGY  Exam: Normal, NAD, alert, oriented x3, no focal deficits.     HEENT  Exam: Normocephalic, atraumatic, EOMI.    RESPIRATORY  Exam: Normal expansion/effort.    CARDIOVASCULAR  Exam: Regular rate and rhythm.       GI/NUTRITION  Exam: Abdomen soft, Non-tender, Non-distended. Ileal conduit     VASCULAR  Exam: Extremities warm, pink, well-perfused. JACQUELINE brandt     MUSCULOSKELETAL  Exam: All extremities moving spontaneously without limitations.    SKIN  Exam: Good skin turgor, no skin breakdown.       --------------------------------------------------------------------------------------    LABS                          9.1    21.91 )-----------( 364      ( 20 Aug 2024 21:30 )             26.1     08-20    139  |  102  |  71<H>  ----------------------------<  56<L>  4.5   |  15<L>  |  6.83<H>    Ca    7.8<L>      20 Aug 2024 21:30  Phos  6.1     08-20  Mg     2.00     08-20    TPro  5.9<L>  /  Alb  2.4<L>  /  TBili  0.5  /  DBili  x   /  AST  129<H>  /  ALT  22  /  AlkPhos  262<H>  08-20    -------------------------------------------------------------------------------------- SICU Daily Progress Note  =====================================================  Interval/Overnight Events:       - CTA showing possible PE  - Heparin gtt started  - Shiley placed, CRRT net even  - Minimal urine output    ALLERGIES:  No Known Allergies      --------------------------------------------------------------------------------------    MEDICATIONS:    Neurologic Medications  acetaminophen   IVPB .. 1000 milliGRAM(s) IV Intermittent every 6 hours  dexMEDEtomidine Infusion 0.2 MICROgram(s)/kG/Hr IV Continuous <Continuous>  midazolam Injectable 1 milliGRAM(s) IV Push every 4 hours PRN Breakthrough Agitation    Respiratory Medications    Cardiovascular Medications  norepinephrine Infusion 0.05 MICROgram(s)/kG/Min IV Continuous <Continuous>    Gastrointestinal Medications  dextrose 5% + sodium chloride 0.45%. 1000 milliLiter(s) IV Continuous <Continuous>    Genitourinary Medications    Hematologic/Oncologic Medications  aspirin  chewable 81 milliGRAM(s) Oral daily  heparin  Infusion 1700 Unit(s)/Hr IV Continuous <Continuous>    Antimicrobial/Immunologic Medications  meropenem  IVPB 500 milliGRAM(s) IV Intermittent every 24 hours    Endocrine/Metabolic Medications  atorvastatin 40 milliGRAM(s) Oral at bedtime  insulin lispro (ADMELOG) corrective regimen sliding scale   SubCutaneous every 6 hours  levothyroxine 100 MICROGram(s) Oral daily  vasopressin Infusion 0.03 Unit(s)/Min IV Continuous <Continuous>    Topical/Other Medications  chlorhexidine 0.12% Liquid 15 milliLiter(s) Oral Mucosa every 12 hours  chlorhexidine 2% Cloths 1 Application(s) Topical daily  CRRT Treatment    <Continuous>  cycloSPORINE (RESTASIS) 0.05% Emulsion 1 Drop(s) Both EYES two times a day  PrismaSATE Dialysate BGK 4 / 2.5 5000 milliLiter(s) CRRT <Continuous>  PrismaSOL Filtration BGK 4 / 2.5 5000 milliLiter(s) CRRT <Continuous>  PrismaSOL Filtration BGK 4 / 2.5 5000 milliLiter(s) CRRT <Continuous>    --------------------------------------------------------------------------------------    VITAL SIGNS:  T(C): 38.3 (08-20-24 @ 21:00), Max: 38.4 (08-20-24 @ 18:00)  HR: 99 (08-21-24 @ 00:00) (80 - 152)  BP: --  RR: 12 (08-21-24 @ 00:00) (12 - 30)  SpO2: 100% (08-21-24 @ 00:00) (79% - 100%)  --------------------------------------------------------------------------------------    INS AND OUTS:    08-19-24 @ 07:01  -  08-20-24 @ 07:00  --------------------------------------------------------  IN: 408.1 mL / OUT: 220 mL / NET: 188.1 mL    08-20-24 @ 07:01  -  08-21-24 @ 00:22  --------------------------------------------------------  IN: 1617.4 mL / OUT: 250 mL / NET: 1367.4 mL      --------------------------------------------------------------------------------------    EXAM    NEUROLOGY  Exam: Sedated and intubated    HEENT  Exam: Normocephalic, atraumatic, EOMI.    RESPIRATORY  Exam: Normal expansion/effort. Intubated 16/350/8/40    CARDIOVASCULAR  Exam: Regular rate and rhythm.       GI/NUTRITION  Exam: Abdomen soft, Non-tender, Non-distended. Ileal conduit     VASCULAR  Exam: Extremities warm, pink, well-perfused. JACQUELINE brandt     MUSCULOSKELETAL  Exam: All extremities moving spontaneously without limitations.    SKIN  Exam: Good skin turgor, no skin breakdown.       --------------------------------------------------------------------------------------    LABS                          9.1    21.91 )-----------( 364      ( 20 Aug 2024 21:30 )             26.1     08-20    139  |  102  |  71<H>  ----------------------------<  56<L>  4.5   |  15<L>  |  6.83<H>    Ca    7.8<L>      20 Aug 2024 21:30  Phos  6.1     08-20  Mg     2.00     08-20    TPro  5.9<L>  /  Alb  2.4<L>  /  TBili  0.5  /  DBili  x   /  AST  129<H>  /  ALT  22  /  AlkPhos  262<H>  08-20    --------------------------------------------------------------------------------------

## 2024-08-21 NOTE — PROGRESS NOTE ADULT - PROBLEM SELECTOR PLAN 2
Metabolic acidosis in setting of renal failure and sepsis. CRRT as noted above. Lactate was normal. Monitor Labs as above.      If you have any questions, please feel free to contact me:  Patti Clinton MD PGY-4  Nephrology Fellow  Pager [phone] / [employer] Teams (Preferred)  (After 5pm or on weekends please page the on-call fellow)

## 2024-08-21 NOTE — PROGRESS NOTE ADULT - ASSESSMENT
69 yo F h/o HTN and bladder CA, s/p cystectomy/IC creation on 8/6/2024 at [facility] with Dr. Thompson (d/c on 8/10) presented to ED 8/16 w/ generalized weakness and some lower abdominal pain. Since d/c, only have 1-2 bowel movement, and had significantly decrease PO intake due to decreased appetite, intermittent fevers/chills, +bloody discharge from urethra, some burning. Pt stated stents fell out yesterday.  BP 80s/50s for EMS.  Pt remained hypotensive in ED, started on pressors, cultures sent, placed on zosyn and BiPAP and admitted to SICU. CT revealed: Expected postoperative changes status post cystectomy with ileal conduit creation including small amount of complex fluid and free air in the pelvis. No organized fluid collection. Distended ileal conduit with narrowing at the exiting ostomy site and proximally at the site of ureteral insertion. Mild bilateral hydroureteronephrosis with delayed nephrograms. No significant perinephric stranding.    8/16: still requiring pressor support and on BiPAP, pt states she feels better,14fr catheter placed in stoma, Bcx with GPC/GNR, Cr to 4.42 from 5.34, abx changed to leila and diflucan  8/17: still requiring pressor support and supplement oxygen but feels well, decr UO overnight, given albumin and IVF restarted, Cr up slightly to 4.68, electrolytes normal  8/18: RBUS done, no signs of hydro. IR consulted, no need for NTs as no hydro. UOP still very low (overnight 10 cc/hour), given 2 boluses with no improvement. Cr up to 4.95 from 4.68. CT loopogram of conduit pending today to evaluate for leak. still requiring pressors. Ucx from conduit now growing e.coli, sensitivities pending.   8/19: CT loopogram w/ no anastamotic leak. Cr continues to rise w/ low UOP. UA on admission w/ coarse granular cast. Constellation of signs and symptoms suggestive of Acute tubular necrosis  8/20 Urine culture w/ e.coli sensitivities w/ resistance to floroquinolones. Pt back on abx. Pt w/ slightly increased urine output  8/21 Pt suffered likely PE w/ tachypnea, and hypoxia requiring intubation and sedation. Received CRRT, and on therapeutic dose heparin    Plan:  -Appreciate Nephrology reccs   -AM labs reviewed  -BCx; NGTD  -pressor and respiratory support prn  -continue meropenem  -strict I&Os  -bowel regimen  -appreciate SICU care  -IS, OOB    Urology  #[id number]

## 2024-08-21 NOTE — PROGRESS NOTE ADULT - ATTENDING COMMENTS
I have personally interviewed (limited by intubatoin and chemical sedation) and examined this patient, reviewed pertinent labs and imaging on SICU rounds.    70 minutes in total were spent in providing direct critical care for the diagnoses, assessment and plan outlined below.  This patient suffers from a critical illness that acutely impairs one or more vital organ systems such that there is a high probability of life threatening or imminent deterioration of the patient's condition.  These diagnoses are unrelated to the surgical procedure.    Additionally, time spent in teaching or the performance of separately billable procedures was not counted toward my critical care time.  There is no overlap.  Time included review of vitals, labs, imaging, discussion with consultants (nephrologist at bedside).    68F HTN and bladder cancer, s/p bladder removal with ileal conduit construction on Aug 6 at [facility], here for management of urosepsis, oliguric BENIGNO/ATN and now hypoxemic respiratory insufficiency 2/2 PE.  Intubated and started on CRRT yesterday.  On t-A-C.    ICU Vital Signs Last 24 Hrs  T(C): 37.8 (21 Aug 2024 08:00), Max: 38.4 (20 Aug 2024 18:00)  T(F): 100 (21 Aug 2024 08:00), Max: 101.2 (20 Aug 2024 18:00)  HR: 78 (21 Aug 2024 11:00) (77 - 152)  ABP: 107/53 (21 Aug 2024 11:00) (68/43 - 178/100) on norepi and vasopressin infusions  ABP(mean): 74 (21 Aug 2024 11:00) (51 - 135)  RR: 17 (21 Aug 2024 11:00) (12 - 30)  SpO2: 100% (21 Aug 2024 11:00) (79% - 100%)    O2 Parameters below as of 21 Aug 2024 08:00  Patient On (Oxygen Delivery Method): ventilator    O2 Concentration (%): 30    opens eyes, agitated, not following commands  intubated, scant resp secretions  RRR  stoma in situ with minimal urine                          8.4    30.56 )-----------( 352      ( 21 Aug 2024 05:01 )             23.8   08-21    135  |  100  |  44<H>  ----------------------------<  133<H>  4.5   |  19<L>  |  4.38<H>    Ca    7.8<L>      21 Aug 2024 05:01  Phos  4.2     08-21  Mg     2.00     08-21    TPro  5.5<L>  /  Alb  2.3<L>  /  TBili  0.7  /  DBili  0.5<H>  /  AST  181<H>  /  ALT  25  /  AlkPhos  715<H>  08-21  ABG - ( 21 Aug 2024 05:01 )  pH, Arterial: 7.35  pH, Blood: x     /  pCO2: 38    /  pO2: 134   / HCO3: 21    / Base Excess: -4.2  /  SaO2: 99.1      CXR/CT scans imaging and report reviewed    MEDICATIONS  (STANDING):  acetaminophen   IVPB .. 1000 milliGRAM(s) IV Intermittent every 6 hours  aspirin  chewable 81 milliGRAM(s) Enteral Tube daily  chlorhexidine 0.12% Liquid 15 milliLiter(s) Oral Mucosa every 12 hours  chlorhexidine 2% Cloths 1 Application(s) Topical daily  CRRT Treatment    <Continuous>  cycloSPORINE (RESTASIS) 0.05% Emulsion 1 Drop(s) Both EYES two times a day  dexMEDEtomidine Infusion 0.2 MICROgram(s)/kG/Hr (4.65 mL/Hr) IV Continuous <Continuous>  dextrose 5% + sodium chloride 0.9%. 1000 milliLiter(s) (40 mL/Hr) IV Continuous <Continuous>  heparin  Infusion 1700 Unit(s)/Hr (14 mL/Hr) IV Continuous <Continuous>  insulin lispro (ADMELOG) corrective regimen sliding scale   SubCutaneous every 6 hours  levothyroxine Injectable 70 MICROGram(s) IV Push <User Schedule>  meropenem  IVPB 1000 milliGRAM(s) IV Intermittent every 8 hours  norepinephrine Infusion 0.05 MICROgram(s)/kG/Min (4.36 mL/Hr) IV Continuous <Continuous>  PrismaSOL Filtration BGK 4 / 2.5 5000 milliLiter(s) (1400 mL/Hr) CRRT <Continuous>  PrismaSOL Filtration BGK 4 / 2.5 5000 milliLiter(s) (1200 mL/Hr) CRRT <Continuous>  PrismaSOL Filtration BGK 4 / 2.5 5000 milliLiter(s) (200 mL/Hr) CRRT <Continuous>  vasopressin Infusion 0.03 Unit(s)/Min (4.5 mL/Hr) IV Continuous <Continuous>

## 2024-08-21 NOTE — PROCEDURE NOTE - NSPROCDETAILS_GEN_ALL_CORE
guidewire recovered/lumen(s) aspirated and flushed/sterile dressing applied/sterile technique, catheter placed/ultrasound guidance with use of sterile gel and probe cove
sterile technique, catheter placed/ultrasound guidance
location identified, draped/prepped, sterile technique used, needle inserted/introduced/positive blood return obtained via catheter/connected to a pressurized flush line/sutured in place/hemostasis with direct pressure, dressing applied/Seldinger technique/all materials/supplies accounted for at end of procedure
patient pre-oxygenated, tube inserted, placement confirmed
guidewire recovered/lumen(s) aspirated and flushed/sterile dressing applied/sterile technique, catheter placed/ultrasound guidance with use of sterile gel and probe cove

## 2024-08-21 NOTE — PROGRESS NOTE ADULT - ASSESSMENT
ASSESSMENT:  67 yo F, with PmHx of HTN and bladder cancer, s/p bladder removal with ileal conduit construction on Aug 6 at [facility], here for generalized weakness. Per pt, she had her surgery on Aug 6th, d/c home on Aug 10th. Since d/c, only have 1-2 bowel movement, and had significantly decrease PO intake due to decreased apptite. No fever, some chill. +bloody discharge from urethra, some burning. Today, called EMS as she felt very weak and unwell. BP 80s/50s for EMS. Patient s/p cystectomy with Dr. Thompson at [facility] on August 6th. Been having decreased PO intake and on and off fevers. Also states she had stents previously that came out on their own. Presents to the ED now due to lower abdominal pain. SICU consulted for hypotension requiring pressors and desaturation on BiPAP.     NEUROLOGIC   - Sedated: precedex, versed (off prop, bottomed out)  - SBIRT consulted, no referral or intervention  - Pain control: Tylenol    RESPIRATORY   - A/C Vent 16/350/8/40    CARDIOVASCULAR   - Monitor hemodynamics  - Levo 0.28  - Vaso 0.03  - q6 ABG  - Home meds: ASA, Atorvastatin 40mg  - Flotrak    GASTROINTESTINAL   - Diet: NPO, NGT confirmed LCWS  - Protonix    /RENAL  - Riley in urostomy  - Maintain strict Is/Os  - Monitor standing weights  - Monitor electrolytes, replete PRN  - UA/Cx from conduit sent -> E. coli  - Urine lytes -> intrinsic  - CRRT net even    HEMATOLOGIC  - Monitor H/H   - Heparin gtt @17    INFECTIOUS DISEASE  - Monitor fever / WBC  - Restarted meropenem, renal dose 500mg daily (8/16-8/27)  - S/p Diflucan (8/16-8/19)  - BCx 8/15 NGTD    ENDOCRINE  - Monitor gluc  - LAINE  - Home synthroid (does not tolerate generic formula)    LINES  - PIV   - R IJ CVC  - A line  - Riley in urostomy  - R charlotte brandt    DISPO: SICU

## 2024-08-21 NOTE — PROGRESS NOTE ADULT - PROBLEM SELECTOR PLAN 1
BENIGNO vs BENIGNO on CKD 2/2 ATN in setting of hemodynamic changes and contrast induced. Pt has no hx of kidney disease. No baseline Scr available. At the time of presentation Scr was 5.34 ( Aug 15, 2024) , She received IV abx and fluids and it initially improved to 4.42 and then started worsening gradually to 6.61. UA on 8/16/24 showed Turbid urine with small bilirubin, large blood with LE and nitrite with coarse granular casts. 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 Conduit was catheterized. Then USG was done and it showed Right kidney: 11.1 cm in length. Left kidney: 10.4 cm in length. Normal in contour and echotexture. No renal mass, hydronephrosis or calculi. Pt initiated on CRRT 8/20/24. Will continue with CRRT today for goal net negative fluid balance. Monitor labs q6-8 hours. Dose medications as per RRT. BENIGNO vs BENIGNO on CKD 2/2 ATN in setting of hemodynamic changes and contrast induced. Pt has no hx of kidney disease. No baseline Scr available. At the time of presentation Scr was 5.34 ( Aug 15, 2024) , She received IV abx and fluids and it initially improved to 4.42 and then started worsening gradually to 6.61. UA on 8/16/24 showed Turbid urine with small bilirubin, large blood with LE and nitrite with coarse granular casts. 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 Conduit was catheterized. Then USG was done and it showed Right kidney: 11.1 cm in length. Left kidney: 10.4 cm in length. Normal in contour and echotexture. No renal mass, hydronephrosis or calculi. Pt initiated on CRRT 8/20/24 for acidosis and volume overload. Will continue with CRRT today for goal net negative fluid balance. Monitor labs q6-8 hours. Dose medications as per RRT.

## 2024-08-21 NOTE — PROGRESS NOTE ADULT - ATTENDING COMMENTS
Sepsis 2/2 UTI, BENIGNO  CT pouchogram - reviewed no obvious significant extravasation, possible tiny wisp but no collection noted, B/l VUR noted explaining b/l hydro  Now with tachypnea and PE - s/p intubation, sedation  Appr SICU care, f/u cxs, IV abx  Nephrology c/s

## 2024-08-21 NOTE — PROCEDURE NOTE - NSINDICATIONS_GEN_A_CORE
critical illness/emergency venous access
respiratory distress/respiratory failure
critical patient
dialysis/CRRT
venous access

## 2024-08-21 NOTE — PROGRESS NOTE ADULT - SUBJECTIVE AND OBJECTIVE BOX
[facility] Division of Kidney Diseases & Hypertension  FOLLOW UP NOTE  [phone]--------------------------------------------------------------------------------  Chief Complaint: BENIGNO    24 hour events/subjective: Pt now intubated and sedated on precedex and requiring vasopressor support. Pt required initiation of CRRT yesterday due to WOB/volume overload. Pt seen and examined at bedside this AM.     PAST HISTORY  --------------------------------------------------------------------------------  No significant changes to PMH, PSH, FHx, SHx from H&P unless otherwise noted.    ALLERGIES & MEDICATIONS  --------------------------------------------------------------------------------  Allergies    No Known Allergies    Intolerances    Standing Inpatient Medications  acetaminophen   IVPB .. 1000 milliGRAM(s) IV Intermittent every 6 hours  aspirin  chewable 81 milliGRAM(s) Oral daily  atorvastatin 40 milliGRAM(s) Oral at bedtime  chlorhexidine 0.12% Liquid 15 milliLiter(s) Oral Mucosa every 12 hours  chlorhexidine 2% Cloths 1 Application(s) Topical daily  CRRT Treatment    <Continuous>  cycloSPORINE (RESTASIS) 0.05% Emulsion 1 Drop(s) Both EYES two times a day  dexMEDEtomidine Infusion 0.2 MICROgram(s)/kG/Hr IV Continuous <Continuous>  dextrose 5% + sodium chloride 0.9%. 1000 milliLiter(s) IV Continuous <Continuous>  heparin  Infusion 1700 Unit(s)/Hr IV Continuous <Continuous>  insulin lispro (ADMELOG) corrective regimen sliding scale   SubCutaneous every 6 hours  levothyroxine Injectable 70 MICROGram(s) IV Push <User Schedule>  meropenem  IVPB 1000 milliGRAM(s) IV Intermittent every 8 hours  norepinephrine Infusion 0.05 MICROgram(s)/kG/Min IV Continuous <Continuous>  PrismaSOL Filtration BGK 4 / 2.5 5000 milliLiter(s) CRRT <Continuous>  PrismaSOL Filtration BGK 4 / 2.5 5000 milliLiter(s) CRRT <Continuous>  PrismaSOL Filtration BGK 4 / 2.5 5000 milliLiter(s) CRRT <Continuous>  vasopressin Infusion 0.03 Unit(s)/Min IV Continuous <Continuous>    PRN Inpatient Medications  midazolam Injectable 1 milliGRAM(s) IV Push every 4 hours PRN  PHENobarbital Injectable 130 milliGRAM(s) IV Push every 15 minutes PRN    REVIEW OF SYSTEMS  --------------------------------------------------------------------------------  Unable to obtain ROS 2/2 clinical status.     VITALS/PHYSICAL EXAM  --------------------------------------------------------------------------------  T(C): 37.8 (08-21-24 @ 08:00), Max: 38.4 (08-20-24 @ 18:00)  HR: 80 (08-21-24 @ 10:00) (77 - 152)  BP: --  RR: 16 (08-21-24 @ 10:00) (12 - 30)  SpO2: 100% (08-21-24 @ 10:00) (79% - 100%)  Wt(kg): --    08-20-24 @ 07:01  -  08-21-24 @ 07:00  --------------------------------------------------------  IN: 2639.6 mL / OUT: 1580 mL / NET: 1059.6 mL    08-21-24 @ 07:01  -  08-21-24 @ 10:47  --------------------------------------------------------  IN: 429 mL / OUT: 209 mL / NET: 220 mL    Physical Exam:  Gen: intubated sedated.  Pulm: CTA B/L  CV: RRR, S1S2;  Abd: +BS, soft, nontender/nondistended  : No suprapubic tenderness  Extremities: no bilateral LE edema noted.   Neuro: Sedated, however agitated moving head and arms against restraints.   Skin: Warm, without rashes  Vascular access: R femoral temporary HD catheter.     LABS/STUDIES  --------------------------------------------------------------------------------              8.4    30.56 >-----------<  352      [08-21-24 @ 05:01]              23.8     135  |  100  |  44  ----------------------------<  133      [08-21-24 @ 05:01]  4.5   |  19  |  4.38        Ca     7.8     [08-21-24 @ 05:01]      Mg     2.00     [08-21-24 @ 05:01]      Phos  4.2     [08-21-24 @ 05:01]    TPro  5.5  /  Alb  2.3  /  TBili  0.7  /  DBili  0.5  /  AST  181  /  ALT  25  /  AlkPhos  715  [08-21-24 @ 05:01]    PT/INR: PT 16.1 , INR 1.45       [08-21-24 @ 05:01]  PTT: >200.0      [08-21-24 @ 05:01]    Creatinine Trend:  SCr 4.38 [08-21 @ 05:01]  SCr 6.83 [08-20 @ 21:30]  SCr 6.71 [08-20 @ 14:00]  SCr 6.61 [08-20 @ 00:30]  SCr 6.52 [08-19 @ 19:33]    Urinalysis - [08-21-24 @ 05:01]      Color  / Appearance  / SG  / pH       Gluc 133 / Ketone   / Bili  / Urobili        Blood  / Protein  / Leuk Est  / Nitrite       RBC  / WBC  / Hyaline  / Gran  / Sq Epi  / Non Sq Epi  / Bacteria     Urine Creatinine 88      [08-17-24 @ 14:05]  Urine Sodium 56      [08-17-24 @ 14:05]  Urine Potassium 25.5      [08-17-24 @ 14:05]  Urine Chloride 29      [08-17-24 @ 14:05]

## 2024-08-21 NOTE — PROGRESS NOTE ADULT - SUBJECTIVE AND OBJECTIVE BOX
Interval events  - hypoxic and tachypneic w/ likely PE. Started on therapeutic heparin, intubated and sedated.   Subjective  Patient seen and examined at the bedside. Interview limited, pt sedated.     Objective    Vital signs  T(F): , Max: 101.2 (08-20-24 @ 18:00)  HR: 79 (08-21-24 @ 07:00)  BP: --  SpO2: 100% (08-21-24 @ 07:00)  Wt(kg): --    Output     OUT:    Urostomy (mL): 350 mL  Total OUT: 350 mL    Total NET: -350 mL          Gen: ventilated and sedated  Abd: soft, nontender, nondistended, ileal conduit w/ taylor in stoma; draining CYU      Labs      08-21 @ 05:01    WBC 30.56 / Hct 23.8  / SCr 4.38     08-20 @ 21:30    WBC 21.91 / Hct 26.1  / SCr 6.83         Culture - Urine (collected 08-16-24 @ 10:30)  Source: .Urine Ileal Conduit  Final Report (08-20-24 @ 10:35):    50,000 - 99,000 CFU/mL Escherichia coli  Organism: Escherichia coli (08-20-24 @ 10:35)  Organism: Escherichia coli (08-20-24 @ 10:35)      -  Levofloxacin: R >4      -  Tobramycin: S <=2      -  Nitrofurantoin: S <=32 Should not be used to treat pyelonephritis      -  Aztreonam: S <=4      -  Gentamicin: S <=2      -  Cefazolin: S <=2 For uncomplicated UTI with K. pneumoniae, E. coli, or P. mirablis: NAHOMY <=16 is sensitive and NAHOMY >=32 is resistant. This also predicts results for oral agents cefaclor, cefdinir, cefpodoxime, cefprozil, cefuroxime axetil, cephalexin and locarbef for uncomplicated UTI. Note that some isolates may be susceptible to these agents while testing resistant to cefazolin.      -  Cefepime: S <=2      -  Piperacillin/Tazobactam: S <=8      -  Ciprofloxacin: R >2      -  Imipenem: S <=1      -  Ceftriaxone: S <=1      -  Ampicillin: S <=8 These ampicillin results predict results for amoxicillin      Method Type: NAHOMY      -  Meropenem: S <=1      -  Ampicillin/Sulbactam: S <=4/2      -  Cefoxitin: S <=8      -  Cefuroxime: S <=4      -  Amoxicillin/Clavulanic Acid: S <=8/4      -  Trimethoprim/Sulfamethoxazole: S <=0.5/9.5      -  Ertapenem: S <=0.5    Culture - Blood (collected 08-15-24 @ 21:15)  Source: .Blood Blood-Peripheral  Final Report (08-21-24 @ 01:01):    No growth at 5 days    Culture - Blood (collected 08-15-24 @ 21:05)  Source: .Blood Blood-Peripheral  Final Report (08-21-24 @ 01:01):    No growth at 5 days        Urine Cx: ?  Blood Cx: ?    Imaging

## 2024-08-21 NOTE — PROCEDURE NOTE - NSINFORMCONSENT_GEN_A_CORE
Benefits, risks, and possible complications of procedure explained to patient/caregiver who verbalized understanding and gave written consent.
Benefits, risks, and possible complications of procedure explained to patient/caregiver who verbalized understanding and gave verbal consent.
This was an emergent procedure.
This was an emergent procedure.

## 2024-08-22 LAB
ANION GAP SERPL CALC-SCNC: 11 MMOL/L — SIGNIFICANT CHANGE UP (ref 7–14)
ANION GAP SERPL CALC-SCNC: 14 MMOL/L — SIGNIFICANT CHANGE UP (ref 7–14)
ANION GAP SERPL CALC-SCNC: 9 MMOL/L — SIGNIFICANT CHANGE UP (ref 7–14)
APTT BLD: 153.3 SEC — CRITICAL HIGH (ref 24.5–35.6)
APTT BLD: 65.1 SEC — HIGH (ref 24.5–35.6)
APTT BLD: >200 SEC — CRITICAL HIGH (ref 24.5–35.6)
APTT BLD: >200 SEC — CRITICAL HIGH (ref 24.5–35.6)
BLOOD GAS ARTERIAL - LYTES,HGB,ICA,LACT RESULT: SIGNIFICANT CHANGE UP
BLOOD GAS ARTERIAL COMPREHENSIVE RESULT: SIGNIFICANT CHANGE UP
BUN SERPL-MCNC: 17 MG/DL — SIGNIFICANT CHANGE UP (ref 7–23)
BUN SERPL-MCNC: 17 MG/DL — SIGNIFICANT CHANGE UP (ref 7–23)
BUN SERPL-MCNC: 21 MG/DL — SIGNIFICANT CHANGE UP (ref 7–23)
CALCIUM SERPL-MCNC: 7.8 MG/DL — LOW (ref 8.4–10.5)
CALCIUM SERPL-MCNC: 8.1 MG/DL — LOW (ref 8.4–10.5)
CALCIUM SERPL-MCNC: 8.1 MG/DL — LOW (ref 8.4–10.5)
CHLORIDE SERPL-SCNC: 102 MMOL/L — SIGNIFICANT CHANGE UP (ref 98–107)
CHLORIDE SERPL-SCNC: 102 MMOL/L — SIGNIFICANT CHANGE UP (ref 98–107)
CHLORIDE SERPL-SCNC: 103 MMOL/L — SIGNIFICANT CHANGE UP (ref 98–107)
CO2 SERPL-SCNC: 22 MMOL/L — SIGNIFICANT CHANGE UP (ref 22–31)
CO2 SERPL-SCNC: 22 MMOL/L — SIGNIFICANT CHANGE UP (ref 22–31)
CO2 SERPL-SCNC: 23 MMOL/L — SIGNIFICANT CHANGE UP (ref 22–31)
CREAT SERPL-MCNC: 1.56 MG/DL — HIGH (ref 0.5–1.3)
CREAT SERPL-MCNC: 1.65 MG/DL — HIGH (ref 0.5–1.3)
CREAT SERPL-MCNC: 2.26 MG/DL — HIGH (ref 0.5–1.3)
EGFR: 23 ML/MIN/1.73M2 — LOW
EGFR: 34 ML/MIN/1.73M2 — LOW
EGFR: 36 ML/MIN/1.73M2 — LOW
GLUCOSE BLDC GLUCOMTR-MCNC: 141 MG/DL — HIGH (ref 70–99)
GLUCOSE BLDC GLUCOMTR-MCNC: 153 MG/DL — HIGH (ref 70–99)
GLUCOSE BLDC GLUCOMTR-MCNC: 164 MG/DL — HIGH (ref 70–99)
GLUCOSE BLDC GLUCOMTR-MCNC: 175 MG/DL — HIGH (ref 70–99)
GLUCOSE SERPL-MCNC: 151 MG/DL — HIGH (ref 70–99)
GLUCOSE SERPL-MCNC: 165 MG/DL — HIGH (ref 70–99)
GLUCOSE SERPL-MCNC: 183 MG/DL — HIGH (ref 70–99)
HCT VFR BLD CALC: 23.6 % — LOW (ref 34.5–45)
HGB BLD-MCNC: 8.2 G/DL — LOW (ref 11.5–15.5)
INR BLD: 1.56 RATIO — HIGH (ref 0.85–1.18)
MAGNESIUM SERPL-MCNC: 2.1 MG/DL — SIGNIFICANT CHANGE UP (ref 1.6–2.6)
MAGNESIUM SERPL-MCNC: 2.3 MG/DL — SIGNIFICANT CHANGE UP (ref 1.6–2.6)
MAGNESIUM SERPL-MCNC: 2.4 MG/DL — SIGNIFICANT CHANGE UP (ref 1.6–2.6)
MCHC RBC-ENTMCNC: 30.1 PG — SIGNIFICANT CHANGE UP (ref 27–34)
MCHC RBC-ENTMCNC: 34.7 GM/DL — SIGNIFICANT CHANGE UP (ref 32–36)
MCV RBC AUTO: 86.8 FL — SIGNIFICANT CHANGE UP (ref 80–100)
NRBC # BLD: 0 /100 WBCS — SIGNIFICANT CHANGE UP (ref 0–0)
NRBC # FLD: 0 K/UL — SIGNIFICANT CHANGE UP (ref 0–0)
PHOSPHATE SERPL-MCNC: 2.1 MG/DL — LOW (ref 2.5–4.5)
PHOSPHATE SERPL-MCNC: 2.2 MG/DL — LOW (ref 2.5–4.5)
PHOSPHATE SERPL-MCNC: 2.7 MG/DL — SIGNIFICANT CHANGE UP (ref 2.5–4.5)
PLATELET # BLD AUTO: 340 K/UL — SIGNIFICANT CHANGE UP (ref 150–400)
POTASSIUM SERPL-MCNC: 4.3 MMOL/L — SIGNIFICANT CHANGE UP (ref 3.5–5.3)
POTASSIUM SERPL-MCNC: 4.5 MMOL/L — SIGNIFICANT CHANGE UP (ref 3.5–5.3)
POTASSIUM SERPL-MCNC: 4.7 MMOL/L — SIGNIFICANT CHANGE UP (ref 3.5–5.3)
POTASSIUM SERPL-SCNC: 4.3 MMOL/L — SIGNIFICANT CHANGE UP (ref 3.5–5.3)
POTASSIUM SERPL-SCNC: 4.5 MMOL/L — SIGNIFICANT CHANGE UP (ref 3.5–5.3)
POTASSIUM SERPL-SCNC: 4.7 MMOL/L — SIGNIFICANT CHANGE UP (ref 3.5–5.3)
PROTHROM AB SERPL-ACNC: 17.3 SEC — HIGH (ref 9.5–13)
RBC # BLD: 2.72 M/UL — LOW (ref 3.8–5.2)
RBC # FLD: 17 % — HIGH (ref 10.3–14.5)
SODIUM SERPL-SCNC: 135 MMOL/L — SIGNIFICANT CHANGE UP (ref 135–145)
SODIUM SERPL-SCNC: 135 MMOL/L — SIGNIFICANT CHANGE UP (ref 135–145)
SODIUM SERPL-SCNC: 138 MMOL/L — SIGNIFICANT CHANGE UP (ref 135–145)
WBC # BLD: 26.84 K/UL — HIGH (ref 3.8–10.5)
WBC # FLD AUTO: 26.84 K/UL — HIGH (ref 3.8–10.5)

## 2024-08-22 PROCEDURE — 99291 CRITICAL CARE FIRST HOUR: CPT | Mod: 24

## 2024-08-22 PROCEDURE — 99232 SBSQ HOSP IP/OBS MODERATE 35: CPT

## 2024-08-22 PROCEDURE — 71045 X-RAY EXAM CHEST 1 VIEW: CPT | Mod: 26

## 2024-08-22 PROCEDURE — 99233 SBSQ HOSP IP/OBS HIGH 50: CPT

## 2024-08-22 RX ORDER — POTASSIUM PHOSPHATE 236; 224 MG/ML; MG/ML
30 INJECTION, SOLUTION INTRAVENOUS ONCE
Refills: 0 | Status: DISCONTINUED | OUTPATIENT
Start: 2024-08-22 | End: 2024-08-22

## 2024-08-22 RX ORDER — PANTOPRAZOLE SODIUM 40 MG
40 TABLET, DELAYED RELEASE (ENTERIC COATED) ORAL DAILY
Refills: 0 | Status: DISCONTINUED | OUTPATIENT
Start: 2024-08-22 | End: 2024-08-24

## 2024-08-22 RX ORDER — CALCIUM GLUCONATE 61(648) MG
2 TABLET ORAL ONCE
Refills: 0 | Status: COMPLETED | OUTPATIENT
Start: 2024-08-22 | End: 2024-08-22

## 2024-08-22 RX ORDER — ACETAMINOPHEN 325 MG/1
1000 TABLET ORAL EVERY 6 HOURS
Refills: 0 | Status: COMPLETED | OUTPATIENT
Start: 2024-08-22 | End: 2024-08-23

## 2024-08-22 RX ADMIN — Medication 1 DROP(S): at 06:03

## 2024-08-22 RX ADMIN — Medication 2 UNIT(S)/HR: at 19:15

## 2024-08-22 RX ADMIN — Medication 4.36 MICROGRAM(S)/KG/MIN: at 19:14

## 2024-08-22 RX ADMIN — ACETAMINOPHEN 400 MILLIGRAM(S): 325 TABLET ORAL at 00:42

## 2024-08-22 RX ADMIN — MEROPENEM 100 MILLIGRAM(S): 500 INJECTION, POWDER, FOR SOLUTION INTRAVENOUS at 13:32

## 2024-08-22 RX ADMIN — PHENOBARBITAL 130 MILLIGRAM(S): 15 TABLET ORAL at 11:29

## 2024-08-22 RX ADMIN — Medication 2 UNIT(S)/HR: at 16:34

## 2024-08-22 RX ADMIN — PHENOBARBITAL 130 MILLIGRAM(S): 15 TABLET ORAL at 19:14

## 2024-08-22 RX ADMIN — Medication 1 DROP(S): at 17:27

## 2024-08-22 RX ADMIN — PHENOBARBITAL 130 MILLIGRAM(S): 15 TABLET ORAL at 21:33

## 2024-08-22 RX ADMIN — MEROPENEM 100 MILLIGRAM(S): 500 INJECTION, POWDER, FOR SOLUTION INTRAVENOUS at 21:07

## 2024-08-22 RX ADMIN — DEXMEDETOMIDINE HYDROCHLORIDE IN 0.9% SODIUM CHLORIDE 4.65 MICROGRAM(S)/KG/HR: 4 INJECTION INTRAVENOUS at 19:14

## 2024-08-22 RX ADMIN — PHENOBARBITAL 130 MILLIGRAM(S): 15 TABLET ORAL at 06:04

## 2024-08-22 RX ADMIN — CHLORHEXIDINE GLUCONATE 15 MILLILITER(S): 40 SOLUTION TOPICAL at 17:27

## 2024-08-22 RX ADMIN — VASOPRESSIN 4.5 UNIT(S)/MIN: 20 INJECTION INTRAVENOUS at 19:14

## 2024-08-22 RX ADMIN — ACETAMINOPHEN 400 MILLIGRAM(S): 325 TABLET ORAL at 17:27

## 2024-08-22 RX ADMIN — Medication 70 MICROGRAM(S): at 06:04

## 2024-08-22 RX ADMIN — ACETAMINOPHEN 400 MILLIGRAM(S): 325 TABLET ORAL at 11:20

## 2024-08-22 RX ADMIN — Medication 81 MILLIGRAM(S): at 11:20

## 2024-08-22 RX ADMIN — Medication 200 GRAM(S): at 11:20

## 2024-08-22 RX ADMIN — DEXMEDETOMIDINE HYDROCHLORIDE IN 0.9% SODIUM CHLORIDE 4.65 MICROGRAM(S)/KG/HR: 4 INJECTION INTRAVENOUS at 07:55

## 2024-08-22 RX ADMIN — PHENOBARBITAL 130 MILLIGRAM(S): 15 TABLET ORAL at 15:06

## 2024-08-22 RX ADMIN — PHENOBARBITAL 130 MILLIGRAM(S): 15 TABLET ORAL at 05:01

## 2024-08-22 RX ADMIN — VASOPRESSIN 4.5 UNIT(S)/MIN: 20 INJECTION INTRAVENOUS at 07:56

## 2024-08-22 RX ADMIN — PHENOBARBITAL 130 MILLIGRAM(S): 15 TABLET ORAL at 16:00

## 2024-08-22 RX ADMIN — ACETAMINOPHEN 400 MILLIGRAM(S): 325 TABLET ORAL at 06:04

## 2024-08-22 RX ADMIN — MEROPENEM 100 MILLIGRAM(S): 500 INJECTION, POWDER, FOR SOLUTION INTRAVENOUS at 06:04

## 2024-08-22 RX ADMIN — CHLORHEXIDINE GLUCONATE 15 MILLILITER(S): 40 SOLUTION TOPICAL at 06:04

## 2024-08-22 RX ADMIN — PHENOBARBITAL 130 MILLIGRAM(S): 15 TABLET ORAL at 18:14

## 2024-08-22 RX ADMIN — Medication 40 MILLIGRAM(S): at 13:32

## 2024-08-22 RX ADMIN — Medication 5 UNIT(S)/HR: at 08:23

## 2024-08-22 RX ADMIN — Medication 4.36 MICROGRAM(S)/KG/MIN: at 07:56

## 2024-08-22 NOTE — PROGRESS NOTE ADULT - PROBLEM SELECTOR PLAN 1
BENIGNO vs BENIGNO on CKD 2/2 ATN in setting of hemodynamic changes and contrast induced. Pt has no hx of kidney disease. No baseline Scr available. At the time of presentation Scr was 5.34 ( Aug 15, 2024) , She received IV abx and fluids and it initially improved to 4.42 and then started worsening gradually to 6.61. UA on 8/16/24 showed Turbid urine with small bilirubin, large blood with LE and nitrite with coarse granular casts. 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 Conduit was catheterized. Then USG was done and it showed Right kidney: 11.1 cm in length. Left kidney: 10.4 cm in length. Normal in contour and echotexture. No renal mass, hydronephrosis or calculi. Pt initiated on CRRT 8/20/24 for acidosis and volume overload. Will continue with CRRT today for goal net negative fluid balance. Monitor labs q8 hours. Dose medications as per RRT. BENIGNO vs BENIGNO on CKD 2/2 ATN in setting of hemodynamic changes and contrast induced. Pt has no hx of kidney disease. No baseline Scr available. At the time of presentation Scr was 5.34 ( Aug 15, 2024) , She received IV abx and fluids and it initially improved to 4.42 and then started worsening gradually to 6.61. UA on 8/16/24 showed Turbid urine with small bilirubin, large blood with LE and nitrite with coarse granular casts. 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 Conduit was catheterized. Then USG was done and it showed Right kidney: 11.1 cm in length. Left kidney: 10.4 cm in length. Normal in contour and echotexture. Pt initiated on CRRT 8/20/24 for acidosis and volume overload. Will continue with CRRT today for goal net negative fluid balance. Monitor labs q8 hours. Dose medications as per RRT.

## 2024-08-22 NOTE — PROGRESS NOTE ADULT - ASSESSMENT
69 yo F h/o HTN and bladder CA, s/p cystectomy/IC creation on 8/6/2024 at [facility] with Dr. Thompson (d/c on 8/10) presented to ED 8/16 w/ generalized weakness and some lower abdominal pain. Since d/c, only have 1-2 bowel movement, and had significantly decrease PO intake due to decreased appetite, intermittent fevers/chills, +bloody discharge from urethra, some burning. Pt stated stents fell out yesterday.  BP 80s/50s for EMS.  Pt remained hypotensive in ED, started on pressors, cultures sent, placed on zosyn and BiPAP and admitted to SICU. CT revealed: Expected postoperative changes status post cystectomy with ileal conduit creation including small amount of complex fluid and free air in the pelvis. No organized fluid collection. Distended ileal conduit with narrowing at the exiting ostomy site and proximally at the site of ureteral insertion. Mild bilateral hydroureteronephrosis with delayed nephrograms. No significant perinephric stranding.    8/16: still requiring pressor support and on BiPAP, pt states she feels better,14fr catheter placed in stoma, Bcx with GPC/GNR, Cr to 4.42 from 5.34, abx changed to leila and diflucan  8/17: still requiring pressor support and supplement oxygen but feels well, decr UO overnight, given albumin and IVF restarted, Cr up slightly to 4.68, electrolytes normal  8/18: RBUS done, no signs of hydro. IR consulted, no need for NTs as no hydro. UOP still very low (overnight 10 cc/hour), given 2 boluses with no improvement. Cr up to 4.95 from 4.68. CT loopogram of conduit pending today to evaluate for leak. still requiring pressors. Ucx from conduit now growing e.coli, sensitivities pending, PT recs rehab   8/19: CT loopogram w/ no anastamotic leak. Cr continues to rise w/ low UOP. UA on admission w/ coarse granular cast. Constellation of signs and symptoms suggestive of Acute tubular necrosis  8/20: Urine culture w/ e.coli sensitivities w/ resistance to floroquinolones. Pt back on abx. Pt w/ slightly increased urine output  8/21: Pt with acute onset of tachypnea, and hypoxia requiring intubation and sedation, CT w/ probably PE, hep gtt started. Nephrology consult obtained and started on CRRT, meropenem restarted  8/22: still intubated and sedated, pressor requirement decreasing, UO increasing, PTT remains supratherapeutic, on tube feeds    Plan:  -AM labs reviewed  -PTT still supratherapeutic, continue to monitor, hep gtt held for PTT >200  -BCx from 8/20 neg at 24hr  -f/u nephrology re: CRRT  -pressor and respiratory support prn  -continue tube feeds  -continue meropenem  -monitor UO  -bowel regimen  -appreciate SICU care  -PT recs rehab    Urology  #[id number]     69 yo F h/o HTN and bladder CA, s/p cystectomy/IC creation on 8/6/2024 at [facility] with Dr. Thompson (d/c on 8/10) presented to ED 8/16 w/ generalized weakness and some lower abdominal pain. Since d/c, only have 1-2 bowel movement, and had significantly decrease PO intake due to decreased appetite, intermittent fevers/chills, +bloody discharge from urethra, some burning. Pt stated stents fell out yesterday.  BP 80s/50s for EMS.  Pt remained hypotensive in ED, started on pressors, cultures sent, placed on zosyn and BiPAP and admitted to SICU. CT revealed: Expected postoperative changes status post cystectomy with ileal conduit creation including small amount of complex fluid and free air in the pelvis. No organized fluid collection. Distended ileal conduit with narrowing at the exiting ostomy site and proximally at the site of ureteral insertion. Mild bilateral hydroureteronephrosis with delayed nephrograms. No significant perinephric stranding.    8/16: still requiring pressor support and on BiPAP, pt states she feels better,14fr catheter placed in stoma, Bcx with GPC/GNR, Cr to 4.42 from 5.34, abx changed to leila and diflucan  8/17: still requiring pressor support and supplement oxygen but feels well, decr UO overnight, given albumin and IVF restarted, Cr up slightly to 4.68, electrolytes normal  8/18: RBUS done, no signs of hydro. IR consulted, no need for NTs as no hydro. UOP still very low (overnight 10 cc/hour), given 2 boluses with no improvement. Cr up to 4.95 from 4.68. CT loopogram of conduit pending today to evaluate for leak. still requiring pressors. Ucx from conduit now growing e.coli, sensitivities pending, PT recs rehab   8/19: CT loopogram w/ no anastamotic leak. Cr continues to rise w/ low UOP. UA on admission w/ coarse granular cast. Constellation of signs and symptoms suggestive of Acute tubular necrosis  8/20: Urine culture w/ e.coli sensitivities w/ resistance to floroquinolones. Pt back on abx. Pt w/ slightly increased urine output  8/21: Pt with acute onset of tachypnea, and hypoxia requiring intubation and sedation, CT w/ probably PE, hep gtt started. Nephrology consult obtained and started on CRRT, meropenem restarted  8/22: still intubated and sedated, pressor requirement decreasing, UO increasing, PTT remains supratherapeutic, hep gtt held for one hour this AM, on tube feeds    Plan:  -AM labs reviewed  -PTT still supratherapeutic, continue to monitor, hep gtt held for one hour this AM for PTT >200  -BCx from 8/20 neg at 24hr  -f/u nephrology re: CRRT  -pressor and respiratory support prn  -continue tube feeds  -continue meropenem  -monitor UO  -bowel regimen  -appreciate SICU care  -PT recs rehab    Urology  #[id number]     67 yo F h/o HTN and bladder CA, s/p cystectomy/IC creation on 8/6/2024 at [facility] with Dr. Thompson (d/c on 8/10) presented to ED 8/16 w/ generalized weakness and some lower abdominal pain. Since d/c, only have 1-2 bowel movement, and had significantly decrease PO intake due to decreased appetite, intermittent fevers/chills, +bloody discharge from urethra, some burning. Pt stated stents fell out yesterday.  BP 80s/50s for EMS.  Pt remained hypotensive in ED, started on pressors, cultures sent, placed on zosyn and BiPAP and admitted to SICU. CT revealed: Expected postoperative changes status post cystectomy with ileal conduit creation including small amount of complex fluid and free air in the pelvis. No organized fluid collection. Distended ileal conduit with narrowing at the exiting ostomy site and proximally at the site of ureteral insertion. Mild bilateral hydroureteronephrosis with delayed nephrograms. No significant perinephric stranding.    8/16: still requiring pressor support and on BiPAP, pt states she feels better,14fr catheter placed in stoma, Bcx with GPC/GNR, Cr to 4.42 from 5.34, abx changed to leila and diflucan  8/17: still requiring pressor support and supplement oxygen but feels well, decr UO overnight, given albumin and IVF restarted, Cr up slightly to 4.68, electrolytes normal  8/18: RBUS done, no signs of hydro. IR consulted, no need for NTs as no hydro. UOP still very low (overnight 10 cc/hour), given 2 boluses with no improvement. Cr up to 4.95 from 4.68. CT loopogram of conduit pending today to evaluate for leak. still requiring pressors. Ucx from conduit now growing e.coli, sensitivities pending, PT recs rehab   8/19: CT loopogram w/ no anastamotic leak. Cr continues to rise w/ low UOP. UA on admission w/ coarse granular cast. Constellation of signs and symptoms suggestive of Acute tubular necrosis  8/20: Urine culture w/ e.coli sensitivities w/ resistance to floroquinolones. Pt back on abx. Pt w/ slightly increased urine output  8/21: Pt with acute onset of tachypnea, and hypoxia requiring intubation and sedation, CT w/ probably PE, hep gtt started. Nephrology consult obtained and started on CRRT, meropenem restarted  8/22: still intubated and sedated, pressor requirement decreasing, UO increasing, PTT remains supratherapeutic, hep gtt held for one hour this AM, on tube feeds, febrile to 100.9 at 4am    Plan:  -AM labs reviewed  -PTT still supratherapeutic, continue to monitor, hep gtt held for one hour this AM for PTT >200  -BCx from 8/20 neg at 24hr  -f/u nephrology re: CRRT  -pressor and respiratory support prn  -continue tube feeds  -continue meropenem  -monitor UO  -bowel regimen  -appreciate SICU care  -PT recs rehab    Urology  #[id number]

## 2024-08-22 NOTE — PROGRESS NOTE ADULT - ATTENDING COMMENTS
I have personally interviewed and examined this patient, reviewed pertinent labs and imaging on SICU rounds.    60   minutes in total were spent in providing direct critical care for the diagnoses, assessment and plan outlined below.  This patient suffers from a critical illness that acutely impairs one or more vital organ systems such that there is a high probability of life threatening or imminent deterioration of the patient's condition.  These diagnoses are unrelated to the surgical procedure.    Additionally, time spent in teaching or the performance of separately billable procedures was not counted toward my critical care time.  There is no overlap.  Time included review of vitals, labs, imaging, discussion with consultants ( attg at bedside).    acute hypoxemic resp insuff, possibly PE  oliguric BENIGNO  agitation/delirium    ICU Vital Signs Last 24 Hrs  T(C): 38.2 (22 Aug 2024 12:00), Max: 38.3 (22 Aug 2024 00:00)  T(F): 100.8 (22 Aug 2024 12:00), Max: 100.9 (22 Aug 2024 00:00)  HR: 64 (22 Aug 2024 14:00) (64 - 79)  BP: 101/56 (22 Aug 2024 08:00) (101/56 - 101/56)  BP(mean): 70 (22 Aug 2024 08:00) (70 - 70)  ABP: 110/55 (22 Aug 2024 14:00) (96/50 - 120/56)  ABP(mean): 76 (22 Aug 2024 14:00) (67 - 81)  RR: 16 (22 Aug 2024 14:00) (16 - 19)  SpO2: 100% (22 Aug 2024 14:00) (97% - 100%)    O2 Parameters below as of 22 Aug 2024 08:00  Patient On (Oxygen Delivery Method): ventilator    O2 Concentration (%): 30    chemically sedated  RRR  mechanical BS bilat  obese, urostomy pink                          8.2    26.84 )-----------( 340      ( 22 Aug 2024 00:30 )             23.6   08-22    135  |  103  |  17  ----------------------------<  183<H>  4.3   |  23  |  1.65<H>    Ca    8.1<L>      22 Aug 2024 13:20  Phos  2.1     08-22  Mg     2.30     08-22    TPro  5.5<L>  /  Alb  2.3<L>  /  TBili  0.7  /  DBili  0.5<H>  /  AST  181<H>  /  ALT  25  /  AlkPhos  715<H>  08-21  ABG - ( 22 Aug 2024 06:00 )  pH, Arterial: 7.41  pH, Blood: x     /  pCO2: 38    /  pO2: 171   / HCO3: 24    / Base Excess: -0.4  /  SaO2: 99.2      MEDICATIONS  (STANDING):  acetaminophen   IVPB .. 1000 milliGRAM(s) IV Intermittent every 6 hours  aspirin  chewable 81 milliGRAM(s) Enteral Tube daily  chlorhexidine 0.12% Liquid 15 milliLiter(s) Oral Mucosa every 12 hours  chlorhexidine 2% Cloths 1 Application(s) Topical daily  CRRT Treatment    <Continuous>  cycloSPORINE (RESTASIS) 0.05% Emulsion 1 Drop(s) Both EYES two times a day  dexMEDEtomidine Infusion 0.2 MICROgram(s)/kG/Hr (4.65 mL/Hr) IV Continuous <Continuous>  heparin  Infusion 1700 Unit(s)/Hr (2 mL/Hr) IV Continuous <Continuous>  insulin lispro (ADMELOG) corrective regimen sliding scale   SubCutaneous every 6 hours  levothyroxine Injectable 70 MICROGram(s) IV Push <User Schedule>  meropenem  IVPB 1000 milliGRAM(s) IV Intermittent every 8 hours  norepinephrine Infusion 0.05 MICROgram(s)/kG/Min (4.36 mL/Hr) IV Continuous <Continuous>  pantoprazole  Injectable 40 milliGRAM(s) IV Push daily  PrismaSOL Filtration BGK 4 / 2.5 5000 milliLiter(s) (1400 mL/Hr) CRRT <Continuous>  PrismaSOL Filtration BGK 4 / 2.5 5000 milliLiter(s) (1200 mL/Hr) CRRT <Continuous>  PrismaSOL Filtration BGK 4 / 2.5 5000 milliLiter(s) (200 mL/Hr) CRRT <Continuous>  vasopressin Infusion 0.03 Unit(s)/Min (4.5 mL/Hr) IV Continuous <Continuous>

## 2024-08-22 NOTE — PROGRESS NOTE ADULT - SUBJECTIVE AND OBJECTIVE BOX
SICU Daily Progress Note  =====================================================  INTERVAL EVENTS:  - PTT >200, hep @ 8  - CRRT net negative, ~30cc      MEDICATIONS:   --------------------------------------------------------------------------------------  Neurologic Medications  acetaminophen   IVPB .. 1000 milliGRAM(s) IV Intermittent every 6 hours  dexMEDEtomidine Infusion 0.2 MICROgram(s)/kG/Hr IV Continuous <Continuous>  PHENobarbital Injectable 130 milliGRAM(s) IV Push every 15 minutes PRN agitation    Respiratory Medications    Cardiovascular Medications  norepinephrine Infusion 0.05 MICROgram(s)/kG/Min IV Continuous <Continuous>    Gastrointestinal Medications    Genitourinary Medications    Hematologic/Oncologic Medications  aspirin  chewable 81 milliGRAM(s) Enteral Tube daily  heparin  Infusion 1700 Unit(s)/Hr IV Continuous <Continuous>    Antimicrobial/Immunologic Medications  meropenem  IVPB 1000 milliGRAM(s) IV Intermittent every 8 hours    Endocrine/Metabolic Medications  insulin lispro (ADMELOG) corrective regimen sliding scale   SubCutaneous every 6 hours  levothyroxine Injectable 70 MICROGram(s) IV Push <User Schedule>  vasopressin Infusion 0.03 Unit(s)/Min IV Continuous <Continuous>    Topical/Other Medications  chlorhexidine 0.12% Liquid 15 milliLiter(s) Oral Mucosa every 12 hours  chlorhexidine 2% Cloths 1 Application(s) Topical daily  CRRT Treatment    <Continuous>  cycloSPORINE (RESTASIS) 0.05% Emulsion 1 Drop(s) Both EYES two times a day  PrismaSOL Filtration BGK 4 / 2.5 5000 milliLiter(s) CRRT <Continuous>  PrismaSOL Filtration BGK 4 / 2.5 5000 milliLiter(s) CRRT <Continuous>  PrismaSOL Filtration BGK 4 / 2.5 5000 milliLiter(s) CRRT <Continuous>    --------------------------------------------------------------------------------------    VITAL SIGNS, INS/OUTS (last 24 hours):  --------------------------------------------------------------------------------------    08-20-24 @ 07:01  -  08-21-24 @ 07:00  --------------------------------------------------------  IN: 2639.6 mL / OUT: 1580 mL / NET: 1059.6 mL    08-21-24 @ 07:01  -  08-22-24 @ 00:07  --------------------------------------------------------  IN: 1959.2 mL / OUT: 2315 mL / NET: -355.8 mL      --------------------------------------------------------------------------------------    NEUROLOGY  Exam: Sedated and intubated    HEENT  Exam: Normocephalic, atraumatic, EOMI.    RESPIRATORY  Exam: Normal expansion/effort. Intubated 16/350/6/40    CARDIOVASCULAR  Exam: Regular rate and rhythm.       GI/NUTRITION  Exam: Abdomen soft, Non-tender, Non-distended. Ileal conduit     VASCULAR  Exam: Extremities warm, pink, well-perfused. JACQUELINE femoral karly     MUSCULOSKELETAL  Exam: All extremities moving spontaneously without limitations.    SKIN  Exam: Good skin turgor, no skin breakdown.         HEMATOLOGIC  [x] VTE Prophylaxis: aspirin  chewable 81 milliGRAM(s) Enteral Tube daily  heparin  Infusion 1700 Unit(s)/Hr IV Continuous <Continuous>        LABS  --------------------------------------------------------------------------------------    T(C): 38.2 (08-21-24 @ 20:00), Max: 38.2 (08-21-24 @ 20:00)  HR: 74 (08-21-24 @ 23:32) (68 - 105)  BP: --  RR: 16 (08-21-24 @ 23:00) (16 - 21)  SpO2: 99% (08-21-24 @ 23:32) (97% - 100%)    --------------------------------------------------------------------------------------   SICU Daily Progress Note  =====================================================  INTERVAL EVENTS:  - PTT >200, hep @ 8  - CRRT net negative, ~30cc  - flushed urostomy taylor 100cc, as per urology mucous output is as expected and nonpurulent      MEDICATIONS:   --------------------------------------------------------------------------------------  Neurologic Medications  acetaminophen   IVPB .. 1000 milliGRAM(s) IV Intermittent every 6 hours  dexMEDEtomidine Infusion 0.2 MICROgram(s)/kG/Hr IV Continuous <Continuous>  PHENobarbital Injectable 130 milliGRAM(s) IV Push every 15 minutes PRN agitation    Respiratory Medications    Cardiovascular Medications  norepinephrine Infusion 0.05 MICROgram(s)/kG/Min IV Continuous <Continuous>    Gastrointestinal Medications    Genitourinary Medications    Hematologic/Oncologic Medications  aspirin  chewable 81 milliGRAM(s) Enteral Tube daily  heparin  Infusion 1700 Unit(s)/Hr IV Continuous <Continuous>    Antimicrobial/Immunologic Medications  meropenem  IVPB 1000 milliGRAM(s) IV Intermittent every 8 hours    Endocrine/Metabolic Medications  insulin lispro (ADMELOG) corrective regimen sliding scale   SubCutaneous every 6 hours  levothyroxine Injectable 70 MICROGram(s) IV Push <User Schedule>  vasopressin Infusion 0.03 Unit(s)/Min IV Continuous <Continuous>    Topical/Other Medications  chlorhexidine 0.12% Liquid 15 milliLiter(s) Oral Mucosa every 12 hours  chlorhexidine 2% Cloths 1 Application(s) Topical daily  CRRT Treatment    <Continuous>  cycloSPORINE (RESTASIS) 0.05% Emulsion 1 Drop(s) Both EYES two times a day  PrismaSOL Filtration BGK 4 / 2.5 5000 milliLiter(s) CRRT <Continuous>  PrismaSOL Filtration BGK 4 / 2.5 5000 milliLiter(s) CRRT <Continuous>  PrismaSOL Filtration BGK 4 / 2.5 5000 milliLiter(s) CRRT <Continuous>    --------------------------------------------------------------------------------------    VITAL SIGNS, INS/OUTS (last 24 hours):  --------------------------------------------------------------------------------------    08-20-24 @ 07:01  - 08-21-24 @ 07:00  --------------------------------------------------------  IN: 2639.6 mL / OUT: 1580 mL / NET: 1059.6 mL    08-21-24 @ 07:01  - 08-22-24 @ 00:07  --------------------------------------------------------  IN: 1959.2 mL / OUT: 2315 mL / NET: -355.8 mL      --------------------------------------------------------------------------------------    NEUROLOGY  Exam: Sedated and intubated    HEENT  Exam: Normocephalic, atraumatic, EOMI.    RESPIRATORY  Exam: Normal expansion/effort. Intubated 16/350/6/40    CARDIOVASCULAR  Exam: Regular rate and rhythm.       GI/NUTRITION  Exam: Abdomen soft, Non-tender, Non-distended. Ileal conduit     VASCULAR  Exam: Extremities warm, pink, well-perfused. R femoral shiley     MUSCULOSKELETAL  Exam: All extremities moving spontaneously without limitations.    SKIN  Exam: Good skin turgor, no skin breakdown.         HEMATOLOGIC  [x] VTE Prophylaxis: aspirin  chewable 81 milliGRAM(s) Enteral Tube daily  heparin  Infusion 1700 Unit(s)/Hr IV Continuous <Continuous>        LABS  --------------------------------------------------------------------------------------    T(C): 38.2 (08-21-24 @ 20:00), Max: 38.2 (08-21-24 @ 20:00)  HR: 74 (08-21-24 @ 23:32) (68 - 105)  BP: --  RR: 16 (08-21-24 @ 23:00) (16 - 21)  SpO2: 99% (08-21-24 @ 23:32) (97% - 100%)    --------------------------------------------------------------------------------------

## 2024-08-22 NOTE — PROGRESS NOTE ADULT - ASSESSMENT
ASSESSMENT:  67 yo F, with PmHx of HTN and bladder cancer, s/p bladder removal with ileal conduit construction on Aug 6 at [facility], here for generalized weakness. Per pt, she had her surgery on Aug 6th, d/c home on Aug 10th. Since d/c, only have 1-2 bowel movement, and had significantly decrease PO intake due to decreased apptite. No fever, some chill. +bloody discharge from urethra, some burning. Today, called EMS as she felt very weak and unwell. BP 80s/50s for EMS. Patient s/p cystectomy with Dr. Thompson at [facility] on August 6th. Been having decreased PO intake and on and off fevers. Also states she had stents previously that came out on their own. Presents to the ED now due to lower abdominal pain. SICU consulted for hypotension requiring pressors and desaturation on BiPAP.     NEUROLOGIC   - Sedated: precedex  - SBIRT consulted, no referral or intervention  - Pain control: IV Tylenol    RESPIRATORY   - A/C Vent 16/350/6/30    CARDIOVASCULAR   - Monitor hemodynamics  - Levo, vaso  - q6 BMP w/ Mg, phos  - Home meds: ASA via NGt  - Holding atorvastatin    GASTROINTESTINAL   - Diet: TF thru NG, Pivot 1.5; at goal  - Protonix    /RENAL  - CRRT net negative  - Riley in urostomy  - Maintain strict Is/Os  - Monitor daily weights  - Monitor electrolytes, replete PRN  - UA/Cx from conduit sent -> E. coli  - Urine lytes -> intrinsic  - IVF d/c'ed    HEMATOLOGIC  - Monitor H/H   - Heparin gtt @11    INFECTIOUS DISEASE  - Monitor fever / WBC  - Restarted meropenem, renal dose 500mg daily (8/16-8/27)  - S/p Diflucan (8/16-8/19)  - BCx 8/15 NGTD    ENDOCRINE  - Monitor gluc  - LAINE  - IV synthroid    LINES  - PIV   - R IJ CVC  - A line  - Riley in urostomy  - R femoral karly    DISPO: SICU

## 2024-08-22 NOTE — PROGRESS NOTE ADULT - SUBJECTIVE AND OBJECTIVE BOX
[facility] Division of Kidney Diseases & Hypertension  FOLLOW UP NOTE  [phone]--------------------------------------------------------------------------------  Chief Complaint: BENIGNO    24 hour events/subjective: Remains intubated and sedated on pressor support. No issues with CRRT overnight.  Pt seen and examined at bedside this AM.     PAST HISTORY  --------------------------------------------------------------------------------  No significant changes to PMH, PSH, FHx, SHx from H&P unless otherwise noted.    ALLERGIES & MEDICATIONS  --------------------------------------------------------------------------------  Allergies    No Known Allergies    Intolerances    Standing Inpatient Medications  acetaminophen   IVPB .. 1000 milliGRAM(s) IV Intermittent every 6 hours  aspirin  chewable 81 milliGRAM(s) Enteral Tube daily  chlorhexidine 0.12% Liquid 15 milliLiter(s) Oral Mucosa every 12 hours  chlorhexidine 2% Cloths 1 Application(s) Topical daily  CRRT Treatment    <Continuous>  cycloSPORINE (RESTASIS) 0.05% Emulsion 1 Drop(s) Both EYES two times a day  dexMEDEtomidine Infusion 0.2 MICROgram(s)/kG/Hr IV Continuous <Continuous>  heparin  Infusion 1700 Unit(s)/Hr IV Continuous <Continuous>  insulin lispro (ADMELOG) corrective regimen sliding scale   SubCutaneous every 6 hours  levothyroxine Injectable 70 MICROGram(s) IV Push <User Schedule>  meropenem  IVPB 1000 milliGRAM(s) IV Intermittent every 8 hours  norepinephrine Infusion 0.05 MICROgram(s)/kG/Min IV Continuous <Continuous>  PrismaSOL Filtration BGK 4 / 2.5 5000 milliLiter(s) CRRT <Continuous>  PrismaSOL Filtration BGK 4 / 2.5 5000 milliLiter(s) CRRT <Continuous>  PrismaSOL Filtration BGK 4 / 2.5 5000 milliLiter(s) CRRT <Continuous>  vasopressin Infusion 0.03 Unit(s)/Min IV Continuous <Continuous>    PRN Inpatient Medications  PHENobarbital Injectable 130 milliGRAM(s) IV Push every 15 minutes PRN    REVIEW OF SYSTEMS  --------------------------------------------------------------------------------  Unable to obtain ROS due to clinical status.     VITALS/PHYSICAL EXAM  --------------------------------------------------------------------------------  T(C): 38.2 (08-22-24 @ 08:00), Max: 38.3 (08-22-24 @ 00:00)  HR: 66 (08-22-24 @ 10:00) (65 - 79)  BP: 101/56 (08-22-24 @ 08:00) (101/56 - 101/56)  RR: 16 (08-22-24 @ 10:00) (16 - 18)  SpO2: 100% (08-22-24 @ 10:00) (98% - 100%)  Wt(kg): --    08-21-24 @ 07:01  -  08-22-24 @ 07:00  --------------------------------------------------------  IN: 2879.4 mL / OUT: 3570 mL / NET: -690.6 mL    08-22-24 @ 07:01  -  08-22-24 @ 10:27  --------------------------------------------------------  IN: 170 mL / OUT: 17 mL / NET: 153 mL    Physical Exam:  Gen: intubated sedated.  Pulm: CTA B/L  CV: RRR, S1S2;  Abd: +BS, soft, nontender/nondistended  Extremities: no bilateral LE edema noted.   Neuro: Sedated  Vascular access: R femoral temporary HD catheter.     LABS/STUDIES  --------------------------------------------------------------------------------              8.2    26.84 >-----------<  340      [08-22-24 @ 00:30]              23.6     135  |  102  |  21  ----------------------------<  151      [08-22-24 @ 00:30]  4.7   |  22  |  2.26        Ca     7.8     [08-22-24 @ 00:30]      Mg     2.10     [08-22-24 @ 00:30]      Phos  2.7     [08-22-24 @ 00:30]    TPro  5.5  /  Alb  2.3  /  TBili  0.7  /  DBili  0.5  /  AST  181  /  ALT  25  /  AlkPhos  715  [08-21-24 @ 05:01]    PT/INR: PT 17.3 , INR 1.56       [08-22-24 @ 00:30]  PTT: >200.0      [08-22-24 @ 06:00]    Creatinine Trend:  SCr 2.26 [08-22 @ 00:30]  SCr 4.38 [08-21 @ 05:01]  SCr 6.83 [08-20 @ 21:30]  SCr 6.71 [08-20 @ 14:00]  SCr 6.61 [08-20 @ 00:30]    Urine Creatinine 88      [08-17-24 @ 14:05]  Urine Sodium 56      [08-17-24 @ 14:05]  Urine Potassium 25.5      [08-17-24 @ 14:05]  Urine Chloride 29      [08-17-24 @ 14:05]

## 2024-08-22 NOTE — PROGRESS NOTE ADULT - CRITICAL CARE ATTENDING COMMENT
NEUROLOGIC   - Pain control: Tylenol, Oxy-->wean oxy dose  - AOx3  - Recent history of alcohol use    RESPIRATORY   - Monitor SpO2 goal >92%  - Incentive spirometry   - 2L NC-->weaned to RA,continue mobilization, OOBTC  - CPAP at night    CARDIOVASCULAR   - Monitor hemodynamics  - Pressors to maintain MAP>65  - ASA    GASTROINTESTINAL   - Diet: Reg, CC    /RENAL  - CT w/ ileal conduit contrast completed -> no leak  - IVL  - US kidney: negative for hydronephrosis   - Maintain strict Is/Os  - Monitor electrolytes, replete PRN  - UA/Cx from conduit sent -> E. coli  - Urine lytes -> intrinsic    HEMATOLOGIC  - Monitor H/H   - DVT ppx: SQH & SCD's    INFECTIOUS DISEASE: completed 3-days IV antibiotics for E. Coli UTI  - Monitor fever / WBC    ENDOCRINE  - Monitor gluc  - LAINE  - Levothyroxine    LINES  - PIV   - R IJ CVC    DISPO: SICU
Patient with urosepsis likely from hydronephrosis with possible stricture at anastomoses. Maintained on vasopressors, given a liter fluid bolus for low uop.  N mentating, pain controlled  resp on cpap overnight  cv on levophed 0.14mcg, start on vasopressin, septic shock, will consult ir for perc nephrostomy tubes, appreciate urology recs  gi npo, ivf  gu/renal adequate uop, ir eval for perc nephrostomy  heme vte ppx  id leila and diflucan urosepsis  endo no changes    The patient is a critical care patient with life threatening hemodynamic and metabolic instability in SICU.  I have personally interviewed when possible and examined the patient, reviewed data and laboratory tests/x-rays and all pertinent electronic images.  I was physically present for the key portions of the evaluation and management (E/M) service provided.   The SICU team has a constant risk benefit analyzes discussion with the primary team, all consultants, House Staff and PA's on all decisions.  These diagnoses are unrelated to the surgical procedure noted above.  I meet with family if needed to get further history, discuss the case and make care decisions for this patient who might not be able to participate.  Time involved in performance of separately billable procedures was not counted toward my critical care time. There is no overlap.  I spent 55-75 minutes ( 0800Hrs-0915Hrs in AM/ 1600Hrs-1715Hrs in PM, or other time indicated) of critical care time for the diagnoses, assessment, plan and interventions.  This time excludes time spent on separate procedures and teaching.
NEUROLOGIC   - Sedated: precedex+phenobarb>start wean to permit SBT  - Pain control: IV Tylenol    RESPIRATORY   - A/C Vent 16/350/6/30  -SBT trial, hope for extubation today/tmrw    CARDIOVASCULAR   - Monitor hemodynamics  - Levo, vaso  - q6 BMP w/ Mg, phos  - Home meds: ASA via NGt  - Holding atorvastatin    GASTROINTESTINAL   - Diet: TF thru NG, Pivot 1.5; at goal  - Protonix    /RENAL:still above EDW, SVV low, continue negative on CRRT  - CRRT net negative  - Riley in urostomy  - Maintain strict Is/Os  - Monitor daily weights  - Monitor electrolytes, replete PRN  - UA/Cx from conduit sent -> E. coli  - Urine lytes -> intrinsic  - IVF d/c'ed    HEMATOLOGIC  - Monitor H/H   - Heparin gtt being titrated given concern for PE on CT scan  -ROLANDO negative    INFECTIOUS DISEASE  - Monitor fever / WBC  - Restarted meropenem, renal dose 500mg daily (8/16-8/27)  - S/p Diflucan (8/16-8/19)  - BCx 8/15 NGTD    ENDOCRINE  - Monitor gluc  - LAINE  - IV synthroid    LINES  - PIV   - R IJ CVC  - A line  - Riley in urostomy  - R femoral karly    DISPO: SICU.  Probably will transition to intermittent HD once hemodynamically stable
NEUROLOGIC   - Pain control: Tylenol, Oxy  - AOx3  - Recent history of alcohol use    RESPIRATORY   - Monitor SpO2 goal >92%  - Incentive spirometry   - 2L NC  - CPAP at night    CARDIOVASCULAR: hypotension  - Monitor hemodynamics   - Levo 0.07 / Vaso .03  - ASA    GASTROINTESTINAL   - Diet: Reg, CC    /RENAL  - CT w/ ileal conduit contrast, pending  - US kidney: negative for hydronephrosis   - LR @ 100  - Maintain strict Is/Os  - Monitor electrolytes, replete PRN  - UA positive   - Urine cx pending    HEMATOLOGI: anemia  - Monitor H/H   - DVT ppx: SQH & SCD's    INFECTIOUS DISEASE  - Monitor fever / WBC  - Meropenem  - Diflucan    ENDOCRINE  - Monitor gluc  - LAINE  - Levothyroxine    LINES  - PIV   - R IJ CVC    DISPO: SICU
NEUROLOGIC   - Pain control: Tylenol, Oxy 5mg severe, 2.5mg moderate (weaning doses)  - AOx3  - Recent history of alcohol use, SBIRT consulted, no referral or intervention    RESPIRATORY   - Monitor SpO2 goal >92%  - Incentive spirometry   - 3L NC  - CPAP at night    CARDIOVASCULAR: appears well perfused  - Monitor hemodynamics  - Levo 0.09  - ASA  - Atorvastatin 40mg    GASTROINTESTINAL   - Diet: Reg, CC  - Protonix (home med)    /RENAL  - CT w/ ileal conduit contrast completed -> no leak  - UA/Cx from conduit sent -> E. coli  - Urine lytes -> intrinsic  - Nephro consult, no urgent CRRT, recommends Bicarb gtt (D5 + 150 mEq NaHCO3) @ 100ml/min    HEMATOLOGIC  - Monitor H/H   - DVT ppx: SQH & SCD's    INFECTIOUS DISEASE  - Monitor fever / WBC  - Restarted meropenem (8/16-8/27) per   - D/c Diflucan    ENDOCRINE  - Monitor gluc  - LAINE  - Levothyroxine    LINES  - PIV   - R IJ CVC  - A line    DISPO: SICU, oliguric BENIGNO without hard signs indicating urgent need for CRRT at present
NEUROLOGIC: agitated, h/o EtOH use  - Sedated: precedex, add phenobarb and d/c versed  - Pain control: Tylenol    RESPIRATORY: hypoxemic resp insufficiency presumed due to PE, metabolic acidosis due to ileal conduit  - A/C Vent 16/350/8/40  -t-A-C    CARDIOVASCULAR: appears hypervolemic due to weight, IVC plethoric on CT scan, low SVV  - Monitor hemodynamics  - Levo 0.28  - Vaso 0.03  - q6 ABG  - Home meds: ASA, Atorvastatin 40mg  - Flotrak    GASTROINTESTINAL: hypoglycemic  - Diet: NPO, NGT confirmed LCWS  - Protonix  -start tube feeds    /RENAL  - Riley in urostomy  - Maintain strict Is/Os  - Monitor standing weights  - Monitor electrolytes, replete PRN  - UA/Cx from conduit sent -> E. coli  - Urine lytes -> intrinsic  - CRRT aiming for net negative  -stop D5 once tolerating tube feeds; this should help also with fluid management    HEMATOLOGIC  - Monitor H/H   - Heparin gtt @17    INFECTIOUS DISEASE  - Monitor fever / WBC  - Restarted meropenem, renal dose 500mg daily (8/16-8/27)  - S/p Diflucan (8/16-8/19)  - BCx 8/15 NGTD; repeat BC results pending    ENDOCRINE  - Monitor gluc  - LAINE  - Home synthroid (does not tolerate generic formula)    LINES  - PIV   - R IJ CVC  - A line  - Riley in urostomy  - R femoral shiley    DISPO: SICU.  Overall, appears to have suffered E. Coli urosepsis associated with obstructive uropathy.  She now has combined oliguria from this and sepsis-associated ATN, possibly IV contrast nephropathy also.  CT PE performed for sudden resp embarrassment is concerning for PE so started t-A-C.  Continue meropenem for E. Coli UTI/pyelo.  CRRT to support oliguric renal failure.  Vent support until volume status and renal function improved.  Guarded prognosis.

## 2024-08-22 NOTE — PROGRESS NOTE ADULT - ATTENDING COMMENTS
critically ill with BENIGNO complicated by hypervolemia and acidosis.  requiring CRRT.  no evidence of kidney function recovery yet.

## 2024-08-22 NOTE — PROGRESS NOTE ADULT - SUBJECTIVE AND OBJECTIVE BOX
Overnight events:  INTERVAL EVENTS:  - PTT >200, hep @ 8  - CRRT net negative, ~30cc  - taylor fell out while SICU attempting flush replaced by Urology, flushed urostomy taylor 100cc, as per urology mucous output is as expected and nonpurulent  - UO increasing  - still on pressor support    Subjective:  Pt intubated and sedated    Objective:    Vital signs  T(C): , Max: 38.3 (08-22-24 @ 00:00)  HR: 70 (08-22-24 @ 06:00)  BP: --  SpO2: 100% (08-22-24 @ 06:00)  Wt(kg): --    Output   IC: 295  08-21 @ 07:01  -  08-22 @ 07:00  --------------------------------------------------------  IN: 2879.4 mL / OUT: 3570 mL / NET: -690.6 mL        Gen: NAD, intubated, sedated  Abd: stoma pink w/ taylor, soft      Labs                        8.2    26.84 )-----------( 340      ( 22 Aug 2024 00:30 )             23.6     22 Aug 2024 00:30    135    |  102    |  21     ----------------------------<  151    4.7     |  22     |  2.26     Ca    7.8        22 Aug 2024 00:30  Phos  2.7       22 Aug 2024 00:30  Mg     2.10      22 Aug 2024 00:30

## 2024-08-22 NOTE — CHART NOTE - NSCHARTNOTEFT_GEN_A_CORE
Patient being seen for malnutrition follow up. Spoke with RN and obtained subjective information from extensive chart review.     Current Diet : Diet, NPO with Tube Feed:   Tube Feeding Modality: Nasogastric  Pivot 1.5 [place] (PIVOT1.5RTH)  Total Volume for 24 Hours (mL): 864  Continuous  Starting Tube Feed Rate {mL per Hour}: 20  Increase Tube Feed Rate by (mL): 25     Every 4 hours  Until Goal Tube Feed Rate (mL per Hour): 36  Tube Feed Duration (in Hours): 24  Tube Feed Start Time: 09:30 (08-21-24 @ 09:25)    TF Provides:  1296 kcals  81 gms protein  655 mL free H2O  864 mL total volume    Current Weight Trend: 101.6 kg (8/22), 102.9 kg (8/20), 98.7 kg (8/18)  Height (cm): 160  Admit Weight (kg): 93   BMI (kg/m2): 36.3  IBW (kg): 57.8     Interval Events: Pt now intubated on pressor support and sedated on precedex. She is receiving enteral feeding without any noted intolerance this time (no nausea/vomiting or abdominal distention); pt with fecal incontinence and BM 8/20. She is being followed by Nephrology for BENIGNO with metabolic acidosis requiring CRRT. CRRT requires higher nutrition needs therefore suggest increasing enteral feeding rate to 40 mL/hr with LPS protein modular (15 gms protein, 100 kcals) x 1/day which will provide 1540 kcals (TF+LPS), 105 gms protein (TF+LPS), 728 mL free H2O in 960 mL total volume/day and will give pt 16 kcals/kg and 1.1 gm protein/kg of her admit wt. Weight trend reflective of fluid accumulation presently 3+ generalized. FS over the past 24 hrs 115 - 175 mg/dl with Admelog ISS coverage. Pt remains at nutrition risk based on inadequate nutrition with weight loss as identified PTA. Suggest increasing enteral feeding as mentioned above to meet pt's estimated nutrition needs. RDN services to remain available as needed.     __________________ Pertinent Medications__________________   MEDICATIONS  (STANDING):  acetaminophen   IVPB .. 1000 milliGRAM(s) IV Intermittent every 6 hours  aspirin  chewable 81 milliGRAM(s) Enteral Tube daily  chlorhexidine 0.12% Liquid 15 milliLiter(s) Oral Mucosa every 12 hours  chlorhexidine 2% Cloths 1 Application(s) Topical daily  cycloSPORINE (RESTASIS) 0.05% Emulsion 1 Drop(s) Both EYES two times a day  dexMEDEtomidine Infusion 0.2 MICROgram(s)/kG/Hr (4.65 mL/Hr) IV Continuous <Continuous>  heparin  Infusion 1700 Unit(s)/Hr (2 mL/Hr) IV Continuous <Continuous>  insulin lispro (ADMELOG) corrective regimen sliding scale   SubCutaneous every 6 hours  levothyroxine Injectable 70 MICROGram(s) IV Push <User Schedule>  meropenem  IVPB 1000 milliGRAM(s) IV Intermittent every 8 hours  norepinephrine Infusion 0.05 MICROgram(s)/kG/Min (4.36 mL/Hr) IV Continuous <Continuous>  pantoprazole  Injectable 40 milliGRAM(s) IV Push daily  vasopressin Infusion 0.03 Unit(s)/Min (4.5 mL/Hr) IV Continuous <Continuous>    __________________ Pertinent Labs__________________   08-22 Na135 mmol/L Glu 183 mg/dL<H> K+ 4.3 mmol/L Cr  1.65 mg/dL<H> BUN 17 mg/dL 08-22 Phos 2.1 mg/dL<L> 08-21 Alb 2.3 g/dL<L>      Skin: No skin injuries identified as per nursing flow sheet records     Estimated Needs (based on Critical Care Guidelines for Enteral Feeding):     1116 - 1395 kcals/day (12-15 kcals/kg of admit wt - 93 kg)  116 gms protein/day (2.0 gms protein/kg of IBW - 57.8 kg for CRRT)      Previous Nutrition Diagnosis:     Malnutrition     Nutrition Diagnosis is: [X] ongoing       New Nutrition Diagnosis:     Increased Nutrient Needs        Related to: physiological causes in setting of critical care illness  As evidenced by: BENIGNO requiring CRRT      Goal(s):  1. Patient to meet > 75% estimated energy needs    Recommendations:   1. Pivot 1.5 @ goal rate of 40 mL/hr x 24 hrs with LPS x 1/day.    Monitoring and Evaluation:   1. Monitor weights, labs, BMs, skin integrity, enteral tolerance and edema.  2. RD services to remain available.     Education:    [x] Not warranted at present    Radha Tyson MS, RDN, CDN, CNSC on [place] TEAMS PREFERRED or Pager #[phone]

## 2024-08-23 LAB
ANION GAP SERPL CALC-SCNC: 10 MMOL/L — SIGNIFICANT CHANGE UP (ref 7–14)
ANION GAP SERPL CALC-SCNC: 10 MMOL/L — SIGNIFICANT CHANGE UP (ref 7–14)
ANION GAP SERPL CALC-SCNC: 11 MMOL/L — SIGNIFICANT CHANGE UP (ref 7–14)
ANION GAP SERPL CALC-SCNC: 11 MMOL/L — SIGNIFICANT CHANGE UP (ref 7–14)
APPEARANCE UR: ABNORMAL
APTT BLD: 46.5 SEC — HIGH (ref 24.5–35.6)
APTT BLD: 50.7 SEC — HIGH (ref 24.5–35.6)
APTT BLD: 59.3 SEC — HIGH (ref 24.5–35.6)
BACTERIA # UR AUTO: ABNORMAL /HPF
BILIRUB UR-MCNC: ABNORMAL
BLOOD GAS ARTERIAL - LYTES,HGB,ICA,LACT RESULT: SIGNIFICANT CHANGE UP
BUN SERPL-MCNC: 16 MG/DL — SIGNIFICANT CHANGE UP (ref 7–23)
BUN SERPL-MCNC: 16 MG/DL — SIGNIFICANT CHANGE UP (ref 7–23)
BUN SERPL-MCNC: 17 MG/DL — SIGNIFICANT CHANGE UP (ref 7–23)
BUN SERPL-MCNC: 17 MG/DL — SIGNIFICANT CHANGE UP (ref 7–23)
CALCIUM SERPL-MCNC: 7.9 MG/DL — LOW (ref 8.4–10.5)
CALCIUM SERPL-MCNC: 8 MG/DL — LOW (ref 8.4–10.5)
CALCIUM SERPL-MCNC: 8 MG/DL — LOW (ref 8.4–10.5)
CALCIUM SERPL-MCNC: 8.2 MG/DL — LOW (ref 8.4–10.5)
CHLORIDE SERPL-SCNC: 102 MMOL/L — SIGNIFICANT CHANGE UP (ref 98–107)
CHLORIDE SERPL-SCNC: 102 MMOL/L — SIGNIFICANT CHANGE UP (ref 98–107)
CHLORIDE SERPL-SCNC: 103 MMOL/L — SIGNIFICANT CHANGE UP (ref 98–107)
CHLORIDE SERPL-SCNC: 103 MMOL/L — SIGNIFICANT CHANGE UP (ref 98–107)
CO2 SERPL-SCNC: 22 MMOL/L — SIGNIFICANT CHANGE UP (ref 22–31)
CO2 SERPL-SCNC: 22 MMOL/L — SIGNIFICANT CHANGE UP (ref 22–31)
CO2 SERPL-SCNC: 23 MMOL/L — SIGNIFICANT CHANGE UP (ref 22–31)
CO2 SERPL-SCNC: 23 MMOL/L — SIGNIFICANT CHANGE UP (ref 22–31)
COLOR SPEC: ABNORMAL
CREAT SERPL-MCNC: 1.33 MG/DL — HIGH (ref 0.5–1.3)
CREAT SERPL-MCNC: 1.43 MG/DL — HIGH (ref 0.5–1.3)
DIFF PNL FLD: ABNORMAL
EGFR: 40 ML/MIN/1.73M2 — LOW
EGFR: 44 ML/MIN/1.73M2 — LOW
GLUCOSE BLDC GLUCOMTR-MCNC: 137 MG/DL — HIGH (ref 70–99)
GLUCOSE BLDC GLUCOMTR-MCNC: 157 MG/DL — HIGH (ref 70–99)
GLUCOSE BLDC GLUCOMTR-MCNC: 83 MG/DL — SIGNIFICANT CHANGE UP (ref 70–99)
GLUCOSE BLDC GLUCOMTR-MCNC: 96 MG/DL — SIGNIFICANT CHANGE UP (ref 70–99)
GLUCOSE BLDC GLUCOMTR-MCNC: 97 MG/DL — SIGNIFICANT CHANGE UP (ref 70–99)
GLUCOSE SERPL-MCNC: 116 MG/DL — HIGH (ref 70–99)
GLUCOSE SERPL-MCNC: 143 MG/DL — HIGH (ref 70–99)
GLUCOSE SERPL-MCNC: 177 MG/DL — HIGH (ref 70–99)
GLUCOSE SERPL-MCNC: 97 MG/DL — SIGNIFICANT CHANGE UP (ref 70–99)
GLUCOSE UR QL: NEGATIVE MG/DL — SIGNIFICANT CHANGE UP
HCT VFR BLD CALC: 23 % — LOW (ref 34.5–45)
HGB BLD-MCNC: 8 G/DL — LOW (ref 11.5–15.5)
KETONES UR-MCNC: NEGATIVE MG/DL — SIGNIFICANT CHANGE UP
LEUKOCYTE ESTERASE UR-ACNC: ABNORMAL
MAGNESIUM SERPL-MCNC: 2.4 MG/DL — SIGNIFICANT CHANGE UP (ref 1.6–2.6)
MCHC RBC-ENTMCNC: 30.2 PG — SIGNIFICANT CHANGE UP (ref 27–34)
MCHC RBC-ENTMCNC: 34.8 GM/DL — SIGNIFICANT CHANGE UP (ref 32–36)
MCV RBC AUTO: 86.8 FL — SIGNIFICANT CHANGE UP (ref 80–100)
NITRITE UR-MCNC: POSITIVE
NRBC # BLD: 0 /100 WBCS — SIGNIFICANT CHANGE UP (ref 0–0)
NRBC # FLD: 0.02 K/UL — HIGH (ref 0–0)
PH UR: 7.5 — SIGNIFICANT CHANGE UP (ref 5–8)
PHOSPHATE SERPL-MCNC: 2.1 MG/DL — LOW (ref 2.5–4.5)
PHOSPHATE SERPL-MCNC: 2.3 MG/DL — LOW (ref 2.5–4.5)
PHOSPHATE SERPL-MCNC: 2.3 MG/DL — LOW (ref 2.5–4.5)
PHOSPHATE SERPL-MCNC: 2.4 MG/DL — LOW (ref 2.5–4.5)
PLATELET # BLD AUTO: 319 K/UL — SIGNIFICANT CHANGE UP (ref 150–400)
POTASSIUM SERPL-MCNC: 4.6 MMOL/L — SIGNIFICANT CHANGE UP (ref 3.5–5.3)
POTASSIUM SERPL-MCNC: 4.6 MMOL/L — SIGNIFICANT CHANGE UP (ref 3.5–5.3)
POTASSIUM SERPL-MCNC: 4.7 MMOL/L — SIGNIFICANT CHANGE UP (ref 3.5–5.3)
POTASSIUM SERPL-MCNC: 4.8 MMOL/L — SIGNIFICANT CHANGE UP (ref 3.5–5.3)
POTASSIUM SERPL-SCNC: 4.6 MMOL/L — SIGNIFICANT CHANGE UP (ref 3.5–5.3)
POTASSIUM SERPL-SCNC: 4.6 MMOL/L — SIGNIFICANT CHANGE UP (ref 3.5–5.3)
POTASSIUM SERPL-SCNC: 4.7 MMOL/L — SIGNIFICANT CHANGE UP (ref 3.5–5.3)
POTASSIUM SERPL-SCNC: 4.8 MMOL/L — SIGNIFICANT CHANGE UP (ref 3.5–5.3)
PROCALCITONIN SERPL-MCNC: >100 NG/ML — HIGH (ref 0.02–0.1)
PROT UR-MCNC: 300 MG/DL
RBC # BLD: 2.65 M/UL — LOW (ref 3.8–5.2)
RBC # FLD: 17.2 % — HIGH (ref 10.3–14.5)
RBC CASTS # UR COMP ASSIST: >50 /HPF — SIGNIFICANT CHANGE UP (ref 0–4)
SODIUM SERPL-SCNC: 135 MMOL/L — SIGNIFICANT CHANGE UP (ref 135–145)
SODIUM SERPL-SCNC: 135 MMOL/L — SIGNIFICANT CHANGE UP (ref 135–145)
SODIUM SERPL-SCNC: 136 MMOL/L — SIGNIFICANT CHANGE UP (ref 135–145)
SODIUM SERPL-SCNC: 136 MMOL/L — SIGNIFICANT CHANGE UP (ref 135–145)
SP GR SPEC: 1.03 — SIGNIFICANT CHANGE UP (ref 1–1.03)
UROBILINOGEN FLD QL: 1 MG/DL — SIGNIFICANT CHANGE UP (ref 0.2–1)
WBC # BLD: 30.16 K/UL — HIGH (ref 3.8–10.5)
WBC # FLD AUTO: 30.16 K/UL — HIGH (ref 3.8–10.5)
WBC UR QL: >50 /HPF — SIGNIFICANT CHANGE UP (ref 0–5)

## 2024-08-23 PROCEDURE — 71045 X-RAY EXAM CHEST 1 VIEW: CPT | Mod: 26

## 2024-08-23 PROCEDURE — 99233 SBSQ HOSP IP/OBS HIGH 50: CPT

## 2024-08-23 PROCEDURE — 99291 CRITICAL CARE FIRST HOUR: CPT

## 2024-08-23 PROCEDURE — 99232 SBSQ HOSP IP/OBS MODERATE 35: CPT

## 2024-08-23 RX ORDER — VANCOMYCIN/0.9 % SOD CHLORIDE 1.75G/25
PLASTIC BAG, INJECTION (ML) INTRAVENOUS
Refills: 0 | Status: COMPLETED | OUTPATIENT
Start: 2024-08-23 | End: 2024-08-24

## 2024-08-23 RX ORDER — PIPERACILLIN SODIUM AND TAZOBACTAM SODIUM 3; .375 G/15ML; G/15ML
3.38 INJECTION, POWDER, FOR SOLUTION INTRAVENOUS ONCE
Refills: 0 | Status: COMPLETED | OUTPATIENT
Start: 2024-08-23 | End: 2024-08-23

## 2024-08-23 RX ORDER — PIPERACILLIN SODIUM AND TAZOBACTAM SODIUM 3; .375 G/15ML; G/15ML
3.38 INJECTION, POWDER, FOR SOLUTION INTRAVENOUS EVERY 8 HOURS
Refills: 0 | Status: DISCONTINUED | OUTPATIENT
Start: 2024-08-24 | End: 2024-08-29

## 2024-08-23 RX ORDER — VANCOMYCIN/0.9 % SOD CHLORIDE 1.75G/25
500 PLASTIC BAG, INJECTION (ML) INTRAVENOUS ONCE
Refills: 0 | Status: COMPLETED | OUTPATIENT
Start: 2024-08-23 | End: 2024-08-23

## 2024-08-23 RX ORDER — VANCOMYCIN/0.9 % SOD CHLORIDE 1.75G/25
500 PLASTIC BAG, INJECTION (ML) INTRAVENOUS EVERY 12 HOURS
Refills: 0 | Status: COMPLETED | OUTPATIENT
Start: 2024-08-24 | End: 2024-08-24

## 2024-08-23 RX ORDER — ALBUMIN (HUMAN) 5 G/20ML
250 SOLUTION INTRAVENOUS ONCE
Refills: 0 | Status: COMPLETED | OUTPATIENT
Start: 2024-08-23 | End: 2024-08-23

## 2024-08-23 RX ADMIN — DEXMEDETOMIDINE HYDROCHLORIDE IN 0.9% SODIUM CHLORIDE 4.65 MICROGRAM(S)/KG/HR: 4 INJECTION INTRAVENOUS at 08:04

## 2024-08-23 RX ADMIN — PHENOBARBITAL 130 MILLIGRAM(S): 15 TABLET ORAL at 01:24

## 2024-08-23 RX ADMIN — Medication 81 MILLIGRAM(S): at 11:04

## 2024-08-23 RX ADMIN — CHLORHEXIDINE GLUCONATE 15 MILLILITER(S): 40 SOLUTION TOPICAL at 06:19

## 2024-08-23 RX ADMIN — Medication 1 DROP(S): at 17:44

## 2024-08-23 RX ADMIN — PIPERACILLIN SODIUM AND TAZOBACTAM SODIUM 25 GRAM(S): 3; .375 INJECTION, POWDER, FOR SOLUTION INTRAVENOUS at 21:21

## 2024-08-23 RX ADMIN — ACETAMINOPHEN 400 MILLIGRAM(S): 325 TABLET ORAL at 17:43

## 2024-08-23 RX ADMIN — MEROPENEM 100 MILLIGRAM(S): 500 INJECTION, POWDER, FOR SOLUTION INTRAVENOUS at 15:20

## 2024-08-23 RX ADMIN — MEROPENEM 100 MILLIGRAM(S): 500 INJECTION, POWDER, FOR SOLUTION INTRAVENOUS at 06:19

## 2024-08-23 RX ADMIN — Medication 100 MILLIGRAM(S): at 18:48

## 2024-08-23 RX ADMIN — ACETAMINOPHEN 1000 MILLIGRAM(S): 325 TABLET ORAL at 18:30

## 2024-08-23 RX ADMIN — Medication 1 DROP(S): at 06:20

## 2024-08-23 RX ADMIN — ALBUMIN (HUMAN) 125 MILLILITER(S): 5 SOLUTION INTRAVENOUS at 18:12

## 2024-08-23 RX ADMIN — PIPERACILLIN SODIUM AND TAZOBACTAM SODIUM 200 GRAM(S): 3; .375 INJECTION, POWDER, FOR SOLUTION INTRAVENOUS at 18:48

## 2024-08-23 RX ADMIN — Medication 4.36 MICROGRAM(S)/KG/MIN: at 08:04

## 2024-08-23 RX ADMIN — Medication 40 MILLIGRAM(S): at 11:04

## 2024-08-23 RX ADMIN — CHLORHEXIDINE GLUCONATE 1 APPLICATION(S): 40 SOLUTION TOPICAL at 11:06

## 2024-08-23 RX ADMIN — ACETAMINOPHEN 400 MILLIGRAM(S): 325 TABLET ORAL at 00:47

## 2024-08-23 RX ADMIN — ACETAMINOPHEN 400 MILLIGRAM(S): 325 TABLET ORAL at 06:20

## 2024-08-23 RX ADMIN — Medication 4.36 MICROGRAM(S)/KG/MIN: at 21:22

## 2024-08-23 RX ADMIN — PHENOBARBITAL 130 MILLIGRAM(S): 15 TABLET ORAL at 04:49

## 2024-08-23 RX ADMIN — Medication 70 MICROGRAM(S): at 06:20

## 2024-08-23 RX ADMIN — Medication 4 UNIT(S)/HR: at 08:04

## 2024-08-23 RX ADMIN — ACETAMINOPHEN 400 MILLIGRAM(S): 325 TABLET ORAL at 11:04

## 2024-08-23 RX ADMIN — VASOPRESSIN 4.5 UNIT(S)/MIN: 20 INJECTION INTRAVENOUS at 21:22

## 2024-08-23 RX ADMIN — VASOPRESSIN 4.5 UNIT(S)/MIN: 20 INJECTION INTRAVENOUS at 08:04

## 2024-08-23 NOTE — PROGRESS NOTE ADULT - ATTENDING COMMENTS
I agree with the history, physical, and plan, which I have reviewed and edited where appropriate.  I agree with notes/assessment of health care providers on my service.  I have personally examined the patient.  I was physically present for the key portions of the evaluation and management (E/M) service provided.  I reviewed data and laboratory tests/x-rays and all pertinent electronic images.  The patient is a critical care patient with life threatening hemodynamic and metabolic instability in SICU.  Risk benefit analyses discussed.    The patient is in SICU with diagnosis mentioned in the note.    The plan is specified below.    67 yo F, with PmHx of HTN and bladder cancer, s/p bladder removal with ileal conduit construction on Aug 6 at [facility], here for generalized weakness. Post op course complicated by urosepsis and BENIGNO/oliguric renal failure. SICU consulted for hypotension requiring pressors and desaturation on BiPAP.     NEUROLOGIC   - Pain control: IV Tylenol    RESPIRATORY: ARDS vs volume overload   - Extubated 8/23  - Face tent 4LPM, wean as tolerated   - CPAP at night    CARDIOVASCULAR: septic shock   - Levo weaning, vaso 0.03  - Home meds: ASA via NGt  - Holding atorvastatin    GASTROINTESTINAL   - Diet: TF thru NG, remove if passes bedside swallow   - Bedside swallow pending, resume PO diet after extubation   - Protonix (home med)    /RENAL: urostomy, e. coli uti   - CRRT net negative -30  - Riley in urostomy, do not manipulate  - Trend weight from admit/dry weight for fluid change  - q6 BMP w/ Mg, phos    HEMATOLOGIC: possible PE on CTA   - Heparin gtt  - ASA    INFECTIOUS DISEASE: E coli uti  - Restarted meropenem, regular dosing 1000mg q8h (8/16-8/27)  - BCx 8/15 NGTD  - BCx 8/20 NGTD  - Procalcitonin pending  - Repeat BCx    ENDOCRINE  - LAINE  - IV synthroid 70mcg QD

## 2024-08-23 NOTE — PROGRESS NOTE ADULT - PROBLEM SELECTOR PLAN 2
Metabolic acidosis in setting of renal failure and sepsis. CRRT as noted above. Lactate was normal. Monitor Labs as above.      If you have any questions, please feel free to contact me:  Patti Clinton MD PGY-4  Nephrology Fellow  Pager [phone] / [employer] Teams (Preferred)  (After 5pm or on weekends please page the on-call fellow)

## 2024-08-23 NOTE — PROGRESS NOTE ADULT - SUBJECTIVE AND OBJECTIVE BOX
Interval/Overnight Events:       - changed TF as per nutrition recs to start tomorrow AM, check w/ dietician  - PTT therapeutic x1 --> repeat PTT at 4:45 AM 50.7, hep gtt @4ml/hr  - Tolerating CPAP      Subjective:  Pt awake but intubated    Objective:    Vital signs  T(C): , Max: 38.2 (08-22-24 @ 12:00)  HR: 85 (08-23-24 @ 07:00)  BP: --  SpO2: 96% (08-23-24 @ 07:00)  Wt(kg): --    Output   IC:   08-22 @ 07:01  -  08-23 @ 07:00  --------------------------------------------------------  IN: 1986.5 mL / OUT: 2823 mL / NET: -836.5 mL        Gen: NAD, intubated, awake  Abd: stoma pink w/ catheter in place, abdomen soft      Labs                        8.0    30.16 )-----------( 319      ( 23 Aug 2024 00:45 )             23.0     23 Aug 2024 00:45    135    |  102    |  16     ----------------------------<  177    4.6     |  22     |  1.43     Ca    8.2        23 Aug 2024 00:45  Phos  2.3       23 Aug 2024 00:45  Mg     2.40      23 Aug 2024 00:45

## 2024-08-23 NOTE — PROGRESS NOTE ADULT - ATTENDING COMMENTS
Patient seen and evaluated.  critically ill.  BENIGNO complicated by acidosis and volume overload requiring CRRT.  minimal urine production so will continue CRRT for now with net negative fluid balance.  Will monitor for transition to IHD.

## 2024-08-23 NOTE — PROGRESS NOTE ADULT - SUBJECTIVE AND OBJECTIVE BOX
[facility] Division of Kidney Diseases & Hypertension  FOLLOW UP NOTE  [phone]--------------------------------------------------------------------------------  Chief Complaint: BENIGNO    24 hour events/subjective: No acute events overnight. Pt seen and examined at bedside this AM. On CPAP this morning at time of exam. Remain hemodynamically unstable on levophed and vasopressin.     PAST HISTORY  --------------------------------------------------------------------------------  No significant changes to PMH, PSH, FHx, SHx from H&P unless otherwise noted.    ALLERGIES & MEDICATIONS  --------------------------------------------------------------------------------  Allergies    No Known Allergies    Intolerances      Standing Inpatient Medications  acetaminophen   IVPB .. 1000 milliGRAM(s) IV Intermittent every 6 hours  aspirin  chewable 81 milliGRAM(s) Enteral Tube daily  chlorhexidine 2% Cloths 1 Application(s) Topical daily  CRRT Treatment    <Continuous>  cycloSPORINE (RESTASIS) 0.05% Emulsion 1 Drop(s) Both EYES two times a day  dexMEDEtomidine Infusion 0.2 MICROgram(s)/kG/Hr IV Continuous <Continuous>  heparin  Infusion 1700 Unit(s)/Hr IV Continuous <Continuous>  insulin lispro (ADMELOG) corrective regimen sliding scale   SubCutaneous every 6 hours  levothyroxine Injectable 70 MICROGram(s) IV Push <User Schedule>  meropenem  IVPB 1000 milliGRAM(s) IV Intermittent every 8 hours  norepinephrine Infusion 0.05 MICROgram(s)/kG/Min IV Continuous <Continuous>  pantoprazole  Injectable 40 milliGRAM(s) IV Push daily  Phoxillum Filtration BK 4 / 2.5 5000 milliLiter(s) CRRT <Continuous>  PrismaSOL Filtration BGK 4 / 2.5 5000 milliLiter(s) CRRT <Continuous>  PrismaSOL Filtration BGK 4 / 2.5 5000 milliLiter(s) CRRT <Continuous>  vasopressin Infusion 0.03 Unit(s)/Min IV Continuous <Continuous>    PRN Inpatient Medications  PHENobarbital Injectable 130 milliGRAM(s) IV Push every 15 minutes PRN    REVIEW OF SYSTEMS  --------------------------------------------------------------------------------  Unable to obtain 2/2 clinical status.     VITALS/PHYSICAL EXAM  --------------------------------------------------------------------------------  T(C): 38 (08-23-24 @ 08:00), Max: 38.2 (08-22-24 @ 12:00)  HR: 84 (08-23-24 @ 08:10) (64 - 121)  BP: --  RR: 22 (08-23-24 @ 08:00) (12 - 23)  SpO2: 100% (08-23-24 @ 08:10) (96% - 100%)  Wt(kg): --    08-22-24 @ 07:01  -  08-23-24 @ 07:00  --------------------------------------------------------  IN: 1986.5 mL / OUT: 2823 mL / NET: -836.5 mL    08-23-24 @ 07:01  -  08-23-24 @ 10:34  --------------------------------------------------------  IN: 70.6 mL / OUT: 85 mL / NET: -14.4 mL    Physical Exam:  Gen: intubated on precedex.  Pulm: CTA B/L  CV: RRR, S1S2;  Abd: +BS, soft, nontender/nondistended  Extremities: no bilateral LE edema noted.   Neuro: Sedated  Vascular access: R femoral temporary HD catheter.     LABS/STUDIES  --------------------------------------------------------------------------------              8.0    30.16 >-----------<  319      [08-23-24 @ 00:45]              23.0     135  |  102  |  17  ----------------------------<  143      [08-23-24 @ 06:15]  4.8   |  23  |  1.33        Ca     8.0     [08-23-24 @ 06:15]      Mg     2.40     [08-23-24 @ 06:15]      Phos  2.3     [08-23-24 @ 06:15]    PT/INR: PT 17.3 , INR 1.56       [08-22-24 @ 00:30]  PTT: 50.7       [08-23-24 @ 04:55]    Creatinine Trend:  SCr 1.33 [08-23 @ 06:15]  SCr 1.43 [08-23 @ 00:45]  SCr 1.56 [08-22 @ 18:08]  SCr 1.65 [08-22 @ 13:20]  SCr 2.26 [08-22 @ 00:30]    Urine Creatinine 88      [08-17-24 @ 14:05]  Urine Sodium 56      [08-17-24 @ 14:05]  Urine Potassium 25.5      [08-17-24 @ 14:05]  Urine Chloride 29      [08-17-24 @ 14:05]

## 2024-08-23 NOTE — PROGRESS NOTE ADULT - PROBLEM SELECTOR PLAN 1
BENIGNO vs BENIGNO on CKD 2/2 ATN in setting of hemodynamic changes and contrast induced. Pt has no hx of kidney disease. No baseline Scr available. At the time of presentation Scr was 5.34 ( Aug 15, 2024) , She received IV abx and fluids and it initially improved to 4.42 and then started worsening gradually to 6.61. UA on 8/16/24 showed Turbid urine with small bilirubin, large blood with LE and nitrite with coarse granular casts. 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 Conduit was catheterized. Renal US Right kidney: 11.1 cm in length. Left kidney: 10.4 cm in length. Normal in contour and echotexture. Pt initiated on CRRT 8/20/24 for acidosis and volume overload. Will continue with CRRT today for goal net negative fluid balance. Monitor labs q8 hours. Dose medications as per RRT. BENIGNO vs BENIGNO on CKD 2/2 ATN in setting of hemodynamic changes and contrast induced. Pt has no hx of kidney disease. No baseline Scr available. At the time of presentation Scr was 5.34 ( Aug 15, 2024) , She received IV abx and fluids and it initially improved to 4.42 and then started worsening gradually to 6.61. Pt initiated on CRRT 8/20/24 for acidosis and volume overload. Will continue with CRRT today for goal net negative fluid balance. Monitor labs q8 hours. Dose medications as per RRT.

## 2024-08-23 NOTE — PROGRESS NOTE ADULT - ASSESSMENT
67 yo F, with PmHx of HTN and bladder cancer, s/p bladder removal with ileal conduit construction on Aug 6 at [facility], here for generalized weakness. Per pt, she had her surgery on Aug 6th, d/c home on Aug 10th. Since d/c, only have 1-2 bowel movement, and had significantly decrease PO intake due to decreased apptite. No fever, some chill. +bloody discharge from urethra, some burning. Today, called EMS as she felt very weak and unwell. BP 80s/50s for EMS. Patient s/p cystectomy with Dr. Thompson at [facility] on August 6th. Been having decreased PO intake and on and off fevers. Also states she had stents previously that came out on their own. Presents to the ED now due to lower abdominal pain. SICU consulted for hypotension requiring pressors and desaturation on BiPAP.     NEUROLOGIC   - Sedated: precedex  - SBIRT consulted, no referral or intervention  - Pain control: IV Tylenol    RESPIRATORY   - CPAP 8/5/30%    CARDIOVASCULAR   - Monitor hemodynamics  - Levo, vaso  - q6 BMP w/ Mg, phos  - Home meds: ASA via NGt  - Holding atorvastatin    GASTROINTESTINAL   - Diet: TF thru NG, Pivot 1.5; modifications per dietician for CRRT to be implemented 8/23 AM  - Protonix    /RENAL  - CRRT net negative  - Riley in urostomy  - Maintain strict Is/Os  - Monitor daily weights  - Monitor electrolytes, replete PRN  - UA/Cx from conduit sent -> E. coli  - Urine lytes -> intrinsic  - IVF d/c'ed    HEMATOLOGIC  - Monitor H/H   - Heparin gtt @2  - ASA    INFECTIOUS DISEASE  - Monitor fever / WBC  - Restarted meropenem (8/16-8/27), d/c renal dosing  - S/p Diflucan (8/16-8/19)  - BCx 8/15 NGTD  - BCx 8/20 NGTD    ENDOCRINE  - Monitor gluc  - LAINE  - IV synthroid    LINES  - PIV   - R IJ CVC  - A line  - Riley in urostomy  - R femoral shiley    DISPO: SICU     67 yo F, with PmHx of HTN and bladder cancer, s/p bladder removal with ileal conduit construction on Aug 6 at [facility], here for generalized weakness. Per pt, she had her surgery on Aug 6th, d/c home on Aug 10th. Since d/c, only have 1-2 bowel movement, and had significantly decrease PO intake due to decreased apptite. No fever, some chill. +bloody discharge from urethra, some burning. Today, called EMS as she felt very weak and unwell. BP 80s/50s for EMS. Patient s/p cystectomy with Dr. Thompson at [facility] on August 6th. Been having decreased PO intake and on and off fevers. Also states she had stents previously that came out on their own. Presents to the ED now due to lower abdominal pain. SICU consulted for hypotension requiring pressors and desaturation on BiPAP.     NEUROLOGIC   - Off sedation 8/23  - A&Ox3  - SBIRT consulted, no referral or intervention  - Pain control: IV Tylenol    RESPIRATORY   - Extubated 8/23  - Face tent 4LPM  - Monitor SpO2 goal >92%  - CPAP at night  - Incentive spirometry    CARDIOVASCULAR   - Monitor hemodynamics  - Levo, vaso  - Home meds: ASA via NGt  - Holding atorvastatin    GASTROINTESTINAL   - Diet: TF thru NG, Pivot 1.5; at goal - modifications per RD  - Bedside swallow pending  - Protonix (home med)    /RENAL  - CRRT net negative -30  - Riley in urostomy, do not manipulate  - Trend weight from admit/dry weight for fluid change  - Maintain strict Is/Os  - Monitor electrolytes, replete PRN  - q6 BMP w/ Mg, phos    HEMATOLOGIC  - Heparin gtt  - ASA    INFECTIOUS DISEASE  - Restarted meropenem, regular dosing 1000mg q8h (8/16-8/27)  - S/p Diflucan (8/16-8/19)  - BCx 8/15 NGTD  - BCx 8/20 NGTD  - Procalcitonin pending  - Repeat BCx    ENDOCRINE  - Monitor gluc  - LAINE  - IV synthroid 70mcg QD    LINES  - PIV   - R IJ CVC  - A line  - Riley in urostomy  - R femoral karly    DISPO: SICU

## 2024-08-23 NOTE — PROGRESS NOTE ADULT - ASSESSMENT
67 yo F h/o HTN and bladder CA, s/p cystectomy/IC creation on 8/6/2024 at [facility] with Dr. Thompson (d/c on 8/10) presented to ED 8/16 w/ generalized weakness and some lower abdominal pain. Since d/c, only have 1-2 bowel movement, and had significantly decrease PO intake due to decreased appetite, intermittent fevers/chills, +bloody discharge from urethra, some burning. Pt stated stents fell out yesterday.  BP 80s/50s for EMS.  Pt remained hypotensive in ED, started on pressors, cultures sent, placed on zosyn and BiPAP and admitted to SICU. CT revealed: Expected postoperative changes status post cystectomy with ileal conduit creation including small amount of complex fluid and free air in the pelvis. No organized fluid collection. Distended ileal conduit with narrowing at the exiting ostomy site and proximally at the site of ureteral insertion. Mild bilateral hydroureteronephrosis with delayed nephrograms. No significant perinephric stranding.    8/16: still requiring pressor support and on BiPAP, pt states she feels better,14fr catheter placed in stoma, Bcx with GPC/GNR, Cr to 4.42 from 5.34, abx changed to leila and diflucan  8/17: still requiring pressor support and supplement oxygen but feels well, decr UO overnight, given albumin and IVF restarted, Cr up slightly to 4.68, electrolytes normal  8/18: RBUS done, no signs of hydro. IR consulted, no need for NTs as no hydro. UOP still very low (overnight 10 cc/hour), given 2 boluses with no improvement. Cr up to 4.95 from 4.68. CT loopogram of conduit pending today to evaluate for leak. still requiring pressors. Ucx from conduit now growing e.coli, sensitivities pending, PT recs rehab   8/19: CT loopogram w/ no anastamotic leak. Cr continues to rise w/ low UOP. UA on admission w/ coarse granular cast. Constellation of signs and symptoms suggestive of Acute tubular necrosis  8/20: Urine culture w/ e.coli sensitivities w/ resistance to floroquinolones. Pt back on abx. Pt w/ slightly increased urine output  8/21: Pt with acute onset of tachypnea, and hypoxia requiring intubation and sedation, CT w/ probably PE, hep gtt started. Nephrology consult obtained and started on CRRT, meropenem restarted  8/22: still intubated and sedated, pressor requirement decreasing, UO increasing, PTT remains supratherapeutic, hep gtt held for one hour this AM, on tube feeds, febrile to 100.9 at 4am  8/23: changed TF as per nutrition recs to start tomorrow AM, check w/ dietician, PTT therapeutic x1 --> repeat PTT at 4:45 AM 50.7, hep gtt @4ml/hr, tolerating CPAP. awake, UO still only 110/shift, still requiring pressor support, CRRT      Plan:  -AM labs reviewed  -BCx from 8/20 neg at 48hr  -f/u nephrology re: CRRT  -pressor and respiratory support prn  -continue tube feeds  -continue meropenem  -monitor UO  -bowel regimen  -appreciate SICU care  -PT recs rehab    Urology  #[id number]

## 2024-08-23 NOTE — PROGRESS NOTE ADULT - SUBJECTIVE AND OBJECTIVE BOX
SICU Daily Progress Note  =====================================================  Interval/Overnight Events:       - changed TF as per nutrition reccs to start tomorrow AM, check w/ dietician  - PTT therapeutic x1 --> repeat PTT at 4:45 AM  - Tolerating CPAP      ALLERGIES:  No Known Allergies      --------------------------------------------------------------------------------------    MEDICATIONS:    Neurologic Medications  acetaminophen   IVPB .. 1000 milliGRAM(s) IV Intermittent every 6 hours  dexMEDEtomidine Infusion 0.2 MICROgram(s)/kG/Hr IV Continuous <Continuous>  PHENobarbital Injectable 130 milliGRAM(s) IV Push every 15 minutes PRN agitation    Respiratory Medications    Cardiovascular Medications  norepinephrine Infusion 0.05 MICROgram(s)/kG/Min IV Continuous <Continuous>    Gastrointestinal Medications  pantoprazole  Injectable 40 milliGRAM(s) IV Push daily    Genitourinary Medications    Hematologic/Oncologic Medications  aspirin  chewable 81 milliGRAM(s) Enteral Tube daily  heparin  Infusion 1700 Unit(s)/Hr IV Continuous <Continuous>    Antimicrobial/Immunologic Medications  meropenem  IVPB 1000 milliGRAM(s) IV Intermittent every 8 hours    Endocrine/Metabolic Medications  insulin lispro (ADMELOG) corrective regimen sliding scale   SubCutaneous every 6 hours  levothyroxine Injectable 70 MICROGram(s) IV Push <User Schedule>  vasopressin Infusion 0.03 Unit(s)/Min IV Continuous <Continuous>    Topical/Other Medications  chlorhexidine 0.12% Liquid 15 milliLiter(s) Oral Mucosa every 12 hours  chlorhexidine 2% Cloths 1 Application(s) Topical daily  CRRT Treatment    <Continuous>  cycloSPORINE (RESTASIS) 0.05% Emulsion 1 Drop(s) Both EYES two times a day  Phoxillum Filtration BK 4 / 2.5 5000 milliLiter(s) CRRT <Continuous>  PrismaSOL Filtration BGK 4 / 2.5 5000 milliLiter(s) CRRT <Continuous>  PrismaSOL Filtration BGK 4 / 2.5 5000 milliLiter(s) CRRT <Continuous>    --------------------------------------------------------------------------------------    VITAL SIGNS:  T(C): 37.8 (08-22-24 @ 20:00), Max: 38.3 (08-22-24 @ 04:00)  HR: 68 (08-22-24 @ 23:21) (64 - 121)  BP: 101/56 (08-22-24 @ 08:00) (101/56 - 101/56)  RR: 17 (08-22-24 @ 23:00) (12 - 20)  SpO2: 100% (08-22-24 @ 23:21) (97% - 100%)  --------------------------------------------------------------------------------------    INS AND OUTS:    08-21-24 @ 07:01  -  08-22-24 @ 07:00  --------------------------------------------------------  IN: 2879.4 mL / OUT: 3696 mL / NET: -816.6 mL    08-22-24 @ 07:01  -  08-23-24 @ 00:08  --------------------------------------------------------  IN: 1514.8 mL / OUT: 1912 mL / NET: -397.2 mL    --------------------------------------------------------------------------------------    EXAM    NEURO: low level sedation, arousable   HEENT: NC/AT  RESPIRATORY: Intubated CPAP 8/5/30%  CARDIO: RRR, S1S2, flotrack monitor  ABDOMEN: soft, nontender, nondistended, ileal conduit pink and well perfused with red tinged output  EXTREMITIES: normal strength, no deformities, R femoral shiley    --------------------------------------------------------------------------------------    LABS  CBC (08-22 @ 00:30)                          8.2<L>                   26.84<H>  )--------------(  340        --    % Neuts, --    % Lymphs, ANC: --                              23.6<L>    BMP (08-22 @ 18:08)       138     |  102     |  17    			Ca++ --      Ca 8.1<L>       ---------------------------------( 165<H>		Mg 2.40         4.5     |  22      |  1.56<H>			Ph 2.2<L>  BMP (08-22 @ 13:20)       135     |  103     |  17    			Ca++ --      Ca 8.1<L>       ---------------------------------( 183<H>		Mg 2.30         4.3     |  23      |  1.65<H>			Ph 2.1<L>      Coags (08-22 @ 22:46)  aPTT 65.1<H> / INR -- / PT --  Coags (08-22 @ 13:20)  aPTT 153.3<HH> / INR -- / PT --      ABG (08-22 @ 06:00)     7.41 / 38 / 171<H> / 24 / -0.4 / 99.2<H>%     Lactate:    ABG (08-22 @ 00:30)     7.40 / 39 / 154<H> / 24 / -0.5 / 99.4<H>%     Lactate:        Urinalysis (08-22 @ 18:08):     Color:  / Appearance:  / SG:  / pH:  / Gluc: 165<H> / Ketones:  / Bili:  / Urobili:  / Protein : / Nitrites:  / Leuk.Est:  / RBC:  / WBC:  / Sq Epi:  / Non Sq Epi:  / Bacteria      Urinalysis (08-22 @ 13:20):     Color:  / Appearance:  / SG:  / pH:  / Gluc: 183<H> / Ketones:  / Bili:  / Urobili:  / Protein : / Nitrites:  / Leuk.Est:  / RBC:  / WBC:  / Sq Epi:  / Non Sq Epi:  / Bacteria        -> .Blood Blood-Venous Culture (08-20 @ 20:50)     NG    NG    No growth at 24 hours    -> .Blood Blood-Venous Culture (08-20 @ 20:25)     NG    NG    No growth at 24 hours    -> .Urine Ileal Conduit Culture (08-16 @ 10:30)     NG    Escherichia coli<!>    50,000 - 99,000 CFU/mL Escherichia coli<!>    -> .Blood Blood-Peripheral Culture (08-15 @ 21:15)     NG    NG    No growth at 5 days    -> .Blood Blood-Peripheral Culture (08-15 @ 21:05)     NG    NG    No growth at 5 days        -------------------------------------------------------------------------------------- SICU Daily Progress Note  =====================================================  Interval/Overnight Events:       - changed TF as per nutrition reccs to start tomorrow AM, check w/ dietician  - PTT therapeutic x1 --> repeat PTT at 4:45 AM  - Tolerating CPAP  - Extubated 8/23    ALLERGIES:  No Known Allergies      --------------------------------------------------------------------------------------    MEDICATIONS:    Neurologic Medications  acetaminophen   IVPB .. 1000 milliGRAM(s) IV Intermittent every 6 hours  dexMEDEtomidine Infusion 0.2 MICROgram(s)/kG/Hr IV Continuous <Continuous>  PHENobarbital Injectable 130 milliGRAM(s) IV Push every 15 minutes PRN agitation    Respiratory Medications    Cardiovascular Medications  norepinephrine Infusion 0.05 MICROgram(s)/kG/Min IV Continuous <Continuous>    Gastrointestinal Medications  pantoprazole  Injectable 40 milliGRAM(s) IV Push daily    Genitourinary Medications    Hematologic/Oncologic Medications  aspirin  chewable 81 milliGRAM(s) Enteral Tube daily  heparin  Infusion 1700 Unit(s)/Hr IV Continuous <Continuous>    Antimicrobial/Immunologic Medications  meropenem  IVPB 1000 milliGRAM(s) IV Intermittent every 8 hours    Endocrine/Metabolic Medications  insulin lispro (ADMELOG) corrective regimen sliding scale   SubCutaneous every 6 hours  levothyroxine Injectable 70 MICROGram(s) IV Push <User Schedule>  vasopressin Infusion 0.03 Unit(s)/Min IV Continuous <Continuous>    Topical/Other Medications  chlorhexidine 0.12% Liquid 15 milliLiter(s) Oral Mucosa every 12 hours  chlorhexidine 2% Cloths 1 Application(s) Topical daily  CRRT Treatment    <Continuous>  cycloSPORINE (RESTASIS) 0.05% Emulsion 1 Drop(s) Both EYES two times a day  Phoxillum Filtration BK 4 / 2.5 5000 milliLiter(s) CRRT <Continuous>  PrismaSOL Filtration BGK 4 / 2.5 5000 milliLiter(s) CRRT <Continuous>  PrismaSOL Filtration BGK 4 / 2.5 5000 milliLiter(s) CRRT <Continuous>    --------------------------------------------------------------------------------------    VITAL SIGNS:  T(C): 37.8 (08-22-24 @ 20:00), Max: 38.3 (08-22-24 @ 04:00)  HR: 68 (08-22-24 @ 23:21) (64 - 121)  BP: 101/56 (08-22-24 @ 08:00) (101/56 - 101/56)  RR: 17 (08-22-24 @ 23:00) (12 - 20)  SpO2: 100% (08-22-24 @ 23:21) (97% - 100%)  --------------------------------------------------------------------------------------    INS AND OUTS:    08-21-24 @ 07:01  -  08-22-24 @ 07:00  --------------------------------------------------------  IN: 2879.4 mL / OUT: 3696 mL / NET: -816.6 mL    08-22-24 @ 07:01  -  08-23-24 @ 00:08  --------------------------------------------------------  IN: 1514.8 mL / OUT: 1912 mL / NET: -397.2 mL    --------------------------------------------------------------------------------------    EXAM    NEURO: low level sedation, arousable   HEENT: NC/AT  RESPIRATORY: Intubated CPAP 8/5/30%  CARDIO: RRR, S1S2, flotrack monitor  ABDOMEN: soft, nontender, nondistended, ileal conduit pink and well perfused with red tinged output  EXTREMITIES: normal strength, no deformities, R femoral shiley    --------------------------------------------------------------------------------------    LABS  CBC (08-22 @ 00:30)                          8.2<L>                   26.84<H>  )--------------(  340        --    % Neuts, --    % Lymphs, ANC: --                              23.6<L>    BMP (08-22 @ 18:08)       138     |  102     |  17    			Ca++ --      Ca 8.1<L>       ---------------------------------( 165<H>		Mg 2.40         4.5     |  22      |  1.56<H>			Ph 2.2<L>  BMP (08-22 @ 13:20)       135     |  103     |  17    			Ca++ --      Ca 8.1<L>       ---------------------------------( 183<H>		Mg 2.30         4.3     |  23      |  1.65<H>			Ph 2.1<L>      Coags (08-22 @ 22:46)  aPTT 65.1<H> / INR -- / PT --  Coags (08-22 @ 13:20)  aPTT 153.3<HH> / INR -- / PT --      ABG (08-22 @ 06:00)     7.41 / 38 / 171<H> / 24 / -0.4 / 99.2<H>%     Lactate:    ABG (08-22 @ 00:30)     7.40 / 39 / 154<H> / 24 / -0.5 / 99.4<H>%     Lactate:        Urinalysis (08-22 @ 18:08):     Color:  / Appearance:  / SG:  / pH:  / Gluc: 165<H> / Ketones:  / Bili:  / Urobili:  / Protein : / Nitrites:  / Leuk.Est:  / RBC:  / WBC:  / Sq Epi:  / Non Sq Epi:  / Bacteria      Urinalysis (08-22 @ 13:20):     Color:  / Appearance:  / SG:  / pH:  / Gluc: 183<H> / Ketones:  / Bili:  / Urobili:  / Protein : / Nitrites:  / Leuk.Est:  / RBC:  / WBC:  / Sq Epi:  / Non Sq Epi:  / Bacteria        -> .Blood Blood-Venous Culture (08-20 @ 20:50)     NG    NG    No growth at 24 hours    -> .Blood Blood-Venous Culture (08-20 @ 20:25)     NG    NG    No growth at 24 hours    -> .Urine Ileal Conduit Culture (08-16 @ 10:30)     NG    Escherichia coli<!>    50,000 - 99,000 CFU/mL Escherichia coli<!>    -> .Blood Blood-Peripheral Culture (08-15 @ 21:15)     NG    NG    No growth at 5 days    -> .Blood Blood-Peripheral Culture (08-15 @ 21:05)     NG    NG    No growth at 5 days        --------------------------------------------------------------------------------------

## 2024-08-24 LAB
AMMONIA BLD-MCNC: 27 UMOL/L — SIGNIFICANT CHANGE UP (ref 11–55)
ANION GAP SERPL CALC-SCNC: 12 MMOL/L — SIGNIFICANT CHANGE UP (ref 7–14)
ANION GAP SERPL CALC-SCNC: 12 MMOL/L — SIGNIFICANT CHANGE UP (ref 7–14)
ANION GAP SERPL CALC-SCNC: 14 MMOL/L — SIGNIFICANT CHANGE UP (ref 7–14)
ANION GAP SERPL CALC-SCNC: 14 MMOL/L — SIGNIFICANT CHANGE UP (ref 7–14)
APTT BLD: 50.5 SEC — HIGH (ref 24.5–35.6)
APTT BLD: 65.9 SEC — HIGH (ref 24.5–35.6)
BLD GP AB SCN SERPL QL: NEGATIVE — SIGNIFICANT CHANGE UP
BLOOD GAS ARTERIAL - LYTES,HGB,ICA,LACT RESULT: SIGNIFICANT CHANGE UP
BUN SERPL-MCNC: 13 MG/DL — SIGNIFICANT CHANGE UP (ref 7–23)
BUN SERPL-MCNC: 13 MG/DL — SIGNIFICANT CHANGE UP (ref 7–23)
BUN SERPL-MCNC: 15 MG/DL — SIGNIFICANT CHANGE UP (ref 7–23)
BUN SERPL-MCNC: 15 MG/DL — SIGNIFICANT CHANGE UP (ref 7–23)
CALCIUM SERPL-MCNC: 7.6 MG/DL — LOW (ref 8.4–10.5)
CALCIUM SERPL-MCNC: 7.6 MG/DL — LOW (ref 8.4–10.5)
CALCIUM SERPL-MCNC: 7.8 MG/DL — LOW (ref 8.4–10.5)
CALCIUM SERPL-MCNC: 7.9 MG/DL — LOW (ref 8.4–10.5)
CHLORIDE SERPL-SCNC: 101 MMOL/L — SIGNIFICANT CHANGE UP (ref 98–107)
CHLORIDE SERPL-SCNC: 101 MMOL/L — SIGNIFICANT CHANGE UP (ref 98–107)
CHLORIDE SERPL-SCNC: 102 MMOL/L — SIGNIFICANT CHANGE UP (ref 98–107)
CHLORIDE SERPL-SCNC: 102 MMOL/L — SIGNIFICANT CHANGE UP (ref 98–107)
CO2 SERPL-SCNC: 19 MMOL/L — LOW (ref 22–31)
CO2 SERPL-SCNC: 20 MMOL/L — LOW (ref 22–31)
CO2 SERPL-SCNC: 21 MMOL/L — LOW (ref 22–31)
CO2 SERPL-SCNC: 21 MMOL/L — LOW (ref 22–31)
CREAT SERPL-MCNC: 1.3 MG/DL — SIGNIFICANT CHANGE UP (ref 0.5–1.3)
CREAT SERPL-MCNC: 1.35 MG/DL — HIGH (ref 0.5–1.3)
CREAT SERPL-MCNC: 1.36 MG/DL — HIGH (ref 0.5–1.3)
CREAT SERPL-MCNC: 1.38 MG/DL — HIGH (ref 0.5–1.3)
EGFR: 42 ML/MIN/1.73M2 — LOW
EGFR: 42 ML/MIN/1.73M2 — LOW
EGFR: 43 ML/MIN/1.73M2 — LOW
EGFR: 45 ML/MIN/1.73M2 — LOW
GLUCOSE BLDC GLUCOMTR-MCNC: 126 MG/DL — HIGH (ref 70–99)
GLUCOSE BLDC GLUCOMTR-MCNC: 90 MG/DL — SIGNIFICANT CHANGE UP (ref 70–99)
GLUCOSE BLDC GLUCOMTR-MCNC: 93 MG/DL — SIGNIFICANT CHANGE UP (ref 70–99)
GLUCOSE BLDC GLUCOMTR-MCNC: 98 MG/DL — SIGNIFICANT CHANGE UP (ref 70–99)
GLUCOSE SERPL-MCNC: 100 MG/DL — HIGH (ref 70–99)
GLUCOSE SERPL-MCNC: 116 MG/DL — HIGH (ref 70–99)
GLUCOSE SERPL-MCNC: 94 MG/DL — SIGNIFICANT CHANGE UP (ref 70–99)
GLUCOSE SERPL-MCNC: 95 MG/DL — SIGNIFICANT CHANGE UP (ref 70–99)
HCT VFR BLD CALC: 21.2 % — LOW (ref 34.5–45)
HCT VFR BLD CALC: 21.7 % — LOW (ref 34.5–45)
HGB BLD-MCNC: 7.2 G/DL — LOW (ref 11.5–15.5)
HGB BLD-MCNC: 7.5 G/DL — LOW (ref 11.5–15.5)
MAGNESIUM SERPL-MCNC: 2.2 MG/DL — SIGNIFICANT CHANGE UP (ref 1.6–2.6)
MAGNESIUM SERPL-MCNC: 2.3 MG/DL — SIGNIFICANT CHANGE UP (ref 1.6–2.6)
MAGNESIUM SERPL-MCNC: 2.4 MG/DL — SIGNIFICANT CHANGE UP (ref 1.6–2.6)
MAGNESIUM SERPL-MCNC: 2.4 MG/DL — SIGNIFICANT CHANGE UP (ref 1.6–2.6)
MCHC RBC-ENTMCNC: 30.3 PG — SIGNIFICANT CHANGE UP (ref 27–34)
MCHC RBC-ENTMCNC: 30.6 PG — SIGNIFICANT CHANGE UP (ref 27–34)
MCHC RBC-ENTMCNC: 34 GM/DL — SIGNIFICANT CHANGE UP (ref 32–36)
MCHC RBC-ENTMCNC: 34.6 GM/DL — SIGNIFICANT CHANGE UP (ref 32–36)
MCV RBC AUTO: 88.6 FL — SIGNIFICANT CHANGE UP (ref 80–100)
MCV RBC AUTO: 89.1 FL — SIGNIFICANT CHANGE UP (ref 80–100)
NRBC # BLD: 0 /100 WBCS — SIGNIFICANT CHANGE UP (ref 0–0)
NRBC # BLD: 0 /100 WBCS — SIGNIFICANT CHANGE UP (ref 0–0)
NRBC # FLD: 0.03 K/UL — HIGH (ref 0–0)
NRBC # FLD: 0.04 K/UL — HIGH (ref 0–0)
PHOSPHATE SERPL-MCNC: 2.1 MG/DL — LOW (ref 2.5–4.5)
PHOSPHATE SERPL-MCNC: 2.1 MG/DL — LOW (ref 2.5–4.5)
PHOSPHATE SERPL-MCNC: 2.2 MG/DL — LOW (ref 2.5–4.5)
PHOSPHATE SERPL-MCNC: 2.3 MG/DL — LOW (ref 2.5–4.5)
PLATELET # BLD AUTO: 244 K/UL — SIGNIFICANT CHANGE UP (ref 150–400)
PLATELET # BLD AUTO: 306 K/UL — SIGNIFICANT CHANGE UP (ref 150–400)
POTASSIUM SERPL-MCNC: 4.2 MMOL/L — SIGNIFICANT CHANGE UP (ref 3.5–5.3)
POTASSIUM SERPL-MCNC: 4.5 MMOL/L — SIGNIFICANT CHANGE UP (ref 3.5–5.3)
POTASSIUM SERPL-SCNC: 4.2 MMOL/L — SIGNIFICANT CHANGE UP (ref 3.5–5.3)
POTASSIUM SERPL-SCNC: 4.5 MMOL/L — SIGNIFICANT CHANGE UP (ref 3.5–5.3)
RBC # BLD: 2.38 M/UL — LOW (ref 3.8–5.2)
RBC # BLD: 2.45 M/UL — LOW (ref 3.8–5.2)
RBC # FLD: 17 % — HIGH (ref 10.3–14.5)
RBC # FLD: 17.5 % — HIGH (ref 10.3–14.5)
RH IG SCN BLD-IMP: POSITIVE — SIGNIFICANT CHANGE UP
SODIUM SERPL-SCNC: 134 MMOL/L — LOW (ref 135–145)
SODIUM SERPL-SCNC: 135 MMOL/L — SIGNIFICANT CHANGE UP (ref 135–145)
VANCOMYCIN TROUGH SERPL-MCNC: 7.5 UG/ML — LOW (ref 10–20)
WBC # BLD: 29.34 K/UL — HIGH (ref 3.8–10.5)
WBC # BLD: 32.02 K/UL — HIGH (ref 3.8–10.5)
WBC # FLD AUTO: 29.34 K/UL — HIGH (ref 3.8–10.5)
WBC # FLD AUTO: 32.02 K/UL — HIGH (ref 3.8–10.5)

## 2024-08-24 PROCEDURE — 99291 CRITICAL CARE FIRST HOUR: CPT

## 2024-08-24 PROCEDURE — 90947 DIALYSIS REPEATED EVAL: CPT | Mod: GC

## 2024-08-24 RX ORDER — ACETAMINOPHEN 325 MG/1
975 TABLET ORAL EVERY 6 HOURS
Refills: 0 | Status: DISCONTINUED | OUTPATIENT
Start: 2024-08-24 | End: 2024-09-13

## 2024-08-24 RX ORDER — ACETAMINOPHEN 325 MG/1
1000 TABLET ORAL EVERY 6 HOURS
Refills: 0 | Status: DISCONTINUED | OUTPATIENT
Start: 2024-08-24 | End: 2024-08-24

## 2024-08-24 RX ORDER — DORNASE ALFA 1 MG/ML
2.5 SOLUTION RESPIRATORY (INHALATION) DAILY
Refills: 0 | Status: DISCONTINUED | OUTPATIENT
Start: 2024-08-24 | End: 2024-08-27

## 2024-08-24 RX ORDER — APIXABAN 5 MG/1
10 TABLET, FILM COATED ORAL EVERY 12 HOURS
Refills: 0 | Status: DISCONTINUED | OUTPATIENT
Start: 2024-08-24 | End: 2024-08-26

## 2024-08-24 RX ORDER — DEXTROSE 15 G/33 G
15 GEL IN PACKET (GRAM) ORAL ONCE
Refills: 0 | Status: DISCONTINUED | OUTPATIENT
Start: 2024-08-24 | End: 2024-08-27

## 2024-08-24 RX ORDER — PANTOPRAZOLE SODIUM 40 MG
40 TABLET, DELAYED RELEASE (ENTERIC COATED) ORAL
Refills: 0 | Status: DISCONTINUED | OUTPATIENT
Start: 2024-08-24 | End: 2024-09-02

## 2024-08-24 RX ORDER — VANCOMYCIN/0.9 % SOD CHLORIDE 1.75G/25
500 PLASTIC BAG, INJECTION (ML) INTRAVENOUS ONCE
Refills: 0 | Status: COMPLETED | OUTPATIENT
Start: 2024-08-24 | End: 2024-08-24

## 2024-08-24 RX ORDER — DEXTROSE 15 G/33 G
25 GEL IN PACKET (GRAM) ORAL ONCE
Refills: 0 | Status: DISCONTINUED | OUTPATIENT
Start: 2024-08-24 | End: 2024-08-27

## 2024-08-24 RX ORDER — APIXABAN 5 MG/1
10 TABLET, FILM COATED ORAL
Refills: 0 | Status: DISCONTINUED | OUTPATIENT
Start: 2024-08-24 | End: 2024-08-24

## 2024-08-24 RX ORDER — GLUCAGON INJECTION, SOLUTION 1 MG/.2ML
1 INJECTION, SOLUTION SUBCUTANEOUS ONCE
Refills: 0 | Status: DISCONTINUED | OUTPATIENT
Start: 2024-08-24 | End: 2024-08-27

## 2024-08-24 RX ORDER — DEXTROSE 15 G/33 G
12.5 GEL IN PACKET (GRAM) ORAL ONCE
Refills: 0 | Status: DISCONTINUED | OUTPATIENT
Start: 2024-08-24 | End: 2024-08-27

## 2024-08-24 RX ORDER — IPRATROPIUM BROMIDE AND ALBUTEROL SULFATE .5; 3 MG/3ML; MG/3ML
3 SOLUTION RESPIRATORY (INHALATION) EVERY 6 HOURS
Refills: 0 | Status: DISCONTINUED | OUTPATIENT
Start: 2024-08-24 | End: 2024-08-27

## 2024-08-24 RX ADMIN — Medication 4.36 MICROGRAM(S)/KG/MIN: at 21:02

## 2024-08-24 RX ADMIN — Medication 100 MILLIGRAM(S): at 05:31

## 2024-08-24 RX ADMIN — CHLORHEXIDINE GLUCONATE 1 APPLICATION(S): 40 SOLUTION TOPICAL at 11:02

## 2024-08-24 RX ADMIN — ACETAMINOPHEN 975 MILLIGRAM(S): 325 TABLET ORAL at 17:52

## 2024-08-24 RX ADMIN — PIPERACILLIN SODIUM AND TAZOBACTAM SODIUM 25 GRAM(S): 3; .375 INJECTION, POWDER, FOR SOLUTION INTRAVENOUS at 05:31

## 2024-08-24 RX ADMIN — APIXABAN 10 MILLIGRAM(S): 5 TABLET, FILM COATED ORAL at 12:44

## 2024-08-24 RX ADMIN — Medication 40 MILLIGRAM(S): at 21:01

## 2024-08-24 RX ADMIN — IPRATROPIUM BROMIDE AND ALBUTEROL SULFATE 3 MILLILITER(S): .5; 3 SOLUTION RESPIRATORY (INHALATION) at 16:52

## 2024-08-24 RX ADMIN — IPRATROPIUM BROMIDE AND ALBUTEROL SULFATE 3 MILLILITER(S): .5; 3 SOLUTION RESPIRATORY (INHALATION) at 11:31

## 2024-08-24 RX ADMIN — DORNASE ALFA 2.5 MILLIGRAM(S): 1 SOLUTION RESPIRATORY (INHALATION) at 11:30

## 2024-08-24 RX ADMIN — PIPERACILLIN SODIUM AND TAZOBACTAM SODIUM 25 GRAM(S): 3; .375 INJECTION, POWDER, FOR SOLUTION INTRAVENOUS at 14:00

## 2024-08-24 RX ADMIN — Medication 1 DROP(S): at 05:31

## 2024-08-24 RX ADMIN — ACETAMINOPHEN 975 MILLIGRAM(S): 325 TABLET ORAL at 17:22

## 2024-08-24 RX ADMIN — APIXABAN 10 MILLIGRAM(S): 5 TABLET, FILM COATED ORAL at 20:57

## 2024-08-24 RX ADMIN — IPRATROPIUM BROMIDE AND ALBUTEROL SULFATE 3 MILLILITER(S): .5; 3 SOLUTION RESPIRATORY (INHALATION) at 21:57

## 2024-08-24 RX ADMIN — Medication 3 MILLIGRAM(S): at 20:57

## 2024-08-24 RX ADMIN — ACETAMINOPHEN 400 MILLIGRAM(S): 325 TABLET ORAL at 05:31

## 2024-08-24 RX ADMIN — Medication 1 DROP(S): at 17:22

## 2024-08-24 RX ADMIN — Medication 100 MILLIGRAM(S): at 20:00

## 2024-08-24 RX ADMIN — PIPERACILLIN SODIUM AND TAZOBACTAM SODIUM 25 GRAM(S): 3; .375 INJECTION, POWDER, FOR SOLUTION INTRAVENOUS at 21:01

## 2024-08-24 RX ADMIN — ACETAMINOPHEN 1000 MILLIGRAM(S): 325 TABLET ORAL at 05:50

## 2024-08-24 RX ADMIN — Medication 81 MILLIGRAM(S): at 11:01

## 2024-08-24 RX ADMIN — ACETAMINOPHEN 400 MILLIGRAM(S): 325 TABLET ORAL at 11:01

## 2024-08-24 NOTE — PROGRESS NOTE ADULT - SUBJECTIVE AND OBJECTIVE BOX
SICU Daily Progress Note  =====================================================  Interval/Overnight Events:  - Extubated  - Passed bedside swallow -> CC Diet  - Net even CRRT  - vanc (trough before 3rd dose)/zosyn  - f/u UCx and BCx      MEDICATIONS:   --------------------------------------------------------------------------------------  Neurologic Medications    Respiratory Medications    Cardiovascular Medications  norepinephrine Infusion 0.05 MICROgram(s)/kG/Min IV Continuous <Continuous>    Gastrointestinal Medications  pantoprazole  Injectable 40 milliGRAM(s) IV Push daily    Genitourinary Medications    Hematologic/Oncologic Medications  aspirin  chewable 81 milliGRAM(s) Enteral Tube daily  heparin  Infusion 1700 Unit(s)/Hr IV Continuous <Continuous>    Antimicrobial/Immunologic Medications  piperacillin/tazobactam IVPB.. 3.375 Gram(s) IV Intermittent every 8 hours  vancomycin  IVPB      vancomycin  IVPB 500 milliGRAM(s) IV Intermittent every 12 hours    Endocrine/Metabolic Medications  insulin lispro (ADMELOG) corrective regimen sliding scale   SubCutaneous every 6 hours  levothyroxine Injectable 70 MICROGram(s) IV Push <User Schedule>  vasopressin Infusion 0.03 Unit(s)/Min IV Continuous <Continuous>    Topical/Other Medications  chlorhexidine 2% Cloths 1 Application(s) Topical daily  CRRT Treatment    <Continuous>  cycloSPORINE (RESTASIS) 0.05% Emulsion 1 Drop(s) Both EYES two times a day  Phoxillum Filtration BK 4 / 2.5 5000 milliLiter(s) CRRT <Continuous>  PrismaSOL Filtration BGK 4 / 2.5 5000 milliLiter(s) CRRT <Continuous>  PrismaSOL Filtration BGK 4 / 2.5 5000 milliLiter(s) CRRT <Continuous>    --------------------------------------------------------------------------------------    VITAL SIGNS, INS/OUTS (last 24 hours):  --------------------------------------------------------------------------------------    08-22-24 @ 07:01  -  08-23-24 @ 07:00  --------------------------------------------------------  IN: 1986.5 mL / OUT: 2823 mL / NET: -836.5 mL    08-23-24 @ 07:01  -  08-24-24 @ 00:09  --------------------------------------------------------  IN: 544.7 mL / OUT: 833 mL / NET: -288.3 mL      --------------------------------------------------------------------------------------    EXAM    NEURO: low level sedation, arousable   HEENT: NC/AT  RESPIRATORY: Intubated CPAP 8/5/30%  CARDIO: RRR, S1S2, flotrack monitor  ABDOMEN: soft, nontender, nondistended, ileal conduit pink and well perfused with red tinged output  EXTREMITIES: normal strength, no deformities, R femoral shiley        HEMATOLOGIC  [x] VTE Prophylaxis: aspirin  chewable 81 milliGRAM(s) Enteral Tube daily  heparin  Infusion 1700 Unit(s)/Hr IV Continuous <Continuous>        LABS  --------------------------------------------------------------------------------------    T(C): 37.3 (08-23-24 @ 20:00), Max: 38 (08-23-24 @ 08:00)  HR: 112 (08-23-24 @ 22:41) (65 - 117)  BP: --  RR: 23 (08-23-24 @ 22:41) (0 - 29)  SpO2: 100% (08-23-24 @ 22:41) (96% - 100%)    --------------------------------------------------------------------------------------   SICU Daily Progress Note  =====================================================  Interval/Overnight Events:  - Extubated  - Passed bedside swallow -> CC Diet  - Net even CRRT  - vanc (trough before 3rd dose)/zosyn  - f/u UCx and BCx    - Urine culture sent  - Vanc trough tonight  - On/off levo  - Nephro recs -> continue CRRT  - camila Nair standing  - Repeat CBC, BMP in PM  - Hep gtt -> DOAC      MEDICATIONS:   --------------------------------------------------------------------------------------  Neurologic Medications    Respiratory Medications    Cardiovascular Medications  norepinephrine Infusion 0.05 MICROgram(s)/kG/Min IV Continuous <Continuous>    Gastrointestinal Medications  pantoprazole  Injectable 40 milliGRAM(s) IV Push daily    Genitourinary Medications    Hematologic/Oncologic Medications  aspirin  chewable 81 milliGRAM(s) Enteral Tube daily  heparin  Infusion 1700 Unit(s)/Hr IV Continuous <Continuous>    Antimicrobial/Immunologic Medications  piperacillin/tazobactam IVPB.. 3.375 Gram(s) IV Intermittent every 8 hours  vancomycin  IVPB      vancomycin  IVPB 500 milliGRAM(s) IV Intermittent every 12 hours    Endocrine/Metabolic Medications  insulin lispro (ADMELOG) corrective regimen sliding scale   SubCutaneous every 6 hours  levothyroxine Injectable 70 MICROGram(s) IV Push <User Schedule>  vasopressin Infusion 0.03 Unit(s)/Min IV Continuous <Continuous>    Topical/Other Medications  chlorhexidine 2% Cloths 1 Application(s) Topical daily  CRRT Treatment    <Continuous>  cycloSPORINE (RESTASIS) 0.05% Emulsion 1 Drop(s) Both EYES two times a day  Phoxillum Filtration BK 4 / 2.5 5000 milliLiter(s) CRRT <Continuous>  PrismaSOL Filtration BGK 4 / 2.5 5000 milliLiter(s) CRRT <Continuous>  PrismaSOL Filtration BGK 4 / 2.5 5000 milliLiter(s) CRRT <Continuous>    --------------------------------------------------------------------------------------    VITAL SIGNS, INS/OUTS (last 24 hours):  --------------------------------------------------------------------------------------    08-22-24 @ 07:01  - 08-23-24 @ 07:00  --------------------------------------------------------  IN: 1986.5 mL / OUT: 2823 mL / NET: -836.5 mL    08-23-24 @ 07:01  -  08-24-24 @ 00:09  --------------------------------------------------------  IN: 544.7 mL / OUT: 833 mL / NET: -288.3 mL      --------------------------------------------------------------------------------------    EXAM    NEURO: low level sedation, arousable   HEENT: NC/AT  RESPIRATORY: Intubated CPAP 8/5/30%  CARDIO: RRR, S1S2, flotrack monitor  ABDOMEN: soft, nontender, nondistended, ileal conduit pink and well perfused with red tinged output  EXTREMITIES: normal strength, no deformities, R femoral shiley        HEMATOLOGIC  [x] VTE Prophylaxis: aspirin  chewable 81 milliGRAM(s) Enteral Tube daily  heparin  Infusion 1700 Unit(s)/Hr IV Continuous <Continuous>        LABS  --------------------------------------------------------------------------------------    T(C): 37.3 (08-23-24 @ 20:00), Max: 38 (08-23-24 @ 08:00)  HR: 112 (08-23-24 @ 22:41) (65 - 117)  BP: --  RR: 23 (08-23-24 @ 22:41) (0 - 29)  SpO2: 100% (08-23-24 @ 22:41) (96% - 100%)    --------------------------------------------------------------------------------------

## 2024-08-24 NOTE — PROGRESS NOTE ADULT - ASSESSMENT
69 yo F, with PmHx of HTN and bladder cancer, s/p bladder removal with ileal conduit construction on Aug 6 at [facility], here for generalized weakness. Per pt, she had her surgery on Aug 6th, d/c home on Aug 10th. Since d/c, only have 1-2 bowel movement, and had significantly decrease PO intake due to decreased apptite. No fever, some chill. +bloody discharge from urethra, some burning. Today, called EMS as she felt very weak and unwell. BP 80s/50s for EMS. Patient s/p cystectomy with Dr. Thompson at [facility] on August 6th. Been having decreased PO intake and on and off fevers. Also states she had stents previously that came out on their own. Presents to the ED now due to lower abdominal pain. SICU consulted for hypotension requiring pressors and desaturation on BiPAP. Now s/p intubation.     NEUROLOGIC   - Off sedation 8/23  - A&Ox3  - SBIRT consulted, no referral or intervention  - Pain control: IV Tylenol    RESPIRATORY   - Extubated 8/23  - Face tent 4LPM  - Monitor SpO2 goal >92%  - CPAP at night  - Incentive spirometry    CARDIOVASCULAR   - Monitor hemodynamics  - Levo, vaso  - Home meds: ASA via NGt  - Holding atorvastatin    GASTROINTESTINAL   - Diet: TF thru NG, Pivot 1.5; at goal - modifications per RD  - Bedside swallow pending  - Protonix (home med)    /RENAL  - CRRT net negative -30  - Riley in urostomy, do not manipulate  - Trend weight from admit/dry weight for fluid change  - Maintain strict Is/Os  - Monitor electrolytes, replete PRN  - q6 BMP w/ Mg, phos    HEMATOLOGIC  - Heparin gtt  - ASA    INFECTIOUS DISEASE  - vanc and zosyn (8/23- )   - meropenem (8/16-8/23)  - S/p Diflucan (8/16-8/19)  - BCx 8/15 NGTD  - BCx 8/20 NGTD  - Procalcitonin pending  - Repeat BCx    ENDOCRINE  - Monitor gluc  - LAINE  - IV synthroid 70mcg QD    LINES  - PIV   - R IJ CVC  - A line  - Riley in urostomy  - R femoral karly    DISPO: SICU   69 yo F, with PmHx of HTN and bladder cancer, s/p bladder removal with ileal conduit construction on Aug 6 at [facility], here for generalized weakness. Per pt, she had her surgery on Aug 6th, d/c home on Aug 10th. Since d/c, only have 1-2 bowel movement, and had significantly decrease PO intake due to decreased apptite. No fever, some chill. +bloody discharge from urethra, some burning. Today, called EMS as she felt very weak and unwell. BP 80s/50s for EMS. Patient s/p cystectomy with Dr. Thompson at [facility] on August 6th. Been having decreased PO intake and on and off fevers. Also states she had stents previously that came out on their own. Presents to the ED now due to lower abdominal pain. SICU consulted for hypotension requiring pressors and desaturation on BiPAP. Now s/p intubation.       NEUROLOGIC   - A&Ox3  - SBIRT consulted, no referral or intervention  - Pain control: IV Tylenol -> PO    RESPIRATORY   - Extubated 8/23  - Monitor SpO2 goal >92%  - CPAP at night  - Incentive spirometry  - camila Nair standing    CARDIOVASCULAR   - Monitor hemodynamics  - On/off levo  - Home ASA, atorvastatin    GASTROINTESTINAL   - CC diet  - Home protonix    /RENAL  - CRRT net even  - Riley in urostomy, do not manipulate  - Trend weight from admit/dry weight for fluid change  - Maintain strict Is/Os  - Monitor electrolytes, replete PRN  - q6 BMP w/ Mg, phos    HEMATOLOGIC  - Heparin gtt -> DOAC 10 q12 4 days, then switch to 5mg q12  - ASA    INFECTIOUS DISEASE  - vanc and zosyn (8/23- )   - meropenem (8/16-8/23)  - S/p Diflucan (8/16-8/19)  - BCx 8/15 NGTD  - BCx 8/20 NGTD  - Procalcitonin >1,000  - Repeat BCx 8/23  - UCx 8/23     ENDOCRINE  - Monitor gluc  - LAINE  - IV synthroid 70mcg QD -> PO    LINES  - PIV   - R IJ CVC  - A line  - Riley in urostomy  - R femoral karly    DISPO: SICU

## 2024-08-24 NOTE — PROGRESS NOTE ADULT - ATTENDING COMMENTS
I agree with the history, physical, and plan, which I have reviewed and edited where appropriate.  I agree with notes/assessment of health care providers on my service.  I have personally examined the patient.  I was physically present for the key portions of the evaluation and management (E/M) service provided.  I reviewed data and laboratory tests/x-rays and all pertinent electronic images.  The patient is a critical care patient with life threatening hemodynamic and metabolic instability in SICU.  Risk benefit analyses discussed.    The patient is in SICU with diagnosis mentioned in the note.    The plan is specified below.    67 yo F, with PmHx of HTN and bladder cancer, s/p bladder removal with ileal conduit construction on Aug 6 at [facility], here for generalized weakness. Post op course complicated by urosepsis and BENIGNO/oliguric renal failure. SICU consulted for hypotension requiring pressors and desaturation on BiPAP.     NEUROLOGIC   - Pain control: IV Tylenol    RESPIRATORY: ARDS vs volume overload   - Extubated 8/23  - on RA   - Add duonebs with pulmozyme for productive cough     CARDIOVASCULAR: septic shock   - Levo weaning  - Home meds: ASA   - resume atorvastatin    GASTROINTESTINAL   - Diet: reg  - Protonix (home med)    /RENAL: urostomy, e. coli uti, BENIGNO resolving  - CRRT net even, trial off? will d/w nephro  - Riley in urostomy, do not manipulate  - Trend weight from admit/dry weight for fluid change  - q6 BMP w/ Mg, phos    HEMATOLOGIC: possible PE on CTA   - Heparin gtt, start doac   - ASA    INFECTIOUS DISEASE: E coli uti, persistent leukocytosis and fever, ? PNA  - transitioned leila to zosyn and vanc (persistent leukocytosis)  - BCx 8/15 NGTD  - BCx 8/20 NGTD  - Repeat BCx 8/23, urine cx 8/23    ENDOCRINE  - LAINE  - IV synthroid 70mcg QD, transition to po

## 2024-08-24 NOTE — PROGRESS NOTE ADULT - ATTENDING COMMENTS
Patient examined and ROS reviewed. A case of BENIGNO (ATN) with metabolic acidosis in the setting of hemodynamic changes and the use of contrast agent. Patient was initiated on CRRT because of hypotension and oliguria. Patient continue to be oliguric and use of vasopressor has decreased. Advised to continue CRRT until BP comes or patient started diuresing.

## 2024-08-24 NOTE — PROGRESS NOTE ADULT - SUBJECTIVE AND OBJECTIVE BOX
[facility] Division of Kidney Diseases & Hypertension  FOLLOW UP NOTE  [phone]--------------------------------------------------------------------------------  Chief Complaint: BENIGNO    24 hour events/subjective: Extubated yesterday now on RA/NC. Pt seen and examined at bedside this AM. Doing well remains on CRRT. Pressor support briefly stopped however patient remained hypotensive so levophed restarted.     PAST HISTORY  --------------------------------------------------------------------------------  No significant changes to PMH, PSH, FHx, SHx from H&P unless otherwise noted.    ALLERGIES & MEDICATIONS  --------------------------------------------------------------------------------  Allergies    No Known Allergies    Intolerances    Standing Inpatient Medications  albuterol/ipratropium for Nebulization 3 milliLiter(s) Nebulizer every 6 hours  apixaban 10 milliGRAM(s) Oral every 12 hours  aspirin  chewable 81 milliGRAM(s) Enteral Tube daily  atorvastatin 40 milliGRAM(s) Oral at bedtime  chlorhexidine 2% Cloths 1 Application(s) Topical daily  CRRT Treatment    <Continuous>  cycloSPORINE (RESTASIS) 0.05% Emulsion 1 Drop(s) Both EYES two times a day  dextrose 5%. 1000 milliLiter(s) IV Continuous <Continuous>  dextrose 5%. 1000 milliLiter(s) IV Continuous <Continuous>  dextrose 50% Injectable 25 Gram(s) IV Push once  dextrose 50% Injectable 12.5 Gram(s) IV Push once  dextrose 50% Injectable 25 Gram(s) IV Push once  dornase siobhan Solution 2.5 milliGRAM(s) Inhalation daily  glucagon  Injectable 1 milliGRAM(s) IntraMuscular once  insulin lispro (ADMELOG) corrective regimen sliding scale   SubCutaneous three times a day before meals  insulin lispro (ADMELOG) corrective regimen sliding scale   SubCutaneous at bedtime  levothyroxine Injectable 70 MICROGram(s) IV Push <User Schedule>  norepinephrine Infusion 0.05 MICROgram(s)/kG/Min IV Continuous <Continuous>  pantoprazole    Tablet 40 milliGRAM(s) Oral before breakfast  Phoxillum Filtration BK 4 / 2.5 5000 milliLiter(s) CRRT <Continuous>  piperacillin/tazobactam IVPB.. 3.375 Gram(s) IV Intermittent every 8 hours  PrismaSOL Filtration BGK 4 / 2.5 5000 milliLiter(s) CRRT <Continuous>  PrismaSOL Filtration BGK 4 / 2.5 5000 milliLiter(s) CRRT <Continuous>    PRN Inpatient Medications  acetaminophen     Tablet .. 975 milliGRAM(s) Oral every 6 hours PRN  dextrose Oral Gel 15 Gram(s) Oral once PRN      REVIEW OF SYSTEMS  --------------------------------------------------------------------------------  Gen: No fevers/chills  Head/Eyes/Ears: No HA  Respiratory: No dyspnea, cough  CV: No chest pain  GI: No diarrhea, constipation, abdominal discomfort  MSK: No edema    All other systems were reviewed and are negative, except as noted above.    VITALS/PHYSICAL EXAM  --------------------------------------------------------------------------------  T(C): 37.2 (08-24-24 @ 08:00), Max: 37.4 (08-24-24 @ 04:00)  HR: 106 (08-24-24 @ 12:00) (99 - 120)  BP: --  RR: 18 (08-24-24 @ 11:00) (0 - 29)  SpO2: 100% (08-24-24 @ 12:00) (99% - 100%)  Wt(kg): --        08-23-24 @ 07:01  -  08-24-24 @ 07:00  --------------------------------------------------------  IN: 927.3 mL / OUT: 1172 mL / NET: -244.7 mL    08-24-24 @ 07:01  -  08-24-24 @ 12:28  --------------------------------------------------------  IN: 136.3 mL / OUT: 256 mL / NET: -119.7 mL    Physical Exam:  Gen: NAD, well-appearing  Pulm: CTA B/L  CV: RRR, S1S2;  Abd: +BS, soft, nontender/nondistended  Extremities: no bilateral LE edema noted.   Vascular access: R femoral HD temporary catheter    LABS/STUDIES  --------------------------------------------------------------------------------              7.2    29.34 >-----------<  244      [08-24-24 @ 01:00]              21.2     135  |  102  |  15  ----------------------------<  94      [08-24-24 @ 08:03]  4.5   |  21  |  1.36        Ca     7.9     [08-24-24 @ 08:03]      Mg     2.40     [08-24-24 @ 08:03]      Phos  2.3     [08-24-24 @ 08:03]      PTT: 65.9       [08-24-24 @ 09:45]    Creatinine Trend  SCr 1.36 [08-24 @ 08:03]  SCr 1.38 [08-24 @ 01:00]  SCr 1.33 [08-23 @ 18:41]  SCr 1.33 [08-23 @ 12:58]  SCr 1.33 [08-23 @ 06:15]    Urinalysis - [08-24-24 @ 08:03]      Color  / Appearance  / SG  / pH       Gluc 94 / Ketone   / Bili  / Urobili        Blood  / Protein  / Leuk Est  / Nitrite       RBC  / WBC  / Hyaline  / Gran  / Sq Epi  / Non Sq Epi  / Bacteria     Urine Creatinine 88      [08-17-24 @ 14:05]  Urine Sodium 56      [08-17-24 @ 14:05]  Urine Potassium 25.5      [08-17-24 @ 14:05]  Urine Chloride 29      [08-17-24 @ 14:05]

## 2024-08-24 NOTE — PROGRESS NOTE ADULT - PROBLEM SELECTOR PLAN 2
Metabolic acidosis in setting of renal failure and sepsis. CRRT as noted above. Lactate was normal. Monitor Labs as above.      If you have any questions, please feel free to contact me:  Patti Clinton MD PGY-4  Nephrology Fellow  Pager [phone] / [employer] Teams (Preferred)  (After 5pm or on weekends please page the on-call fellow)

## 2024-08-24 NOTE — PROGRESS NOTE ADULT - SUBJECTIVE AND OBJECTIVE BOX
Interval/Overnight Events:  - Extubated  - Passed bedside swallow -> CC Diet  - Net even CRRT  - Procal >100  - still requiring small amount of pressor support  - leila d/c  - zosyn started  - vanc given but d/c this AM  - f/u UCx and BCx    Subjective:  Pt offers no complaints, + flatus, BM    Objective:    Vital signs  T(C): , Max: 38 (08-23-24 @ 08:00)  HR: 113 (08-24-24 @ 07:00)  BP: --  SpO2: 99% (08-24-24 @ 07:00)  Wt(kg): --    Output   IC: 160  08-23 @ 07:01  -  08-24 @ 07:00  --------------------------------------------------------  IN: 927.3 mL / OUT: 1035 mL / NET: -107.7 mL        Gen: NAD  Abd: stoma pink with catheter in place, soft, nontender      Labs                        7.2    29.34 )-----------( 244      ( 24 Aug 2024 01:00 )             21.2     24 Aug 2024 01:00    134    |  101    |  15     ----------------------------<  100    4.5     |  19     |  1.38     Ca    7.6        24 Aug 2024 01:00  Phos  2.2       24 Aug 2024 01:00  Mg     2.20      24 Aug 2024 01:00         Interval/Overnight Events:  - Extubated  - Passed bedside swallow -> CC Diet  - Net even CRRT  - Procal >100  - still requiring small amount of pressor support  - leila d/c  - zosyn started  - vanc given but d/c this AM  - f/u UCx and BCx    Subjective:  Pt states + flatus, BM, has had some vaginal bleeding x ? 2 days    Objective:    Vital signs  T(C): , Max: 38 (08-23-24 @ 08:00)  HR: 113 (08-24-24 @ 07:00)  BP: --  SpO2: 99% (08-24-24 @ 07:00)  Wt(kg): --    Output   IC: 160  08-23 @ 07:01  -  08-24 @ 07:00  --------------------------------------------------------  IN: 927.3 mL / OUT: 1035 mL / NET: -107.7 mL        Gen: NAD  Abd: stoma pink with catheter in place, soft, nontender  Manual exam of vagina, no retained FB appreciated, ? sutures palpated R vaginal wall, + heme on exam finger  Rectal brown stool      Labs                        7.2    29.34 )-----------( 244      ( 24 Aug 2024 01:00 )             21.2     24 Aug 2024 01:00    134    |  101    |  15     ----------------------------<  100    4.5     |  19     |  1.38     Ca    7.6        24 Aug 2024 01:00  Phos  2.2       24 Aug 2024 01:00  Mg     2.20      24 Aug 2024 01:00

## 2024-08-24 NOTE — PROGRESS NOTE ADULT - PROBLEM SELECTOR PLAN 1
BENIGNO vs BENIGNO on CKD 2/2 ATN in setting of hemodynamic changes and contrast induced. Pt has no hx of kidney disease. No baseline Scr available. At the time of presentation Scr was 5.34 ( Aug 15, 2024) , She received IV abx and fluids and it initially improved to 4.42 and then started worsening gradually to 6.61. Oligiuric UOP in last 24 hours 305cc, similar to day prior. Pt initiated on CRRT 8/20/24 for acidosis and volume overload. Will continue with CRRT today for goal net even fluid balance, as patient appears intravascularly dry per SICU bedside pocus. Monitor labs q8 hours. Dose medications as per RRT.

## 2024-08-24 NOTE — PROGRESS NOTE ADULT - ASSESSMENT
69 yo F h/o HTN and bladder CA, s/p cystectomy/IC creation on 8/6/2024 at [facility] with Dr. Thompson (d/c on 8/10) presented to ED 8/16 w/ generalized weakness and some lower abdominal pain. Since d/c, only have 1-2 bowel movement, and had significantly decrease PO intake due to decreased appetite, intermittent fevers/chills, +bloody discharge from urethra, some burning. Pt stated stents fell out yesterday.  BP 80s/50s for EMS.  Pt remained hypotensive in ED, started on pressors, cultures sent, placed on zosyn and BiPAP and admitted to SICU. CT revealed: Expected postoperative changes status post cystectomy with ileal conduit creation including small amount of complex fluid and free air in the pelvis. No organized fluid collection. Distended ileal conduit with narrowing at the exiting ostomy site and proximally at the site of ureteral insertion. Mild bilateral hydroureteronephrosis with delayed nephrograms. No significant perinephric stranding.    8/16: still requiring pressor support and on BiPAP, pt states she feels better,14fr catheter placed in stoma, Bcx with GPC/GNR, Cr to 4.42 from 5.34, abx changed to leila and diflucan  8/17: still requiring pressor support and supplement oxygen but feels well, decr UO overnight, given albumin and IVF restarted, Cr up slightly to 4.68, electrolytes normal  8/18: RBUS done, no signs of hydro. IR consulted, no need for NTs as no hydro. UOP still very low (overnight 10 cc/hour), given 2 boluses with no improvement. Cr up to 4.95 from 4.68. CT loopogram of conduit pending today to evaluate for leak. still requiring pressors. Ucx from conduit now growing e.coli, sensitivities pending, PT recs rehab   8/19: CT loopogram w/ no anastamotic leak. Cr continues to rise w/ low UOP. UA on admission w/ coarse granular cast. Constellation of signs and symptoms suggestive of Acute tubular necrosis  8/20: Urine culture w/ e.coli sensitivities w/ resistance to floroquinolones. Pt back on abx. Pt w/ slightly increased urine output  8/21: Pt with acute onset of tachypnea, and hypoxia requiring intubation and sedation, CT w/ probably PE, hep gtt started. Nephrology consult obtained and started on CRRT, meropenem restarted  8/22: still intubated and sedated, pressor requirement decreasing, UO increasing, PTT remains supratherapeutic, hep gtt held for one hour this AM, on tube feeds, febrile to 100.9 at 4am  8/23: changed TF as per nutrition recs to start tomorrow AM, check w/ dietician, PTT therapeutic x1 --> repeat PTT at 4:45 AM 50.7, hep gtt @4ml/hr, tolerating CPAP. awake, UO still only 110/shift, still requiring pressor support, CRRT  8//24: extubated, now on RA, still requiring CRRT, /shift and small amount of pressor support, Procal >100, repeat Bcx and Ucx ordered, leila d/c, vanc x 2, then d/c now on zosyn, passed beside S&S now on reg diet, pt offers no complaints, + flatus/BM, will do bedside pelvic to evaluate for retained material      Plan:  -AM labs reviewed  -will do bedside pelvic to evaluate for retained material  -BCx from 8/20 neg at 72hr  -f/u new Ucx and Bcx  -f/u nephrology re: CRRT  -pressor support prn  -reg diet  -continue zosyn  -monitor UO  -bowel regimen  -appreciate SICU care  -PT recs rehab    Urology  #[id number]     69 yo F h/o HTN and bladder CA, s/p cystectomy/IC creation on 8/6/2024 at [facility] with Dr. Thompson (d/c on 8/10) presented to ED 8/16 w/ generalized weakness and some lower abdominal pain. Since d/c, only have 1-2 bowel movement, and had significantly decrease PO intake due to decreased appetite, intermittent fevers/chills, +bloody discharge from urethra, some burning. Pt stated stents fell out yesterday.  BP 80s/50s for EMS.  Pt remained hypotensive in ED, started on pressors, cultures sent, placed on zosyn and BiPAP and admitted to SICU. CT revealed: Expected postoperative changes status post cystectomy with ileal conduit creation including small amount of complex fluid and free air in the pelvis. No organized fluid collection. Distended ileal conduit with narrowing at the exiting ostomy site and proximally at the site of ureteral insertion. Mild bilateral hydroureteronephrosis with delayed nephrograms. No significant perinephric stranding.    8/16: still requiring pressor support and on BiPAP, pt states she feels better,14fr catheter placed in stoma, Bcx with GPC/GNR, Cr to 4.42 from 5.34, abx changed to leila and diflucan  8/17: still requiring pressor support and supplement oxygen but feels well, decr UO overnight, given albumin and IVF restarted, Cr up slightly to 4.68, electrolytes normal  8/18: RBUS done, no signs of hydro. IR consulted, no need for NTs as no hydro. UOP still very low (overnight 10 cc/hour), given 2 boluses with no improvement. Cr up to 4.95 from 4.68. CT loopogram of conduit pending today to evaluate for leak. still requiring pressors. Ucx from conduit now growing e.coli, sensitivities pending, PT recs rehab   8/19: CT loopogram w/ no anastamotic leak. Cr continues to rise w/ low UOP. UA on admission w/ coarse granular cast. Constellation of signs and symptoms suggestive of Acute tubular necrosis  8/20: Urine culture w/ e.coli sensitivities w/ resistance to floroquinolones. Pt back on abx. Pt w/ slightly increased urine output  8/21: Pt with acute onset of tachypnea, and hypoxia requiring intubation and sedation, CT w/ probably PE, hep gtt started. Nephrology consult obtained and started on CRRT, meropenem restarted  8/22: still intubated and sedated, pressor requirement decreasing, UO increasing, PTT remains supratherapeutic, hep gtt held for one hour this AM, on tube feeds, febrile to 100.9 at 4am  8/23: changed TF as per nutrition recs to start tomorrow AM, check w/ dietician, PTT therapeutic x1 --> repeat PTT at 4:45 AM 50.7, hep gtt @4ml/hr, tolerating CPAP. awake, UO still only 110/shift, still requiring pressor support, CRRT  8//24: extubated, now on RA, still requiring CRRT, /shift and small amount of pressor support, Procal >100, repeat Bcx and Ucx ordered, leila d/c, vanc x 2, then d/c now on zosyn, passed beside S&S now on reg diet, pt offers no complaints, + flatus/BM, will do bedside pelvic to evaluate for retained material      Plan:  -AM labs reviewed  -will do bedside pelvic to evaluate for retained material: non appreciated, + suture palpable on right vaginal wall, + heme on exam finger, rectal brown stool  -BCx from 8/20 neg at 72hr  -f/u new Ucx and Bcx  -f/u nephrology re: CRRT  -pressor support prn  -reg diet  -continue zosyn  -monitor UO  -bowel regimen  -appreciate SICU care  -PT recs rehab    Urology  #[id number]     69 yo F h/o HTN and bladder CA, s/p cystectomy/IC creation on 8/6/2024 at [facility] with Dr. Thompson (d/c on 8/10) presented to ED 8/16 w/ generalized weakness and some lower abdominal pain. Since d/c, only have 1-2 bowel movement, and had significantly decrease PO intake due to decreased appetite, intermittent fevers/chills, +bloody discharge from urethra, some burning. Pt stated stents fell out yesterday.  BP 80s/50s for EMS.  Pt remained hypotensive in ED, started on pressors, cultures sent, placed on zosyn and BiPAP and admitted to SICU. CT revealed: Expected postoperative changes status post cystectomy with ileal conduit creation including small amount of complex fluid and free air in the pelvis. No organized fluid collection. Distended ileal conduit with narrowing at the exiting ostomy site and proximally at the site of ureteral insertion. Mild bilateral hydroureteronephrosis with delayed nephrograms. No significant perinephric stranding.    8/16: still requiring pressor support and on BiPAP, pt states she feels better,14fr catheter placed in stoma, Bcx with GPC/GNR, Cr to 4.42 from 5.34, abx changed to leila and diflucan  8/17: still requiring pressor support and supplement oxygen but feels well, decr UO overnight, given albumin and IVF restarted, Cr up slightly to 4.68, electrolytes normal  8/18: RBUS done, no signs of hydro. IR consulted, no need for NTs as no hydro. UOP still very low (overnight 10 cc/hour), given 2 boluses with no improvement. Cr up to 4.95 from 4.68. CT loopogram of conduit pending today to evaluate for leak. still requiring pressors. Ucx from conduit now growing e.coli, sensitivities pending, PT recs rehab   8/19: CT loopogram w/ no anastamotic leak. Cr continues to rise w/ low UOP. UA on admission w/ coarse granular cast. Constellation of signs and symptoms suggestive of Acute tubular necrosis  8/20: Urine culture w/ e.coli sensitivities w/ resistance to floroquinolones. Pt back on abx. Pt w/ slightly increased urine output  8/21: Pt with acute onset of tachypnea, and hypoxia requiring intubation and sedation, CT w/ probably PE, hep gtt started. Nephrology consult obtained and started on CRRT, meropenem restarted  8/22: still intubated and sedated, pressor requirement decreasing, UO increasing, PTT remains supratherapeutic, hep gtt held for one hour this AM, on tube feeds, febrile to 100.9 at 4am  8/23: changed TF as per nutrition recs to start tomorrow AM, check w/ dietician, PTT therapeutic x1 --> repeat PTT at 4:45 AM 50.7, hep gtt @4ml/hr, tolerating CPAP. awake, UO still only 110/shift, still requiring pressor support, CRRT  8//24: extubated, now on RA, still requiring CRRT, /shift and small amount of pressor support, Procal >100, repeat Bcx and Ucx ordered, leila d/c, vanc x 2, then d/c now on zosyn, passed beside S&S now on reg diet, pt offers no complaints, + flatus/BM, will do bedside pelvic to evaluate for retained material      Plan:  -AM labs reviewed  -will do bedside pelvic to evaluate for retained material: non appreciated, + suture palpable on right vaginal wall, + heme on exam finger, rectal brown stool  -monitor vaginal bleeding (pad count)  -BCx from 8/20 neg at 72hr  -f/u new Ucx and Bcx  -f/u nephrology re: CRRT  -pressor support prn  -reg diet  -continue zosyn  -monitor UO  -bowel regimen  -appreciate SICU care  -PT recs rehab    Urology  #[id number]

## 2024-08-25 LAB
ANION GAP SERPL CALC-SCNC: 12 MMOL/L — SIGNIFICANT CHANGE UP (ref 7–14)
ANION GAP SERPL CALC-SCNC: 13 MMOL/L — SIGNIFICANT CHANGE UP (ref 7–14)
BUN SERPL-MCNC: 12 MG/DL — SIGNIFICANT CHANGE UP (ref 7–23)
CALCIUM SERPL-MCNC: 7.6 MG/DL — LOW (ref 8.4–10.5)
CALCIUM SERPL-MCNC: 7.8 MG/DL — LOW (ref 8.4–10.5)
CALCIUM SERPL-MCNC: 7.8 MG/DL — LOW (ref 8.4–10.5)
CALCIUM SERPL-MCNC: 7.9 MG/DL — LOW (ref 8.4–10.5)
CHLORIDE SERPL-SCNC: 103 MMOL/L — SIGNIFICANT CHANGE UP (ref 98–107)
CO2 SERPL-SCNC: 21 MMOL/L — LOW (ref 22–31)
CO2 SERPL-SCNC: 22 MMOL/L — SIGNIFICANT CHANGE UP (ref 22–31)
CREAT SERPL-MCNC: 1.27 MG/DL — SIGNIFICANT CHANGE UP (ref 0.5–1.3)
CREAT SERPL-MCNC: 1.28 MG/DL — SIGNIFICANT CHANGE UP (ref 0.5–1.3)
CREAT SERPL-MCNC: 1.29 MG/DL — SIGNIFICANT CHANGE UP (ref 0.5–1.3)
CREAT SERPL-MCNC: 1.31 MG/DL — HIGH (ref 0.5–1.3)
CULTURE RESULTS: NO GROWTH — SIGNIFICANT CHANGE UP
EGFR: 44 ML/MIN/1.73M2 — LOW
EGFR: 45 ML/MIN/1.73M2 — LOW
EGFR: 46 ML/MIN/1.73M2 — LOW
EGFR: 46 ML/MIN/1.73M2 — LOW
GLUCOSE BLDC GLUCOMTR-MCNC: 100 MG/DL — HIGH (ref 70–99)
GLUCOSE BLDC GLUCOMTR-MCNC: 105 MG/DL — HIGH (ref 70–99)
GLUCOSE BLDC GLUCOMTR-MCNC: 91 MG/DL — SIGNIFICANT CHANGE UP (ref 70–99)
GLUCOSE BLDC GLUCOMTR-MCNC: 95 MG/DL — SIGNIFICANT CHANGE UP (ref 70–99)
GLUCOSE SERPL-MCNC: 100 MG/DL — HIGH (ref 70–99)
GLUCOSE SERPL-MCNC: 101 MG/DL — HIGH (ref 70–99)
GLUCOSE SERPL-MCNC: 108 MG/DL — HIGH (ref 70–99)
GLUCOSE SERPL-MCNC: 109 MG/DL — HIGH (ref 70–99)
HCT VFR BLD CALC: 20.8 % — CRITICAL LOW (ref 34.5–45)
HGB BLD-MCNC: 7.2 G/DL — LOW (ref 11.5–15.5)
MAGNESIUM SERPL-MCNC: 2.3 MG/DL — SIGNIFICANT CHANGE UP (ref 1.6–2.6)
MAGNESIUM SERPL-MCNC: 2.3 MG/DL — SIGNIFICANT CHANGE UP (ref 1.6–2.6)
MAGNESIUM SERPL-MCNC: 2.4 MG/DL — SIGNIFICANT CHANGE UP (ref 1.6–2.6)
MAGNESIUM SERPL-MCNC: 2.4 MG/DL — SIGNIFICANT CHANGE UP (ref 1.6–2.6)
MCHC RBC-ENTMCNC: 30.8 PG — SIGNIFICANT CHANGE UP (ref 27–34)
MCHC RBC-ENTMCNC: 34.6 GM/DL — SIGNIFICANT CHANGE UP (ref 32–36)
MCV RBC AUTO: 88.9 FL — SIGNIFICANT CHANGE UP (ref 80–100)
NRBC # BLD: 0 /100 WBCS — SIGNIFICANT CHANGE UP (ref 0–0)
NRBC # FLD: 0.08 K/UL — HIGH (ref 0–0)
PHOSPHATE SERPL-MCNC: 2.2 MG/DL — LOW (ref 2.5–4.5)
PHOSPHATE SERPL-MCNC: 2.3 MG/DL — LOW (ref 2.5–4.5)
PHOSPHATE SERPL-MCNC: 2.4 MG/DL — LOW (ref 2.5–4.5)
PHOSPHATE SERPL-MCNC: 2.5 MG/DL — SIGNIFICANT CHANGE UP (ref 2.5–4.5)
PLATELET # BLD AUTO: 289 K/UL — SIGNIFICANT CHANGE UP (ref 150–400)
POTASSIUM SERPL-MCNC: 4.3 MMOL/L — SIGNIFICANT CHANGE UP (ref 3.5–5.3)
POTASSIUM SERPL-MCNC: 4.3 MMOL/L — SIGNIFICANT CHANGE UP (ref 3.5–5.3)
POTASSIUM SERPL-MCNC: 4.4 MMOL/L — SIGNIFICANT CHANGE UP (ref 3.5–5.3)
POTASSIUM SERPL-MCNC: 4.5 MMOL/L — SIGNIFICANT CHANGE UP (ref 3.5–5.3)
POTASSIUM SERPL-SCNC: 4.3 MMOL/L — SIGNIFICANT CHANGE UP (ref 3.5–5.3)
POTASSIUM SERPL-SCNC: 4.3 MMOL/L — SIGNIFICANT CHANGE UP (ref 3.5–5.3)
POTASSIUM SERPL-SCNC: 4.4 MMOL/L — SIGNIFICANT CHANGE UP (ref 3.5–5.3)
POTASSIUM SERPL-SCNC: 4.5 MMOL/L — SIGNIFICANT CHANGE UP (ref 3.5–5.3)
RBC # BLD: 2.34 M/UL — LOW (ref 3.8–5.2)
RBC # FLD: 16.6 % — HIGH (ref 10.3–14.5)
SODIUM SERPL-SCNC: 136 MMOL/L — SIGNIFICANT CHANGE UP (ref 135–145)
SODIUM SERPL-SCNC: 136 MMOL/L — SIGNIFICANT CHANGE UP (ref 135–145)
SODIUM SERPL-SCNC: 137 MMOL/L — SIGNIFICANT CHANGE UP (ref 135–145)
SODIUM SERPL-SCNC: 137 MMOL/L — SIGNIFICANT CHANGE UP (ref 135–145)
SPECIMEN SOURCE: SIGNIFICANT CHANGE UP
VANCOMYCIN FLD-MCNC: 6.8 UG/ML — SIGNIFICANT CHANGE UP
WBC # BLD: 25.93 K/UL — HIGH (ref 3.8–10.5)
WBC # FLD AUTO: 25.93 K/UL — HIGH (ref 3.8–10.5)

## 2024-08-25 PROCEDURE — 90945 DIALYSIS ONE EVALUATION: CPT | Mod: GC

## 2024-08-25 PROCEDURE — 99291 CRITICAL CARE FIRST HOUR: CPT

## 2024-08-25 RX ORDER — VANCOMYCIN/0.9 % SOD CHLORIDE 1.75G/25
750 PLASTIC BAG, INJECTION (ML) INTRAVENOUS EVERY 12 HOURS
Refills: 0 | Status: DISCONTINUED | OUTPATIENT
Start: 2024-08-25 | End: 2024-08-25

## 2024-08-25 RX ORDER — MIDODRINE HYDROCHLORIDE 5 MG/1
20 TABLET ORAL EVERY 8 HOURS
Refills: 0 | Status: DISCONTINUED | OUTPATIENT
Start: 2024-08-25 | End: 2024-08-28

## 2024-08-25 RX ORDER — VANCOMYCIN/0.9 % SOD CHLORIDE 1.75G/25
1000 PLASTIC BAG, INJECTION (ML) INTRAVENOUS EVERY 12 HOURS
Refills: 0 | Status: DISCONTINUED | OUTPATIENT
Start: 2024-08-25 | End: 2024-08-26

## 2024-08-25 RX ADMIN — APIXABAN 10 MILLIGRAM(S): 5 TABLET, FILM COATED ORAL at 08:51

## 2024-08-25 RX ADMIN — APIXABAN 10 MILLIGRAM(S): 5 TABLET, FILM COATED ORAL at 19:16

## 2024-08-25 RX ADMIN — ACETAMINOPHEN 975 MILLIGRAM(S): 325 TABLET ORAL at 00:44

## 2024-08-25 RX ADMIN — Medication 250 MILLIGRAM(S): at 19:30

## 2024-08-25 RX ADMIN — IPRATROPIUM BROMIDE AND ALBUTEROL SULFATE 3 MILLILITER(S): .5; 3 SOLUTION RESPIRATORY (INHALATION) at 15:48

## 2024-08-25 RX ADMIN — Medication 4.36 MICROGRAM(S)/KG/MIN: at 08:00

## 2024-08-25 RX ADMIN — ACETAMINOPHEN 975 MILLIGRAM(S): 325 TABLET ORAL at 01:00

## 2024-08-25 RX ADMIN — IPRATROPIUM BROMIDE AND ALBUTEROL SULFATE 3 MILLILITER(S): .5; 3 SOLUTION RESPIRATORY (INHALATION) at 21:31

## 2024-08-25 RX ADMIN — ACETAMINOPHEN 975 MILLIGRAM(S): 325 TABLET ORAL at 11:39

## 2024-08-25 RX ADMIN — PIPERACILLIN SODIUM AND TAZOBACTAM SODIUM 25 GRAM(S): 3; .375 INJECTION, POWDER, FOR SOLUTION INTRAVENOUS at 14:37

## 2024-08-25 RX ADMIN — PIPERACILLIN SODIUM AND TAZOBACTAM SODIUM 25 GRAM(S): 3; .375 INJECTION, POWDER, FOR SOLUTION INTRAVENOUS at 21:45

## 2024-08-25 RX ADMIN — PIPERACILLIN SODIUM AND TAZOBACTAM SODIUM 25 GRAM(S): 3; .375 INJECTION, POWDER, FOR SOLUTION INTRAVENOUS at 05:07

## 2024-08-25 RX ADMIN — ACETAMINOPHEN 975 MILLIGRAM(S): 325 TABLET ORAL at 12:09

## 2024-08-25 RX ADMIN — MIDODRINE HYDROCHLORIDE 20 MILLIGRAM(S): 5 TABLET ORAL at 14:36

## 2024-08-25 RX ADMIN — Medication 40 MILLIGRAM(S): at 21:45

## 2024-08-25 RX ADMIN — Medication 1 DROP(S): at 05:41

## 2024-08-25 RX ADMIN — ACETAMINOPHEN 975 MILLIGRAM(S): 325 TABLET ORAL at 18:38

## 2024-08-25 RX ADMIN — Medication 40 MILLIGRAM(S): at 06:04

## 2024-08-25 RX ADMIN — MIDODRINE HYDROCHLORIDE 20 MILLIGRAM(S): 5 TABLET ORAL at 21:45

## 2024-08-25 RX ADMIN — Medication 1 DROP(S): at 18:41

## 2024-08-25 RX ADMIN — Medication 81 MILLIGRAM(S): at 11:39

## 2024-08-25 RX ADMIN — ACETAMINOPHEN 975 MILLIGRAM(S): 325 TABLET ORAL at 19:08

## 2024-08-25 RX ADMIN — Medication 4.36 MICROGRAM(S)/KG/MIN: at 19:19

## 2024-08-25 RX ADMIN — IPRATROPIUM BROMIDE AND ALBUTEROL SULFATE 3 MILLILITER(S): .5; 3 SOLUTION RESPIRATORY (INHALATION) at 04:10

## 2024-08-25 RX ADMIN — CHLORHEXIDINE GLUCONATE 1 APPLICATION(S): 40 SOLUTION TOPICAL at 11:40

## 2024-08-25 NOTE — PROGRESS NOTE ADULT - PROBLEM SELECTOR PLAN 2
Metabolic acidosis in setting of renal failure and sepsis, but now on CRRT. CRRT break as noted above. Lactate was normal. Monitor Labs as above.      If you have any questions, please feel free to contact me:  Patti Clinton MD PGY-4  Nephrology Fellow  Pager [phone] / [employer] Teams (Preferred)  (After 5pm or on weekends please page the on-call fellow)

## 2024-08-25 NOTE — PROGRESS NOTE ADULT - ASSESSMENT
69 yo F h/o HTN and bladder CA, s/p cystectomy/IC creation on 8/6/2024 at [facility] with Dr. Thompson (d/c on 8/10) presented to ED 8/16 w/ generalized weakness and some lower abdominal pain. Since d/c, only have 1-2 bowel movement, and had significantly decrease PO intake due to decreased appetite, intermittent fevers/chills, +bloody discharge from urethra, some burning. Pt stated stents fell out yesterday.  BP 80s/50s for EMS.  Pt remained hypotensive in ED, started on pressors, cultures sent, placed on zosyn and BiPAP and admitted to SICU. CT revealed: Expected postoperative changes status post cystectomy with ileal conduit creation including small amount of complex fluid and free air in the pelvis. No organized fluid collection. Distended ileal conduit with narrowing at the exiting ostomy site and proximally at the site of ureteral insertion. Mild bilateral hydroureteronephrosis with delayed nephrograms. No significant perinephric stranding.    8/16: still requiring pressor support and on BiPAP, pt states she feels better,14fr catheter placed in stoma, Bcx with GPC/GNR, Cr to 4.42 from 5.34, abx changed to leila and diflucan  8/17: still requiring pressor support and supplement oxygen but feels well, decr UO overnight, given albumin and IVF restarted, Cr up slightly to 4.68, electrolytes normal  8/18: RBUS done, no signs of hydro. IR consulted, no need for NTs as no hydro. UOP still very low (overnight 10 cc/hour), given 2 boluses with no improvement. Cr up to 4.95 from 4.68. CT loopogram of conduit pending today to evaluate for leak. still requiring pressors. Ucx from conduit now growing e.coli, sensitivities pending, PT recs rehab   8/19: CT loopogram w/ no anastamotic leak. Cr continues to rise w/ low UOP. UA on admission w/ coarse granular cast. Constellation of signs and symptoms suggestive of Acute tubular necrosis  8/20: Urine culture w/ e.coli sensitivities w/ resistance to floroquinolones. Pt back on abx. Pt w/ slightly increased urine output  8/21: Pt with acute onset of tachypnea, and hypoxia requiring intubation and sedation, CT w/ probably PE, hep gtt started. Nephrology consult obtained and started on CRRT, meropenem restarted  8/22: still intubated and sedated, pressor requirement decreasing, UO increasing, PTT remains supratherapeutic, hep gtt held for one hour this AM, on tube feeds, febrile to 100.9 at 4am  8/23: changed TF as per nutrition recs to start tomorrow AM, check w/ dietician, PTT therapeutic x1 --> repeat PTT at 4:45 AM 50.7, hep gtt @4ml/hr, tolerating CPAP. awake, UO still only 110/shift, still requiring pressor support, CRRT  8//24: extubated, now on RA, still requiring CRRT, /shift and small amount of pressor support, Procal >100, repeat Bcx and Ucx ordered, leila d/c, vanc x 2, then d/c now on zosyn, passed beside S&S now on reg diet, pt offers no complaints, + flatus/BM, will do bedside pelvic to evaluate for retained material  8/25: CRRT, UO less overnight, zosyn, tolerating reg diet, blood tinged vaginal discharge     Plan:  -AM labs reviewed  -monitor vaginal bleeding (pad count)  -BCx from 8/20 neg at 72hr  -f/u new Ucx and Bcx  -f/u nephrology re: CRRT  -pressor support prn  -reg diet  -continue zosyn  -monitor UO  -bowel regimen  -appreciate SICU care  -PT recs rehab    Urology  #[id number]

## 2024-08-25 NOTE — PROGRESS NOTE ADULT - SUBJECTIVE AND OBJECTIVE BOX
SICU Daily Progress Note  =====================================================  Interval/Overnight Events:     - Urine culture sent  - Vanc level low -> 500 x1, repeat vanc trough  - On/off levo  - Nephro recs -> continue CRRT  - camila Nair standing  - Hep gtt -> DOAC  - Fungitell sent  - Family to bring in home PO synthroid  - Blood-tinged fluid from vaginal canal    Allergies: No Known Allergies      MEDICATIONS:   --------------------------------------------------------------------------------------  Neurologic Medications  acetaminophen     Tablet .. 975 milliGRAM(s) Oral every 6 hours PRN Mild Pain (1 - 3)  melatonin 3 milliGRAM(s) Oral at bedtime PRN Insomnia    Respiratory Medications  albuterol/ipratropium for Nebulization 3 milliLiter(s) Nebulizer every 6 hours  dornase siobhan Solution 2.5 milliGRAM(s) Inhalation daily    Cardiovascular Medications  norepinephrine Infusion 0.05 MICROgram(s)/kG/Min IV Continuous <Continuous>    Gastrointestinal Medications  dextrose 5%. 1000 milliLiter(s) IV Continuous <Continuous>  dextrose 5%. 1000 milliLiter(s) IV Continuous <Continuous>  pantoprazole    Tablet 40 milliGRAM(s) Oral before breakfast    Genitourinary Medications    Hematologic/Oncologic Medications  apixaban 10 milliGRAM(s) Oral every 12 hours  aspirin  chewable 81 milliGRAM(s) Enteral Tube daily    Antimicrobial/Immunologic Medications  piperacillin/tazobactam IVPB.. 3.375 Gram(s) IV Intermittent every 8 hours    Endocrine/Metabolic Medications  atorvastatin 40 milliGRAM(s) Oral at bedtime  dextrose 50% Injectable 25 Gram(s) IV Push once  dextrose 50% Injectable 12.5 Gram(s) IV Push once  dextrose 50% Injectable 25 Gram(s) IV Push once  dextrose Oral Gel 15 Gram(s) Oral once PRN Blood Glucose LESS THAN 70 milliGRAM(s)/deciliter  glucagon  Injectable 1 milliGRAM(s) IntraMuscular once  insulin lispro (ADMELOG) corrective regimen sliding scale   SubCutaneous at bedtime  insulin lispro (ADMELOG) corrective regimen sliding scale   SubCutaneous three times a day before meals  levothyroxine Injectable 70 MICROGram(s) IV Push <User Schedule>    Topical/Other Medications  chlorhexidine 2% Cloths 1 Application(s) Topical daily  CRRT Treatment    <Continuous>  cycloSPORINE (RESTASIS) 0.05% Emulsion 1 Drop(s) Both EYES two times a day  Phoxillum Filtration BK 4 / 2.5 5000 milliLiter(s) CRRT <Continuous>  PrismaSOL Filtration BGK 4 / 2.5 5000 milliLiter(s) CRRT <Continuous>  PrismaSOL Filtration BGK 4 / 2.5 5000 milliLiter(s) CRRT <Continuous>    --------------------------------------------------------------------------------------    VITAL SIGNS, INS/OUTS (last 24 hours):  --------------------------------------------------------------------------------------  T(C): 37.5 (08-24-24 @ 20:00), Max: 37.5 (08-24-24 @ 16:00)  HR: 107 (08-24-24 @ 23:00) (95 - 120)  BP: --  RR: 19 (08-24-24 @ 23:00) (10 - 29)  SpO2: 95% (08-24-24 @ 23:00) (95% - 100%)    08-23-24 @ 07:01  -  08-24-24 @ 07:00  --------------------------------------------------------  IN: 927.3 mL / OUT: 1172 mL / NET: -244.7 mL    08-24-24 @ 07:01  -  08-25-24 @ 00:12  --------------------------------------------------------  IN: 463.3 mL / OUT: 631 mL / NET: -167.7 mL      --------------------------------------------------------------------------------------    EXAM  NEURO: NAD  HEENT: NC/AT  RESPIRATORY: RA  CARDIO: RRR, S1S2  ABDOMEN: soft, nontender, nondistended, ileal conduit pink and well perfused with red tinged output    LABS  --------------------------------------------------------------------------------------                        7.5    32.02 )-----------( 306      ( 24 Aug 2024 12:25 )             21.7   08-24    135  |  101  |  13  ----------------------------<  116<H>  4.2   |  20<L>  |  1.35<H>    Ca    7.8<L>      24 Aug 2024 19:25  Phos  2.1     08-24  Mg     2.40     08-24    ABG - ( 24 Aug 2024 16:31 )  pH, Arterial: 7.43  pH, Blood: x     /  pCO2: 35    /  pO2: 117   / HCO3: 23    / Base Excess: -1.0  /  SaO2: 98.5            Urinalysis Basic - ( 24 Aug 2024 19:25 )    Color: x / Appearance: x / SG: x / pH: x  Gluc: 116 mg/dL / Ketone: x  / Bili: x / Urobili: x   Blood: x / Protein: x / Nitrite: x   Leuk Esterase: x / RBC: x / WBC x   Sq Epi: x / Non Sq Epi: x / Bacteria: x    PTT - ( 24 Aug 2024 09:45 )  PTT:65.9 sec  --------------------------------------------------------------------------------------

## 2024-08-25 NOTE — PROGRESS NOTE ADULT - ATTENDING COMMENTS
BENIGNO on CRRT   still oliguric   pt wants to get out of bed today. it is reasonable to hold CRRT during the day and recheck labs around 4pm and decide on restarting CRRT then based on labs and Urine output  discoursed with SICU team this am

## 2024-08-25 NOTE — PROGRESS NOTE ADULT - ASSESSMENT
67 yo F, with PmHx of HTN and bladder cancer, s/p bladder removal with ileal conduit construction on Aug 6 at [facility], here for generalized weakness. Per pt, she had her surgery on Aug 6th, d/c home on Aug 10th. Since d/c, only have 1-2 bowel movement, and had significantly decrease PO intake due to decreased apptite. No fever, some chill. +bloody discharge from urethra, some burning. Today, called EMS as she felt very weak and unwell. BP 80s/50s for EMS. Patient s/p cystectomy with Dr. Thompson at [facility] on August 6th. Been having decreased PO intake and on and off fevers. Also states she had stents previously that came out on their own. Presents to the ED now due to lower abdominal pain. SICU consulted for hypotension requiring pressors and desaturation on BiPAP. Now s/p intubation.       NEUROLOGIC   - A&Ox3  - SBIRT consulted, no referral or intervention  - Pain control: IV Tylenol -> PO    RESPIRATORY   - Extubated 8/23  - Monitor SpO2 goal >92%  - CPAP at night  - Incentive spirometry  - camila Nair standing    CARDIOVASCULAR   - Monitor hemodynamics  - On/off levo  - Home ASA, atorvastatin    GASTROINTESTINAL   - CC diet  - Home protonix    /RENAL  - CRRT net even  - Riley in urostomy, do not manipulate  - Trend weight from admit/dry weight for fluid change  - Maintain strict Is/Os  - Monitor electrolytes, replete PRN  - q6 BMP w/ Mg, phos    HEMATOLOGIC  - Heparin gtt -> DOAC 10 q12 4 days, then switch to 5mg q12  - ASA    INFECTIOUS DISEASE  - vanc and zosyn (8/23- )   - meropenem (8/16-8/23)  - S/p Diflucan (8/16-8/19)  - BCx 8/15 NGTD  - BCx 8/20 NGTD  - Procalcitonin >1,000  - Repeat BCx 8/23  - UCx 8/23     ENDOCRINE  - Monitor gluc  - LAINE  - IV synthroid 70mcg QD -> PO    LINES  - PIV   - R IJ CVC  - A line  - Riley in urostomy  - R femoral karly    DISPO: SICU

## 2024-08-25 NOTE — PROGRESS NOTE ADULT - PROBLEM SELECTOR PLAN 1
BENIGNO vs BENIGNO on CKD 2/2 ATN in setting of hemodynamic changes and contrast induced. Pt has no hx of kidney disease. No baseline Scr available. At the time of presentation Scr was 5.34 ( Aug 15, 2024) , She received IV abx and fluids and it initially improved to 4.42 and then started worsening gradually to 6.61. Oligiuric UOP in last 24 hours 185cc. Pt initiated on CRRT 8/20/24 for acidosis and volume overload. Recommend giving patient time off of CRRT today. Can stop this AM. Would recheck  CMP at 6PM, will restart CRRT in evening. Dose medications as per RRT. BENIGNO vs BENIGNO on CKD 2/2 ATN in setting of hemodynamic changes and contrast induced. Pt has no hx of kidney disease. No baseline Scr available. At the time of presentation Scr was 5.34 ( Aug 15, 2024) , She received IV abx and fluids and it initially improved to 4.42 and then started worsening gradually to 6.61. Oligiuric UOP in last 24 hours 185cc. Pt initiated on CRRT 8/20/24 for acidosis and volume overload. Recommend giving patient time off of CRRT today. Can stop this AM. Would recheck  CMP at 4PM and 8PM, will restart CRRT in evening based on labs. Dose medications as per RRT.

## 2024-08-25 NOTE — PROGRESS NOTE ADULT - SUBJECTIVE AND OBJECTIVE BOX
Interval events:   - Urine culture sent  - Vanc level low -> 500 x1, repeat vanc trough  - On/off levo  - Nephro recs -> continue CRRT  - camila Nair standing  - Hep gtt -> DOAC  - Fungitell sent  - Family to bring in home PO synthroid  - Blood-tinged fluid from vaginal canal          OBJECTIVE:  Vital Signs Last 24 Hrs  T(C): 37.4 (25 Aug 2024 08:00), Max: 37.5 (24 Aug 2024 16:00)  T(F): 99.3 (25 Aug 2024 08:00), Max: 99.5 (24 Aug 2024 16:00)  HR: 103 (25 Aug 2024 08:00) (78 - 117)  BP: --  BP(mean): --  RR: 19 (25 Aug 2024 08:00) (10 - 27)  SpO2: 100% (25 Aug 2024 08:00) (95% - 100%)    Parameters below as of 25 Aug 2024 08:00  Patient On (Oxygen Delivery Method): room air        Physical Examination:  GEN: NAD, resting quietly  : IC with talyor in place draining minimal urine       LABS:                        7.2    25.93 )-----------( 289      ( 25 Aug 2024 00:40 )             20.8       08-25    137  |  103  |  12  ----------------------------<  101<H>  4.4   |  22  |  1.28    Ca    7.6<L>      25 Aug 2024 06:35  Phos  2.3     08-25  Mg     2.40     08-25

## 2024-08-25 NOTE — PROGRESS NOTE ADULT - SUBJECTIVE AND OBJECTIVE BOX
[facility] Division of Kidney Diseases & Hypertension  FOLLOW UP NOTE  [phone]--------------------------------------------------------------------------------  Chief Complaint: BENIGNO    24 hour events/subjective: No acute events overnight. Pt seen and examined at bedside this AM. Per patient feels very frustrated staying in bed all day hooked to CRRT, would like to get and stretch today.     PAST HISTORY  --------------------------------------------------------------------------------  No significant changes to PMH, PSH, FHx, SHx from H&P unless otherwise noted.    ALLERGIES & MEDICATIONS  --------------------------------------------------------------------------------  Allergies    No Known Allergies    Intolerances    Standing Inpatient Medications  albuterol/ipratropium for Nebulization 3 milliLiter(s) Nebulizer every 6 hours  apixaban 10 milliGRAM(s) Oral every 12 hours  aspirin  chewable 81 milliGRAM(s) Enteral Tube daily  atorvastatin 40 milliGRAM(s) Oral at bedtime  chlorhexidine 2% Cloths 1 Application(s) Topical daily  CRRT Treatment    <Continuous>  cycloSPORINE (RESTASIS) 0.05% Emulsion 1 Drop(s) Both EYES two times a day  dextrose 5%. 1000 milliLiter(s) IV Continuous <Continuous>  dextrose 5%. 1000 milliLiter(s) IV Continuous <Continuous>  dextrose 50% Injectable 25 Gram(s) IV Push once  dextrose 50% Injectable 12.5 Gram(s) IV Push once  dextrose 50% Injectable 25 Gram(s) IV Push once  dornase siobhan Solution 2.5 milliGRAM(s) Inhalation daily  glucagon  Injectable 1 milliGRAM(s) IntraMuscular once  insulin lispro (ADMELOG) corrective regimen sliding scale   SubCutaneous three times a day before meals  insulin lispro (ADMELOG) corrective regimen sliding scale   SubCutaneous at bedtime  levothyroxine Injectable 70 MICROGram(s) IV Push <User Schedule>  norepinephrine Infusion 0.05 MICROgram(s)/kG/Min IV Continuous <Continuous>  pantoprazole    Tablet 40 milliGRAM(s) Oral before breakfast  Phoxillum Filtration BK 4 / 2.5 5000 milliLiter(s) CRRT <Continuous>  piperacillin/tazobactam IVPB.. 3.375 Gram(s) IV Intermittent every 8 hours  PrismaSOL Filtration BGK 4 / 2.5 5000 milliLiter(s) CRRT <Continuous>  PrismaSOL Filtration BGK 4 / 2.5 5000 milliLiter(s) CRRT <Continuous>    PRN Inpatient Medications  acetaminophen     Tablet .. 975 milliGRAM(s) Oral every 6 hours PRN  dextrose Oral Gel 15 Gram(s) Oral once PRN  melatonin 3 milliGRAM(s) Oral at bedtime PRN      REVIEW OF SYSTEMS  --------------------------------------------------------------------------------  Gen: No fevers/chills,   Head/Eyes/Ears: No HA  Respiratory: No dyspnea, cough  CV: No chest pain  MSK: No edema  Skin: No rashes  Psych: +frustration     All other systems were reviewed and are negative, except as noted above.    VITALS/PHYSICAL EXAM  --------------------------------------------------------------------------------  T(C): 37.4 (08-25-24 @ 08:00), Max: 37.5 (08-24-24 @ 16:00)  HR: 104 (08-25-24 @ 10:00) (78 - 117)  BP: --  RR: 12 (08-25-24 @ 10:00) (10 - 30)  SpO2: 100% (08-25-24 @ 10:00) (95% - 100%)  Wt(kg): --      08-24-24 @ 07:01  -  08-25-24 @ 07:00  --------------------------------------------------------  IN: 577.2 mL / OUT: 807 mL / NET: -229.8 mL    08-25-24 @ 07:01  -  08-25-24 @ 11:09  --------------------------------------------------------  IN: 25 mL / OUT: 35 mL / NET: -10 mL    Physical Exam:  Gen: NAD, well-appearing  Pulm: CTA B/L  CV: RRR, S1S2;  Abd: +BS, soft, nontender/nondistended  : No suprapubic tenderness  Extremities: no bilateral LE edema noted.   Neuro: No focal deficits  Skin: Warm, without rashes  Vascular access:    LABS/STUDIES  --------------------------------------------------------------------------------              7.2    25.93 >-----------<  289      [08-25-24 @ 00:40]              20.8     137  |  103  |  12  ----------------------------<  101      [08-25-24 @ 06:35]  4.4   |  22  |  1.28        Ca     7.6     [08-25-24 @ 06:35]      Mg     2.40     [08-25-24 @ 06:35]      Phos  2.3     [08-25-24 @ 06:35]    PTT: 65.9       [08-24-24 @ 09:45}    Creatinine Trend:  SCr 1.28 [08-25 @ 06:35]  SCr 1.29 [08-25 @ 00:40]  SCr 1.35 [08-24 @ 19:25]  SCr 1.30 [08-24 @ 12:25]  SCr 1.36 [08-24 @ 08:03]    Urinalysis - [08-25-24 @ 06:35]      Color  / Appearance  / SG  / pH       Gluc 101 / Ketone   / Bili  / Urobili        Blood  / Protein  / Leuk Est  / Nitrite       RBC  / WBC  / Hyaline  / Gran  / Sq Epi  / Non Sq Epi  / Bacteria

## 2024-08-25 NOTE — PROGRESS NOTE ADULT - ATTENDING COMMENTS
I agree with the history, physical, and plan, which I have reviewed and edited where appropriate.  I agree with notes/assessment of health care providers on my service.  I have personally examined the patient.  I was physically present for the key portions of the evaluation and management (E/M) service provided.  I reviewed data and laboratory tests/x-rays and all pertinent electronic images.  The patient is a critical care patient with life threatening hemodynamic and metabolic instability in SICU.  Risk benefit analyses discussed.    The patient is in SICU with diagnosis mentioned in the note.    The plan is specified below.    67 yo F, with PmHx of HTN and bladder cancer, s/p bladder removal with ileal conduit construction on Aug 6 at [facility], here for generalized weakness. Post op course complicated by urosepsis and BENIGNO/oliguric renal failure. SICU consulted for hypotension requiring pressors and desaturation on BiPAP. Intubated, now extubated on 8/23. Remains intermittently hypotensive and on CRRT.     NEUROLOGIC   - Pain control: IV Tylenol    RESPIRATORY: ARDS vs volume overload, improved   - Extubated 8/23  - on RA   - Continue duonebs with pulmozyme for productive cough, improved     CARDIOVASCULAR: septic shock   - Levo weaning  - start midodrine to assist in levo weaning   - Home meds: ASA   - cont atorvastatin    GASTROINTESTINAL   - Diet: reg  - Protonix (home med)    /RENAL: urostomy, e. coli uti, BENIGNO resolving  - CRRT net even  - Switch Shiley from Fem to IJ to allow patient to be more mobile   - Riley in urostomy, do not manipulate  - Trend weight from admit/dry weight for fluid change  - q6 BMP w/ Mg, phos    HEMATOLOGIC: possible PE on CTA   - Cont eliquis   - ASA    INFECTIOUS DISEASE: E coli uti, persistent leukocytosis and fever, ? PNA  - transitioned leila to zosyn and vanc (persistent leukocytosis, now improving)  - BCx 8/15 NGTD  - BCx 8/20 NGTD  - Repeat BCx 8/23, urine cx 8/23    ENDOCRINE  - LAINE  - IV synthroid 70mcg QD, transition to po.

## 2024-08-26 LAB
ANION GAP SERPL CALC-SCNC: 10 MMOL/L — SIGNIFICANT CHANGE UP (ref 7–14)
ANION GAP SERPL CALC-SCNC: 12 MMOL/L — SIGNIFICANT CHANGE UP (ref 7–14)
ANION GAP SERPL CALC-SCNC: 13 MMOL/L — SIGNIFICANT CHANGE UP (ref 7–14)
ANION GAP SERPL CALC-SCNC: 13 MMOL/L — SIGNIFICANT CHANGE UP (ref 7–14)
BUN SERPL-MCNC: 11 MG/DL — SIGNIFICANT CHANGE UP (ref 7–23)
BUN SERPL-MCNC: 11 MG/DL — SIGNIFICANT CHANGE UP (ref 7–23)
BUN SERPL-MCNC: 12 MG/DL — SIGNIFICANT CHANGE UP (ref 7–23)
BUN SERPL-MCNC: 15 MG/DL — SIGNIFICANT CHANGE UP (ref 7–23)
CA-I BLD-SCNC: 1 MMOL/L — LOW (ref 1.15–1.29)
CALCIUM SERPL-MCNC: 7.7 MG/DL — LOW (ref 8.4–10.5)
CALCIUM SERPL-MCNC: 7.9 MG/DL — LOW (ref 8.4–10.5)
CALCIUM SERPL-MCNC: 7.9 MG/DL — LOW (ref 8.4–10.5)
CALCIUM SERPL-MCNC: 8.1 MG/DL — LOW (ref 8.4–10.5)
CHLORIDE SERPL-SCNC: 101 MMOL/L — SIGNIFICANT CHANGE UP (ref 98–107)
CHLORIDE SERPL-SCNC: 101 MMOL/L — SIGNIFICANT CHANGE UP (ref 98–107)
CHLORIDE SERPL-SCNC: 102 MMOL/L — SIGNIFICANT CHANGE UP (ref 98–107)
CHLORIDE SERPL-SCNC: 104 MMOL/L — SIGNIFICANT CHANGE UP (ref 98–107)
CO2 SERPL-SCNC: 21 MMOL/L — LOW (ref 22–31)
CREAT SERPL-MCNC: 1.18 MG/DL — SIGNIFICANT CHANGE UP (ref 0.5–1.3)
CREAT SERPL-MCNC: 1.2 MG/DL — SIGNIFICANT CHANGE UP (ref 0.5–1.3)
CREAT SERPL-MCNC: 1.23 MG/DL — SIGNIFICANT CHANGE UP (ref 0.5–1.3)
CREAT SERPL-MCNC: 1.62 MG/DL — HIGH (ref 0.5–1.3)
CULTURE RESULTS: SIGNIFICANT CHANGE UP
CULTURE RESULTS: SIGNIFICANT CHANGE UP
EGFR: 34 ML/MIN/1.73M2 — LOW
EGFR: 48 ML/MIN/1.73M2 — LOW
EGFR: 49 ML/MIN/1.73M2 — LOW
EGFR: 50 ML/MIN/1.73M2 — LOW
FUNGITELL: <31 PG/ML — SIGNIFICANT CHANGE UP
GLUCOSE BLDC GLUCOMTR-MCNC: 100 MG/DL — HIGH (ref 70–99)
GLUCOSE BLDC GLUCOMTR-MCNC: 102 MG/DL — HIGH (ref 70–99)
GLUCOSE BLDC GLUCOMTR-MCNC: 111 MG/DL — HIGH (ref 70–99)
GLUCOSE BLDC GLUCOMTR-MCNC: 93 MG/DL — SIGNIFICANT CHANGE UP (ref 70–99)
GLUCOSE SERPL-MCNC: 100 MG/DL — HIGH (ref 70–99)
GLUCOSE SERPL-MCNC: 101 MG/DL — HIGH (ref 70–99)
GLUCOSE SERPL-MCNC: 104 MG/DL — HIGH (ref 70–99)
GLUCOSE SERPL-MCNC: 96 MG/DL — SIGNIFICANT CHANGE UP (ref 70–99)
HCT VFR BLD CALC: 18.9 % — CRITICAL LOW (ref 34.5–45)
HCT VFR BLD CALC: 21.7 % — LOW (ref 34.5–45)
HGB BLD-MCNC: 6.4 G/DL — CRITICAL LOW (ref 11.5–15.5)
HGB BLD-MCNC: 7.5 G/DL — LOW (ref 11.5–15.5)
MAGNESIUM SERPL-MCNC: 2.3 MG/DL — SIGNIFICANT CHANGE UP (ref 1.6–2.6)
MAGNESIUM SERPL-MCNC: 2.4 MG/DL — SIGNIFICANT CHANGE UP (ref 1.6–2.6)
MAGNESIUM SERPL-MCNC: 2.4 MG/DL — SIGNIFICANT CHANGE UP (ref 1.6–2.6)
MAGNESIUM SERPL-MCNC: 2.5 MG/DL — SIGNIFICANT CHANGE UP (ref 1.6–2.6)
MCHC RBC-ENTMCNC: 30.4 PG — SIGNIFICANT CHANGE UP (ref 27–34)
MCHC RBC-ENTMCNC: 30.5 PG — SIGNIFICANT CHANGE UP (ref 27–34)
MCHC RBC-ENTMCNC: 33.9 GM/DL — SIGNIFICANT CHANGE UP (ref 32–36)
MCHC RBC-ENTMCNC: 34.6 GM/DL — SIGNIFICANT CHANGE UP (ref 32–36)
MCV RBC AUTO: 87.9 FL — SIGNIFICANT CHANGE UP (ref 80–100)
MCV RBC AUTO: 90 FL — SIGNIFICANT CHANGE UP (ref 80–100)
NRBC # BLD: 0 /100 WBCS — SIGNIFICANT CHANGE UP (ref 0–0)
NRBC # BLD: 0 /100 WBCS — SIGNIFICANT CHANGE UP (ref 0–0)
NRBC # FLD: 0 K/UL — SIGNIFICANT CHANGE UP (ref 0–0)
NRBC # FLD: 0 K/UL — SIGNIFICANT CHANGE UP (ref 0–0)
PHOSPHATE SERPL-MCNC: 2.3 MG/DL — LOW (ref 2.5–4.5)
PHOSPHATE SERPL-MCNC: 2.3 MG/DL — LOW (ref 2.5–4.5)
PHOSPHATE SERPL-MCNC: 2.5 MG/DL — SIGNIFICANT CHANGE UP (ref 2.5–4.5)
PHOSPHATE SERPL-MCNC: 3 MG/DL — SIGNIFICANT CHANGE UP (ref 2.5–4.5)
PLATELET # BLD AUTO: 317 K/UL — SIGNIFICANT CHANGE UP (ref 150–400)
PLATELET # BLD AUTO: 328 K/UL — SIGNIFICANT CHANGE UP (ref 150–400)
POTASSIUM SERPL-MCNC: 4.1 MMOL/L — SIGNIFICANT CHANGE UP (ref 3.5–5.3)
POTASSIUM SERPL-MCNC: 4.1 MMOL/L — SIGNIFICANT CHANGE UP (ref 3.5–5.3)
POTASSIUM SERPL-MCNC: 4.2 MMOL/L — SIGNIFICANT CHANGE UP (ref 3.5–5.3)
POTASSIUM SERPL-MCNC: 4.3 MMOL/L — SIGNIFICANT CHANGE UP (ref 3.5–5.3)
POTASSIUM SERPL-SCNC: 4.1 MMOL/L — SIGNIFICANT CHANGE UP (ref 3.5–5.3)
POTASSIUM SERPL-SCNC: 4.1 MMOL/L — SIGNIFICANT CHANGE UP (ref 3.5–5.3)
POTASSIUM SERPL-SCNC: 4.2 MMOL/L — SIGNIFICANT CHANGE UP (ref 3.5–5.3)
POTASSIUM SERPL-SCNC: 4.3 MMOL/L — SIGNIFICANT CHANGE UP (ref 3.5–5.3)
RBC # BLD: 2.1 M/UL — LOW (ref 3.8–5.2)
RBC # BLD: 2.47 M/UL — LOW (ref 3.8–5.2)
RBC # FLD: 15.5 % — HIGH (ref 10.3–14.5)
RBC # FLD: 16.4 % — HIGH (ref 10.3–14.5)
SODIUM SERPL-SCNC: 135 MMOL/L — SIGNIFICANT CHANGE UP (ref 135–145)
SPECIMEN SOURCE: SIGNIFICANT CHANGE UP
SPECIMEN SOURCE: SIGNIFICANT CHANGE UP
VANCOMYCIN TROUGH SERPL-MCNC: 9.8 UG/ML — LOW (ref 10–20)
WBC # BLD: 17.88 K/UL — HIGH (ref 3.8–10.5)
WBC # BLD: 19.68 K/UL — HIGH (ref 3.8–10.5)
WBC # FLD AUTO: 17.88 K/UL — HIGH (ref 3.8–10.5)
WBC # FLD AUTO: 19.68 K/UL — HIGH (ref 3.8–10.5)

## 2024-08-26 PROCEDURE — 99233 SBSQ HOSP IP/OBS HIGH 50: CPT

## 2024-08-26 PROCEDURE — 76604 US EXAM CHEST: CPT | Mod: 26

## 2024-08-26 PROCEDURE — 71045 X-RAY EXAM CHEST 1 VIEW: CPT | Mod: 26

## 2024-08-26 PROCEDURE — 99291 CRITICAL CARE FIRST HOUR: CPT

## 2024-08-26 RX ORDER — ALBUMIN (HUMAN) 5 G/20ML
50 SOLUTION INTRAVENOUS ONCE
Refills: 0 | Status: COMPLETED | OUTPATIENT
Start: 2024-08-26 | End: 2024-08-26

## 2024-08-26 RX ORDER — FUROSEMIDE 40 MG
80 TABLET ORAL ONCE
Refills: 0 | Status: COMPLETED | OUTPATIENT
Start: 2024-08-26 | End: 2024-08-26

## 2024-08-26 RX ORDER — LEVOTHYROXINE SODIUM 100 MCG
100 TABLET ORAL DAILY
Refills: 0 | Status: DISCONTINUED | OUTPATIENT
Start: 2024-08-26 | End: 2024-09-13

## 2024-08-26 RX ORDER — FUROSEMIDE 40 MG
80 TABLET ORAL ONCE
Refills: 0 | Status: DISCONTINUED | OUTPATIENT
Start: 2024-08-26 | End: 2024-08-26

## 2024-08-26 RX ADMIN — MIDODRINE HYDROCHLORIDE 20 MILLIGRAM(S): 5 TABLET ORAL at 22:01

## 2024-08-26 RX ADMIN — Medication 80 MILLIGRAM(S): at 10:07

## 2024-08-26 RX ADMIN — ALBUMIN (HUMAN) 50 MILLILITER(S): 5 SOLUTION INTRAVENOUS at 10:06

## 2024-08-26 RX ADMIN — Medication 1 DROP(S): at 17:26

## 2024-08-26 RX ADMIN — Medication 250 MILLIGRAM(S): at 08:11

## 2024-08-26 RX ADMIN — Medication 40 MILLIGRAM(S): at 22:01

## 2024-08-26 RX ADMIN — DORNASE ALFA 2.5 MILLIGRAM(S): 1 SOLUTION RESPIRATORY (INHALATION) at 10:08

## 2024-08-26 RX ADMIN — Medication 40 MILLIGRAM(S): at 06:08

## 2024-08-26 RX ADMIN — APIXABAN 10 MILLIGRAM(S): 5 TABLET, FILM COATED ORAL at 08:09

## 2024-08-26 RX ADMIN — Medication 81 MILLIGRAM(S): at 12:27

## 2024-08-26 RX ADMIN — PIPERACILLIN SODIUM AND TAZOBACTAM SODIUM 25 GRAM(S): 3; .375 INJECTION, POWDER, FOR SOLUTION INTRAVENOUS at 22:01

## 2024-08-26 RX ADMIN — IPRATROPIUM BROMIDE AND ALBUTEROL SULFATE 3 MILLILITER(S): .5; 3 SOLUTION RESPIRATORY (INHALATION) at 03:28

## 2024-08-26 RX ADMIN — Medication 1 DROP(S): at 06:07

## 2024-08-26 RX ADMIN — IPRATROPIUM BROMIDE AND ALBUTEROL SULFATE 3 MILLILITER(S): .5; 3 SOLUTION RESPIRATORY (INHALATION) at 10:08

## 2024-08-26 RX ADMIN — ACETAMINOPHEN 975 MILLIGRAM(S): 325 TABLET ORAL at 06:07

## 2024-08-26 RX ADMIN — MIDODRINE HYDROCHLORIDE 20 MILLIGRAM(S): 5 TABLET ORAL at 06:08

## 2024-08-26 RX ADMIN — PIPERACILLIN SODIUM AND TAZOBACTAM SODIUM 25 GRAM(S): 3; .375 INJECTION, POWDER, FOR SOLUTION INTRAVENOUS at 06:05

## 2024-08-26 RX ADMIN — MIDODRINE HYDROCHLORIDE 20 MILLIGRAM(S): 5 TABLET ORAL at 15:38

## 2024-08-26 RX ADMIN — Medication 70 MICROGRAM(S): at 06:07

## 2024-08-26 RX ADMIN — PIPERACILLIN SODIUM AND TAZOBACTAM SODIUM 25 GRAM(S): 3; .375 INJECTION, POWDER, FOR SOLUTION INTRAVENOUS at 14:38

## 2024-08-26 RX ADMIN — CHLORHEXIDINE GLUCONATE 1 APPLICATION(S): 40 SOLUTION TOPICAL at 12:27

## 2024-08-26 RX ADMIN — ACETAMINOPHEN 975 MILLIGRAM(S): 325 TABLET ORAL at 23:55

## 2024-08-26 RX ADMIN — ACETAMINOPHEN 975 MILLIGRAM(S): 325 TABLET ORAL at 06:45

## 2024-08-26 NOTE — PROGRESS NOTE ADULT - PROBLEM SELECTOR PLAN 2
Metabolic acidosis in setting of renal failure and sepsis. CRRT break as noted above. Lactate was normal. Monitor Labs as above.      If you have any questions, please feel free to contact me:  Patti Clinton MD PGY-4  Nephrology Fellow  Pager [phone] / [employer] Teams (Preferred)  (After 5pm or on weekends please page the on-call fellow)

## 2024-08-26 NOTE — PHYSICAL THERAPY INITIAL EVALUATION ADULT - MANUAL MUSCLE TESTING RESULTS, REHAB EVAL
Bilateral upper extremities 4-/5, bilateral quadriceps 3-/5, bilateral hamstrings 3/5, bilateral ankles 3/5
Bilateral LE grossly 3+/5

## 2024-08-26 NOTE — PHYSICAL THERAPY INITIAL EVALUATION ADULT - IMPAIRMENTS FOUND, PT EVAL
gait, locomotion, and balance/muscle strength
aerobic capacity/endurance/decreased midline orientation/ergonomics and body mechanics/gait, locomotion, and balance/gross motor/muscle strength/posture

## 2024-08-26 NOTE — PROGRESS NOTE ADULT - PROBLEM SELECTOR PLAN 1
BENIGNO vs BENIGNO on CKD 2/2 ATN in setting of hemodynamic changes and contrast induced. Pt has no hx of kidney disease. No baseline Scr available. At the time of presentation Scr was 5.34 ( Aug 15, 2024) , She received IV abx and fluids and it initially improved to 4.42 and then started worsening gradually to 6.61. Oligiuric UOP in last 24 hours 119cc. Pt initiated on CRRT 8/20/24 for acidosis and volume overload. Received lasix 80mg IVP. Recommend stopping CRRT today and monitor for renal recovery off CRRT. Monitor labs and I/Os. Avoid nephrotoxins incluiding, ACE/ARB, NSAIDs, contrast, etc. BENIGNO vs BENIGNO on CKD 2/2 ATN in setting of hemodynamic changes and contrast induced. Pt has no hx of kidney disease. No baseline Scr available. At the time of presentation Scr was 5.34 ( Aug 15, 2024) , She received IV abx and fluids and it initially improved to 4.42 and then started worsening gradually to 6.61. Oligiuric UOP in last 24 hours 119cc. Pt initiated on CRRT 8/20/24 for acidosis and volume overload. Received lasix 80mg IVP today. Recommend stopping CRRT today and monitor for renal recovery off CRRT. Monitor labs and I/Os. Avoid nephrotoxins incluiding, ACE/ARB, NSAIDs, contrast, etc.

## 2024-08-26 NOTE — PROGRESS NOTE ADULT - ATTENDING COMMENTS
I agree with the detailed interval history, physical, and plan, which I have reviewed and edited where appropriate'; also agree with notes/assessment with my team on service.  I have personally examined the patient.  I was physically present for the key portions of the evaluation and management (E/M) service provided.  I reviewed all the pertinent data.  The patient is a critical care patient with life threatening hemodynamic and metabolic instability in SICU.  The SICU team has a constant risk benefit analyzes discussion and coordinating care with the primary team and all consultants.   The patient is in SICU with the chief complaint and diagnosis mentioned in the note.   The plan will be specified in the note.  69 yo F s/p bladder removal with ileal conduit; s/p cystectomy in SICU for hypotension requiring pressors and desaturation on BiPAP on CVVH.  AO3  Lung coarse BS  Heart RR  Abd soft    PLAN  NEUROLOGIC   -Tylenol   RESPIRATORY   - CPAP at night  -duonebs   CARDIOVASCULAR   -ASA  -atorvastatin  GASTROINTESTINAL   -protonix  /RENAL  - DC CRRT   -Lasix+  HEMATOLOGIC  -Eliquis   - ASA  INFECTIOUS DISEASE  - vanco  -zosyn   ENDOCRINE  - Monitor glucose    DISPO: SICU

## 2024-08-26 NOTE — PHYSICAL THERAPY INITIAL EVALUATION ADULT - ADDITIONAL COMMENTS
Pt lives in a private house with her mother; there are 6-8 steps to enter + 10 steps to basement bedroom. There are 10-12 steps to second floor where patient cares for her mother; there is a stairlift present.
Pt lives in a private house with her mother; there are ~6-8 steps to enter; (+)bilateral handrails; ~ 10 steps to basement where her bedroom is. There are 10-12 steps to second floor where patient cares for her mother; there is a stairlift present. Prior to hospital admission, pt was completely independent and used no assistive device with ambulation. She does own a single axis cane. Pt denies any recent falls.    Pt left comfortable in bed, NAD, all lines intact, all precautions maintained, with call bell in reach, and RN aware of PT evaluation.

## 2024-08-26 NOTE — PROGRESS NOTE ADULT - CRITICAL CARE SERVICES PROVIDED
unknown
Patient is critically ill, requiring critical care services.

## 2024-08-26 NOTE — PROGRESS NOTE ADULT - ASSESSMENT
69 yo F h/o HTN and bladder CA, s/p cystectomy/IC creation on 8/6/2024 at [facility] with Dr. Thompson (d/c on 8/10) presented to ED 8/16 w/ generalized weakness and some lower abdominal pain. Since d/c, only have 1-2 bowel movement, and had significantly decrease PO intake due to decreased appetite, intermittent fevers/chills, +bloody discharge from urethra, some burning. Pt stated stents fell out yesterday.  BP 80s/50s for EMS.  Pt remained hypotensive in ED, started on pressors, cultures sent, placed on zosyn and BiPAP and admitted to SICU. CT revealed: Expected postoperative changes status post cystectomy with ileal conduit creation including small amount of complex fluid and free air in the pelvis. No organized fluid collection. Distended ileal conduit with narrowing at the exiting ostomy site and proximally at the site of ureteral insertion. Mild bilateral hydroureteronephrosis with delayed nephrograms. No significant perinephric stranding.    8/16: still requiring pressor support and on BiPAP, pt states she feels better,14fr catheter placed in stoma, Bcx with GPC/GNR, Cr to 4.42 from 5.34, abx changed to leila and diflucan  8/17: still requiring pressor support and supplement oxygen but feels well, decr UO overnight, given albumin and IVF restarted, Cr up slightly to 4.68, electrolytes normal  8/18: RBUS done, no signs of hydro. IR consulted, no need for NTs as no hydro. UOP still very low (overnight 10 cc/hour), given 2 boluses with no improvement. Cr up to 4.95 from 4.68. CT loopogram of conduit pending today to evaluate for leak. still requiring pressors. Ucx from conduit now growing e.coli, sensitivities pending, PT recs rehab   8/19: CT loopogram w/ no anastamotic leak. Cr continues to rise w/ low UOP. UA on admission w/ coarse granular cast. Constellation of signs and symptoms suggestive of Acute tubular necrosis  8/20: Urine culture w/ e.coli sensitivities w/ resistance to floroquinolones. Pt back on abx. Pt w/ slightly increased urine output  8/21: Pt with acute onset of tachypnea, and hypoxia requiring intubation and sedation, CT w/ probably PE, hep gtt started. Nephrology consult obtained and started on CRRT, meropenem restarted  8/22: still intubated and sedated, pressor requirement decreasing, UO increasing, PTT remains supratherapeutic, hep gtt held for one hour this AM, on tube feeds, febrile to 100.9 at 4am  8/23: changed TF as per nutrition recs to start tomorrow AM, check w/ dietician, PTT therapeutic x1 --> repeat PTT at 4:45 AM 50.7, hep gtt @4ml/hr, tolerating CPAP. awake, UO still only 110/shift, still requiring pressor support, CRRT  8//24: extubated, now on RA, still requiring CRRT, /shift and small amount of pressor support, Procal >100, repeat Bcx and Ucx ordered, leila d/c, vanc x 2, then d/c now on zosyn, passed beside S&S now on reg diet, pt offers no complaints, + flatus/BM, will do bedside pelvic to evaluate for retained material  8/25: CRRT, UO less overnight, zosyn, tolerating reg diet, blood tinged vaginal discharge   8/26: UOP 39 overnight, on zosyn and vanc, tolerating reg diet. poss transfer to [facility] today - SICU to discuss on rounds.     Plan:  -AM labs reviewed  -monitor vaginal bleeding (pad count)  -BCx from 8/23 NGTD at 48 hrs   -new IC cx final neg   -f/u nephrology re: CRRT  -pressor support prn, on midodrine now   -reg diet  -continue zosyn  -vanc by level   -monitor UO  -bowel regimen  -appreciate SICU care  -PT recs rehab    Urology  #[id number]

## 2024-08-26 NOTE — PROGRESS NOTE ADULT - SUBJECTIVE AND OBJECTIVE BOX
Interval/Overnight Events: NAOE  hgb 6.8 > 1U PRBC   WBC trending down   Started Midodrine 20 q8   On Eliquis       Subjective  Awake and conversing with team. on RA.     Objective    Vital signs  T(F): , Max: 99.5 (08-25-24 @ 16:00)  HR: 84 (08-26-24 @ 09:00)  BP: --  SpO2: 100% (08-26-24 @ 09:00)  Wt(kg): --    Output     OUT:    Urostomy (mL): 139 mL  Total OUT: 139 mL    Total NET: -139 mL      OUT:    Urostomy (mL): 5 mL  Total OUT: 5 mL    Total NET: -5 mL          Physical Examination:  GEN: NAD, resting quietly  : IC with taylor in place draining minimal urine     Labs      08-26 @ 06:20    WBC --    / Hct --    / SCr 1.18     08-26 @ 04:30    WBC 17.88 / Hct 21.7  / SCr --           Culture - Urine (collected 08-23-24 @ 20:40)  Source: .Urine Ileal Conduit  Final Report (08-25-24 @ 12:11):    No growth    Culture - Blood (collected 08-23-24 @ 16:40)  Source: .Blood Blood-Peripheral  Preliminary Report (08-25-24 @ 22:01):    No growth at 48 Hours    Culture - Blood (collected 08-23-24 @ 16:35)  Source: .Blood Blood-Peripheral  Preliminary Report (08-25-24 @ 22:01):    No growth at 48 Hours    Culture - Blood (collected 08-20-24 @ 20:50)  Source: .Blood Blood-Venous  Final Report (08-26-24 @ 02:00):    No growth at 5 days    Culture - Blood (collected 08-20-24 @ 20:25)  Source: .Blood Blood-Venous  Final Report (08-26-24 @ 03:00):    No growth at 5 days        Urine Cx: ?  Blood Cx: ?    Imaging

## 2024-08-26 NOTE — PROGRESS NOTE ADULT - ASSESSMENT
67 yo F, with PmHx of HTN and bladder cancer, s/p bladder removal with ileal conduit construction on Aug 6 at [facility], here for generalized weakness. Per pt, she had her surgery on Aug 6th, d/c home on Aug 10th. Since d/c, only have 1-2 bowel movement, and had significantly decrease PO intake due to decreased apptite. No fever, some chill. +bloody discharge from urethra, some burning. Today, called EMS as she felt very weak and unwell. BP 80s/50s for EMS. Patient s/p cystectomy with Dr. Thompson at [facility] on August 6th. Been having decreased PO intake and on and off fevers. Also states she had stents previously that came out on their own. Presents to the ED now due to lower abdominal pain. SICU consulted for hypotension requiring pressors and desaturation on BiPAP  requiring intubation, now s/p extubation 8/23.       NEUROLOGIC   - A&Ox3  - SBIRT consulted, no referral or intervention  - Pain control: IV Tylenol -> PO    RESPIRATORY   - Extubated 8/23  - Monitor SpO2 goal >92%  - CPAP at night  - Incentive spirometry  - camila Nair standing    CARDIOVASCULAR   - Monitor hemodynamics  - On/off levo  - Home ASA, atorvastatin    GASTROINTESTINAL   - CC diet  - Home protonix    /RENAL  - CRRT net even  - Riley in urostomy, do not manipulate  - Trend weight from admit/dry weight for fluid change  - Maintain strict Is/Os  - Monitor electrolytes, replete PRN  - q6 BMP w/ Mg, phos    HEMATOLOGIC  - Heparin gtt -> Eliquis   - ASA    INFECTIOUS DISEASE  - vanc and zosyn (8/23- )   - meropenem (8/16-8/23)  - S/p Diflucan (8/16-8/19)  - BCx 8/15 NGTD  - BCx 8/20 NGTD  - Procalcitonin >100  - Repeat BCx 8/23  - UCx 8/23     ENDOCRINE  - Monitor gluc  - LAINE  - IV synthroid 70mcg QD -> PO    LINES  - PIV   - R IJ CVC  - A line  - Riley in urostomy  - R femoral liloley    DISPO: SICU

## 2024-08-26 NOTE — PHYSICAL THERAPY INITIAL EVALUATION ADULT - ACTIVE RANGE OF MOTION EXAMINATION, REHAB EVAL
Right hip flexion limited due to femoral shiley/bilateral upper extremity Active ROM was WFL (within functional limits)/bilateral  lower extremity Active ROM was WFL (within functional limits)

## 2024-08-26 NOTE — PHYSICAL THERAPY INITIAL EVALUATION ADULT - PATIENT PROFILE REVIEW, REHAB EVAL
ACTIVITY ORDER: OOB with Assistance; Spoke with RN Vnae Shah prior->Pt OK for PT consult/ROM activity; HOLD OOB activity due to femoral shiley./yes

## 2024-08-26 NOTE — PHYSICAL THERAPY INITIAL EVALUATION ADULT - DIAGNOSIS, PT EVAL
Pt presents with decreased strength.
Impairments in functional mobility, balance, strength, and gait

## 2024-08-26 NOTE — CHART NOTE - NSCHARTNOTEFT_GEN_A_CORE
Patient being seen for malnutrition follow up. Spoke with pt and obtained subjective information from extensive chart review.     Diet, Regular:   Consistent Carbohydrate {Evening Snack} (CSTCHOSN)  Supplement Feeding Modality:  Oral  Ensure Clear Cans or Servings Per Day:  1       Frequency:  Three Times a day (08-26-24 @ 09:19)    PO Intake: 50-75%    Current Weight Trend: 97.2 kg (8/26), 100.8 kg (8/24), 101.6 kg (8/22), 102.9 kg (8/20), 98.7 kg (8/18)  Height (cm): 160  Admit Weight (kg): 93   BMI (kg/m2): 36.3  IBW (kg): 57.8     Interval Events: Pt now extubated. She has been receiving oral diet and eating a fair amount. Weight trend reflective of fluid accumulation presently 2+ generalized. FS over the past 24 hrs 91 - 105 mg/dl with Admelog ISS coverage. Pt remains at nutrition risk based on inadequate nutrition with weight loss as identified PTA. Suggest . RDN services to remain available as needed.     __________________ Pertinent Medications__________________   MEDICATIONS  (STANDING):  albuterol/ipratropium for Nebulization 3 milliLiter(s) Nebulizer every 6 hours  aspirin  chewable 81 milliGRAM(s) Enteral Tube daily  atorvastatin 40 milliGRAM(s) Oral at bedtime  chlorhexidine 2% Cloths 1 Application(s) Topical daily  CRRT Treatment    <Continuous>  cycloSPORINE (RESTASIS) 0.05% Emulsion 1 Drop(s) Both EYES two times a day  dextrose 5%. 1000 milliLiter(s) (100 mL/Hr) IV Continuous <Continuous>  dextrose 5%. 1000 milliLiter(s) (50 mL/Hr) IV Continuous <Continuous>  dextrose 50% Injectable 25 Gram(s) IV Push once  dextrose 50% Injectable 12.5 Gram(s) IV Push once  dextrose 50% Injectable 25 Gram(s) IV Push once  dornase siobhan Solution 2.5 milliGRAM(s) Inhalation daily  glucagon  Injectable 1 milliGRAM(s) IntraMuscular once  insulin lispro (ADMELOG) corrective regimen sliding scale   SubCutaneous three times a day before meals  insulin lispro (ADMELOG) corrective regimen sliding scale   SubCutaneous at bedtime  levothyroxine 100 MICROGram(s) Oral daily  midodrine 20 milliGRAM(s) Oral every 8 hours  norepinephrine Infusion 0.05 MICROgram(s)/kG/Min (4.36 mL/Hr) IV Continuous <Continuous>  pantoprazole    Tablet 40 milliGRAM(s) Oral before breakfast  Phoxillum Filtration BK 4 / 2.5 5000 milliLiter(s) (1200 mL/Hr) CRRT <Continuous>  piperacillin/tazobactam IVPB.. 3.375 Gram(s) IV Intermittent every 8 hours  PrismaSOL Filtration BGK 4 / 2.5 5000 milliLiter(s) (1400 mL/Hr) CRRT <Continuous>  PrismaSOL Filtration BGK 4 / 2.5 5000 milliLiter(s) (200 mL/Hr) CRRT <Continuous>    __________________ Pertinent Labs__________________   08-26 Na 135 mmol/L Glu 101 mg/dL<H> K+ 4.1 mmol/L Cr 1.20 mg/dL BUN 12 mg/dL Phos 2.5 mg/dL  08-26 @ 12:02 POCT 102 mg/dL  08-26 @ 08:05 POCT 93 mg/dL  08-25 @ 21:43 POCT 105 mg/dL  08-25 @ 16:52 POCT 100 mg/dL      Skin: No skin injuries identified as per nursing flow sheet records     Estimated Needs (based on IBW - 57.8 kg):     1618 - 1850 kcals/day (28-32 kcals/kg)  81 - 93 gms protein/day (1.4-1.6 gms protein/kg)      Previous Nutrition Diagnoses:     Malnutrition   Increased Nutrient Needs     Nutrition Diagnoses are: [X] ongoing       Goal(s):  1. Patient to meet > 75% estimated energy needs    Recommendations:   1. Ensure Max (150 kcal, 30 gm protein, 11 oz) x 2/day.    Monitoring and Evaluation:   1. Monitor weights, labs, BMs, skin integrity, PO tolerance and edema.  2. RD services to remain available.     Education:    [x] Not warranted at present    Radha Tyson, MS, RDN, CDN, CNSC on MS TEAMS PREFERRED or Pager #[phone]. Patient being seen for malnutrition follow up. Spoke with pt and obtained subjective information from extensive chart review.     Diet, Regular:   Consistent Carbohydrate {Evening Snack} (CSTCHOSN)  Supplement Feeding Modality:  Oral  Ensure Clear Cans or Servings Per Day:  1       Frequency:  Three Times a day (08-26-24 @ 09:19)    PO Intake: 50-75%    Current Weight Trend: 97.2 kg (8/26), 100.8 kg (8/24), 101.6 kg (8/22), 102.9 kg (8/20), 98.7 kg (8/18)  Height (cm): 160  Admit Weight (kg): 93   BMI (kg/m2): 36.3  IBW (kg): 57.8     Interval Events: Pt now extubated. She has been receiving oral diet and eating a fair amount. Ensure Clear is contraindicated on Consistent Carbohydrate diet and does not offer enough protein for patient, especially since she has been recently weaned off of CRRT per Nephrology. Suggest changing supplement to Ensure Max (150 kcal, 30 gm protein, 11 oz) x 2/day which is much higher in protein and more suitable for diabetic patients. Weight trend reflective of fluid accumulation presently 2+ generalized. FS over the past 24 hrs 91 - 105 mg/dl with Admelog ISS coverage. Pt remains at nutrition risk based on inadequate nutrition with weight loss as identified PTA. Suggest switching oral supplementation as mentioned to help met estimated nutrition needs. RDN services to remain available as needed.     __________________ Pertinent Medications__________________   MEDICATIONS  (STANDING):  albuterol/ipratropium for Nebulization 3 milliLiter(s) Nebulizer every 6 hours  aspirin  chewable 81 milliGRAM(s) Enteral Tube daily  atorvastatin 40 milliGRAM(s) Oral at bedtime  chlorhexidine 2% Cloths 1 Application(s) Topical daily  CRRT Treatment    <Continuous>  cycloSPORINE (RESTASIS) 0.05% Emulsion 1 Drop(s) Both EYES two times a day  dextrose 5%. 1000 milliLiter(s) (100 mL/Hr) IV Continuous <Continuous>  dextrose 5%. 1000 milliLiter(s) (50 mL/Hr) IV Continuous <Continuous>  dextrose 50% Injectable 25 Gram(s) IV Push once  dextrose 50% Injectable 12.5 Gram(s) IV Push once  dextrose 50% Injectable 25 Gram(s) IV Push once  dornase siobhan Solution 2.5 milliGRAM(s) Inhalation daily  glucagon  Injectable 1 milliGRAM(s) IntraMuscular once  insulin lispro (ADMELOG) corrective regimen sliding scale   SubCutaneous three times a day before meals  insulin lispro (ADMELOG) corrective regimen sliding scale   SubCutaneous at bedtime  levothyroxine 100 MICROGram(s) Oral daily  midodrine 20 milliGRAM(s) Oral every 8 hours  norepinephrine Infusion 0.05 MICROgram(s)/kG/Min (4.36 mL/Hr) IV Continuous <Continuous>  pantoprazole    Tablet 40 milliGRAM(s) Oral before breakfast  Phoxillum Filtration BK 4 / 2.5 5000 milliLiter(s) (1200 mL/Hr) CRRT <Continuous>  piperacillin/tazobactam IVPB.. 3.375 Gram(s) IV Intermittent every 8 hours  PrismaSOL Filtration BGK 4 / 2.5 5000 milliLiter(s) (1400 mL/Hr) CRRT <Continuous>  PrismaSOL Filtration BGK 4 / 2.5 5000 milliLiter(s) (200 mL/Hr) CRRT <Continuous>    __________________ Pertinent Labs__________________   08-26 Na 135 mmol/L Glu 101 mg/dL<H> K+ 4.1 mmol/L Cr 1.20 mg/dL BUN 12 mg/dL Phos 2.5 mg/dL  08-26 @ 12:02 POCT 102 mg/dL  08-26 @ 08:05 POCT 93 mg/dL  08-25 @ 21:43 POCT 105 mg/dL  08-25 @ 16:52 POCT 100 mg/dL      Skin: No skin injuries identified as per nursing flow sheet records     Estimated Needs (based on IBW - 57.8 kg):     1618 - 1850 kcals/day (28-32 kcals/kg)  81 - 93 gms protein/day (1.4-1.6 gms protein/kg)      Previous Nutrition Diagnoses:     Malnutrition   Increased Nutrient Needs     Nutrition Diagnoses are: [X] ongoing       Goal(s):  1. Patient to meet > 75% estimated energy needs    Recommendations:   1. Ensure Max (150 kcal, 30 gm protein, 11 oz) x 2/day instead of Ensure Clear x 3/day.    Monitoring and Evaluation:   1. Monitor weights, labs, BMs, skin integrity, PO tolerance and edema.  2. RD services to remain available.     Education:    [x] Not warranted at present    Radha Tyson, MS, RDN, CDN, CNSC on MS TEAMS PREFERRED or Pager #[phone].

## 2024-08-26 NOTE — PHYSICAL THERAPY INITIAL EVALUATION ADULT - PERTINENT HX OF CURRENT PROBLEM, REHAB EVAL
Pt is a 68 year old female presenting with generalized weakness, reduced appetite, decrease PO intake, chills, and bloody discharge from urethra. Of note, patient s/p bladder removal with ileal conduit construction on 08/06 at [facility]. CT A/P showed expected postoperative changes status post cystectomy with ileal conduit creation including small amount of complex fluid and free air in the pelvis, no organized fluid collection, distended ileal conduit with narrowing at the exiting ostomy site and proximally at the site of ureteral insertion, and mild bilateral hydroureteronephrosis with delayed nephrograms. US kidney and bladder without evidence of hydronephrosis bilaterally. Pt admitted to SICU for hypotension requiring pressors and desaturation on BiPAP.
67 yo F, with PmHx of HTN and bladder cancer, s/p bladder removal with ileal conduit construction on Aug 6 at [facility], here for generalized weakness. Per pt, she had her surgery on Aug 6th, d/c home on Aug 10th. Since d/c, only have 1-2 bowel movement, and had significantly decrease PO intake due to decreased apptite. No fever, some chill. +bloody discharge from urethra, some burning. Today, called EMS as she felt very weak and unwell. BP 80s/50s for EMS. Patient s/p cystectomy with Dr. Thompson at [facility] on August 6th. Been having decreased PO intake and on and off fevers. Also states she had stents previously that came out on their own. Presents to the ED now due to lower abdominal pain. SICU consulted for hypotension requiring pressors and desaturation on BiPAP  requiring intubation, now s/p extubation 8/23.

## 2024-08-26 NOTE — PHYSICAL THERAPY INITIAL EVALUATION ADULT - SIT-TO-STAND BALANCE
fair balance
Abdomen soft, non-tender and non-distended, no rebound, no guarding and no masses. no hepatosplenomegaly.
To be assessed

## 2024-08-26 NOTE — PHYSICAL THERAPY INITIAL EVALUATION ADULT - PASSIVE RANGE OF MOTION EXAMINATION, REHAB EVAL
Right hip flexion limited due to femoral shiley/bilateral upper extremity Passive ROM was WFL (within functional limits)/bilateral lower extremity Passive ROM was WFL (within functional limits)

## 2024-08-26 NOTE — PROGRESS NOTE ADULT - SUBJECTIVE AND OBJECTIVE BOX
[facility] Division of Kidney Diseases & Hypertension  FOLLOW UP NOTE  [phone]--------------------------------------------------------------------------------  Chief Complaint: BENIGNO    24 hour events/subjective: No acute events overnight. Pt seen and examined at bedside this AM. Feels like she is improving today. Pt received IV lasix 80mg this morning. UOP has been decreasing.     PAST HISTORY  --------------------------------------------------------------------------------  No significant changes to PMH, PSH, FHx, SHx from H&P unless otherwise noted.    ALLERGIES & MEDICATIONS  --------------------------------------------------------------------------------  Allergies    No Known Allergies    Intolerances    Standing Inpatient Medications  albumin human 25% IVPB 50 milliLiter(s) IV Intermittent once  albuterol/ipratropium for Nebulization 3 milliLiter(s) Nebulizer every 6 hours  aspirin  chewable 81 milliGRAM(s) Enteral Tube daily  atorvastatin 40 milliGRAM(s) Oral at bedtime  chlorhexidine 2% Cloths 1 Application(s) Topical daily  CRRT Treatment    <Continuous>  cycloSPORINE (RESTASIS) 0.05% Emulsion 1 Drop(s) Both EYES two times a day  dextrose 5%. 1000 milliLiter(s) IV Continuous <Continuous>  dextrose 5%. 1000 milliLiter(s) IV Continuous <Continuous>  dextrose 50% Injectable 25 Gram(s) IV Push once  dextrose 50% Injectable 12.5 Gram(s) IV Push once  dextrose 50% Injectable 25 Gram(s) IV Push once  dornase siobhan Solution 2.5 milliGRAM(s) Inhalation daily  furosemide   Injectable 80 milliGRAM(s) IV Push once  glucagon  Injectable 1 milliGRAM(s) IntraMuscular once  insulin lispro (ADMELOG) corrective regimen sliding scale   SubCutaneous at bedtime  insulin lispro (ADMELOG) corrective regimen sliding scale   SubCutaneous three times a day before meals  levothyroxine 100 MICROGram(s) Oral daily  midodrine 20 milliGRAM(s) Oral every 8 hours  norepinephrine Infusion 0.05 MICROgram(s)/kG/Min IV Continuous <Continuous>  pantoprazole    Tablet 40 milliGRAM(s) Oral before breakfast  Phoxillum Filtration BK 4 / 2.5 5000 milliLiter(s) CRRT <Continuous>  piperacillin/tazobactam IVPB.. 3.375 Gram(s) IV Intermittent every 8 hours  PrismaSOL Filtration BGK 4 / 2.5 5000 milliLiter(s) CRRT <Continuous>  PrismaSOL Filtration BGK 4 / 2.5 5000 milliLiter(s) CRRT <Continuous>    PRN Inpatient Medications  acetaminophen     Tablet .. 975 milliGRAM(s) Oral every 6 hours PRN  dextrose Oral Gel 15 Gram(s) Oral once PRN  melatonin 3 milliGRAM(s) Oral at bedtime PRN      REVIEW OF SYSTEMS  --------------------------------------------------------------------------------  Gen: No fevers/chills  Respiratory: No dyspnea  CV: No chest pain  MSK: No edema    All other systems were reviewed and are negative, except as noted above.    VITALS/PHYSICAL EXAM  --------------------------------------------------------------------------------  T(C): 36.9 (08-26-24 @ 08:00), Max: 37.5 (08-25-24 @ 16:00)  HR: 84 (08-26-24 @ 09:00) (84 - 104)  BP: --  RR: 14 (08-26-24 @ 09:00) (12 - 27)  SpO2: 100% (08-26-24 @ 09:00) (90% - 100%)  Wt(kg): --    08-25-24 @ 07:01  -  08-26-24 @ 07:00  --------------------------------------------------------  IN: 732 mL / OUT: 920 mL / NET: -188 mL    08-26-24 @ 07:01  -  08-26-24 @ 10:06  --------------------------------------------------------  IN: 275 mL / OUT: 5 mL / NET: 270 mL    Physical Exam:  Gen: NAD, well-appearing  Pulm: CTA B/L  CV: RRR, S1S2;  Abd: +BS, soft, nontender/nondistended  Extremities: no bilateral LE edema noted.   Neuro: No focal deficits  Vascular Access: R femoral non-tunnuled HD catheter.    LABS/STUDIES  --------------------------------------------------------------------------------              7.5    17.88 >-----------<  317      [08-26-24 @ 04:30]              21.7     135  |  101  |  11  ----------------------------<  96      [08-26-24 @ 06:20]  4.2   |  21  |  1.18        Ca     7.9     [08-26-24 @ 06:20]      iCa    1.00     [08-26 @ 00:35]      Mg     2.40     [08-26-24 @ 06:20]      Phos  2.3     [08-26-24 @ 06:20]    Creatinine Trend:  SCr 1.18 [08-26 @ 06:20]  SCr 1.23 [08-26 @ 00:35]  SCr 1.31 [08-25 @ 19:18]  SCr 1.27 [08-25 @ 13:00]  SCr 1.28 [08-25 @ 06:35]           [facility] Division of Kidney Diseases & Hypertension  FOLLOW UP NOTE  [phone]--------------------------------------------------------------------------------    Chief Complaint: BENIGNO    24 hour events/subjective: No acute events overnight. Pt seen and examined at bedside this AM. Feels like she is improving today. Pt received IV lasix 80mg this morning. UOP has been decreasing.     PAST HISTORY  --------------------------------------------------------------------------------  No significant changes to PMH, PSH, FHx, SHx from H&P unless otherwise noted.    ALLERGIES & MEDICATIONS  --------------------------------------------------------------------------------  Allergies    No Known Allergies    Intolerances    Standing Inpatient Medications  albumin human 25% IVPB 50 milliLiter(s) IV Intermittent once  albuterol/ipratropium for Nebulization 3 milliLiter(s) Nebulizer every 6 hours  aspirin  chewable 81 milliGRAM(s) Enteral Tube daily  atorvastatin 40 milliGRAM(s) Oral at bedtime  chlorhexidine 2% Cloths 1 Application(s) Topical daily  CRRT Treatment    <Continuous>  cycloSPORINE (RESTASIS) 0.05% Emulsion 1 Drop(s) Both EYES two times a day  dextrose 5%. 1000 milliLiter(s) IV Continuous <Continuous>  dextrose 5%. 1000 milliLiter(s) IV Continuous <Continuous>  dextrose 50% Injectable 25 Gram(s) IV Push once  dextrose 50% Injectable 12.5 Gram(s) IV Push once  dextrose 50% Injectable 25 Gram(s) IV Push once  dornase siobhan Solution 2.5 milliGRAM(s) Inhalation daily  furosemide   Injectable 80 milliGRAM(s) IV Push once  glucagon  Injectable 1 milliGRAM(s) IntraMuscular once  insulin lispro (ADMELOG) corrective regimen sliding scale   SubCutaneous at bedtime  insulin lispro (ADMELOG) corrective regimen sliding scale   SubCutaneous three times a day before meals  levothyroxine 100 MICROGram(s) Oral daily  midodrine 20 milliGRAM(s) Oral every 8 hours  norepinephrine Infusion 0.05 MICROgram(s)/kG/Min IV Continuous <Continuous>  pantoprazole    Tablet 40 milliGRAM(s) Oral before breakfast  Phoxillum Filtration BK 4 / 2.5 5000 milliLiter(s) CRRT <Continuous>  piperacillin/tazobactam IVPB.. 3.375 Gram(s) IV Intermittent every 8 hours  PrismaSOL Filtration BGK 4 / 2.5 5000 milliLiter(s) CRRT <Continuous>  PrismaSOL Filtration BGK 4 / 2.5 5000 milliLiter(s) CRRT <Continuous>    PRN Inpatient Medications  acetaminophen     Tablet .. 975 milliGRAM(s) Oral every 6 hours PRN  dextrose Oral Gel 15 Gram(s) Oral once PRN  melatonin 3 milliGRAM(s) Oral at bedtime PRN      REVIEW OF SYSTEMS  --------------------------------------------------------------------------------  Gen: No fevers/chills  Respiratory: No dyspnea  CV: No chest pain  MSK: No edema    All other systems were reviewed and are negative, except as noted above.    VITALS/PHYSICAL EXAM  --------------------------------------------------------------------------------  T(C): 36.9 (08-26-24 @ 08:00), Max: 37.5 (08-25-24 @ 16:00)  HR: 84 (08-26-24 @ 09:00) (84 - 104)  RR: 14 (08-26-24 @ 09:00) (12 - 27)  SpO2: 100% (08-26-24 @ 09:00) (90% - 100%)  Wt(kg): --    08-25-24 @ 07:01  -  08-26-24 @ 07:00  --------------------------------------------------------  IN: 732 mL / OUT: 920 mL / NET: -188 mL    08-26-24 @ 07:01  -  08-26-24 @ 10:06  --------------------------------------------------------  IN: 275 mL / OUT: 5 mL / NET: 270 mL    Physical Exam:  Gen: NAD, well-appearing  Pulm: CTA B/L  CV: RRR, S1S2;  Abd: +BS, soft, nontender/nondistended  Extremities: no bilateral LE edema noted.   Neuro: No focal deficits  Vascular Access: R femoral non-tunneled HD catheter.    LABS/STUDIES  --------------------------------------------------------------------------------              7.5    17.88 >-----------<  317      [08-26-24 @ 04:30]              21.7     135  |  101  |  11  ----------------------------<  96      [08-26-24 @ 06:20]  4.2   |  21  |  1.18        Ca     7.9     [08-26-24 @ 06:20]      iCa    1.00     [08-26 @ 00:35]      Mg     2.40     [08-26-24 @ 06:20]      Phos  2.3     [08-26-24 @ 06:20]    Creatinine Trend:  SCr 1.18 [08-26 @ 06:20]  SCr 1.23 [08-26 @ 00:35]  SCr 1.31 [08-25 @ 19:18]  SCr 1.27 [08-25 @ 13:00]  SCr 1.28 [08-25 @ 06:35]

## 2024-08-26 NOTE — PROCEDURE NOTE - NSPROCNAME_GEN_A_CORE
Arterial Puncture/Cannulation
Central Line Insertion
Point of Care Ultrasound Cardiac
Central Line Insertion
Arterial Puncture/Cannulation
Midline Insertion
Tracheal Intubation

## 2024-08-26 NOTE — PROGRESS NOTE ADULT - ATTENDING COMMENTS
Agree with above.  BENIGNO complicated by volume overload and acidosis.  IV push of lasix given today.  Recommend hold CRRT will evaluate for renal recovery vs IHD tomorrow.

## 2024-08-26 NOTE — PHYSICAL THERAPY INITIAL EVALUATION ADULT - NSPTDISCHREC_GEN_A_CORE
Restorative Rehab
Rehabilitation to improve strength and balance for return to prior level of function.

## 2024-08-26 NOTE — PROGRESS NOTE ADULT - SUBJECTIVE AND OBJECTIVE BOX
SICU Daily Progress Note  =====================================================    Interval/Overnight Events: NAOE  WBC trending down f/u AM   Started Midodrine 20 q8   On Eliquis       MEDICATIONS:   --------------------------------------------------------------------------------------  acetaminophen     Tablet .. 975 milliGRAM(s) Oral every 6 hours PRN  albuterol/ipratropium for Nebulization 3 milliLiter(s) Nebulizer every 6 hours  apixaban 10 milliGRAM(s) Oral every 12 hours  aspirin  chewable 81 milliGRAM(s) Enteral Tube daily  atorvastatin 40 milliGRAM(s) Oral at bedtime  chlorhexidine 2% Cloths 1 Application(s) Topical daily  CRRT Treatment    <Continuous>  cycloSPORINE (RESTASIS) 0.05% Emulsion 1 Drop(s) Both EYES two times a day  dextrose 5%. 1000 milliLiter(s) IV Continuous <Continuous>  dextrose 5%. 1000 milliLiter(s) IV Continuous <Continuous>  dextrose 50% Injectable 25 Gram(s) IV Push once  dextrose 50% Injectable 12.5 Gram(s) IV Push once  dextrose 50% Injectable 25 Gram(s) IV Push once  dextrose Oral Gel 15 Gram(s) Oral once PRN  dornase siobhan Solution 2.5 milliGRAM(s) Inhalation daily  glucagon  Injectable 1 milliGRAM(s) IntraMuscular once  insulin lispro (ADMELOG) corrective regimen sliding scale   SubCutaneous at bedtime  insulin lispro (ADMELOG) corrective regimen sliding scale   SubCutaneous three times a day before meals  levothyroxine Injectable 70 MICROGram(s) IV Push <User Schedule>  melatonin 3 milliGRAM(s) Oral at bedtime PRN  midodrine 20 milliGRAM(s) Oral every 8 hours  norepinephrine Infusion 0.05 MICROgram(s)/kG/Min IV Continuous <Continuous>  pantoprazole    Tablet 40 milliGRAM(s) Oral before breakfast  Phoxillum Filtration BK 4 / 2.5 5000 milliLiter(s) CRRT <Continuous>  piperacillin/tazobactam IVPB.. 3.375 Gram(s) IV Intermittent every 8 hours  PrismaSOL Filtration BGK 4 / 2.5 5000 milliLiter(s) CRRT <Continuous>  PrismaSOL Filtration BGK 4 / 2.5 5000 milliLiter(s) CRRT <Continuous>  vancomycin  IVPB 1000 milliGRAM(s) IV Intermittent every 12 hours    --------------------------------------------------------------------------------------    VITAL SIGNS, INS/OUTS (last 24 hours):  --------------------------------------------------------------------------------------  T(C): 37.3 (08-25-24 @ 20:00), Max: 37.5 (08-25-24 @ 16:00)  HR: 103 (08-25-24 @ 23:00) (80 - 108)  BP: --  RR: 22 (08-25-24 @ 23:00) (12 - 30)  SpO2: 100% (08-25-24 @ 23:00) (90% - 100%)      08-24-24 @ 07:01  -  08-25-24 @ 07:00  --------------------------------------------------------  IN: 577.2 mL / OUT: 807 mL / NET: -229.8 mL    08-25-24 @ 07:01 - 08-26-24 @ 00:17  --------------------------------------------------------  IN: 405.3 mL / OUT: 471 mL / NET: -65.7 mL      --------------------------------------------------------------------------------------      EXAM  NEURO: NAD  HEENT: NC/AT  RESPIRATORY: RA  CARDIO: RRR. normal rate   ABDOMEN: soft, nontender, nondistended, ileal conduit pink and well perfused with red tinged output    TUBES/LINES/DRAINS  [x] Peripheral IV  [] Central Venous Line     	[x] R	[] L	[x] IJ	[] Fem	[] SC	Date Placed:   [] Arterial Line		[x] R	[] L	[] Fem	[x] Rad	[] Ax	Date Placed:   [] PICC		[] Midline		[] Mediport  [] Urinary Catheter		Date Placed:     LABS  --------------------------------------------------------------------------------------    --------------------------------------------------------------------------------------    IMAGING STUDIES:      SICU Daily Progress Note  =====================================================    Interval/Overnight Events: NAOE  hgb 6.8 > 1U PRBC   WBC trending down   Started Midodrine 20 q8   On Eliquis       MEDICATIONS:   --------------------------------------------------------------------------------------  acetaminophen     Tablet .. 975 milliGRAM(s) Oral every 6 hours PRN  albuterol/ipratropium for Nebulization 3 milliLiter(s) Nebulizer every 6 hours  apixaban 10 milliGRAM(s) Oral every 12 hours  aspirin  chewable 81 milliGRAM(s) Enteral Tube daily  atorvastatin 40 milliGRAM(s) Oral at bedtime  chlorhexidine 2% Cloths 1 Application(s) Topical daily  CRRT Treatment    <Continuous>  cycloSPORINE (RESTASIS) 0.05% Emulsion 1 Drop(s) Both EYES two times a day  dextrose 5%. 1000 milliLiter(s) IV Continuous <Continuous>  dextrose 5%. 1000 milliLiter(s) IV Continuous <Continuous>  dextrose 50% Injectable 25 Gram(s) IV Push once  dextrose 50% Injectable 12.5 Gram(s) IV Push once  dextrose 50% Injectable 25 Gram(s) IV Push once  dextrose Oral Gel 15 Gram(s) Oral once PRN  dornase siobhan Solution 2.5 milliGRAM(s) Inhalation daily  glucagon  Injectable 1 milliGRAM(s) IntraMuscular once  insulin lispro (ADMELOG) corrective regimen sliding scale   SubCutaneous at bedtime  insulin lispro (ADMELOG) corrective regimen sliding scale   SubCutaneous three times a day before meals  levothyroxine Injectable 70 MICROGram(s) IV Push <User Schedule>  melatonin 3 milliGRAM(s) Oral at bedtime PRN  midodrine 20 milliGRAM(s) Oral every 8 hours  norepinephrine Infusion 0.05 MICROgram(s)/kG/Min IV Continuous <Continuous>  pantoprazole    Tablet 40 milliGRAM(s) Oral before breakfast  Phoxillum Filtration BK 4 / 2.5 5000 milliLiter(s) CRRT <Continuous>  piperacillin/tazobactam IVPB.. 3.375 Gram(s) IV Intermittent every 8 hours  PrismaSOL Filtration BGK 4 / 2.5 5000 milliLiter(s) CRRT <Continuous>  PrismaSOL Filtration BGK 4 / 2.5 5000 milliLiter(s) CRRT <Continuous>  vancomycin  IVPB 1000 milliGRAM(s) IV Intermittent every 12 hours    --------------------------------------------------------------------------------------    VITAL SIGNS, INS/OUTS (last 24 hours):  --------------------------------------------------------------------------------------  T(C): 37.3 (08-25-24 @ 20:00), Max: 37.5 (08-25-24 @ 16:00)  HR: 103 (08-25-24 @ 23:00) (80 - 108)  BP: --  RR: 22 (08-25-24 @ 23:00) (12 - 30)  SpO2: 100% (08-25-24 @ 23:00) (90% - 100%)      08-24-24 @ 07:01  -  08-25-24 @ 07:00  --------------------------------------------------------  IN: 577.2 mL / OUT: 807 mL / NET: -229.8 mL    08-25-24 @ 07:01  - 08-26-24 @ 00:17  --------------------------------------------------------  IN: 405.3 mL / OUT: 471 mL / NET: -65.7 mL      --------------------------------------------------------------------------------------      EXAM  NEURO: NAD  HEENT: NC/AT  RESPIRATORY: RA  CARDIO: RRR. normal rate   ABDOMEN: soft, nontender, nondistended, ileal conduit pink and well perfused with red tinged output    TUBES/LINES/DRAINS  [x] Peripheral IV  [] Central Venous Line     	[x] R	[] L	[x] IJ	[] Fem	[] SC	Date Placed:   [] Arterial Line		[x] R	[] L	[] Fem	[x] Rad	[] Ax	Date Placed:   [] PICC		[] Midline		[] Mediport  [] Urinary Catheter		Date Placed:     LABS  --------------------------------------------------------------------------------------    --------------------------------------------------------------------------------------    IMAGING STUDIES:

## 2024-08-26 NOTE — PHYSICAL THERAPY INITIAL EVALUATION ADULT - GENERAL OBSERVATIONS, REHAB EVAL
Pt encountered in semisupine position, no distress, AxOx4, with +IV, +tele, +pulse oximeter, +right femoral shiley, +A.line, and +drain. Pt agreeable to participate in PT Re- Evaluation.  Arterial BP 84/36mmHg, heart rate 93bpm, SpO2 100%.
Upon entry, pt semi-supine in bed in NAD; + ICU monitoring, + ileal conduit, + nasal cannula. /73 HR 96bpm SpO2 100% on 4L. Son present at bedside. Pt left seated in recliner with all tubes/lines intact, call bell in reach and in NAD.

## 2024-08-27 DIAGNOSIS — N17.9 ACUTE KIDNEY FAILURE, UNSPECIFIED: ICD-10-CM

## 2024-08-27 LAB
ANION GAP SERPL CALC-SCNC: 9 MMOL/L — SIGNIFICANT CHANGE UP (ref 7–14)
BUN SERPL-MCNC: 19 MG/DL — SIGNIFICANT CHANGE UP (ref 7–23)
CALCIUM SERPL-MCNC: 8.2 MG/DL — LOW (ref 8.4–10.5)
CHLORIDE SERPL-SCNC: 102 MMOL/L — SIGNIFICANT CHANGE UP (ref 98–107)
CO2 SERPL-SCNC: 23 MMOL/L — SIGNIFICANT CHANGE UP (ref 22–31)
CREAT SERPL-MCNC: 1.98 MG/DL — HIGH (ref 0.5–1.3)
EGFR: 27 ML/MIN/1.73M2 — LOW
GLUCOSE BLDC GLUCOMTR-MCNC: 100 MG/DL — HIGH (ref 70–99)
GLUCOSE BLDC GLUCOMTR-MCNC: 102 MG/DL — HIGH (ref 70–99)
GLUCOSE BLDC GLUCOMTR-MCNC: 112 MG/DL — HIGH (ref 70–99)
GLUCOSE BLDC GLUCOMTR-MCNC: 114 MG/DL — HIGH (ref 70–99)
GLUCOSE SERPL-MCNC: 107 MG/DL — HIGH (ref 70–99)
HCT VFR BLD CALC: 21.1 % — LOW (ref 34.5–45)
HGB BLD-MCNC: 7.2 G/DL — LOW (ref 11.5–15.5)
MAGNESIUM SERPL-MCNC: 2.3 MG/DL — SIGNIFICANT CHANGE UP (ref 1.6–2.6)
MCHC RBC-ENTMCNC: 30.4 PG — SIGNIFICANT CHANGE UP (ref 27–34)
MCHC RBC-ENTMCNC: 34.1 GM/DL — SIGNIFICANT CHANGE UP (ref 32–36)
MCV RBC AUTO: 89 FL — SIGNIFICANT CHANGE UP (ref 80–100)
NRBC # BLD: 0 /100 WBCS — SIGNIFICANT CHANGE UP (ref 0–0)
NRBC # FLD: 0 K/UL — SIGNIFICANT CHANGE UP (ref 0–0)
PHOSPHATE SERPL-MCNC: 3.5 MG/DL — SIGNIFICANT CHANGE UP (ref 2.5–4.5)
PLATELET # BLD AUTO: 357 K/UL — SIGNIFICANT CHANGE UP (ref 150–400)
POTASSIUM SERPL-MCNC: 4.3 MMOL/L — SIGNIFICANT CHANGE UP (ref 3.5–5.3)
POTASSIUM SERPL-SCNC: 4.3 MMOL/L — SIGNIFICANT CHANGE UP (ref 3.5–5.3)
RBC # BLD: 2.37 M/UL — LOW (ref 3.8–5.2)
RBC # FLD: 16.5 % — HIGH (ref 10.3–14.5)
SODIUM SERPL-SCNC: 134 MMOL/L — LOW (ref 135–145)
WBC # BLD: 13.42 K/UL — HIGH (ref 3.8–10.5)
WBC # FLD AUTO: 13.42 K/UL — HIGH (ref 3.8–10.5)

## 2024-08-27 PROCEDURE — 99233 SBSQ HOSP IP/OBS HIGH 50: CPT

## 2024-08-27 PROCEDURE — 99231 SBSQ HOSP IP/OBS SF/LOW 25: CPT | Mod: GC

## 2024-08-27 RX ORDER — FUROSEMIDE 40 MG
80 TABLET ORAL ONCE
Refills: 0 | Status: DISCONTINUED | OUTPATIENT
Start: 2024-08-27 | End: 2024-08-27

## 2024-08-27 RX ORDER — APIXABAN 5 MG/1
10 TABLET, FILM COATED ORAL
Refills: 0 | Status: DISCONTINUED | OUTPATIENT
Start: 2024-08-27 | End: 2024-08-27

## 2024-08-27 RX ORDER — FUROSEMIDE 40 MG
80 TABLET ORAL ONCE
Refills: 0 | Status: COMPLETED | OUTPATIENT
Start: 2024-08-27 | End: 2024-08-27

## 2024-08-27 RX ORDER — ALBUMIN (HUMAN) 5 G/20ML
50 SOLUTION INTRAVENOUS ONCE
Refills: 0 | Status: COMPLETED | OUTPATIENT
Start: 2024-08-27 | End: 2024-08-27

## 2024-08-27 RX ORDER — APIXABAN 5 MG/1
10 TABLET, FILM COATED ORAL EVERY 12 HOURS
Refills: 0 | Status: COMPLETED | OUTPATIENT
Start: 2024-08-27 | End: 2024-08-28

## 2024-08-27 RX ORDER — IPRATROPIUM BROMIDE AND ALBUTEROL SULFATE .5; 3 MG/3ML; MG/3ML
3 SOLUTION RESPIRATORY (INHALATION) EVERY 12 HOURS
Refills: 0 | Status: DISCONTINUED | OUTPATIENT
Start: 2024-08-27 | End: 2024-08-27

## 2024-08-27 RX ADMIN — Medication 40 MILLIGRAM(S): at 21:14

## 2024-08-27 RX ADMIN — CHLORHEXIDINE GLUCONATE 1 APPLICATION(S): 40 SOLUTION TOPICAL at 11:19

## 2024-08-27 RX ADMIN — Medication 80 MILLIGRAM(S): at 02:58

## 2024-08-27 RX ADMIN — PIPERACILLIN SODIUM AND TAZOBACTAM SODIUM 25 GRAM(S): 3; .375 INJECTION, POWDER, FOR SOLUTION INTRAVENOUS at 21:14

## 2024-08-27 RX ADMIN — Medication 1 DROP(S): at 05:36

## 2024-08-27 RX ADMIN — PIPERACILLIN SODIUM AND TAZOBACTAM SODIUM 25 GRAM(S): 3; .375 INJECTION, POWDER, FOR SOLUTION INTRAVENOUS at 14:01

## 2024-08-27 RX ADMIN — ACETAMINOPHEN 975 MILLIGRAM(S): 325 TABLET ORAL at 23:11

## 2024-08-27 RX ADMIN — Medication 80 MILLIGRAM(S): at 14:01

## 2024-08-27 RX ADMIN — IPRATROPIUM BROMIDE AND ALBUTEROL SULFATE 3 MILLILITER(S): .5; 3 SOLUTION RESPIRATORY (INHALATION) at 10:21

## 2024-08-27 RX ADMIN — ALBUMIN (HUMAN) 50 MILLILITER(S): 5 SOLUTION INTRAVENOUS at 02:59

## 2024-08-27 RX ADMIN — ACETAMINOPHEN 975 MILLIGRAM(S): 325 TABLET ORAL at 00:25

## 2024-08-27 RX ADMIN — MIDODRINE HYDROCHLORIDE 20 MILLIGRAM(S): 5 TABLET ORAL at 14:00

## 2024-08-27 RX ADMIN — ALBUMIN (HUMAN) 50 MILLILITER(S): 5 SOLUTION INTRAVENOUS at 11:18

## 2024-08-27 RX ADMIN — Medication 100 MICROGRAM(S): at 05:37

## 2024-08-27 RX ADMIN — DORNASE ALFA 2.5 MILLIGRAM(S): 1 SOLUTION RESPIRATORY (INHALATION) at 10:23

## 2024-08-27 RX ADMIN — Medication 1 DROP(S): at 17:06

## 2024-08-27 RX ADMIN — APIXABAN 10 MILLIGRAM(S): 5 TABLET, FILM COATED ORAL at 17:06

## 2024-08-27 RX ADMIN — Medication 40 MILLIGRAM(S): at 06:29

## 2024-08-27 RX ADMIN — MIDODRINE HYDROCHLORIDE 20 MILLIGRAM(S): 5 TABLET ORAL at 21:13

## 2024-08-27 RX ADMIN — MIDODRINE HYDROCHLORIDE 20 MILLIGRAM(S): 5 TABLET ORAL at 05:37

## 2024-08-27 RX ADMIN — Medication 80 MILLIGRAM(S): at 11:30

## 2024-08-27 RX ADMIN — Medication 81 MILLIGRAM(S): at 11:18

## 2024-08-27 RX ADMIN — PIPERACILLIN SODIUM AND TAZOBACTAM SODIUM 25 GRAM(S): 3; .375 INJECTION, POWDER, FOR SOLUTION INTRAVENOUS at 05:37

## 2024-08-27 NOTE — PROGRESS NOTE ADULT - ASSESSMENT
67 yo F, with PmHx of HTN and bladder cancer, s/p bladder removal with ileal conduit construction on Aug 6 at [facility], here for generalized weakness. Per pt, she had her surgery on Aug 6th, d/c home on Aug 10th. Since d/c, only have 1-2 bowel movement, and had significantly decrease PO intake due to decreased apptite. No fever, some chill. +bloody discharge from urethra, some burning. Today, called EMS as she felt very weak and unwell. BP 80s/50s for EMS. Patient s/p cystectomy with Dr. Thompson at [facility] on August 6th. Been having decreased PO intake and on and off fevers. Also states she had stents previously that came out on their own. Presents to the ED now due to lower abdominal pain. SICU consulted for hypotension requiring pressors and desaturation on BiPAP  requiring intubation, now s/p extubation 8/23.       NEUROLOGIC   - A&Ox3  - SBIRT consulted, no referral or intervention  - Pain control: IV Tylenol -> PO    RESPIRATORY   - Extubated 8/23  - Monitor SpO2 goal >92%  - CPAP at night  - Incentive spirometry  - camila Nair standing    CARDIOVASCULAR   - Monitor hemodynamics  - On/off levo  - Home ASA, atorvastatin    GASTROINTESTINAL   - CC diet  - Home protonix    /RENAL  - CRRT d/c'ed  - Riley in urostomy, do not manipulate  - Trend weight from admit/dry weight for fluid change  - Maintain strict Is/Os  - Monitor electrolytes, replete PRN  - q6 BMP w/ Mg, phos    HEMATOLOGIC  - Heparin gtt -> Eliquis d/c'ed  - ASA    INFECTIOUS DISEASE  - zosyn (8/23- )   - Vanc (8/23-8/26)  - meropenem (8/16-8/23)  - S/p Diflucan (8/16-8/19)  - BCx 8/15 NGTD  - BCx 8/20 NGTD  - Procalcitonin >100  - Repeat BCx 8/23  - UCx 8/23     ENDOCRINE  - Monitor gluc  - LAINE  - IV synthroid 70mcg QD -> PO    LINES  - PIV   - R IJ CVC  - A line  - Riley in urostomy  - R femoral karly    DISPO: SICU     67 yo F, with PmHx of HTN and bladder cancer, s/p bladder removal with ileal conduit construction on Aug 6 at [facility] here for generalized weakness. Per pt, she had her surgery on Aug 6th, d/c home on Aug 10th. Since d/c, only have 1-2 bowel movement, and had significantly decrease PO intake due to decreased appetite No fever, some chill. +bloody discharge from urethra, some burning. Today, called EMS as she felt very weak and unwell. BP 80s/50s for EMS. Patient s/p cystectomy with Dr. Thompson at [place] on August 6th. Been having decreased PO intake and on and off fevers. Also states she had stents previously that came out on their own. Presents to the ED now due to lower abdominal pain. SICU consulted for hypotension requiring pressors and desaturation on BiPAP  requiring intubation, now s/p extubation 8/23.     NEUROLOGIC   - A&Ox3  - SBIRT consulted, no referral or intervention  - Pain control: IV Tylenol -> PO    RESPIRATORY   - Extubated 8/23  - Monitor SpO2 goal >92%  - CPAP at night  - Incentive spirometry  - Pulmozyne, duonebs q12    CARDIOVASCULAR   - Monitor hemodynamics  - Off levo since 8/25  - Home ASA, atorvastatin  - Midrodine   - Hgb 6.4>7.5 s/p 1U PRBC     GASTROINTESTINAL   - CC diet  - Home protonix    /RENAL  - CRRT d/c 8/26  - Riley in urostomy, do not manipulate  - Trend weight from admit/dry weight for fluid change  - Maintain strict Is/Os  - Monitor electrolytes, replete PRN  - q6 BMP w/ Mg, phos  - Urine culture negative (8/23)  - continued bleeding per vaginal canal    HEMATOLOGIC  - Heparin gtt -> holding Eliquis for line removal 8/27  - ASA    INFECTIOUS DISEASE  - vanc (8/23- 8/26)  -  zosyn (8/23- )  - meropenem (8/16-8/23)  - S/p Diflucan (8/16-8/19)  - BCx 8/15 NGTD  - BCx 8/20 NGTD  - Procalcitonin >1,000  - Repeat BCx 8/23  - UCx 8/23 NGTD    ENDOCRINE  - Monitor gluc  - LAINE  - IV synthroid 70mcg QD -> 100 PO (home)    LINES  - PIV   - R IJ CVC  - R A line  - Riley in urostomy (do not flush)   - R femoral karly    DISPO: STEVIE Weeks   67 yo F, with PmHx of HTN and bladder cancer, s/p bladder removal with ileal conduit construction on Aug 6 at [facility] here for generalized weakness. Per pt, she had her surgery on Aug 6th, d/c home on Aug 10th. Since d/c, only have 1-2 bowel movement, and had significantly decrease PO intake due to decreased appetite No fever, some chill. +bloody discharge from urethra, some burning. Today, called EMS as she felt very weak and unwell. BP 80s/50s for EMS. Patient s/p cystectomy with Dr. Thompson at [place] on August 6th. Been having decreased PO intake and on and off fevers. Also states she had stents previously that came out on their own. Presents to the ED now due to lower abdominal pain. SICU consulted for hypotension requiring pressors and desaturation on BiPAP  requiring intubation, now s/p extubation 8/23.     NEUROLOGIC   - A&Ox3  - SBIRT consulted, no referral or intervention  - Pain control: IV Tylenol -> PO    RESPIRATORY   - Extubated 8/23  - Monitor SpO2 goal >92%  - CPAP at night  - Incentive spirometry  - Pulmozyne, duonebs q12    CARDIOVASCULAR   - Monitor hemodynamics  - Off levo since 8/25  - Home ASA, atorvastatin  - Midrodine   - Hgb 6.4>7.5 s/p 1U PRBC     GASTROINTESTINAL   - CC diet  - Home protonix    /RENAL  - CRRT d/c 8/26  - Riley in urostomy, do not manipulate  - Trend weight from admit/dry weight for fluid change  - Maintain strict Is/Os  - Monitor electrolytes, replete PRN  - q6 BMP w/ Mg, phos  - Urine culture negative (8/23)  - continued bleeding per vaginal canal    HEMATOLOGIC  - Restarted Eliquis  - ASA    INFECTIOUS DISEASE  - vanc (8/23- 8/26)  -  zosyn (8/23- )  - meropenem (8/16-8/23)  - S/p Diflucan (8/16-8/19)  - BCx 8/15 NGTD  - BCx 8/20 NGTD  - Procalcitonin >1,000  - Repeat BCx 8/23  - UCx 8/23 NGTD    ENDOCRINE  - Monitor gluc  - LAINE  - IV synthroid 70mcg QD -> 100 PO (home)    LINES  - RUE midline  - R A line  - Riley in urostomy (do not flush)       DISPO: Continue SICU care

## 2024-08-27 NOTE — PROGRESS NOTE ADULT - ATTENDING COMMENTS
I agree with the detailed interval history, physical, and plan, which I have reviewed and edited where appropriate'; also agree with notes/assessment with my team on service.  I have personally examined the patient.  I was physically present for the key portions of the evaluation and management (E/M) service provided.  I reviewed all the pertinent data.  The patient is a critical care patient with life threatening hemodynamic and metabolic instability in SICU.  The SICU team has a constant risk benefit analyzes discussion and coordinating care with the primary team and all consultants.   The patient is in SICU with the chief complaint and diagnosis mentioned in the note.   The plan will be specified in the note.  69 yo F s/p bladder removal with ileal conduit; s/p cystectomy in SICU for hypotension requiring pressors and desaturation on BiPAP..  AO3  Lung coarse BS  Heart RR  Abd soft    PLAN  NEUROLOGIC   -Tylenol   RESPIRATORY   -duonebs   CARDIOVASCULAR   -ASA  -atorvastatin  GASTROINTESTINAL   -protonix  /RENAL  -Lasix  HEMATOLOGIC  -Eliquis   - ASA  INFECTIOUS DISEASE  - vanco  -zosyn   ENDOCRINE  - Monitor glucose    DISPO: SICU. DC lines

## 2024-08-27 NOTE — PROGRESS NOTE ADULT - SUBJECTIVE AND OBJECTIVE BOX
Interval/Overnight Events:   - Fungitell sent  - Lines out 8/27  - POCUS showed B lines 8/26, s/p 50 albumin, 80 lasix. Repeat Albumin and 80IV lasix overnight, responded well with increased urine output  - vanc dc'd  - Eliquis dc'd  - CRRT d/c'ed as of 8/26 10AM      Subjective  Awake and conversing with team. on RA. Good UOP from stoma, urine is clear yellow.       Vital signs  T(F): , Max: 99 (08-26-24 @ 16:00)  HR: 67 (08-27-24 @ 10:27)  BP: --  SpO2: 100% (08-27-24 @ 10:27)  Wt(kg): --    Output     OUT:    Urostomy (mL): 980 mL  Total OUT: 980 mL    Total NET: -980 mL      OUT:    Urostomy (mL): 130 mL  Total OUT: 130 mL    Total NET: -130 mL          Physical Examination:  GEN: NAD, resting quietly  : IC with taylor in place draining clear yellow urine     Labs      08-27 @ 00:50    WBC 13.42 / Hct 21.1  / SCr 1.98     08-26 @ 18:35    WBC --    / Hct --    / SCr 1.62         Culture - Urine (collected 08-23-24 @ 20:40)  Source: .Urine Ileal Conduit  Final Report (08-25-24 @ 12:11):    No growth    Culture - Blood (collected 08-23-24 @ 16:40)  Source: .Blood Blood-Peripheral  Preliminary Report (08-26-24 @ 22:01):    No growth at 72 Hours    Culture - Blood (collected 08-23-24 @ 16:35)  Source: .Blood Blood-Peripheral  Preliminary Report (08-26-24 @ 22:01):    No growth at 72 Hours    Culture - Blood (collected 08-20-24 @ 20:50)  Source: .Blood Blood-Venous  Final Report (08-26-24 @ 02:00):    No growth at 5 days    Culture - Blood (collected 08-20-24 @ 20:25)  Source: .Blood Blood-Venous  Final Report (08-26-24 @ 03:00):    No growth at 5 days        Urine Cx: ?  Blood Cx: ?    Imaging

## 2024-08-27 NOTE — PROGRESS NOTE ADULT - ASSESSMENT
67 yo F h/o HTN and bladder CA, s/p cystectomy/IC creation on 8/6/2024 at [facility] with Dr. Thompson (d/c on 8/10) presented to ED 8/16 w/ generalized weakness and some lower abdominal pain. Since d/c, only have 1-2 bowel movement, and had significantly decrease PO intake due to decreased appetite, intermittent fevers/chills, +bloody discharge from urethra, some burning. Pt stated stents fell out yesterday.  BP 80s/50s for EMS.  Pt remained hypotensive in ED, started on pressors, cultures sent, placed on zosyn and BiPAP and admitted to SICU. CT revealed: Expected postoperative changes status post cystectomy with ileal conduit creation including small amount of complex fluid and free air in the pelvis. No organized fluid collection. Distended ileal conduit with narrowing at the exiting ostomy site and proximally at the site of ureteral insertion. Mild bilateral hydroureteronephrosis with delayed nephrograms. No significant perinephric stranding.    8/16: still requiring pressor support and on BiPAP, pt states she feels better,14fr catheter placed in stoma, Bcx with GPC/GNR, Cr to 4.42 from 5.34, abx changed to leila and diflucan  8/17: still requiring pressor support and supplement oxygen but feels well, decr UO overnight, given albumin and IVF restarted, Cr up slightly to 4.68, electrolytes normal  8/18: RBUS done, no signs of hydro. IR consulted, no need for NTs as no hydro. UOP still very low (overnight 10 cc/hour), given 2 boluses with no improvement. Cr up to 4.95 from 4.68. CT loopogram of conduit pending today to evaluate for leak. still requiring pressors. Ucx from conduit now growing e.coli, sensitivities pending, PT recs rehab   8/19: CT loopogram w/ no anastamotic leak. Cr continues to rise w/ low UOP. UA on admission w/ coarse granular cast. Constellation of signs and symptoms suggestive of Acute tubular necrosis  8/20: Urine culture w/ e.coli sensitivities w/ resistance to floroquinolones. Pt back on abx. Pt w/ slightly increased urine output  8/21: Pt with acute onset of tachypnea, and hypoxia requiring intubation and sedation, CT w/ probably PE, hep gtt started. Nephrology consult obtained and started on CRRT, meropenem restarted  8/22: still intubated and sedated, pressor requirement decreasing, UO increasing, PTT remains supratherapeutic, hep gtt held for one hour this AM, on tube feeds, febrile to 100.9 at 4am  8/23: changed TF as per nutrition recs to start tomorrow AM, check w/ dietician, PTT therapeutic x1 --> repeat PTT at 4:45 AM 50.7, hep gtt @4ml/hr, tolerating CPAP. awake, UO still only 110/shift, still requiring pressor support, CRRT  8//24: extubated, now on RA, still requiring CRRT, /shift and small amount of pressor support, Procal >100, repeat Bcx and Ucx ordered, leila d/c, vanc x 2, then d/c now on zosyn, passed beside S&S now on reg diet, pt offers no complaints, + flatus/BM, will do bedside pelvic to evaluate for retained material  8/25: CRRT, UO less overnight, zosyn, tolerating reg diet, blood tinged vaginal discharge   8/26: UOP 39 overnight, on zosyn and vanc, tolerating reg diet. poss transfer to [facility] today - SICU to discuss on rounds.   8/27: UOP increased to 310 after dose of lasix. off pressors, still getting midodrine.     Plan:  -AM labs reviewed  -monitor vaginal bleeding (pad count)  -BCx from 8/23 NGTD at 72 hrs   -new IC cx final neg   -f/u nephrology re: CRRT  -pressor support prn, on midodrine now   -CC diet   -continue zosyn  -vanc by level   -monitor UO  -bowel regimen  -appreciate SICU care  -PT recs rehab    Urology  #[id number]

## 2024-08-27 NOTE — PROGRESS NOTE ADULT - PROBLEM SELECTOR PLAN 2
Metabolic acidosis in setting of renal failure and sepsis. SCO2 today 23. CRRT break as noted above. Lactate was normal. Monitor Labs as above.      If you have any questions, please feel free to contact me:  Patti Clinton MD PGY-4  Nephrology Fellow  Pager [phone] / [employer] Teams (Preferred)  (After 5pm or on weekends please page the on-call fellow)

## 2024-08-27 NOTE — PROGRESS NOTE ADULT - PROBLEM SELECTOR PLAN 1
BENIGNO on CKD2 likely due to ATN in setting of hemodynamic changes and contrast induced. Pt with baseline renal function 1.1-1.4. Most recent SCr from outpatient 1.18 eGFR 50 4/2024. At the time of presentation Scr was 5.34 ( Aug 15, 2024) , She received IV abx and fluids and it initially improved to 4.42 and then started worsening gradually to 6.61. SCr today is 1.98. UOP in 24 hours 760 cc while on IV lasix. Pt initiated on CRRT 8/20/24-8/26/24 for acidosis and volume overload. Recommend continuing to hold RRT. Monitor labs and I/Os. Avoid nephrotoxins including, ACE/ARB, NSAIDs, contrast, etc. BENIGNO on CKD2 likely due to ATN in setting of hemodynamic changes and contrast induced. Pt with baseline renal function 1.1-1.4. Most recent SCr from outpatient 1.18 eGFR 50 4/2024. At the time of presentation Scr was 5.34 ( Aug 15, 2024) , She received IV abx and fluids and it initially improved to 4.42 and then started worsening gradually to 6.61 and requiring CRRT. SCr today is 1.98. UOP in 24 hours 760 cc while on IV lasix. Pt initiated on CRRT 8/20/24-8/26/24 for acidosis and volume overload. Recommend continuing to hold RRT. Monitor labs and I/Os. Avoid nephrotoxins including, ACE/ARB, NSAIDs, contrast, etc.

## 2024-08-27 NOTE — PROGRESS NOTE ADULT - SUBJECTIVE AND OBJECTIVE BOX
[facility] Division of Kidney Diseases & Hypertension  FOLLOW UP NOTE  [phone]--------------------------------------------------------------------------------  Chief Complaint:BENIGNO on CKD2    24 hour events/subjective: No acute events overnight. Pt seen and examined at bedside this AM. Feels like she has been "hit by a truck." Urinating more than she was previously with lasix.     PAST HISTORY  --------------------------------------------------------------------------------  No significant changes to PMH, PSH, FHx, SHx from H&P unless otherwise noted.    ALLERGIES & MEDICATIONS  --------------------------------------------------------------------------------  Allergies    No Known Allergies    Intolerances    Standing Inpatient Medications  albuterol/ipratropium for Nebulization 3 milliLiter(s) Nebulizer every 6 hours  aspirin  chewable 81 milliGRAM(s) Enteral Tube daily  atorvastatin 40 milliGRAM(s) Oral at bedtime  chlorhexidine 2% Cloths 1 Application(s) Topical daily  cycloSPORINE (RESTASIS) 0.05% Emulsion 1 Drop(s) Both EYES two times a day  dornase siobhan Solution 2.5 milliGRAM(s) Inhalation daily  insulin lispro (ADMELOG) corrective regimen sliding scale   SubCutaneous at bedtime  insulin lispro (ADMELOG) corrective regimen sliding scale   SubCutaneous three times a day before meals  levothyroxine 100 MICROGram(s) Oral daily  midodrine 20 milliGRAM(s) Oral every 8 hours  norepinephrine Infusion 0.05 MICROgram(s)/kG/Min IV Continuous <Continuous>  pantoprazole    Tablet 40 milliGRAM(s) Oral before breakfast  piperacillin/tazobactam IVPB.. 3.375 Gram(s) IV Intermittent every 8 hours    PRN Inpatient Medications  acetaminophen     Tablet .. 975 milliGRAM(s) Oral every 6 hours PRN  melatonin 3 milliGRAM(s) Oral at bedtime PRN      REVIEW OF SYSTEMS  --------------------------------------------------------------------------------  Gen: No fevers/chills  Head/Eyes/Ears: No HA  Respiratory: No dyspnea  CV: No chest pain  MSK: No edema  Skin: No rashes    All other systems were reviewed and are negative, except as noted above.    VITALS/PHYSICAL EXAM  --------------------------------------------------------------------------------  T(C): 36.7 (08-27-24 @ 08:00), Max: 37.2 (08-26-24 @ 16:00)  HR: 67 (08-27-24 @ 10:27) (62 - 95)  BP: --  RR: 15 (08-27-24 @ 09:00) (11 - 23)  SpO2: 100% (08-27-24 @ 10:27) (100% - 100%)  Wt(kg): --    08-26-24 @ 07:01  -  08-27-24 @ 07:00  --------------------------------------------------------  IN: 325 mL / OUT: 1427 mL / NET: -1102 mL    08-27-24 @ 07:01  -  08-27-24 @ 10:35  --------------------------------------------------------  IN: 100 mL / OUT: 90 mL / NET: 10 mL    Physical Exam:  Gen: NAD, well-appearing  Pulm: CTA B/L  CV: RRR, S1S2;  Extremities: no bilateral LE edema noted.   Vascular Access: R femoral non-tunneled HD catheter.    LABS/STUDIES  --------------------------------------------------------------------------------              7.2    13.42 >-----------<  357      [08-27-24 @ 00:50]              21.1     134  |  102  |  19  ----------------------------<  107      [08-27-24 @ 00:50]  4.3   |  23  |  1.98        Ca     8.2     [08-27-24 @ 00:50]      iCa    1.00     [08-26 @ 00:35]      Mg     2.30     [08-27-24 @ 00:50]      Phos  3.5     [08-27-24 @ 00:50]    Creatinine Trend:  SCr 1.98 [08-27 @ 00:50]  SCr 1.62 [08-26 @ 18:35]  SCr 1.20 [08-26 @ 12:03]  SCr 1.18 [08-26 @ 06:20]  SCr 1.23 [08-26 @ 00:35]    Urinalysis - [08-27-24 @ 00:50]      Color  / Appearance  / SG  / pH       Gluc 107 / Ketone   / Bili  / Urobili        Blood  / Protein  / Leuk Est  / Nitrite       RBC  / WBC  / Hyaline  / Gran  / Sq Epi  / Non Sq Epi  / Bacteria

## 2024-08-27 NOTE — PROGRESS NOTE ADULT - SUBJECTIVE AND OBJECTIVE BOX
SICU Daily Progress Note  =====================================================  Interval/Overnight Events:       - Fungitell sent  - Lines out 8/27  - POCUS showed B lines, s/p 50 albumin, 80 lasix. Repeat Albumin and 80IV lasix overnight.  - leopoldo lee  - Eliquis dc'd  - CR d/c'ed as of 8/26 10AM    ALLERGIES:  No Known Allergies      --------------------------------------------------------------------------------------    MEDICATIONS:    Neurologic Medications  acetaminophen     Tablet .. 975 milliGRAM(s) Oral every 6 hours PRN Mild Pain (1 - 3)  melatonin 3 milliGRAM(s) Oral at bedtime PRN Insomnia    Respiratory Medications  albuterol/ipratropium for Nebulization 3 milliLiter(s) Nebulizer every 6 hours  dornase siobhan Solution 2.5 milliGRAM(s) Inhalation daily    Cardiovascular Medications  midodrine 20 milliGRAM(s) Oral every 8 hours  norepinephrine Infusion 0.05 MICROgram(s)/kG/Min IV Continuous <Continuous>    Gastrointestinal Medications  dextrose 5%. 1000 milliLiter(s) IV Continuous <Continuous>  dextrose 5%. 1000 milliLiter(s) IV Continuous <Continuous>  pantoprazole    Tablet 40 milliGRAM(s) Oral before breakfast    Genitourinary Medications    Hematologic/Oncologic Medications  aspirin  chewable 81 milliGRAM(s) Enteral Tube daily    Antimicrobial/Immunologic Medications  piperacillin/tazobactam IVPB.. 3.375 Gram(s) IV Intermittent every 8 hours    Endocrine/Metabolic Medications  atorvastatin 40 milliGRAM(s) Oral at bedtime  dextrose 50% Injectable 25 Gram(s) IV Push once  dextrose 50% Injectable 12.5 Gram(s) IV Push once  dextrose 50% Injectable 25 Gram(s) IV Push once  dextrose Oral Gel 15 Gram(s) Oral once PRN Blood Glucose LESS THAN 70 milliGRAM(s)/deciliter  glucagon  Injectable 1 milliGRAM(s) IntraMuscular once  insulin lispro (ADMELOG) corrective regimen sliding scale   SubCutaneous three times a day before meals  insulin lispro (ADMELOG) corrective regimen sliding scale   SubCutaneous at bedtime  levothyroxine 100 MICROGram(s) Oral daily    Topical/Other Medications  chlorhexidine 2% Cloths 1 Application(s) Topical daily  cycloSPORINE (RESTASIS) 0.05% Emulsion 1 Drop(s) Both EYES two times a day    --------------------------------------------------------------------------------------    VITAL SIGNS:  T(C): 36.9 (08-26-24 @ 20:00), Max: 37.2 (08-26-24 @ 16:00)  HR: 83 (08-26-24 @ 23:00) (81 - 95)  BP: --  RR: 21 (08-26-24 @ 23:00) (11 - 23)  SpO2: 100% (08-26-24 @ 23:00) (99% - 100%)  --------------------------------------------------------------------------------------    INS AND OUTS:    08-25-24 @ 07:01  -  08-26-24 @ 07:00  --------------------------------------------------------  IN: 732 mL / OUT: 938 mL / NET: -206 mL    08-26-24 @ 07:01  -  08-27-24 @ 00:14  --------------------------------------------------------  IN: 325 mL / OUT: 957 mL / NET: -632 mL      --------------------------------------------------------------------------------------    EXAM    NEURO: NAD,   HEENT: NC/AT, R IJ CVC   RESPIRATORY: nonlabored respirations, normal CW expansion  CARDIO: RRR, S1S2  ABDOMEN: soft, nontender, nondistended,  ileal conduit pink and well perfused with red tinged output from JACQUELINE taylor femoral shiley  EXTREMITIES: normal strength, no deformities    --------------------------------------------------------------------------------------    LABS  CBC (08-26 @ 04:30)                          7.5<L>                   17.88<H>  )--------------(  317        --    % Neuts, --    % Lymphs, ANC: --                              21.7<L>  CBC (08-26 @ 00:35)                          6.4<LL>                   19.68<H>  )--------------(  328        --    % Neuts, --    % Lymphs, ANC: --                              18.9<LL>    BMP (08-26 @ 18:35)       135     |  102     |  15    			Ca++ --      Ca 8.1<L>       ---------------------------------( 104<H>		Mg 2.50         4.3     |  21<L>   |  1.62<H>			Ph 3.0     BMP (08-26 @ 12:03)       135     |  104     |  12    			Ca++ --      Ca 7.9<L>       ---------------------------------( 101<H>		Mg 2.40         4.1     |  21<L>   |  1.20  			Ph 2.5                 Urinalysis (08-26 @ 18:35):     Color:  / Appearance:  / SG:  / pH:  / Gluc: 104<H> / Ketones:  / Bili:  / Urobili:  / Protein : / Nitrites:  / Leuk.Est:  / RBC:  / WBC:  / Sq Epi:  / Non Sq Epi:  / Bacteria      Urinalysis (08-26 @ 12:03):     Color:  / Appearance:  / SG:  / pH:  / Gluc: 101<H> / Ketones:  / Bili:  / Urobili:  / Protein : / Nitrites:  / Leuk.Est:  / RBC:  / WBC:  / Sq Epi:  / Non Sq Epi:  / Bacteria        -> .Urine Ileal Conduit Culture (08-23 @ 20:40)     NG    NG    No growth    -> .Blood Blood-Peripheral Culture (08-23 @ 16:40)     NG    NG    No growth at 72 Hours    -> .Blood Blood-Peripheral Culture (08-23 @ 16:35)     NG    NG    No growth at 72 Hours    -> .Blood Blood-Venous Culture (08-20 @ 20:50)     NG    NG    No growth at 5 days    -> .Blood Blood-Venous Culture (08-20 @ 20:25)     NG    NG    No growth at 5 days        -------------------------------------------------------------------------------------- SICU Daily Progress Note  =====================================================  Interval/Overnight Events:       - Fungitell sent  - Lines out 8/27  - POCUS showed B lines 8/26, s/p 50 albumin, 80 lasix. Repeat Albumin and 80IV lasix overnight, responded well with increased urine output  - leopoldo lee  - Eliquis dc'estrellita  - CRRT d/c'ed as of 8/26 10AM    ALLERGIES:  No Known Allergies      --------------------------------------------------------------------------------------    MEDICATIONS:    Neurologic Medications  acetaminophen     Tablet .. 975 milliGRAM(s) Oral every 6 hours PRN Mild Pain (1 - 3)  melatonin 3 milliGRAM(s) Oral at bedtime PRN Insomnia    Respiratory Medications  albuterol/ipratropium for Nebulization 3 milliLiter(s) Nebulizer every 6 hours  dornase siobhan Solution 2.5 milliGRAM(s) Inhalation daily    Cardiovascular Medications  midodrine 20 milliGRAM(s) Oral every 8 hours  norepinephrine Infusion 0.05 MICROgram(s)/kG/Min IV Continuous <Continuous>    Gastrointestinal Medications  dextrose 5%. 1000 milliLiter(s) IV Continuous <Continuous>  dextrose 5%. 1000 milliLiter(s) IV Continuous <Continuous>  pantoprazole    Tablet 40 milliGRAM(s) Oral before breakfast    Genitourinary Medications    Hematologic/Oncologic Medications  aspirin  chewable 81 milliGRAM(s) Enteral Tube daily    Antimicrobial/Immunologic Medications  piperacillin/tazobactam IVPB.. 3.375 Gram(s) IV Intermittent every 8 hours    Endocrine/Metabolic Medications  atorvastatin 40 milliGRAM(s) Oral at bedtime  dextrose 50% Injectable 25 Gram(s) IV Push once  dextrose 50% Injectable 12.5 Gram(s) IV Push once  dextrose 50% Injectable 25 Gram(s) IV Push once  dextrose Oral Gel 15 Gram(s) Oral once PRN Blood Glucose LESS THAN 70 milliGRAM(s)/deciliter  glucagon  Injectable 1 milliGRAM(s) IntraMuscular once  insulin lispro (ADMELOG) corrective regimen sliding scale   SubCutaneous three times a day before meals  insulin lispro (ADMELOG) corrective regimen sliding scale   SubCutaneous at bedtime  levothyroxine 100 MICROGram(s) Oral daily    Topical/Other Medications  chlorhexidine 2% Cloths 1 Application(s) Topical daily  cycloSPORINE (RESTASIS) 0.05% Emulsion 1 Drop(s) Both EYES two times a day    --------------------------------------------------------------------------------------    VITAL SIGNS:  T(C): 36.9 (08-26-24 @ 20:00), Max: 37.2 (08-26-24 @ 16:00)  HR: 83 (08-26-24 @ 23:00) (81 - 95)  BP: --  RR: 21 (08-26-24 @ 23:00) (11 - 23)  SpO2: 100% (08-26-24 @ 23:00) (99% - 100%)  --------------------------------------------------------------------------------------    INS AND OUTS:    08-25-24 @ 07:01  -  08-26-24 @ 07:00  --------------------------------------------------------  IN: 732 mL / OUT: 938 mL / NET: -206 mL    08-26-24 @ 07:01  -  08-27-24 @ 00:14  --------------------------------------------------------  IN: 325 mL / OUT: 957 mL / NET: -632 mL      --------------------------------------------------------------------------------------    EXAM    NEURO: NAD,   HEENT: NC/AT, R IJ CVC   RESPIRATORY: nonlabored respirations, normal CW expansion  CARDIO: RRR, S1S2  ABDOMEN: soft, nontender, nondistended,  ileal conduit pink and well perfused with red tinged output from taylor, R femoral shiley  EXTREMITIES: normal strength, no deformities    --------------------------------------------------------------------------------------    LABS  CBC (08-26 @ 04:30)                          7.5<L>                   17.88<H>  )--------------(  317        --    % Neuts, --    % Lymphs, ANC: --                              21.7<L>  CBC (08-26 @ 00:35)                          6.4<LL>                   19.68<H>  )--------------(  328        --    % Neuts, --    % Lymphs, ANC: --                              18.9<LL>    BMP (08-26 @ 18:35)       135     |  102     |  15    			Ca++ --      Ca 8.1<L>       ---------------------------------( 104<H>		Mg 2.50         4.3     |  21<L>   |  1.62<H>			Ph 3.0     BMP (08-26 @ 12:03)       135     |  104     |  12    			Ca++ --      Ca 7.9<L>       ---------------------------------( 101<H>		Mg 2.40         4.1     |  21<L>   |  1.20  			Ph 2.5                 Urinalysis (08-26 @ 18:35):     Color:  / Appearance:  / SG:  / pH:  / Gluc: 104<H> / Ketones:  / Bili:  / Urobili:  / Protein : / Nitrites:  / Leuk.Est:  / RBC:  / WBC:  / Sq Epi:  / Non Sq Epi:  / Bacteria      Urinalysis (08-26 @ 12:03):     Color:  / Appearance:  / SG:  / pH:  / Gluc: 101<H> / Ketones:  / Bili:  / Urobili:  / Protein : / Nitrites:  / Leuk.Est:  / RBC:  / WBC:  / Sq Epi:  / Non Sq Epi:  / Bacteria        -> .Urine Ileal Conduit Culture (08-23 @ 20:40)     NG    NG    No growth    -> .Blood Blood-Peripheral Culture (08-23 @ 16:40)     NG    NG    No growth at 72 Hours    -> .Blood Blood-Peripheral Culture (08-23 @ 16:35)     NG    NG    No growth at 72 Hours    -> .Blood Blood-Venous Culture (08-20 @ 20:50)     NG    NG    No growth at 5 days    -> .Blood Blood-Venous Culture (08-20 @ 20:25)     NG    NG    No growth at 5 days        -------------------------------------------------------------------------------------- SICU Daily Progress Note  =====================================================  Interval/Overnight Events:       - Fungitell sent  - Lines out 8/27  - POCUS showed B lines 8/26, s/p 50 albumin, 80 lasix. Repeat Albumin and 80IV lasix overnight, responded well with increased urine output  - vanc dc'd  - Eliquis dc'd  - CRRT d/c'ed as of 8/26 10AM    Day time event:  -Femoral and IJ CL removed  -Picc line placed RUE  -Given albumin and Lasix with adequate UO response.   -Patient restarted on Eliquis.       ALLERGIES:  No Known Allergies      --------------------------------------------------------------------------------------    MEDICATIONS:    Neurologic Medications  acetaminophen     Tablet .. 975 milliGRAM(s) Oral every 6 hours PRN Mild Pain (1 - 3)  melatonin 3 milliGRAM(s) Oral at bedtime PRN Insomnia    Respiratory Medications  albuterol/ipratropium for Nebulization 3 milliLiter(s) Nebulizer every 6 hours  dornase siobhan Solution 2.5 milliGRAM(s) Inhalation daily    Cardiovascular Medications  midodrine 20 milliGRAM(s) Oral every 8 hours  norepinephrine Infusion 0.05 MICROgram(s)/kG/Min IV Continuous <Continuous>    Gastrointestinal Medications  dextrose 5%. 1000 milliLiter(s) IV Continuous <Continuous>  dextrose 5%. 1000 milliLiter(s) IV Continuous <Continuous>  pantoprazole    Tablet 40 milliGRAM(s) Oral before breakfast    Genitourinary Medications    Hematologic/Oncologic Medications  aspirin  chewable 81 milliGRAM(s) Enteral Tube daily    Antimicrobial/Immunologic Medications  piperacillin/tazobactam IVPB.. 3.375 Gram(s) IV Intermittent every 8 hours    Endocrine/Metabolic Medications  atorvastatin 40 milliGRAM(s) Oral at bedtime  dextrose 50% Injectable 25 Gram(s) IV Push once  dextrose 50% Injectable 12.5 Gram(s) IV Push once  dextrose 50% Injectable 25 Gram(s) IV Push once  dextrose Oral Gel 15 Gram(s) Oral once PRN Blood Glucose LESS THAN 70 milliGRAM(s)/deciliter  glucagon  Injectable 1 milliGRAM(s) IntraMuscular once  insulin lispro (ADMELOG) corrective regimen sliding scale   SubCutaneous three times a day before meals  insulin lispro (ADMELOG) corrective regimen sliding scale   SubCutaneous at bedtime  levothyroxine 100 MICROGram(s) Oral daily    Topical/Other Medications  chlorhexidine 2% Cloths 1 Application(s) Topical daily  cycloSPORINE (RESTASIS) 0.05% Emulsion 1 Drop(s) Both EYES two times a day    --------------------------------------------------------------------------------------    VITAL SIGNS:  T(C): 36.9 (08-26-24 @ 20:00), Max: 37.2 (08-26-24 @ 16:00)  HR: 83 (08-26-24 @ 23:00) (81 - 95)  BP: --  RR: 21 (08-26-24 @ 23:00) (11 - 23)  SpO2: 100% (08-26-24 @ 23:00) (99% - 100%)  --------------------------------------------------------------------------------------    INS AND OUTS:    08-25-24 @ 07:01  -  08-26-24 @ 07:00  --------------------------------------------------------  IN: 732 mL / OUT: 938 mL / NET: -206 mL    08-26-24 @ 07:01  -  08-27-24 @ 00:14  --------------------------------------------------------  IN: 325 mL / OUT: 957 mL / NET: -632 mL      --------------------------------------------------------------------------------------    EXAM    NEURO: NAD,   HEENT: NC/AT, R IJ CVC   RESPIRATORY: nonlabored respirations, normal CW expansion  CARDIO: RRR, S1S2  ABDOMEN: soft, nontender, nondistended,  ileal conduit pink and well perfused with red tinged output from taylor, R femoral shiley  EXTREMITIES: normal strength, no deformities    --------------------------------------------------------------------------------------    LABS  CBC (08-26 @ 04:30)                          7.5<L>                   17.88<H>  )--------------(  317        --    % Neuts, --    % Lymphs, ANC: --                              21.7<L>  CBC (08-26 @ 00:35)                          6.4<LL>                   19.68<H>  )--------------(  328        --    % Neuts, --    % Lymphs, ANC: --                              18.9<LL>    BMP (08-26 @ 18:35)       135     |  102     |  15    			Ca++ --      Ca 8.1<L>       ---------------------------------( 104<H>		Mg 2.50         4.3     |  21<L>   |  1.62<H>			Ph 3.0     BMP (08-26 @ 12:03)       135     |  104     |  12    			Ca++ --      Ca 7.9<L>       ---------------------------------( 101<H>		Mg 2.40         4.1     |  21<L>   |  1.20  			Ph 2.5                 Urinalysis (08-26 @ 18:35):     Color:  / Appearance:  / SG:  / pH:  / Gluc: 104<H> / Ketones:  / Bili:  / Urobili:  / Protein : / Nitrites:  / Leuk.Est:  / RBC:  / WBC:  / Sq Epi:  / Non Sq Epi:  / Bacteria      Urinalysis (08-26 @ 12:03):     Color:  / Appearance:  / SG:  / pH:  / Gluc: 101<H> / Ketones:  / Bili:  / Urobili:  / Protein : / Nitrites:  / Leuk.Est:  / RBC:  / WBC:  / Sq Epi:  / Non Sq Epi:  / Bacteria        -> .Urine Ileal Conduit Culture (08-23 @ 20:40)     NG    NG    No growth    -> .Blood Blood-Peripheral Culture (08-23 @ 16:40)     NG    NG    No growth at 72 Hours    -> .Blood Blood-Peripheral Culture (08-23 @ 16:35)     NG    NG    No growth at 72 Hours    -> .Blood Blood-Venous Culture (08-20 @ 20:50)     NG    NG    No growth at 5 days    -> .Blood Blood-Venous Culture (08-20 @ 20:25)     NG    NG    No growth at 5 days        --------------------------------------------------------------------------------------

## 2024-08-27 NOTE — PROGRESS NOTE ADULT - ATTENDING COMMENTS
agree with above. BENIGNO complicated by hypervolemia requiring CRRT.  improving now but still high risk for requiring HD again.  will monitor off today and trend SCr for renal recovery.

## 2024-08-28 LAB
ANION GAP SERPL CALC-SCNC: 15 MMOL/L — HIGH (ref 7–14)
ANION GAP SERPL CALC-SCNC: 16 MMOL/L — HIGH (ref 7–14)
BLD GP AB SCN SERPL QL: NEGATIVE — SIGNIFICANT CHANGE UP
BUN SERPL-MCNC: 30 MG/DL — HIGH (ref 7–23)
BUN SERPL-MCNC: 32 MG/DL — HIGH (ref 7–23)
CALCIUM SERPL-MCNC: 8.4 MG/DL — SIGNIFICANT CHANGE UP (ref 8.4–10.5)
CALCIUM SERPL-MCNC: 8.5 MG/DL — SIGNIFICANT CHANGE UP (ref 8.4–10.5)
CHLORIDE SERPL-SCNC: 100 MMOL/L — SIGNIFICANT CHANGE UP (ref 98–107)
CHLORIDE SERPL-SCNC: 101 MMOL/L — SIGNIFICANT CHANGE UP (ref 98–107)
CO2 SERPL-SCNC: 20 MMOL/L — LOW (ref 22–31)
CO2 SERPL-SCNC: 21 MMOL/L — LOW (ref 22–31)
CREAT SERPL-MCNC: 2.77 MG/DL — HIGH (ref 0.5–1.3)
CREAT SERPL-MCNC: 2.94 MG/DL — HIGH (ref 0.5–1.3)
CULTURE RESULTS: SIGNIFICANT CHANGE UP
CULTURE RESULTS: SIGNIFICANT CHANGE UP
EGFR: 17 ML/MIN/1.73M2 — LOW
EGFR: 18 ML/MIN/1.73M2 — LOW
GLUCOSE BLDC GLUCOMTR-MCNC: 107 MG/DL — HIGH (ref 70–99)
GLUCOSE BLDC GLUCOMTR-MCNC: 109 MG/DL — HIGH (ref 70–99)
GLUCOSE SERPL-MCNC: 107 MG/DL — HIGH (ref 70–99)
GLUCOSE SERPL-MCNC: 113 MG/DL — HIGH (ref 70–99)
HCT VFR BLD CALC: 22.4 % — LOW (ref 34.5–45)
HGB BLD-MCNC: 7.3 G/DL — LOW (ref 11.5–15.5)
MAGNESIUM SERPL-MCNC: 2.1 MG/DL — SIGNIFICANT CHANGE UP (ref 1.6–2.6)
MAGNESIUM SERPL-MCNC: 2.2 MG/DL — SIGNIFICANT CHANGE UP (ref 1.6–2.6)
MCHC RBC-ENTMCNC: 29.8 PG — SIGNIFICANT CHANGE UP (ref 27–34)
MCHC RBC-ENTMCNC: 32.6 GM/DL — SIGNIFICANT CHANGE UP (ref 32–36)
MCV RBC AUTO: 91.4 FL — SIGNIFICANT CHANGE UP (ref 80–100)
NRBC # BLD: 0 /100 WBCS — SIGNIFICANT CHANGE UP (ref 0–0)
NRBC # FLD: 0 K/UL — SIGNIFICANT CHANGE UP (ref 0–0)
PHOSPHATE SERPL-MCNC: 4.5 MG/DL — SIGNIFICANT CHANGE UP (ref 2.5–4.5)
PHOSPHATE SERPL-MCNC: 4.7 MG/DL — HIGH (ref 2.5–4.5)
PLATELET # BLD AUTO: 398 K/UL — SIGNIFICANT CHANGE UP (ref 150–400)
POTASSIUM SERPL-MCNC: 3.9 MMOL/L — SIGNIFICANT CHANGE UP (ref 3.5–5.3)
POTASSIUM SERPL-MCNC: 4 MMOL/L — SIGNIFICANT CHANGE UP (ref 3.5–5.3)
POTASSIUM SERPL-SCNC: 3.9 MMOL/L — SIGNIFICANT CHANGE UP (ref 3.5–5.3)
POTASSIUM SERPL-SCNC: 4 MMOL/L — SIGNIFICANT CHANGE UP (ref 3.5–5.3)
RBC # BLD: 2.45 M/UL — LOW (ref 3.8–5.2)
RBC # FLD: 17.3 % — HIGH (ref 10.3–14.5)
RH IG SCN BLD-IMP: POSITIVE — SIGNIFICANT CHANGE UP
SODIUM SERPL-SCNC: 136 MMOL/L — SIGNIFICANT CHANGE UP (ref 135–145)
SODIUM SERPL-SCNC: 137 MMOL/L — SIGNIFICANT CHANGE UP (ref 135–145)
SPECIMEN SOURCE: SIGNIFICANT CHANGE UP
SPECIMEN SOURCE: SIGNIFICANT CHANGE UP
WBC # BLD: 10.09 K/UL — SIGNIFICANT CHANGE UP (ref 3.8–10.5)
WBC # FLD AUTO: 10.09 K/UL — SIGNIFICANT CHANGE UP (ref 3.8–10.5)

## 2024-08-28 PROCEDURE — 76770 US EXAM ABDO BACK WALL COMP: CPT | Mod: 26

## 2024-08-28 PROCEDURE — 99291 CRITICAL CARE FIRST HOUR: CPT | Mod: 25

## 2024-08-28 PROCEDURE — 99231 SBSQ HOSP IP/OBS SF/LOW 25: CPT | Mod: GC

## 2024-08-28 PROCEDURE — 99233 SBSQ HOSP IP/OBS HIGH 50: CPT

## 2024-08-28 RX ORDER — MIDODRINE HYDROCHLORIDE 5 MG/1
10 TABLET ORAL EVERY 8 HOURS
Refills: 0 | Status: DISCONTINUED | OUTPATIENT
Start: 2024-08-28 | End: 2024-08-29

## 2024-08-28 RX ORDER — FUROSEMIDE 40 MG
40 TABLET ORAL ONCE
Refills: 0 | Status: COMPLETED | OUTPATIENT
Start: 2024-08-28 | End: 2024-08-28

## 2024-08-28 RX ORDER — ALBUMIN (HUMAN) 5 G/20ML
50 SOLUTION INTRAVENOUS ONCE
Refills: 0 | Status: COMPLETED | OUTPATIENT
Start: 2024-08-28 | End: 2024-08-28

## 2024-08-28 RX ORDER — POLYETHYLENE GLYCOL 3350 17 G/17G
17 POWDER, FOR SOLUTION ORAL DAILY
Refills: 0 | Status: DISCONTINUED | OUTPATIENT
Start: 2024-08-28 | End: 2024-09-13

## 2024-08-28 RX ADMIN — APIXABAN 10 MILLIGRAM(S): 5 TABLET, FILM COATED ORAL at 05:32

## 2024-08-28 RX ADMIN — ALBUMIN (HUMAN) 50 MILLILITER(S): 5 SOLUTION INTRAVENOUS at 10:13

## 2024-08-28 RX ADMIN — MIDODRINE HYDROCHLORIDE 20 MILLIGRAM(S): 5 TABLET ORAL at 05:32

## 2024-08-28 RX ADMIN — PIPERACILLIN SODIUM AND TAZOBACTAM SODIUM 25 GRAM(S): 3; .375 INJECTION, POWDER, FOR SOLUTION INTRAVENOUS at 14:26

## 2024-08-28 RX ADMIN — POLYETHYLENE GLYCOL 3350 17 GRAM(S): 17 POWDER, FOR SOLUTION ORAL at 10:15

## 2024-08-28 RX ADMIN — CHLORHEXIDINE GLUCONATE 1 APPLICATION(S): 40 SOLUTION TOPICAL at 14:27

## 2024-08-28 RX ADMIN — MIDODRINE HYDROCHLORIDE 10 MILLIGRAM(S): 5 TABLET ORAL at 14:26

## 2024-08-28 RX ADMIN — APIXABAN 10 MILLIGRAM(S): 5 TABLET, FILM COATED ORAL at 18:42

## 2024-08-28 RX ADMIN — ACETAMINOPHEN 975 MILLIGRAM(S): 325 TABLET ORAL at 22:30

## 2024-08-28 RX ADMIN — ACETAMINOPHEN 975 MILLIGRAM(S): 325 TABLET ORAL at 08:21

## 2024-08-28 RX ADMIN — ACETAMINOPHEN 975 MILLIGRAM(S): 325 TABLET ORAL at 16:00

## 2024-08-28 RX ADMIN — Medication 40 MILLIGRAM(S): at 06:04

## 2024-08-28 RX ADMIN — ACETAMINOPHEN 975 MILLIGRAM(S): 325 TABLET ORAL at 21:33

## 2024-08-28 RX ADMIN — Medication 40 MILLIGRAM(S): at 14:26

## 2024-08-28 RX ADMIN — ACETAMINOPHEN 975 MILLIGRAM(S): 325 TABLET ORAL at 15:30

## 2024-08-28 RX ADMIN — PIPERACILLIN SODIUM AND TAZOBACTAM SODIUM 25 GRAM(S): 3; .375 INJECTION, POWDER, FOR SOLUTION INTRAVENOUS at 21:34

## 2024-08-28 RX ADMIN — Medication 1 DROP(S): at 18:44

## 2024-08-28 RX ADMIN — Medication 40 MILLIGRAM(S): at 10:11

## 2024-08-28 RX ADMIN — Medication 1 DROP(S): at 05:32

## 2024-08-28 RX ADMIN — Medication 100 MICROGRAM(S): at 05:33

## 2024-08-28 RX ADMIN — ACETAMINOPHEN 975 MILLIGRAM(S): 325 TABLET ORAL at 09:00

## 2024-08-28 RX ADMIN — Medication 81 MILLIGRAM(S): at 14:26

## 2024-08-28 RX ADMIN — Medication 40 MILLIGRAM(S): at 21:33

## 2024-08-28 RX ADMIN — PIPERACILLIN SODIUM AND TAZOBACTAM SODIUM 25 GRAM(S): 3; .375 INJECTION, POWDER, FOR SOLUTION INTRAVENOUS at 05:32

## 2024-08-28 RX ADMIN — MIDODRINE HYDROCHLORIDE 10 MILLIGRAM(S): 5 TABLET ORAL at 21:33

## 2024-08-28 RX ADMIN — ACETAMINOPHEN 975 MILLIGRAM(S): 325 TABLET ORAL at 00:00

## 2024-08-28 NOTE — PROGRESS NOTE ADULT - ATTENDING COMMENTS
Agree with assessment and plan.  Consider repeat renal US to evaluate for hydronephrosis  Trend creatinine

## 2024-08-28 NOTE — PROGRESS NOTE ADULT - PROBLEM SELECTOR PLAN 2
Metabolic acidosis in setting of renal failure and sepsis. SCO2 today 23. No further CRRT as above.  Lactate was normal. Monitor Labs as above.      If you have any questions, please feel free to contact me:  Patti Clinton MD PGY-4  Nephrology Fellow  Pager [phone] / [employer] Teams (Preferred)  (After 5pm or on weekends please page the on-call fellow)

## 2024-08-28 NOTE — PROGRESS NOTE ADULT - ATTENDING COMMENTS
Patient remains critically ill in the ICU.  BENIGNO requiring dialysis now showing signs of improvement.  still requiring intermittent IV diuretics.  recommend hold further HD sessions (catheter removed).  monitor BMP. continue to use IV diuretics to optimize fluid balance.

## 2024-08-28 NOTE — PROGRESS NOTE ADULT - SUBJECTIVE AND OBJECTIVE BOX
Interval/Overnight Events:   - Repeated albumin and 80IV lasix x2 8/27 AM, improving UOP  - midline placed 8/27, previous line removed, Eliquis restarted      Subjective  Feeling well, was able to sit in her chair for 4 hours yesterday, wants to walk around. IC still draining clear yellow urine.       Objective    Vital signs  T(F): , Max: 98.2 (08-27-24 @ 16:00)  HR: 71 (08-28-24 @ 08:00)  BP: 107/72 (08-28-24 @ 08:00)  SpO2: 100% (08-28-24 @ 08:00)  Wt(kg): --    Output     OUT:    Urostomy (mL): 2322 mL  Total OUT: 2322 mL    Total NET: -2322 mL        GEN: NAD, resting quietly  : IC with taylor in place draining clear yellow urine     Labs      08-28 @ 00:30    WBC 10.09 / Hct 22.4  / SCr 2.77     08-27 @ 00:50    WBC 13.42 / Hct 21.1  / SCr 1.98         Culture - Urine (collected 08-23-24 @ 20:40)  Source: .Urine Ileal Conduit  Final Report (08-25-24 @ 12:11):    No growth    Culture - Blood (collected 08-23-24 @ 16:40)  Source: .Blood Blood-Peripheral  Preliminary Report (08-27-24 @ 22:01):    No growth at 4 days    Culture - Blood (collected 08-23-24 @ 16:35)  Source: .Blood Blood-Peripheral  Preliminary Report (08-27-24 @ 22:01):    No growth at 4 days        Urine Cx: ?  Blood Cx: ?    Imaging

## 2024-08-28 NOTE — PROGRESS NOTE ADULT - ATTENDING COMMENTS
Patient with acute kidney injury s/p cystectomy and urosepsis with atn, now recovering. Received albumin and lasix for low urine output.  N mentating well, pain controlled  resp out of bed to chair, incentive spirometer  cv on midodrine, wean down, overall +3l liter on los, will administer lasix and albumin d/c deya  gi reg diet  gu/renal adequate uop, kidney u/s  heme vte ppx  id on zosyn, d/c zosyn tomorrow  endo no changes  urology looking for transfer to [facility]    The patient is a critical care patient with life threatening hemodynamic and metabolic instability in SICU.  I have personally interviewed when possible and examined the patient, reviewed data and laboratory tests/x-rays and all pertinent electronic images.  I was physically present for the key portions of the evaluation and management (E/M) service provided.   The SICU team has a constant risk benefit analyzes discussion with the primary team, all consultants, House Staff and PA's on all decisions.  These diagnoses are unrelated to the surgical procedure noted above.  I meet with family if needed to get further history, discuss the case and make care decisions for this patient who might not be able to participate.  Time involved in performance of separately billable procedures was not counted toward my critical care time. There is no overlap.  I spent 55-75 minutes ( 0800Hrs-0915Hrs in AM/ 1600Hrs-1715Hrs in PM, or other time indicated) of critical care time for the diagnoses, assessment, plan and interventions.  This time excludes time spent on separate procedures and teaching.

## 2024-08-28 NOTE — PROGRESS NOTE ADULT - ASSESSMENT
69 yo F, with PmHx of HTN and bladder cancer, s/p bladder removal with ileal conduit construction on Aug 6 at [facility], here for generalized weakness. Per pt, she had her surgery on Aug 6th, d/c home on Aug 10th. Since d/c, only have 1-2 bowel movement, and had significantly decrease PO intake due to decreased apptite. No fever, some chill. +bloody discharge from urethra, some burning. EMS was called as she felt very weak and unwell. BP 80s/50s for EMS. Patient s/p cystectomy with Dr. Thompson at [facility] on August 6th. Been having decreased PO intake and on and off fevers. Also states she had stents previously that came out on their own. Presents to the ED now due to lower abdominal pain. SICU consulted for hypotension requiring pressors and desaturation on BiPAP  requiring intubation, now s/p extubation 8/23. Off CRRT, no pressor requirements.       NEUROLOGIC   - A&Ox3  - SBIRT consulted, no referral or intervention  - Pain control: IV Tylenol -> PO    RESPIRATORY   - Extubated 8/23  - Monitor SpO2 goal >92%  - CPAP at night  - Incentive spirometry  - Pulmozyne  - Dc'd duonebs    CARDIOVASCULAR   - Monitor hemodynamics  - Off levo since 8/25  - Home ASA, atorvastatin  - Midrodine 20 q8  - Hgb 6.4>7.5 s/p 1U PRBC     GASTROINTESTINAL   - CC diet  - Home protonix    /RENAL  - CRRT d/c 8/26  - Riley in urostomy, do not manipulate  - Trend weight from admit/dry weight for fluid change  - Maintain strict Is/Os  - Monitor electrolytes, replete PRN  - q6 BMP w/ Mg, phos  - Urine culture negative (8/23)  - continued bleeding per vaginal canal    HEMATOLOGIC  - Eliquist started for possible PE  - ASA    INFECTIOUS DISEASE  -  zosyn (8/23- ) - 7d course  - vanc (8/23- 8/26)  - meropenem (8/16-8/23)  - S/p Diflucan (8/16-8/19)  - BCx 8/15 NGTD  - BCx 8/20 NGTD  - Procalcitonin >1,000  - Repeat BCx 8/23  - UCx 8/23 NGTD    ENDOCRINE  - Monitor gluc  - LAINE  - IV synthroid 100 PO (home)    LINES  - PIV   - R A line  - Riley in urostomy (do not flush)     DISPO: STEVIE Weeks   67 yo F, with PmHx of HTN and bladder cancer, s/p bladder removal with ileal conduit construction on Aug 6 at [facility], here for generalized weakness. Per pt, she had her surgery on Aug 6th, d/c home on Aug 10th. Since d/c, only have 1-2 bowel movement, and had significantly decrease PO intake due to decreased apptite. No fever, some chill. +bloody discharge from urethra, some burning. EMS was called as she felt very weak and unwell. BP 80s/50s for EMS. Patient s/p cystectomy with Dr. Thompson at [facility] on August 6th. Been having decreased PO intake and on and off fevers. Also states she had stents previously that came out on their own. Presents to the ED now due to lower abdominal pain. SICU consulted for hypotension requiring pressors and desaturation on BiPAP  requiring intubation, now s/p extubation 8/23. Off CRRT, no pressor requirements.     NEUROLOGIC   - A&Ox3  - SBIRT consulted, no referral or intervention  - Pain control: IV Tylenol -> PO    RESPIRATORY   - Extubated 8/23  - Monitor SpO2 goal >92%  - CPAP at night  - Incentive spirometry  - Pulmozyne  - Dc'd duonebs    CARDIOVASCULAR   - Monitor hemodynamics  - Off levo since 8/25  - Home ASA, atorvastatin  - Midrodine 20 q8  - Hgb 6.4>7.5 s/p 1U PRBC on 8/27    GASTROINTESTINAL   - CC diet  - Home protonix    /RENAL  - CRRT d/c 8/26  - Riley in urostomy, do not manipulate  - Trend weight from admit/dry weight for fluid change  - Maintain strict Is/Os  - Monitor electrolytes, replete PRN  - q6 BMP w/ Mg, phos  - Renal US 8/28, assess for mechanical obstruction  - Urine culture negative (8/23)  - continued bleeding per vaginal canal    HEMATOLOGIC  - Eliquis resumed for possible PE  - ASA    INFECTIOUS DISEASE  -  zosyn (8/23- ) - 7d course  - vanc (8/23- 8/26)  - meropenem (8/16-8/23)  - S/p Diflucan (8/16-8/19)  - BCx 8/15 NGTD  - BCx 8/20 NGTD  - Procalcitonin >1,000  - Repeat BCx 8/23  - UCx 8/23 NGTD    ENDOCRINE  - Monitor gluc  - LAINE  - IV synthroid 100 PO (home)    LINES  - PIV   - R A line  - Riley in urostomy (do not flush)     DISPO: STEVIE Weeks   67 yo F, with PmHx of HTN and bladder cancer, s/p bladder removal with ileal conduit construction on Aug 6 at [facility], here for generalized weakness. Per pt, she had her surgery on Aug 6th, d/c home on Aug 10th. Since d/c, only have 1-2 bowel movement, and had significantly decrease PO intake due to decreased apptite. No fever, some chill. +bloody discharge from urethra, some burning. EMS was called as she felt very weak and unwell. BP 80s/50s for EMS. Patient s/p cystectomy with Dr. Thompson at [facility] on August 6th. Been having decreased PO intake and on and off fevers. Also states she had stents previously that came out on their own. Presents to the ED now due to lower abdominal pain. SICU consulted for hypotension requiring pressors and desaturation on BiPAP  requiring intubation, now s/p extubation 8/23. Off CRRT, no pressor requirements.     NEUROLOGIC   - A&Ox3  - SBIRT consulted, no referral or intervention  - Pain control: IV Tylenol -> PO    RESPIRATORY   - Extubated 8/23  - Monitor SpO2 goal >92%  - CPAP at night  - Incentive spirometry  - Pulmozyne  - Dc'd duonebs    CARDIOVASCULAR   - Monitor hemodynamics  - Off levo since 8/25  - Home ASA, atorvastatin  - Midrodine decreased to 10 q8    GASTROINTESTINAL   - CC diet  - Miralax  - Home protonix    /RENAL  - CRRT d/c 8/26  - Riley in urostomy, do not manipulate  - Trend weight from admit/dry weight for fluid change  - Maintain strict Is/Os  - Monitor electrolytes, replete PRN  - Additional albumin 250 and 40IV lasix x2 8/28 AM  - Renal US 8/28, assess for mechanical obstruction  - Urine culture negative (8/23)  - continued bleeding per vaginal canal    HEMATOLOGIC  - Eliquis resumed for possible PE  - ASA    INFECTIOUS DISEASE  - DC zosyn after 8/28 dose (8/23- )  - vanc (8/23- 8/26)  - meropenem (8/16-8/23)  - S/p Diflucan (8/16-8/19)  - BCx 8/15 NGTD  - BCx 8/20 NGTD  - Procalcitonin >1,000  - Repeat BCx 8/23  - UCx 8/23 NGTD    ENDOCRINE  - Monitor gluc  - LAINE  - IV synthroid 100 PO (home)    LINES  - PIV   - R A line  - Riley in urostomy (do not flush)     DISPO: STEVIE Weeks

## 2024-08-28 NOTE — PROGRESS NOTE ADULT - ASSESSMENT
67 yo F h/o HTN and bladder CA, s/p cystectomy/IC creation on 8/6/2024 at [facility] with Dr. Thompson (d/c on 8/10) presented to ED 8/16 w/ generalized weakness and some lower abdominal pain. Since d/c, only have 1-2 bowel movement, and had significantly decrease PO intake due to decreased appetite, intermittent fevers/chills, +bloody discharge from urethra, some burning. Pt stated stents fell out yesterday.  BP 80s/50s for EMS.  Pt remained hypotensive in ED, started on pressors, cultures sent, placed on zosyn and BiPAP and admitted to SICU. CT revealed: Expected postoperative changes status post cystectomy with ileal conduit creation including small amount of complex fluid and free air in the pelvis. No organized fluid collection. Distended ileal conduit with narrowing at the exiting ostomy site and proximally at the site of ureteral insertion. Mild bilateral hydroureteronephrosis with delayed nephrograms. No significant perinephric stranding.    8/16: still requiring pressor support and on BiPAP, pt states she feels better,14fr catheter placed in stoma, Bcx with GPC/GNR, Cr to 4.42 from 5.34, abx changed to leila and diflucan  8/17: still requiring pressor support and supplement oxygen but feels well, decr UO overnight, given albumin and IVF restarted, Cr up slightly to 4.68, electrolytes normal  8/18: RBUS done, no signs of hydro. IR consulted, no need for NTs as no hydro. UOP still very low (overnight 10 cc/hour), given 2 boluses with no improvement. Cr up to 4.95 from 4.68. CT loopogram of conduit pending today to evaluate for leak. still requiring pressors. Ucx from conduit now growing e.coli, sensitivities pending, PT recs rehab   8/19: CT loopogram w/ no anastamotic leak. Cr continues to rise w/ low UOP. UA on admission w/ coarse granular cast. Constellation of signs and symptoms suggestive of Acute tubular necrosis  8/20: Urine culture w/ e.coli sensitivities w/ resistance to floroquinolones. Pt back on abx. Pt w/ slightly increased urine output  8/21: Pt with acute onset of tachypnea, and hypoxia requiring intubation and sedation, CT w/ probably PE, hep gtt started. Nephrology consult obtained and started on CRRT, meropenem restarted  8/22: still intubated and sedated, pressor requirement decreasing, UO increasing, PTT remains supratherapeutic, hep gtt held for one hour this AM, on tube feeds, febrile to 100.9 at 4am  8/23: changed TF as per nutrition recs to start tomorrow AM, check w/ dietician, PTT therapeutic x1 --> repeat PTT at 4:45 AM 50.7, hep gtt @4ml/hr, tolerating CPAP. awake, UO still only 110/shift, still requiring pressor support, CRRT  8//24: extubated, now on RA, still requiring CRRT, /shift and small amount of pressor support, Procal >100, repeat Bcx and Ucx ordered, leila d/c, vanc x 2, then d/c now on zosyn, passed beside S&S now on reg diet, pt offers no complaints, + flatus/BM, will do bedside pelvic to evaluate for retained material  8/25: CRRT, UO less overnight, zosyn, tolerating reg diet, blood tinged vaginal discharge   8/26: UOP 39 overnight, on zosyn and vanc, tolerating reg diet. poss transfer to [facility] today - SICU to discuss on rounds.   8/27: UOP increased to 310 after dose of lasix. off pressors, still getting midodrine.   8/28: Got more lasix and albumin overnight with OP of 642, still down from 1.5 yesterday during the day. Cr scott from 1.98 to 2.77. Will suggest RBUS. Bcx NGTD at 4 days. Off vanc today, still on zosyn.     Plan:  -rec RBUS for rising Cr, look for hydro to suggest blockage/stricture   -monitor UOP closely   -AM labs reviewed  -monitor vaginal bleeding (pad count)  -BCx from 8/23 NGTD at 4 days  -new IC cx final neg   -f/u nephrology: no CRRT needed   -pressor support prn, on midodrine now   -CC diet   -continue zosyn  -bowel regimen  -appreciate SICU care  -PT recs rehab    Urology  #[id number]

## 2024-08-28 NOTE — PROGRESS NOTE ADULT - PROBLEM SELECTOR PLAN 1
BENIGNO on CKD2 likely due to ATN in setting of hemodynamic changes and contrast induced. Pt with baseline renal function 1.1-1.4. Most recent SCr from outpatient 1.18 eGFR 50 4/2024. At the time of presentation Scr was 5.34 ( Aug 15, 2024) , She received IV abx and fluids and it initially improved to 4.42 and then started worsening gradually to 6.61 and requiring CRRT. SCr elevated today to 2.77 in setting of holding further renal replacement therapy and diuretics. UOP in 24 hours 2.27L while on IV lasix. Pt initiated on CRRT 8/20/24-8/26/24 for acidosis and volume overload. Recommend no further CRRT. Monitor labs and I/Os. Avoid nephrotoxins including, ACE/ARB, NSAIDs, contrast, etc.

## 2024-08-28 NOTE — PROGRESS NOTE ADULT - SUBJECTIVE AND OBJECTIVE BOX
[facility] Division of Kidney Diseases & Hypertension  FOLLOW UP NOTE  [phone]--------------------------------------------------------------------------------  Chief Complaint: BENIGNO on CK2.    24 hour events/subjective: Pt had removal of R femoral HD catheter yesterday. Pt seen and examined at bedside this AM. Feeling well, improved. Has been urinating more. Has pain in bilateral feet and hands.    PAST HISTORY  --------------------------------------------------------------------------------  No significant changes to PMH, PSH, FHx, SHx from H&P unless otherwise noted.    ALLERGIES & MEDICATIONS  --------------------------------------------------------------------------------  Allergies    No Known Allergies    Intolerances    Standing Inpatient Medications  apixaban 10 milliGRAM(s) Oral every 12 hours  aspirin  chewable 81 milliGRAM(s) Enteral Tube daily  atorvastatin 40 milliGRAM(s) Oral at bedtime  chlorhexidine 2% Cloths 1 Application(s) Topical daily  cycloSPORINE (RESTASIS) 0.05% Emulsion 1 Drop(s) Both EYES two times a day  insulin lispro (ADMELOG) corrective regimen sliding scale   SubCutaneous at bedtime  insulin lispro (ADMELOG) corrective regimen sliding scale   SubCutaneous three times a day before meals  levothyroxine 100 MICROGram(s) Oral daily  midodrine 20 milliGRAM(s) Oral every 8 hours  pantoprazole    Tablet 40 milliGRAM(s) Oral before breakfast  piperacillin/tazobactam IVPB.. 3.375 Gram(s) IV Intermittent every 8 hours    PRN Inpatient Medications  acetaminophen     Tablet .. 975 milliGRAM(s) Oral every 6 hours PRN  melatonin 3 milliGRAM(s) Oral at bedtime PRN    REVIEW OF SYSTEMS  --------------------------------------------------------------------------------  Gen: No fevers/chills  Head/Eyes/Ears: No HA  Respiratory: No dyspnea  CV: No chest pain  MSK: No edema, +pain in bilateral feet/hands  Skin: No rashes    All other systems were reviewed and are negative, except as noted above.    VITALS/PHYSICAL EXAM  --------------------------------------------------------------------------------  T(C): 36.1 (08-28-24 @ 08:00), Max: 36.8 (08-27-24 @ 16:00)  HR: 71 (08-28-24 @ 08:00) (62 - 94)  BP: 107/72 (08-28-24 @ 08:00) (107/72 - 107/72)  RR: 15 (08-28-24 @ 08:00) (12 - 23)  SpO2: 100% (08-28-24 @ 08:00) (97% - 100%)  Wt(kg): --    08-27-24 @ 07:01  -  08-28-24 @ 07:00  --------------------------------------------------------  IN: 650 mL / OUT: 2322 mL / NET: -1672 mL    Physical Exam:  Gen: NAD, well-appearing  Pulm: CTA B/L  CV: RRR, S1S2;  Extremities: no bilateral LE edema noted.   Vascular Access: PIVs.    LABS/STUDIES  --------------------------------------------------------------------------------              7.3    10.09 >-----------<  398      [08-28-24 @ 00:30]              22.4     136  |  100  |  30  ----------------------------<  113      [08-28-24 @ 00:30]  3.9   |  21  |  2.77        Ca     8.4     [08-28-24 @ 00:30]      Mg     2.20     [08-28-24 @ 00:30]      Phos  4.5     [08-28-24 @ 00:30]    Creatinine Trend:  SCr 2.77 [08-28 @ 00:30]  SCr 1.98 [08-27 @ 00:50]  SCr 1.62 [08-26 @ 18:35]  SCr 1.20 [08-26 @ 12:03]  SCr 1.18 [08-26 @ 06:20]

## 2024-08-28 NOTE — PROGRESS NOTE ADULT - SUBJECTIVE AND OBJECTIVE BOX
SICU Daily Progress Note  =====================================================    Interval/Overnight Events: NAOE  - Repeated albumin and 80IV lasix x2 8/27 AM, improving UOP  - midline placed 8/27, previous line removed, Eliquis restarted      MEDICATIONS:   --------------------------------------------------------------------------------------  acetaminophen     Tablet .. 975 milliGRAM(s) Oral every 6 hours PRN  apixaban 10 milliGRAM(s) Oral every 12 hours  aspirin  chewable 81 milliGRAM(s) Enteral Tube daily  atorvastatin 40 milliGRAM(s) Oral at bedtime  chlorhexidine 2% Cloths 1 Application(s) Topical daily  cycloSPORINE (RESTASIS) 0.05% Emulsion 1 Drop(s) Both EYES two times a day  insulin lispro (ADMELOG) corrective regimen sliding scale   SubCutaneous three times a day before meals  insulin lispro (ADMELOG) corrective regimen sliding scale   SubCutaneous at bedtime  levothyroxine 100 MICROGram(s) Oral daily  melatonin 3 milliGRAM(s) Oral at bedtime PRN  midodrine 20 milliGRAM(s) Oral every 8 hours  pantoprazole    Tablet 40 milliGRAM(s) Oral before breakfast  piperacillin/tazobactam IVPB.. 3.375 Gram(s) IV Intermittent every 8 hours    --------------------------------------------------------------------------------------    VITAL SIGNS, INS/OUTS (last 24 hours):  --------------------------------------------------------------------------------------  T(C): 36.5 (08-27-24 @ 20:00), Max: 36.8 (08-27-24 @ 16:00)  HR: 80 (08-27-24 @ 23:33) (62 - 94)  BP: --  RR: 16 (08-27-24 @ 23:00) (12 - 23)  SpO2: 100% (08-27-24 @ 23:33) (97% - 100%)      08-26-24 @ 07:01  -  08-27-24 @ 07:00  --------------------------------------------------------  IN: 325 mL / OUT: 1427 mL / NET: -1102 mL    08-27-24 @ 07:01  -  08-28-24 @ 00:03  --------------------------------------------------------  IN: 550 mL / OUT: 2060 mL / NET: -1510 mL      --------------------------------------------------------------------------------------    EXAM  EXAM    NEURO: NAD,   HEENT: NC/AT, R IJ CVC   RESPIRATORY: nonlabored respirations, normal CW expansion  CARDIO: Normal rate, rhythm   ABDOMEN: soft, nontender, nondistended,  ileal conduit pink and well perfused with red tinged output from JACQUELINE taylor  EXTREMITIES: normal strength, no deformities    TUBES/LINES/DRAINS  [x] Peripheral IV  [] Central Venous Line     	[] R	[] L	[] IJ	[] Fem	[] SC	Date Placed:   [] Arterial Line		[] R	[] L	[] Fem	[] Rad	[] Ax	Date Placed:   [] PICC		[] Midline		[] Mediport  [] Urinary Catheter		Date Placed:     LABS  --------------------------------------------------------------------------------------    --------------------------------------------------------------------------------------    IMAGING STUDIES:      SICU Daily Progress Note  =====================================================    Interval/Overnight Events: NAOE  - Repeated albumin and 80IV lasix x2 8/27 AM, improving UOP  - midline placed 8/27, previous line removed, Eliquis restarted  - Renal US 8/28, assess for hydronephrosis indicating stricture/obstruction      MEDICATIONS:   --------------------------------------------------------------------------------------  acetaminophen     Tablet .. 975 milliGRAM(s) Oral every 6 hours PRN  apixaban 10 milliGRAM(s) Oral every 12 hours  aspirin  chewable 81 milliGRAM(s) Enteral Tube daily  atorvastatin 40 milliGRAM(s) Oral at bedtime  chlorhexidine 2% Cloths 1 Application(s) Topical daily  cycloSPORINE (RESTASIS) 0.05% Emulsion 1 Drop(s) Both EYES two times a day  insulin lispro (ADMELOG) corrective regimen sliding scale   SubCutaneous three times a day before meals  insulin lispro (ADMELOG) corrective regimen sliding scale   SubCutaneous at bedtime  levothyroxine 100 MICROGram(s) Oral daily  melatonin 3 milliGRAM(s) Oral at bedtime PRN  midodrine 20 milliGRAM(s) Oral every 8 hours  pantoprazole    Tablet 40 milliGRAM(s) Oral before breakfast  piperacillin/tazobactam IVPB.. 3.375 Gram(s) IV Intermittent every 8 hours    --------------------------------------------------------------------------------------    VITAL SIGNS, INS/OUTS (last 24 hours):  --------------------------------------------------------------------------------------  T(C): 36.5 (08-27-24 @ 20:00), Max: 36.8 (08-27-24 @ 16:00)  HR: 80 (08-27-24 @ 23:33) (62 - 94)  BP: --  RR: 16 (08-27-24 @ 23:00) (12 - 23)  SpO2: 100% (08-27-24 @ 23:33) (97% - 100%)      08-26-24 @ 07:01  -  08-27-24 @ 07:00  --------------------------------------------------------  IN: 325 mL / OUT: 1427 mL / NET: -1102 mL    08-27-24 @ 07:01  -  08-28-24 @ 00:03  --------------------------------------------------------  IN: 550 mL / OUT: 2060 mL / NET: -1510 mL      --------------------------------------------------------------------------------------    EXAM  EXAM    NEURO: NAD,   HEENT: NC/AT, R IJ CVC   RESPIRATORY: nonlabored respirations, normal CW expansion  CARDIO: Normal rate, rhythm   ABDOMEN: soft, nontender, nondistended,  ileal conduit pink and well perfused with red tinged output from taylor, R femoral shiley  EXTREMITIES: normal strength, no deformities    TUBES/LINES/DRAINS  [x] Peripheral IV  [] Central Venous Line     	[] R	[] L	[] IJ	[] Fem	[] SC	Date Placed:   [] Arterial Line		[] R	[] L	[] Fem	[] Rad	[] Ax	Date Placed:   [] PICC		[] Midline		[] Mediport  [] Urinary Catheter		Date Placed:     LABS  --------------------------------------------------------------------------------------    --------------------------------------------------------------------------------------    IMAGING STUDIES:      SICU Daily Progress Note  =====================================================    Interval/Overnight Events:  - Repeated albumin and 80IV lasix x2 8/27 AM, improving UOP  - Additional albumin 250 and 40IV lasix x2 8/28 AM  - midline placed 8/27, previous line removed, Eliquis restarted  - Renal US 8/28, assess for hydronephrosis indicating stricture/obstruction      MEDICATIONS:   --------------------------------------------------------------------------------------  acetaminophen     Tablet .. 975 milliGRAM(s) Oral every 6 hours PRN  apixaban 10 milliGRAM(s) Oral every 12 hours  aspirin  chewable 81 milliGRAM(s) Enteral Tube daily  atorvastatin 40 milliGRAM(s) Oral at bedtime  chlorhexidine 2% Cloths 1 Application(s) Topical daily  cycloSPORINE (RESTASIS) 0.05% Emulsion 1 Drop(s) Both EYES two times a day  insulin lispro (ADMELOG) corrective regimen sliding scale   SubCutaneous three times a day before meals  insulin lispro (ADMELOG) corrective regimen sliding scale   SubCutaneous at bedtime  levothyroxine 100 MICROGram(s) Oral daily  melatonin 3 milliGRAM(s) Oral at bedtime PRN  midodrine 20 milliGRAM(s) Oral every 8 hours  pantoprazole    Tablet 40 milliGRAM(s) Oral before breakfast  piperacillin/tazobactam IVPB.. 3.375 Gram(s) IV Intermittent every 8 hours    --------------------------------------------------------------------------------------    VITAL SIGNS, INS/OUTS (last 24 hours):  --------------------------------------------------------------------------------------  T(C): 36.5 (08-27-24 @ 20:00), Max: 36.8 (08-27-24 @ 16:00)  HR: 80 (08-27-24 @ 23:33) (62 - 94)  BP: --  RR: 16 (08-27-24 @ 23:00) (12 - 23)  SpO2: 100% (08-27-24 @ 23:33) (97% - 100%)      08-26-24 @ 07:01  -  08-27-24 @ 07:00  --------------------------------------------------------  IN: 325 mL / OUT: 1427 mL / NET: -1102 mL    08-27-24 @ 07:01  -  08-28-24 @ 00:03  --------------------------------------------------------  IN: 550 mL / OUT: 2060 mL / NET: -1510 mL      --------------------------------------------------------------------------------------    EXAM  EXAM    NEURO: NAD,   HEENT: NC/AT, R IJ CVC   RESPIRATORY: nonlabored respirations, normal CW expansion  CARDIO: Normal rate, rhythm   ABDOMEN: soft, nontender, nondistended,  ileal conduit pink and well perfused with red tinged output from taylorJACQUELINE femoral shiley  EXTREMITIES: normal strength, no deformities    TUBES/LINES/DRAINS  [x] Peripheral IV  [] Central Venous Line     	[] R	[] L	[] IJ	[] Fem	[] SC	Date Placed:   [] Arterial Line		[] R	[] L	[] Fem	[] Rad	[] Ax	Date Placed:   [] PICC		[] Midline		[] Mediport  [] Urinary Catheter		Date Placed:     LABS  --------------------------------------------------------------------------------------    --------------------------------------------------------------------------------------    IMAGING STUDIES:

## 2024-08-29 LAB
ANION GAP SERPL CALC-SCNC: 14 MMOL/L — SIGNIFICANT CHANGE UP (ref 7–14)
ANION GAP SERPL CALC-SCNC: 16 MMOL/L — HIGH (ref 7–14)
BUN SERPL-MCNC: 39 MG/DL — HIGH (ref 7–23)
BUN SERPL-MCNC: 46 MG/DL — HIGH (ref 7–23)
CALCIUM SERPL-MCNC: 8.4 MG/DL — SIGNIFICANT CHANGE UP (ref 8.4–10.5)
CALCIUM SERPL-MCNC: 8.9 MG/DL — SIGNIFICANT CHANGE UP (ref 8.4–10.5)
CHLORIDE SERPL-SCNC: 100 MMOL/L — SIGNIFICANT CHANGE UP (ref 98–107)
CHLORIDE SERPL-SCNC: 102 MMOL/L — SIGNIFICANT CHANGE UP (ref 98–107)
CO2 SERPL-SCNC: 20 MMOL/L — LOW (ref 22–31)
CO2 SERPL-SCNC: 21 MMOL/L — LOW (ref 22–31)
CREAT SERPL-MCNC: 2.82 MG/DL — HIGH (ref 0.5–1.3)
CREAT SERPL-MCNC: 3.09 MG/DL — HIGH (ref 0.5–1.3)
EGFR: 16 ML/MIN/1.73M2 — LOW
EGFR: 18 ML/MIN/1.73M2 — LOW
GLUCOSE BLDC GLUCOMTR-MCNC: 102 MG/DL — HIGH (ref 70–99)
GLUCOSE BLDC GLUCOMTR-MCNC: 113 MG/DL — HIGH (ref 70–99)
GLUCOSE BLDC GLUCOMTR-MCNC: 117 MG/DL — HIGH (ref 70–99)
GLUCOSE BLDC GLUCOMTR-MCNC: 133 MG/DL — HIGH (ref 70–99)
GLUCOSE SERPL-MCNC: 107 MG/DL — HIGH (ref 70–99)
GLUCOSE SERPL-MCNC: 114 MG/DL — HIGH (ref 70–99)
HCT VFR BLD CALC: 20.5 % — CRITICAL LOW (ref 34.5–45)
HCT VFR BLD CALC: 21.4 % — LOW (ref 34.5–45)
HGB BLD-MCNC: 6.7 G/DL — CRITICAL LOW (ref 11.5–15.5)
HGB BLD-MCNC: 7.3 G/DL — LOW (ref 11.5–15.5)
MAGNESIUM SERPL-MCNC: 1.8 MG/DL — SIGNIFICANT CHANGE UP (ref 1.6–2.6)
MAGNESIUM SERPL-MCNC: 1.9 MG/DL — SIGNIFICANT CHANGE UP (ref 1.6–2.6)
MCHC RBC-ENTMCNC: 30.7 PG — SIGNIFICANT CHANGE UP (ref 27–34)
MCHC RBC-ENTMCNC: 31.5 PG — SIGNIFICANT CHANGE UP (ref 27–34)
MCHC RBC-ENTMCNC: 32.7 GM/DL — SIGNIFICANT CHANGE UP (ref 32–36)
MCHC RBC-ENTMCNC: 34.1 GM/DL — SIGNIFICANT CHANGE UP (ref 32–36)
MCV RBC AUTO: 92.2 FL — SIGNIFICANT CHANGE UP (ref 80–100)
MCV RBC AUTO: 94 FL — SIGNIFICANT CHANGE UP (ref 80–100)
NRBC # BLD: 0 /100 WBCS — SIGNIFICANT CHANGE UP (ref 0–0)
NRBC # BLD: 0 /100 WBCS — SIGNIFICANT CHANGE UP (ref 0–0)
NRBC # FLD: 0 K/UL — SIGNIFICANT CHANGE UP (ref 0–0)
NRBC # FLD: 0 K/UL — SIGNIFICANT CHANGE UP (ref 0–0)
PHOSPHATE SERPL-MCNC: 4.2 MG/DL — SIGNIFICANT CHANGE UP (ref 2.5–4.5)
PHOSPHATE SERPL-MCNC: 4.8 MG/DL — HIGH (ref 2.5–4.5)
PLATELET # BLD AUTO: 447 K/UL — HIGH (ref 150–400)
PLATELET # BLD AUTO: 471 K/UL — HIGH (ref 150–400)
POTASSIUM SERPL-MCNC: 3.2 MMOL/L — LOW (ref 3.5–5.3)
POTASSIUM SERPL-MCNC: 3.4 MMOL/L — LOW (ref 3.5–5.3)
POTASSIUM SERPL-SCNC: 3.2 MMOL/L — LOW (ref 3.5–5.3)
POTASSIUM SERPL-SCNC: 3.4 MMOL/L — LOW (ref 3.5–5.3)
RBC # BLD: 2.18 M/UL — LOW (ref 3.8–5.2)
RBC # BLD: 2.32 M/UL — LOW (ref 3.8–5.2)
RBC # FLD: 17.3 % — HIGH (ref 10.3–14.5)
RBC # FLD: 18.6 % — HIGH (ref 10.3–14.5)
SODIUM SERPL-SCNC: 136 MMOL/L — SIGNIFICANT CHANGE UP (ref 135–145)
SODIUM SERPL-SCNC: 137 MMOL/L — SIGNIFICANT CHANGE UP (ref 135–145)
WBC # BLD: 8.84 K/UL — SIGNIFICANT CHANGE UP (ref 3.8–10.5)
WBC # BLD: 9.07 K/UL — SIGNIFICANT CHANGE UP (ref 3.8–10.5)
WBC # FLD AUTO: 8.84 K/UL — SIGNIFICANT CHANGE UP (ref 3.8–10.5)
WBC # FLD AUTO: 9.07 K/UL — SIGNIFICANT CHANGE UP (ref 3.8–10.5)

## 2024-08-29 PROCEDURE — 99233 SBSQ HOSP IP/OBS HIGH 50: CPT

## 2024-08-29 PROCEDURE — 99232 SBSQ HOSP IP/OBS MODERATE 35: CPT | Mod: GC

## 2024-08-29 RX ORDER — MIDODRINE HYDROCHLORIDE 5 MG/1
10 TABLET ORAL
Refills: 0 | Status: DISCONTINUED | OUTPATIENT
Start: 2024-08-29 | End: 2024-09-02

## 2024-08-29 RX ORDER — LIDOCAINE/BENZALKONIUM/ALCOHOL
1 SOLUTION, NON-ORAL TOPICAL ONCE
Refills: 0 | Status: DISCONTINUED | OUTPATIENT
Start: 2024-08-29 | End: 2024-08-29

## 2024-08-29 RX ORDER — LIDOCAINE/PRILOCAINE 2.5 %-2.5%
1 KIT TOPICAL ONCE
Refills: 0 | Status: COMPLETED | OUTPATIENT
Start: 2024-08-29 | End: 2024-08-29

## 2024-08-29 RX ORDER — POTASSIUM CHLORIDE 10 MEQ
10 TABLET, EXT RELEASE, PARTICLES/CRYSTALS ORAL
Refills: 0 | Status: DISCONTINUED | OUTPATIENT
Start: 2024-08-29 | End: 2024-08-29

## 2024-08-29 RX ORDER — POTASSIUM CHLORIDE 10 MEQ
40 TABLET, EXT RELEASE, PARTICLES/CRYSTALS ORAL ONCE
Refills: 0 | Status: COMPLETED | OUTPATIENT
Start: 2024-08-29 | End: 2024-08-30

## 2024-08-29 RX ORDER — LIDOCAINE HCL 20 MG/ML
4 VIAL (ML) INJECTION ONCE
Refills: 0 | Status: COMPLETED | OUTPATIENT
Start: 2024-08-29 | End: 2024-08-29

## 2024-08-29 RX ADMIN — Medication 100 GRAM(S): at 01:56

## 2024-08-29 RX ADMIN — MIDODRINE HYDROCHLORIDE 10 MILLIGRAM(S): 5 TABLET ORAL at 06:02

## 2024-08-29 RX ADMIN — PIPERACILLIN SODIUM AND TAZOBACTAM SODIUM 25 GRAM(S): 3; .375 INJECTION, POWDER, FOR SOLUTION INTRAVENOUS at 06:03

## 2024-08-29 RX ADMIN — Medication 40 MILLIGRAM(S): at 06:02

## 2024-08-29 RX ADMIN — ACETAMINOPHEN 975 MILLIGRAM(S): 325 TABLET ORAL at 13:00

## 2024-08-29 RX ADMIN — ACETAMINOPHEN 975 MILLIGRAM(S): 325 TABLET ORAL at 19:00

## 2024-08-29 RX ADMIN — ACETAMINOPHEN 975 MILLIGRAM(S): 325 TABLET ORAL at 07:00

## 2024-08-29 RX ADMIN — Medication 1 APPLICATION(S): at 13:36

## 2024-08-29 RX ADMIN — Medication 40 MILLIGRAM(S): at 21:05

## 2024-08-29 RX ADMIN — Medication 100 MICROGRAM(S): at 06:02

## 2024-08-29 RX ADMIN — ACETAMINOPHEN 975 MILLIGRAM(S): 325 TABLET ORAL at 18:42

## 2024-08-29 RX ADMIN — Medication 1 DROP(S): at 18:07

## 2024-08-29 RX ADMIN — CHLORHEXIDINE GLUCONATE 1 APPLICATION(S): 40 SOLUTION TOPICAL at 13:38

## 2024-08-29 RX ADMIN — ACETAMINOPHEN 975 MILLIGRAM(S): 325 TABLET ORAL at 06:02

## 2024-08-29 RX ADMIN — Medication 81 MILLIGRAM(S): at 13:37

## 2024-08-29 RX ADMIN — Medication 1 DROP(S): at 06:02

## 2024-08-29 RX ADMIN — Medication 4 MILLILITER(S): at 13:37

## 2024-08-29 RX ADMIN — ACETAMINOPHEN 975 MILLIGRAM(S): 325 TABLET ORAL at 12:10

## 2024-08-29 RX ADMIN — MIDODRINE HYDROCHLORIDE 10 MILLIGRAM(S): 5 TABLET ORAL at 21:05

## 2024-08-29 NOTE — PROGRESS NOTE ADULT - ASSESSMENT
69 yo F h/o HTN and bladder CA, s/p cystectomy/IC creation on 8/6/2024 at [facility] with Dr. Thompson (d/c on 8/10) presented to ED 8/16 w/ generalized weakness and some lower abdominal pain. Since d/c, only have 1-2 bowel movement, and had significantly decrease PO intake due to decreased appetite, intermittent fevers/chills, +bloody discharge from urethra, some burning. Pt stated stents fell out yesterday.  BP 80s/50s for EMS.  Pt remained hypotensive in ED, started on pressors, cultures sent, placed on zosyn and BiPAP and admitted to SICU. CT revealed: Expected postoperative changes status post cystectomy with ileal conduit creation including small amount of complex fluid and free air in the pelvis. No organized fluid collection. Distended ileal conduit with narrowing at the exiting ostomy site and proximally at the site of ureteral insertion. Mild bilateral hydroureteronephrosis with delayed nephrograms. No significant perinephric stranding.    8/16: still requiring pressor support and on BiPAP, pt states she feels better,14fr catheter placed in stoma, Bcx with GPC/GNR, Cr to 4.42 from 5.34, abx changed to leila and diflucan  8/17: still requiring pressor support and supplement oxygen but feels well, decr UO overnight, given albumin and IVF restarted, Cr up slightly to 4.68, electrolytes normal  8/18: RBUS done, no signs of hydro. IR consulted, no need for NTs as no hydro. UOP still very low (overnight 10 cc/hour), given 2 boluses with no improvement. Cr up to 4.95 from 4.68. CT loopogram of conduit pending today to evaluate for leak. still requiring pressors. Ucx from conduit now growing e.coli, sensitivities pending, PT recs rehab   8/19: CT loopogram w/ no anastamotic leak. Cr continues to rise w/ low UOP. UA on admission w/ coarse granular cast. Constellation of signs and symptoms suggestive of Acute tubular necrosis  8/20: Urine culture w/ e.coli sensitivities w/ resistance to floroquinolones. Pt back on abx. Pt w/ slightly increased urine output  8/21: Pt with acute onset of tachypnea, and hypoxia requiring intubation and sedation, CT w/ probably PE, hep gtt started. Nephrology consult obtained and started on CRRT, meropenem restarted  8/22: still intubated and sedated, pressor requirement decreasing, UO increasing, PTT remains supratherapeutic, hep gtt held for one hour this AM, on tube feeds, febrile to 100.9 at 4am  8/23: changed TF as per nutrition recs to start tomorrow AM, check w/ dietician, PTT therapeutic x1 --> repeat PTT at 4:45 AM 50.7, hep gtt @4ml/hr, tolerating CPAP. awake, UO still only 110/shift, still requiring pressor support, CRRT  8//24: extubated, now on RA, still requiring CRRT, /shift and small amount of pressor support, Procal >100, repeat Bcx and Ucx ordered, leila d/c, vanc x 2, then d/c now on zosyn, passed beside S&S now on reg diet, pt offers no complaints, + flatus/BM, will do bedside pelvic to evaluate for retained material  8/25: CRRT, UO less overnight, zosyn, tolerating reg diet, blood tinged vaginal discharge   8/26: UOP 39 overnight, on zosyn and vanc, tolerating reg diet. poss transfer to [facility] today - SICU to discuss on rounds.   8/27: UOP increased to 310 after dose of lasix. off pressors, still getting midodrine.   8/28: Got more lasix and albumin overnight with OP of 642, still down from 1.5 yesterday during the day. Cr csott from 1.98 to 2.77. Will suggest RBUS. Bcx NGTD at 4 days. Off vanc today, still on zosyn.   8/29: stable, midodrine increased,  overnight, still with some blood from vagina (no pads documented) RBUS no hydro, Bcx neg final, IC Cx neg    Plan:  -monitor UOP closely   -AM labs reviewed  -monitor vaginal bleeding (pad count)  -f/u nephrology: no CRRT needed   -pressor support prn, on midodrine now   -CC diet   -consider d/c zosyn given neg Cxs  -bowel regimen  -appreciate SICU care  -PT recs rehab    Urology  #[id number]

## 2024-08-29 NOTE — PROGRESS NOTE ADULT - PROBLEM SELECTOR PLAN 2
Metabolic acidosis in setting of renal failure and sepsis. SCO2 today 21. No further CRRT as above.  Lactate was normal. Monitor Labs as above.      If you have any questions, please feel free to contact me:  Patti Clinton MD PGY-4  Nephrology Fellow  Pager [phone] / [employer] Teams (Preferred)  (After 5pm or on weekends please page the on-call fellow)

## 2024-08-29 NOTE — PROGRESS NOTE ADULT - ASSESSMENT
67 yo F, with PmHx of HTN and bladder cancer, s/p bladder removal with ileal conduit construction on Aug 6 at [facility], here for generalized weakness. Per pt, she had her surgery on Aug 6th, d/c home on Aug 10th. Since d/c, only have 1-2 bowel movement, and had significantly decrease PO intake due to decreased apptite. No fever, some chill. +bloody discharge from urethra, some burning. EMS was called as she felt very weak and unwell. BP 80s/50s for EMS. Patient [facility]. Been having decreased PO intake and on and off fevers. Also states she had stents previously that came out on their own. Presents to the ED now due to lower abdominal pain. SICU consulted for hypotension requiring pressors and desaturation on BiPAP  requiring intubation, now s/p extubation 8/23. Off CRRT, no pressor requirements. UOP increasing.    NEUROLOGIC   - A&Ox3  - SBIRT consulted, no referral or intervention  - Pain control: IV Tylenol -> PO    RESPIRATORY   - Extubated 8/23  - Monitor SpO2 goal >92%  - CPAP at night  - Incentive spirometry  - Pulmozyne  - Dc'd duonebs    CARDIOVASCULAR   - Monitor hemodynamics  - Off levo since 8/25  - Home ASA, atorvastatin  - Midrodine decreased to 10 q8    GASTROINTESTINAL   - CC diet  - Miralax  - Home protonix    /RENAL  - CRRT d/c 8/26  - Riley in urostomy, do not manipulate  - Trend weight from admit/dry weight for fluid change  - Maintain strict Is/Os  - Monitor electrolytes, replete PRN  - Additional albumin 250 and 40IV lasix x2 8/28 AM  - Renal US 8/28, assess for mechanical obstruction  - Urine culture negative (8/23)  - continued bleeding per vaginal canal    HEMATOLOGIC  - Eliquis resumed for possible PE  - ASA    INFECTIOUS DISEASE  - DC zosyn after 8/28 dose (8/23- )  - vanc (8/23- 8/26)  - meropenem (8/16-8/23)  - S/p Diflucan (8/16-8/19)  - BCx 8/15 NGTD  - BCx 8/20 NGTD  - Procalcitonin >1,000  - Repeat BCx 8/23  - UCx 8/23 NGTD    ENDOCRINE  - Monitor gluc  - LAINE  - IV synthroid 100 PO (home)    LINES  - PIV   - R A line  - Riley in urostomy (do not flush)     DISPO: STEVIE Weeks     69 yo F, with PmHx of HTN and bladder cancer, s/p bladder removal with ileal conduit construction on Aug 6 at [facility], here for generalized weakness. Per pt, she had her surgery on Aug 6th, d/c home on Aug 10th. Since d/c, only have 1-2 bowel movement, and had significantly decrease PO intake due to decreased apptite. No fever, some chill. +bloody discharge from urethra, some burning. EMS was called as she felt very weak and unwell. BP 80s/50s for EMS. Patient s/p cystectomy with Dr. Thompson at [facility] on August 6th. Been having decreased PO intake and on and off fevers. Also states she had stents previously that came out on their own. Presents to the ED now due to lower abdominal pain. SICU consulted for hypotension requiring pressors and desaturation on BiPAP  requiring intubation, now s/p extubation 8/23. Off CRRT, no pressor requirements. UOP increasing.      NEUROLOGIC   - A&Ox3  - SBIRT consulted, no referral or intervention  - Pain control: IV Tylenol -> PO    RESPIRATORY   - Extubated 8/23  - Monitor SpO2 goal >92%  - CPAP at night  - Incentive spirometry  - Pulmozyne  - Dc'd duonebs    CARDIOVASCULAR   - Monitor hemodynamics  - Off levo since 8/25  - Home ASA, atorvastatin  - Midrodine decreased to 10 q8    GASTROINTESTINAL   - CC diet  - Miralax  - Home protonix    /RENAL  - CRRT d/c 8/26  - Riley in urostomy, do not manipulate  - Trend weight from admit/dry weight for fluid change  - Maintain strict Is/Os  - Monitor electrolytes, replete PRN  - Additional albumin 250 and 40IV lasix x2 8/28 AM  - Renal US 8/28, assess for mechanical obstruction  - Urine culture negative (8/23)  - continued bleeding per vaginal canal    HEMATOLOGIC  - Eliquis resumed for possible PE  - ASA    INFECTIOUS DISEASE  - S/p zosyn (8/23-28)  - S/p vanc (8/23- 8/26)  - S/p meropenem (8/16-8/23)  - S/p Diflucan (8/16-8/19)  - BCx 8/15 NGTD  - BCx 8/20 NGTD  - Procalcitonin >1,000  - Repeat BCx 8/23  - UCx 8/23 NGTD    ENDOCRINE  - Monitor gluc  - LAINE  - IV synthroid 100 PO (home)    LINES  - PIV   - R A line  - Riley in urostomy (do not flush)     DISPO: STEVIE Weeks

## 2024-08-29 NOTE — PROGRESS NOTE ADULT - SUBJECTIVE AND OBJECTIVE BOX
[facility] Division of Kidney Diseases & Hypertension  FOLLOW UP NOTE  [phone]--------------------------------------------------------------------------------  Chief Complaint:BENIGNO on CK    24 hour events/subjective: Received lasix 40mg IV BID yesterday. Pt seen and examined at bedside this AM. Seen sitting up in chair. Feels well, slept in chair overnight because she feels so improved. UOP is improving.     PAST HISTORY  --------------------------------------------------------------------------------  No significant changes to PMH, PSH, FHx, SHx from H&P unless otherwise noted.    ALLERGIES & MEDICATIONS  --------------------------------------------------------------------------------  Allergies    No Known Allergies    Intolerances    Standing Inpatient Medications  aspirin  chewable 81 milliGRAM(s) Enteral Tube daily  atorvastatin 40 milliGRAM(s) Oral at bedtime  chlorhexidine 2% Cloths 1 Application(s) Topical daily  cycloSPORINE (RESTASIS) 0.05% Emulsion 1 Drop(s) Both EYES two times a day  insulin lispro (ADMELOG) corrective regimen sliding scale   SubCutaneous three times a day before meals  insulin lispro (ADMELOG) corrective regimen sliding scale   SubCutaneous at bedtime  levothyroxine 100 MICROGram(s) Oral daily  midodrine 10 milliGRAM(s) Oral every 8 hours  pantoprazole    Tablet 40 milliGRAM(s) Oral before breakfast  piperacillin/tazobactam IVPB.. 3.375 Gram(s) IV Intermittent every 8 hours  polyethylene glycol 3350 17 Gram(s) Oral daily    PRN Inpatient Medications  acetaminophen     Tablet .. 975 milliGRAM(s) Oral every 6 hours PRN  melatonin 3 milliGRAM(s) Oral at bedtime PRN    REVIEW OF SYSTEMS  --------------------------------------------------------------------------------  Gen: No fevers/chills  Head/Eyes/Ears: No HA  Respiratory: No dyspnea  CV: No chest pain  MSK: No edema,  Skin: No rashes    All other systems were reviewed and are negative, except as noted above.    VITALS/PHYSICAL EXAM  --------------------------------------------------------------------------------  T(C): 36.6 (08-29-24 @ 04:00), Max: 36.8 (08-28-24 @ 18:15)  HR: 90 (08-29-24 @ 08:09) (79 - 96)  BP: --  RR: 12 (08-29-24 @ 06:00) (11 - 20)  SpO2: 99% (08-29-24 @ 08:09) (96% - 100%)  Wt(kg): --    08-28-24 @ 07:01  -  08-29-24 @ 07:00  --------------------------------------------------------  IN: 900 mL / OUT: 2030 mL / NET: -1130 mL    Physical Exam:  Gen: NAD, well-appearing sitting up in chair.   Pulm: CTA B/L  CV: RRR, S1S2;  : +taylor/urostomy.  Extremities: no bilateral LE edema noted.   Skin: Warm, without rashes  Vascular access: PIVs    LABS/STUDIES  --------------------------------------------------------------------------------              7.3    8.84  >-----------<  447      [08-29-24 @ 00:48]              21.4     137  |  100  |  39  ----------------------------<  107      [08-29-24 @ 00:48]  3.4   |  21  |  3.09        Ca     8.4     [08-29-24 @ 00:48]      Mg     1.80     [08-29-24 @ 00:48]      Phos  4.8     [08-29-24 @ 00:48]    Creatinine Trend:  SCr 3.09 [08-29 @ 00:48]  SCr 2.94 [08-28 @ 08:27]  SCr 2.77 [08-28 @ 00:30]  SCr 1.98 [08-27 @ 00:50]  SCr 1.62 [08-26 @ 18:35]    Urinalysis - [08-29-24 @ 00:48]      Color  / Appearance  / SG  / pH       Gluc 107 / Ketone   / Bili  / Urobili        Blood  / Protein  / Leuk Est  / Nitrite       RBC  / WBC  / Hyaline  / Gran  / Sq Epi  / Non Sq Epi  / Bacteria            [facility] Division of Kidney Diseases & Hypertension  FOLLOW UP NOTE  [phone]--------------------------------------------------------------------------------  Chief Complaint: BENIGNO on CK    24 hour events/subjective: Received lasix 40mg IV BID yesterday. Pt seen and examined at bedside this AM. Seen sitting up in chair. Feels well, slept in chair overnight because she feels so improved. UOP is improving.     PAST HISTORY  --------------------------------------------------------------------------------  No significant changes to PMH, PSH, FHx, SHx from H&P unless otherwise noted.    ALLERGIES & MEDICATIONS  --------------------------------------------------------------------------------  Allergies    No Known Allergies    Intolerances    Standing Inpatient Medications  aspirin  chewable 81 milliGRAM(s) Enteral Tube daily  atorvastatin 40 milliGRAM(s) Oral at bedtime  chlorhexidine 2% Cloths 1 Application(s) Topical daily  cycloSPORINE (RESTASIS) 0.05% Emulsion 1 Drop(s) Both EYES two times a day  insulin lispro (ADMELOG) corrective regimen sliding scale   SubCutaneous three times a day before meals  insulin lispro (ADMELOG) corrective regimen sliding scale   SubCutaneous at bedtime  levothyroxine 100 MICROGram(s) Oral daily  midodrine 10 milliGRAM(s) Oral every 8 hours  pantoprazole    Tablet 40 milliGRAM(s) Oral before breakfast  piperacillin/tazobactam IVPB.. 3.375 Gram(s) IV Intermittent every 8 hours  polyethylene glycol 3350 17 Gram(s) Oral daily    PRN Inpatient Medications  acetaminophen     Tablet .. 975 milliGRAM(s) Oral every 6 hours PRN  melatonin 3 milliGRAM(s) Oral at bedtime PRN    REVIEW OF SYSTEMS  --------------------------------------------------------------------------------  Gen: No fevers/chills  Head/Eyes/Ears: No HA  Respiratory: No dyspnea  CV: No chest pain  MSK: No edema,  Skin: No rashes    All other systems were reviewed and are negative, except as noted above.    VITALS/PHYSICAL EXAM  --------------------------------------------------------------------------------  T(C): 36.6 (08-29-24 @ 04:00), Max: 36.8 (08-28-24 @ 18:15)  HR: 90 (08-29-24 @ 08:09) (79 - 96)  BP: --  RR: 12 (08-29-24 @ 06:00) (11 - 20)  SpO2: 99% (08-29-24 @ 08:09) (96% - 100%)  Wt(kg): --    08-28-24 @ 07:01  -  08-29-24 @ 07:00  --------------------------------------------------------  IN: 900 mL / OUT: 2030 mL / NET: -1130 mL    Physical Exam:  Gen: NAD, well-appearing sitting up in chair.   Pulm: CTA B/L  CV: RRR, S1S2;  : +taylor/urostomy.  Extremities: no bilateral LE edema noted.   Skin: Warm, without rashes  Vascular access: PIVs    LABS/STUDIES  --------------------------------------------------------------------------------              7.3    8.84  >-----------<  447      [08-29-24 @ 00:48]              21.4     137  |  100  |  39  ----------------------------<  107      [08-29-24 @ 00:48]  3.4   |  21  |  3.09        Ca     8.4     [08-29-24 @ 00:48]      Mg     1.80     [08-29-24 @ 00:48]      Phos  4.8     [08-29-24 @ 00:48]    Creatinine Trend:  SCr 3.09 [08-29 @ 00:48]  SCr 2.94 [08-28 @ 08:27]  SCr 2.77 [08-28 @ 00:30]  SCr 1.98 [08-27 @ 00:50]  SCr 1.62 [08-26 @ 18:35]    Urinalysis - [08-29-24 @ 00:48]      Color  / Appearance  / SG  / pH       Gluc 107 / Ketone   / Bili  / Urobili        Blood  / Protein  / Leuk Est  / Nitrite       RBC  / WBC  / Hyaline  / Gran  / Sq Epi  / Non Sq Epi  / Bacteria

## 2024-08-29 NOTE — PROGRESS NOTE ADULT - ATTENDING COMMENTS
Patient no events overnight. Patient received lasix and albumin. Acute kidney injury, cr continues to slowly rise however making adequate urine. Patient out of bed to chair, will maintain off abx today. Decrease midodrine. Floor eligible.    I have personally interviewed when possible and examined the patient, reviewed data and laboratory tests/x-rays and all pertinent electronic images.  I was physically present for the key portions of the evaluation and management (E/M) service provided.   The SICU team has a constant risk benefit analyzes discussion with the primary team, all consultants, House Staff and PA's on all decisions.  These diagnoses are unrelated to the surgical procedure noted above.  I meet with family if needed to get further history, discuss the case and make care decisions for this patient who might not be able to participate.  Time involved in performance of separately billable procedures was not counted toward my critical care time. There is no overlap.  I spent 30 minutes ( 0800Hrs-0915Hrs in AM/ 1600Hrs-1715Hrs in PM, or other time indicated) of critical care time for the diagnoses, assessment, plan and interventions.  This time excludes time spent on separate procedures and teaching.

## 2024-08-29 NOTE — PROGRESS NOTE ADULT - PROBLEM SELECTOR PLAN 1
BENIGNO on CKD2 likely due to ATN in setting of hemodynamic changes and contrast induced. Pt with baseline renal function 1.1-1.4. Most recent SCr from outpatient 1.18 eGFR 50 4/2024. At the time of presentation Scr was 5.34 ( Aug 15, 2024) , She received IV abx and fluids and it initially improved to 4.42 and then started worsening gradually to 6.61 and requiring CRRT. SCr elevated today to 3.09 in setting of holding further renal replacement therapy and diuretics. UOP in 24 hours 2L while on IV lasix 40 mg BID. Pt initiated on CRRT 8/20/24-8/26/24 for acidosis and volume overload. Recommend holding further diuretics today. No urgent indication for CRRT. Monitor labs and I/Os. Avoid nephrotoxins including, ACE/ARB, NSAIDs, contrast, etc. BENIGNO on CKD2 likely due to ATN in setting of hemodynamic changes and contrast induced. Pt with baseline renal function 1.1-1.4. Most recent SCr from outpatient 1.18 eGFR 50 4/2024. At the time of presentation Scr was 5.34 ( Aug 15, 2024) , She received IV abx and fluids and it initially improved to 4.42 and then started worsening gradually to 6.61 and requiring CRRT 8/20 to 8/26. SCr elevated today to 3.09 in setting of holding further renal replacement therapy. UOP in 24 hours 2L while on IV lasix 40 mg BID. Recommend holding further diuretics today. No urgent indication for CRRT. Monitor labs and I/Os. Avoid nephrotoxins including, ACE/ARB, NSAIDs, contrast, etc.

## 2024-08-29 NOTE — PROGRESS NOTE ADULT - SUBJECTIVE AND OBJECTIVE BOX
Interval/Overnight Events:  - NAOE  - Midrodine decreased to 10 q8 8/28  - Additional albumin 250 and 40IV lasix x2 8/28 AM  - Renal US 8/28 - no obstruction  - Zosyn DC'ed 8/28  - Miralax per patient comfort  - Pt refused US for PIV - deferred until morning    Subjective:  Pt offers no complaints, was able to sleep in chair, still with some blood per vagina    Objective:    Vital signs  T(C): , Max: 36.8 (08-28-24 @ 18:15)  HR: 95 (08-29-24 @ 06:00)  BP: 107/72 (08-28-24 @ 08:00)  SpO2: 100% (08-29-24 @ 06:00)  Wt(kg): --    Output   IC: 620 pink tinged    08-28 @ 07:01  -  08-29 @ 07:00  --------------------------------------------------------  IN: 900 mL / OUT: 2030 mL / NET: -1130 mL        Gen: NAD  Abd: stoma pink w/ claudia in situ, soft, nontender      Labs                        7.3    8.84  )-----------( 447      ( 29 Aug 2024 00:48 )             21.4     29 Aug 2024 00:48    137    |  100    |  39     ----------------------------<  107    3.4     |  21     |  3.09     Ca    8.4        29 Aug 2024 00:48  Phos  4.8       29 Aug 2024 00:48  Mg     1.80      29 Aug 2024 00:48

## 2024-08-29 NOTE — PROGRESS NOTE ADULT - SUBJECTIVE AND OBJECTIVE BOX
SICU Daily Progress Note  =====================================================  Interval/Overnight Events:    - NAEO     - Renal US 8/28: no obstructive uropathy    ALLERGIES:  No Known Allergies      --------------------------------------------------------------------------------------    MEDICATIONS:    Neurologic Medications  acetaminophen     Tablet .. 975 milliGRAM(s) Oral every 6 hours PRN Mild Pain (1 - 3)  melatonin 3 milliGRAM(s) Oral at bedtime PRN Insomnia    Respiratory Medications    Cardiovascular Medications  midodrine 10 milliGRAM(s) Oral every 8 hours    Gastrointestinal Medications  pantoprazole    Tablet 40 milliGRAM(s) Oral before breakfast  polyethylene glycol 3350 17 Gram(s) Oral daily    Genitourinary Medications    Hematologic/Oncologic Medications  aspirin  chewable 81 milliGRAM(s) Enteral Tube daily    Antimicrobial/Immunologic Medications  piperacillin/tazobactam IVPB.. 3.375 Gram(s) IV Intermittent every 8 hours    Endocrine/Metabolic Medications  atorvastatin 40 milliGRAM(s) Oral at bedtime  insulin lispro (ADMELOG) corrective regimen sliding scale   SubCutaneous three times a day before meals  insulin lispro (ADMELOG) corrective regimen sliding scale   SubCutaneous at bedtime  levothyroxine 100 MICROGram(s) Oral daily    Topical/Other Medications  chlorhexidine 2% Cloths 1 Application(s) Topical daily  cycloSPORINE (RESTASIS) 0.05% Emulsion 1 Drop(s) Both EYES two times a day    --------------------------------------------------------------------------------------    VITAL SIGNS:  T(C): 36.5 (08-28-24 @ 20:00), Max: 36.8 (08-28-24 @ 18:15)  HR: 96 (08-28-24 @ 23:17) (71 - 96)  BP: 107/72 (08-28-24 @ 08:00) (107/72 - 107/72)  RR: 20 (08-28-24 @ 21:30) (13 - 22)  SpO2: 100% (08-28-24 @ 23:17) (96% - 100%)  --------------------------------------------------------------------------------------    INS AND OUTS:    08-27-24 @ 07:01  -  08-28-24 @ 07:00  --------------------------------------------------------  IN: 650 mL / OUT: 2322 mL / NET: -1672 mL    08-28-24 @ 07:01  -  08-29-24 @ 00:31  --------------------------------------------------------  IN: 700 mL / OUT: 1525 mL / NET: -825 mL      --------------------------------------------------------------------------------------    EXAM    NEURO: NAD,   HEENT: NC/AT  RESPIRATORY: nonlabored respirations, normal CW expansion  CARDIO: RRR, S1S2  ABDOMEN: soft, nontender, nondistended, ileal conduit pink and well perfused with red tinged output from taylor  EXTREMITIES: normal strength, no deformities    --------------------------------------------------------------------------------------    LABS  CBC (08-28 @ 00:30)                          7.3<L>                   10.09   )--------------(  398        --    % Neuts, --    % Lymphs, ANC: --                              22.4<L>    BMP (08-28 @ 08:27)       137     |  101     |  32<H> 			Ca++ --      Ca 8.5          ---------------------------------( 107<H>		Mg 2.10         4.0     |  20<L>   |  2.94<H>			Ph 4.7<H>  BMP (08-28 @ 00:30)       136     |  100     |  30<H> 			Ca++ --      Ca 8.4          ---------------------------------( 113<H>		Mg 2.20         3.9     |  21<L>   |  2.77<H>			Ph 4.5                 Urinalysis (08-28 @ 08:27):     Color:  / Appearance:  / SG:  / pH:  / Gluc: 107<H> / Ketones:  / Bili:  / Urobili:  / Protein : / Nitrites:  / Leuk.Est:  / RBC:  / WBC:  / Sq Epi:  / Non Sq Epi:  / Bacteria      Urinalysis (08-28 @ 00:30):     Color:  / Appearance:  / SG:  / pH:  / Gluc: 113<H> / Ketones:  / Bili:  / Urobili:  / Protein : / Nitrites:  / Leuk.Est:  / RBC:  / WBC:  / Sq Epi:  / Non Sq Epi:  / Bacteria        -> .Urine Ileal Conduit Culture (08-23 @ 20:40)     NG    NG    No growth    -> .Blood Blood-Peripheral Culture (08-23 @ 16:40)     NG    NG    No growth at 5 days    -> .Blood Blood-Peripheral Culture (08-23 @ 16:35)     NG    NG    No growth at 5 days    -> .Blood Blood-Venous Culture (08-20 @ 20:50)     NG    NG    No growth at 5 days    -> .Blood Blood-Venous Culture (08-20 @ 20:25)     NG    NG    No growth at 5 days        -------------------------------------------------------------------------------------- SICU Daily Progress Note  =====================================================    Interval/Overnight Events:  - NAOE  - Midrodine decreased to 10 q8 8/28  - Additional albumin 250 and 40IV lasix x2 8/28 AM  - Renal US 8/28 - no obstruction  - Zosyn DC'ed 8/28  - Miralax per patient comfort  - Pt refused US for PIV - deferred until morning    ALLERGIES:  No Known Allergies      --------------------------------------------------------------------------------------    MEDICATIONS:    Neurologic Medications  acetaminophen     Tablet .. 975 milliGRAM(s) Oral every 6 hours PRN Mild Pain (1 - 3)  melatonin 3 milliGRAM(s) Oral at bedtime PRN Insomnia    Respiratory Medications    Cardiovascular Medications  midodrine 10 milliGRAM(s) Oral every 8 hours    Gastrointestinal Medications  pantoprazole    Tablet 40 milliGRAM(s) Oral before breakfast  polyethylene glycol 3350 17 Gram(s) Oral daily    Genitourinary Medications    Hematologic/Oncologic Medications  aspirin  chewable 81 milliGRAM(s) Enteral Tube daily    Antimicrobial/Immunologic Medications  piperacillin/tazobactam IVPB.. 3.375 Gram(s) IV Intermittent every 8 hours    Endocrine/Metabolic Medications  atorvastatin 40 milliGRAM(s) Oral at bedtime  insulin lispro (ADMELOG) corrective regimen sliding scale   SubCutaneous three times a day before meals  insulin lispro (ADMELOG) corrective regimen sliding scale   SubCutaneous at bedtime  levothyroxine 100 MICROGram(s) Oral daily    Topical/Other Medications  chlorhexidine 2% Cloths 1 Application(s) Topical daily  cycloSPORINE (RESTASIS) 0.05% Emulsion 1 Drop(s) Both EYES two times a day    --------------------------------------------------------------------------------------    VITAL SIGNS:  T(C): 36.5 (08-28-24 @ 20:00), Max: 36.8 (08-28-24 @ 18:15)  HR: 96 (08-28-24 @ 23:17) (71 - 96)  BP: 107/72 (08-28-24 @ 08:00) (107/72 - 107/72)  RR: 20 (08-28-24 @ 21:30) (13 - 22)  SpO2: 100% (08-28-24 @ 23:17) (96% - 100%)  --------------------------------------------------------------------------------------    INS AND OUTS:    08-27-24 @ 07:01  -  08-28-24 @ 07:00  --------------------------------------------------------  IN: 650 mL / OUT: 2322 mL / NET: -1672 mL    08-28-24 @ 07:01  -  08-29-24 @ 00:31  --------------------------------------------------------  IN: 700 mL / OUT: 1525 mL / NET: -825 mL      --------------------------------------------------------------------------------------    EXAM    NEURO: NAD,   HEENT: NC/AT  RESPIRATORY: nonlabored respirations, normal CW expansion  CARDIO: RRR, S1S2  ABDOMEN: soft, nontender, nondistended, ileal conduit pink and well perfused with red tinged output from taylor  EXTREMITIES: normal strength, no deformities    --------------------------------------------------------------------------------------    LABS  CBC (08-28 @ 00:30)                          7.3<L>                   10.09   )--------------(  398        --    % Neuts, --    % Lymphs, ANC: --                              22.4<L>    BMP (08-28 @ 08:27)       137     |  101     |  32<H> 			Ca++ --      Ca 8.5          ---------------------------------( 107<H>		Mg 2.10         4.0     |  20<L>   |  2.94<H>			Ph 4.7<H>  BMP (08-28 @ 00:30)       136     |  100     |  30<H> 			Ca++ --      Ca 8.4          ---------------------------------( 113<H>		Mg 2.20         3.9     |  21<L>   |  2.77<H>			Ph 4.5                 Urinalysis (08-28 @ 08:27):     Color:  / Appearance:  / SG:  / pH:  / Gluc: 107<H> / Ketones:  / Bili:  / Urobili:  / Protein : / Nitrites:  / Leuk.Est:  / RBC:  / WBC:  / Sq Epi:  / Non Sq Epi:  / Bacteria      Urinalysis (08-28 @ 00:30):     Color:  / Appearance:  / SG:  / pH:  / Gluc: 113<H> / Ketones:  / Bili:  / Urobili:  / Protein : / Nitrites:  / Leuk.Est:  / RBC:  / WBC:  / Sq Epi:  / Non Sq Epi:  / Bacteria        -> .Urine Ileal Conduit Culture (08-23 @ 20:40)     NG    NG    No growth    -> .Blood Blood-Peripheral Culture (08-23 @ 16:40)     NG    NG    No growth at 5 days    -> .Blood Blood-Peripheral Culture (08-23 @ 16:35)     NG    NG    No growth at 5 days    -> .Blood Blood-Venous Culture (08-20 @ 20:50)     NG    NG    No growth at 5 days    -> .Blood Blood-Venous Culture (08-20 @ 20:25)     NG    NG    No growth at 5 days        -------------------------------------------------------------------------------------- Breath sounds are clear, no distress present, no wheeze, rales, rhonchi or tachypnea. Normal rate and effort.

## 2024-08-30 LAB
GLUCOSE BLDC GLUCOMTR-MCNC: 105 MG/DL — HIGH (ref 70–99)
GLUCOSE BLDC GLUCOMTR-MCNC: 113 MG/DL — HIGH (ref 70–99)
GLUCOSE BLDC GLUCOMTR-MCNC: 90 MG/DL — SIGNIFICANT CHANGE UP (ref 70–99)
GLUCOSE BLDC GLUCOMTR-MCNC: 98 MG/DL — SIGNIFICANT CHANGE UP (ref 70–99)
HCT VFR BLD CALC: 22.8 % — LOW (ref 34.5–45)
HGB BLD-MCNC: 7.6 G/DL — LOW (ref 11.5–15.5)
MCHC RBC-ENTMCNC: 30.3 PG — SIGNIFICANT CHANGE UP (ref 27–34)
MCHC RBC-ENTMCNC: 33.3 GM/DL — SIGNIFICANT CHANGE UP (ref 32–36)
MCV RBC AUTO: 90.8 FL — SIGNIFICANT CHANGE UP (ref 80–100)
NRBC # BLD: 0 /100 WBCS — SIGNIFICANT CHANGE UP (ref 0–0)
NRBC # FLD: 0 K/UL — SIGNIFICANT CHANGE UP (ref 0–0)
PLATELET # BLD AUTO: 440 K/UL — HIGH (ref 150–400)
RBC # BLD: 2.51 M/UL — LOW (ref 3.8–5.2)
RBC # FLD: 17.5 % — HIGH (ref 10.3–14.5)
WBC # BLD: 8.82 K/UL — SIGNIFICANT CHANGE UP (ref 3.8–10.5)
WBC # FLD AUTO: 8.82 K/UL — SIGNIFICANT CHANGE UP (ref 3.8–10.5)

## 2024-08-30 PROCEDURE — 99233 SBSQ HOSP IP/OBS HIGH 50: CPT

## 2024-08-30 PROCEDURE — 71045 X-RAY EXAM CHEST 1 VIEW: CPT | Mod: 26

## 2024-08-30 PROCEDURE — 99232 SBSQ HOSP IP/OBS MODERATE 35: CPT

## 2024-08-30 RX ORDER — POTASSIUM CHLORIDE 10 MEQ
40 TABLET, EXT RELEASE, PARTICLES/CRYSTALS ORAL EVERY 4 HOURS
Refills: 0 | Status: COMPLETED | OUTPATIENT
Start: 2024-08-30 | End: 2024-08-30

## 2024-08-30 RX ORDER — APIXABAN 5 MG/1
10 TABLET, FILM COATED ORAL
Refills: 0 | Status: COMPLETED | OUTPATIENT
Start: 2024-08-30 | End: 2024-09-01

## 2024-08-30 RX ORDER — APIXABAN 5 MG/1
10 TABLET, FILM COATED ORAL
Refills: 0 | Status: DISCONTINUED | OUTPATIENT
Start: 2024-08-30 | End: 2024-08-30

## 2024-08-30 RX ORDER — HYDROMORPHONE HYDROCHLORIDE 2 MG/1
2 TABLET ORAL ONCE
Refills: 0 | Status: DISCONTINUED | OUTPATIENT
Start: 2024-08-30 | End: 2024-08-30

## 2024-08-30 RX ADMIN — APIXABAN 10 MILLIGRAM(S): 5 TABLET, FILM COATED ORAL at 21:27

## 2024-08-30 RX ADMIN — ACETAMINOPHEN 975 MILLIGRAM(S): 325 TABLET ORAL at 18:47

## 2024-08-30 RX ADMIN — Medication 81 MILLIGRAM(S): at 11:54

## 2024-08-30 RX ADMIN — MIDODRINE HYDROCHLORIDE 10 MILLIGRAM(S): 5 TABLET ORAL at 19:57

## 2024-08-30 RX ADMIN — APIXABAN 10 MILLIGRAM(S): 5 TABLET, FILM COATED ORAL at 09:40

## 2024-08-30 RX ADMIN — HYDROMORPHONE HYDROCHLORIDE 2 MILLIGRAM(S): 2 TABLET ORAL at 22:56

## 2024-08-30 RX ADMIN — Medication 1 DROP(S): at 17:48

## 2024-08-30 RX ADMIN — Medication 40 MILLIEQUIVALENT(S): at 05:12

## 2024-08-30 RX ADMIN — Medication 1 DROP(S): at 05:14

## 2024-08-30 RX ADMIN — CHLORHEXIDINE GLUCONATE 1 APPLICATION(S): 40 SOLUTION TOPICAL at 11:55

## 2024-08-30 RX ADMIN — ACETAMINOPHEN 975 MILLIGRAM(S): 325 TABLET ORAL at 09:00

## 2024-08-30 RX ADMIN — Medication 40 MILLIGRAM(S): at 05:13

## 2024-08-30 RX ADMIN — POLYETHYLENE GLYCOL 3350 17 GRAM(S): 17 POWDER, FOR SOLUTION ORAL at 11:54

## 2024-08-30 RX ADMIN — Medication 100 MICROGRAM(S): at 05:12

## 2024-08-30 RX ADMIN — Medication 40 MILLIGRAM(S): at 21:27

## 2024-08-30 RX ADMIN — ACETAMINOPHEN 975 MILLIGRAM(S): 325 TABLET ORAL at 17:47

## 2024-08-30 RX ADMIN — Medication 40 MILLIEQUIVALENT(S): at 00:43

## 2024-08-30 RX ADMIN — HYDROMORPHONE HYDROCHLORIDE 2 MILLIGRAM(S): 2 TABLET ORAL at 23:56

## 2024-08-30 RX ADMIN — ACETAMINOPHEN 975 MILLIGRAM(S): 325 TABLET ORAL at 03:00

## 2024-08-30 RX ADMIN — ACETAMINOPHEN 975 MILLIGRAM(S): 325 TABLET ORAL at 08:34

## 2024-08-30 RX ADMIN — MIDODRINE HYDROCHLORIDE 10 MILLIGRAM(S): 5 TABLET ORAL at 08:35

## 2024-08-30 RX ADMIN — ACETAMINOPHEN 975 MILLIGRAM(S): 325 TABLET ORAL at 02:30

## 2024-08-30 NOTE — OCCUPATIONAL THERAPY INITIAL EVALUATION ADULT - LEVEL OF INDEPENDENCE: GROOMING, OT EVAL
Pt requires intermittent set-up due to difficulty manipulating objects (opening containers)/minimum assist (75% patients effort)

## 2024-08-30 NOTE — PROGRESS NOTE ADULT - ASSESSMENT
69 yo F h/o HTN and bladder CA, s/p cystectomy/IC creation on 8/6/2024 at [facility] with Dr. Thompson (d/c on 8/10) presented to ED 8/16 w/ generalized weakness and some lower abdominal pain. Since d/c, only have 1-2 bowel movement, and had significantly decrease PO intake due to decreased appetite, intermittent fevers/chills, +bloody discharge from urethra, some burning. Pt stated stents fell out yesterday.  BP 80s/50s for EMS.  Pt remained hypotensive in ED, started on pressors, cultures sent, placed on zosyn and BiPAP and admitted to SICU. CT revealed: Expected postoperative changes status post cystectomy with ileal conduit creation including small amount of complex fluid and free air in the pelvis. No organized fluid collection. Distended ileal conduit with narrowing at the exiting ostomy site and proximally at the site of ureteral insertion. Mild bilateral hydroureteronephrosis with delayed nephrograms. No significant perinephric stranding.    8/16: still requiring pressor support and on BiPAP, pt states she feels better,14fr catheter placed in stoma, Bcx with GPC/GNR, Cr to 4.42 from 5.34, abx changed to leila and diflucan  8/17: still requiring pressor support and supplement oxygen but feels well, decr UO overnight, given albumin and IVF restarted, Cr up slightly to 4.68, electrolytes normal  8/18: RBUS done, no signs of hydro. IR consulted, no need for NTs as no hydro. UOP still very low (overnight 10 cc/hour), given 2 boluses with no improvement. Cr up to 4.95 from 4.68. CT loopogram of conduit pending today to evaluate for leak. still requiring pressors. Ucx from conduit now growing e.coli, sensitivities pending, PT recs rehab   8/19: CT loopogram w/ no anastamotic leak. Cr continues to rise w/ low UOP. UA on admission w/ coarse granular cast. Constellation of signs and symptoms suggestive of Acute tubular necrosis  8/20: Urine culture w/ e.coli sensitivities w/ resistance to floroquinolones. Pt back on abx. Pt w/ slightly increased urine output  8/21: Pt with acute onset of tachypnea, and hypoxia requiring intubation and sedation, CT w/ probably PE, hep gtt started. Nephrology consult obtained and started on CRRT, meropenem restarted  8/22: still intubated and sedated, pressor requirement decreasing, UO increasing, PTT remains supratherapeutic, hep gtt held for one hour this AM, on tube feeds, febrile to 100.9 at 4am  8/23: changed TF as per nutrition recs to start tomorrow AM, check w/ dietician, PTT therapeutic x1 --> repeat PTT at 4:45 AM 50.7, hep gtt @4ml/hr, tolerating CPAP. awake, UO still only 110/shift, still requiring pressor support, CRRT  8//24: extubated, now on RA, still requiring CRRT, /shift and small amount of pressor support, Procal >100, repeat Bcx and Ucx ordered, leila d/c, vanc x 2, then d/c now on zosyn, passed beside S&S now on reg diet, pt offers no complaints, + flatus/BM, will do bedside pelvic to evaluate for retained material  8/25: CRRT, UO less overnight, zosyn, tolerating reg diet, blood tinged vaginal discharge   8/26: UOP 39 overnight, on zosyn and vanc, tolerating reg diet. poss transfer to [facility] today - SICU to discuss on rounds.   8/27: UOP increased to 310 after dose of lasix. off pressors, still getting midodrine.   8/28: Got more lasix and albumin overnight with OP of 642, still down from 1.5 yesterday during the day. Cr scott from 1.98 to 2.77. Will suggest RBUS. Bcx NGTD at 4 days. Off vanc today, still on zosyn.   8/29: stable, midodrine increased,  overnight, still with some blood from vagina (no pads documented) RBUS no hydro, Bcx neg final, IC Cx neg  8/30: stable, midodrine to bid, no OB pads documented, good UO yellow, was transfused 1u pRBC overnight with appropriate response, zosyn d/c, listed for floor    Plan:  -monitor UOP closely   -AM labs reviewed  -monitor vaginal bleeding (pad count)  -f/u nephrology: no CRRT needed   -continue midodrine  -CC diet   -bowel regimen  -appreciate SICU care  -PT recs rehab    Urology  #[id number]

## 2024-08-30 NOTE — OCCUPATIONAL THERAPY INITIAL EVALUATION ADULT - PERTINENT HX OF CURRENT PROBLEM, REHAB EVAL
67 yo F, with PmHx of HTN and bladder cancer, s/p bladder removal with ileal conduit construction on Aug 6 at [facility], here for generalized weakness. Per pt, she had her surgery on Aug 6th, d/c home on Aug 10th. Since d/c, only have 1-2 bowel movement, and had significantly decrease PO intake due to decreased apptite. No fever, some chill. +bloody discharge from urethra, some burning. Today, called EMS as she felt very weak and unwell. BP 80s/50s for EMS. Patient s/p cystectomy with Dr. Thompson at [facility] on August 6th. Been having decreased PO intake and on and off fevers. Also states she had stents previously that came out on their own. Presents to the ED now due to lower abdominal pain. SICU consulted for hypotension requiring pressors and desaturation on BiPAP  requiring intubation, now s/p extubation 8/23.

## 2024-08-30 NOTE — PROGRESS NOTE ADULT - ASSESSMENT
69 yo F, with PmHx of HTN and bladder cancer, s/p bladder removal with ileal conduit construction on Aug 6 at [facility], here for generalized weakness. Per pt, she had her surgery on Aug 6th, d/c home on Aug 10th. Since d/c, only have 1-2 bowel movement, and had significantly decrease PO intake due to decreased apptite. No fever, some chill. +bloody discharge from urethra, some burning. Today, called EMS as she felt very weak and unwell. BP 80s/50s for EMS. Patient s/p cystectomy with Dr. Thompson at [facility] on August 6th. Patient states she had been doing well until about the last week. This last week she has been having decreased PO intake and on and off fevers. She states her surgeon knew of this any only recommended tylenol. Also states she had stents previously that came out on their own. She was supposed to see him tomorrow. Presents to the ED now due to lower abdominal pain. SICU consulted for hypotension requiring pressors and desaturation on BiPAP.     Patient with oliguiric BENIGNO/ATN  NEUROLOGIC   - A&Ox3  - SBIRT consulted, no referral or intervention  - Pain control: IV Tylenol -> PO    RESPIRATORY   - Extubated 8/23  - Monitor SpO2 goal >92%  - CPAP at night  - Incentive spirometry  - Pulmozyne  - Dc'd duonebs    CARDIOVASCULAR   - Monitor hemodynamics  - Off levo since 8/25  - Home ASA, atorvastatin  - Midrodine decreased from 10 q8 to 10 BID    GASTROINTESTINAL   - CC diet  - Miralax  - Home protonix    /RENAL  - CRRT d/c 8/26  - Riley in urostomy, do not manipulate  - Trend weight from admit/dry weight for fluid change  - Maintain strict Is/Os  - Monitor electrolytes, replete PRN  - Urine culture negative (8/23)  - continued bleeding per vaginal canal  - Renal US 8/28 - no obstruction      HEMATOLOGIC  - Eliquis resumed for possible PE  - ASA    INFECTIOUS DISEASE  - S/p zosyn (8/23-28)  - S/p vanc (8/23- 8/26)  - S/p meropenem (8/16-8/23)  - S/p Diflucan (8/16-8/19)  - BCx 8/15 NGTD  - BCx 8/20 NGTD  - Procalcitonin >1,000  - Repeat BCx 8/23  - UCx 8/23 NGTD    ENDOCRINE  - Monitor gluc  - LAINE  - IV synthroid 100 PO (home)    LINES  - PIV   - R A line DC'ed  - Riley in urostomy (do not flush)     DISPO: SICU v [facility]

## 2024-08-30 NOTE — PROGRESS NOTE ADULT - ATTENDING COMMENTS
I agree with the detailed interval history, physical, and plan, which I have reviewed and edited where appropriate'; also agree with notes/assessment with my team on service.  I have personally examined the patient.  I was physically present for the key portions of the evaluation and management (E/M) service provided.  I reviewed all the pertinent data.  The patient is a critical care patient with life threatening hemodynamic and metabolic instability in SICU.  The SICU team has a constant risk benefit analyzes discussion and coordinating care with the primary team and all consultants.   The patient is in SICU with the chief complaint and diagnosis mentioned in the note.   The plan will be specified in the note.  67 yo F s/p bladder removal with ileal conduit; s/p cystectomy in SICU for HD monitoring.  AO3  Lung coarse BS  Heart RR  Abd soft    PLAN  NEUROLOGIC   -Tylenol   RESPIRATORY   -duonebs   CARDIOVASCULAR   -ASA  -atorvastatin  GASTROINTESTINAL   -protonix  /RENAL  -Lasix  HEMATOLOGIC  -Eliquis   - ASA  INFECTIOUS DISEASE  - vanco  -zosyn   ENDOCRINE  - Monitor glucose    DISPO: MANOJ I agree with the detailed interval history, physical, and plan, which I have reviewed and edited where appropriate'; also agree with notes/assessment with my team on service.  I have personally examined the patient.  I was physically present for the key portions of the evaluation and management (E/M) service provided.  I reviewed all the pertinent data.  The patient is a critical care patient with life threatening hemodynamic and metabolic instability in SICU.  The SICU team has a constant risk benefit analyzes discussion and coordinating care with the primary team and all consultants.   The patient is in SICU with the chief complaint and diagnosis mentioned in the note.   The plan will be specified in the note.  67 yo F s/p bladder removal with ileal conduit; s/p cystectomy in SICU for HD monitoring.  AO3  Lung coarse BS  Heart RR  Abd soft    PLAN  NEUROLOGIC   -Tylenol   RESPIRATORY   -duonebs   CARDIOVASCULAR   -ASA  -atorvastatin  GASTROINTESTINAL   -protonix  /RENAL  -Lasix  HEMATOLOGIC  -Eliquis   - ASA  INFECTIOUS DISEASE  - vanco  -zosyn   ENDOCRINE  - Monitor glucose    DISPO: MANOJ.

## 2024-08-30 NOTE — OCCUPATIONAL THERAPY INITIAL EVALUATION ADULT - MANUAL MUSCLE TESTING RESULTS, REHAB EVAL
bilateral upper extremity shoulder/elbow 3+/5, bilateral upper extremity wrist/hand/digits 3/5/grossly assessed due to

## 2024-08-30 NOTE — PROGRESS NOTE ADULT - SUBJECTIVE AND OBJECTIVE BOX
SICU Daily Progress Note  =====================================================  Interval/Overnight Events:  - Listed  - unable to place midline ovn  - Hb dropped from 7-->6.5, s/p 1 unit PRBC    ALLERGIES:  No Known Allergies      --------------------------------------------------------------------------------------    MEDICATIONS:    Neurologic Medications  acetaminophen     Tablet .. 975 milliGRAM(s) Oral every 6 hours PRN Mild Pain (1 - 3)  melatonin 3 milliGRAM(s) Oral at bedtime PRN Insomnia    Respiratory Medications    Cardiovascular Medications  midodrine. 10 milliGRAM(s) Oral <User Schedule>    Gastrointestinal Medications  pantoprazole    Tablet 40 milliGRAM(s) Oral before breakfast  polyethylene glycol 3350 17 Gram(s) Oral daily  potassium chloride    Tablet ER 40 milliEquivalent(s) Oral once    Genitourinary Medications    Hematologic/Oncologic Medications  aspirin  chewable 81 milliGRAM(s) Enteral Tube daily    Antimicrobial/Immunologic Medications    Endocrine/Metabolic Medications  atorvastatin 40 milliGRAM(s) Oral at bedtime  insulin lispro (ADMELOG) corrective regimen sliding scale   SubCutaneous three times a day before meals  insulin lispro (ADMELOG) corrective regimen sliding scale   SubCutaneous at bedtime  levothyroxine 100 MICROGram(s) Oral daily    Topical/Other Medications  chlorhexidine 2% Cloths 1 Application(s) Topical daily  cycloSPORINE (RESTASIS) 0.05% Emulsion 1 Drop(s) Both EYES two times a day    --------------------------------------------------------------------------------------    VITAL SIGNS:  ICU Vital Signs Last 24 Hrs  T(C): 36.6 (29 Aug 2024 20:00), Max: 36.9 (29 Aug 2024 14:00)  T(F): 97.9 (29 Aug 2024 20:00), Max: 98.5 (29 Aug 2024 14:00)  HR: 101 (29 Aug 2024 21:00) (88 - 102)  BP: 109/61 (29 Aug 2024 08:00) (109/61 - 109/61)  BP(mean): 74 (29 Aug 2024 08:00) (74 - 74)  ABP: 104/60 (29 Aug 2024 21:00) (97/58 - 112/63)  ABP(mean): 78 (29 Aug 2024 21:00) (74 - 84)  RR: 12 (29 Aug 2024 21:00) (10 - 21)  SpO2: 100% (29 Aug 2024 20:00) (96% - 100%)    O2 Parameters below as of 29 Aug 2024 20:00  Patient On (Oxygen Delivery Method): room air    --------------------------------------------------------------------------------------    INS AND OUTS:  I&O's Detail    28 Aug 2024 07:01  -  29 Aug 2024 07:00  --------------------------------------------------------  IN:    IV PiggyBack: 400 mL    Oral Fluid: 500 mL  Total IN: 900 mL    OUT:    Urostomy (mL): 2030 mL  Total OUT: 2030 mL    Total NET: -1130 mL      29 Aug 2024 07:01  -  30 Aug 2024 00:03  --------------------------------------------------------  IN:    Oral Fluid: 450 mL  Total IN: 450 mL    OUT:    Urostomy (mL): 750 mL  Total OUT: 750 mL    Total NET: -300 mL    --------------------------------------------------------------------------------------      EXAM    NEURO: NAD,   HEENT: NC/AT  RESPIRATORY: nonlabored respirations, normal CW expansion  CARDIO: RRR, S1S2  ABDOMEN: soft, nontender, nondistended, ileal conduit pink and well perfused with red tinged output from taylor  EXTREMITIES: normal strength, no deformities    METABOLIC / FLUIDS / ELECTROLYTES  potassium chloride    Tablet ER 40 milliEquivalent(s) Oral once      HEMATOLOGIC  [x] VTE Prophylaxis: aspirin  chewable 81 milliGRAM(s) Enteral Tube daily    Transfusions:	[] PRBC	[] Platelets		[] FFP	[] Cryoprecipitate    INFECTIOUS DISEASE  Antimicrobials/Immunologic Medications:    Day #      of     ***    TUBES / LINES / DRAINS  ***  [x] Peripheral IV  [] Central Venous Line     	[] R	[] L	[] IJ	[] Fem	[] SC	Date Placed:   [] Arterial Line		[] R	[] L	[] Fem	[] Rad	[] Ax	Date Placed:   [] PICC		[] Midline		[] Mediport  [] Urinary Catheter		Date Placed:   [x] Necessity of urinary, arterial, and venous catheters discussed    --------------------------------------------------------------------------------------    LABS                          6.7    9.07  )-----------( 471      ( 29 Aug 2024 21:53 )             20.5   08-29    136  |  102  |  46<H>  ----------------------------<  114<H>  3.2<L>   |  20<L>  |  2.82<H>    Ca    8.9      29 Aug 2024 21:53  Phos  4.2     08-29  Mg     1.90     08-29    --------------------------------------------------------------------------------------    OTHER LABORATORY:     IMAGING STUDIES:   CXR:

## 2024-08-30 NOTE — PROGRESS NOTE ADULT - PROBLEM SELECTOR PLAN 1
BENIGNO on CKD2 likely due to ATN in setting of hemodynamic changes and contrast induced. Pt with baseline renal function 1.1-1.4. Most recent SCr from outpatient 1.18 eGFR 50 4/2024. At the time of presentation Scr was 5.34 ( Aug 15, 2024) , She received IV abx and fluids and it initially improved to 4.42 and then started worsening gradually to 6.61 and requiring CRRT 8/20 to 8/26. SCr elevated to 2.82 yesterday in setting of holding further renal replacement therapy. Labs not yet drawn today. UOP in 24 hours 1.3L. No urgent indication for CRRT. Monitor labs and I/Os. Avoid nephrotoxins including, ACE/ARB, NSAIDs, contrast, etc.

## 2024-08-30 NOTE — OCCUPATIONAL THERAPY INITIAL EVALUATION ADULT - DIAGNOSIS, OT EVAL
Pt with impaired balance, fine motor coordination and strength in Bilateral hands, and generalized weakness impacting ability to complete ADLs, IADLs, functional mobility/transfers.

## 2024-08-30 NOTE — OCCUPATIONAL THERAPY INITIAL EVALUATION ADULT - ADDITIONAL COMMENTS
Pt lives in a private house with her mother; there are ~3-4 steps to enter; (+)bilateral handrails; ~10 steps to basement where her bedroom/bathroom is.  Pt's bathroom is a walk-in stall shower with a shower chair and a raised toilet seat. There are 10-12 steps to second floor where patient cares for her mother; there is a stairlift present. Prior to hospital admission, pt was completely independent and used no assistive device with ambulation. She does own a single axis cane. Pt denies any recent falls.

## 2024-08-30 NOTE — PROGRESS NOTE ADULT - SUBJECTIVE AND OBJECTIVE BOX
[facility] Division of Kidney Diseases & Hypertension  FOLLOW UP NOTE  [phone]--------------------------------------------------------------------------------  Chief Complaint: BENIGNO on CKD    24 hour events/subjective: No acute events overnight. Pt seen and examined at bedside this AM. Feels well this morning sitting up in chair.     PAST HISTORY  --------------------------------------------------------------------------------  No significant changes to PMH, PSH, FHx, SHx from H&P unless otherwise noted.    ALLERGIES & MEDICATIONS  --------------------------------------------------------------------------------  Allergies    No Known Allergies    Intolerances    Standing Inpatient Medications  apixaban 10 milliGRAM(s) Oral two times a day  aspirin  chewable 81 milliGRAM(s) Enteral Tube daily  atorvastatin 40 milliGRAM(s) Oral at bedtime  chlorhexidine 2% Cloths 1 Application(s) Topical daily  cycloSPORINE (RESTASIS) 0.05% Emulsion 1 Drop(s) Both EYES two times a day  insulin lispro (ADMELOG) corrective regimen sliding scale   SubCutaneous at bedtime  insulin lispro (ADMELOG) corrective regimen sliding scale   SubCutaneous three times a day before meals  levothyroxine 100 MICROGram(s) Oral daily  midodrine. 10 milliGRAM(s) Oral <User Schedule>  pantoprazole    Tablet 40 milliGRAM(s) Oral before breakfast  polyethylene glycol 3350 17 Gram(s) Oral daily    PRN Inpatient Medications  acetaminophen     Tablet .. 975 milliGRAM(s) Oral every 6 hours PRN  melatonin 3 milliGRAM(s) Oral at bedtime PRN      REVIEW OF SYSTEMS  --------------------------------------------------------------------------------  Gen: No fevers/chills  Head/Eyes/Ears: No HA  Respiratory: No dyspnea  CV: No chest pain  MSK: No edema,  Skin: No rashes    All other systems were reviewed and are negative, except as noted above.    VITALS/PHYSICAL EXAM  --------------------------------------------------------------------------------  T(C): 36.7 (08-30-24 @ 08:00), Max: 36.9 (08-29-24 @ 14:00)  HR: 104 (08-30-24 @ 08:00) (89 - 104)  BP: --  RR: 24 (08-30-24 @ 08:00) (10 - 24)  SpO2: 100% (08-30-24 @ 08:00) (96% - 100%)  Wt(kg): --    08-29-24 @ 07:01  -  08-30-24 @ 07:00  --------------------------------------------------------  IN: 750 mL / OUT: 1325 mL / NET: -575 mL    Physical Exam:  Gen: NAD, well-appearing sitting up in chair.   Pulm: CTA B/L  CV: RRR, S1S2;  : +taylor/urostomy.  Extremities: no bilateral LE edema noted.   Skin: Warm, without rashes  Vascular access: PIVs    LABS/STUDIES  --------------------------------------------------------------------------------              7.6    8.82  >-----------<  440      [08-30-24 @ 02:27]              22.8     136  |  102  |  46  ----------------------------<  114      [08-29-24 @ 21:53]  3.2   |  20  |  2.82        Ca     8.9     [08-29-24 @ 21:53]      Mg     1.90     [08-29-24 @ 21:53]      Phos  4.2     [08-29-24 @ 21:53]    Creatinine Trend:  SCr 2.82 [08-29 @ 21:53]  SCr 3.09 [08-29 @ 00:48]  SCr 2.94 [08-28 @ 08:27]  SCr 2.77 [08-28 @ 00:30]  SCr 1.98 [08-27 @ 00:50]    Urinalysis - [08-29-24 @ 21:53]      Color  / Appearance  / SG  / pH       Gluc 114 / Ketone   / Bili  / Urobili        Blood  / Protein  / Leuk Est  / Nitrite       RBC  / WBC  / Hyaline  / Gran  / Sq Epi  / Non Sq Epi  / Bacteria            [facility] Division of Kidney Diseases & Hypertension  FOLLOW UP NOTE  [phone]--------------------------------------------------------------------------------  Chief Complaint: BENIGNO on CKD    24 hour events/subjective: No acute events overnight. Pt seen and examined at bedside this AM. Feels well this morning sitting up in chair.     PAST HISTORY  --------------------------------------------------------------------------------  No significant changes to PMH, PSH, FHx, SHx from H&P unless otherwise noted.    ALLERGIES & MEDICATIONS  --------------------------------------------------------------------------------  Allergies    No Known Allergies    Intolerances    Standing Inpatient Medications  apixaban 10 milliGRAM(s) Oral two times a day  aspirin  chewable 81 milliGRAM(s) Enteral Tube daily  atorvastatin 40 milliGRAM(s) Oral at bedtime  chlorhexidine 2% Cloths 1 Application(s) Topical daily  cycloSPORINE (RESTASIS) 0.05% Emulsion 1 Drop(s) Both EYES two times a day  insulin lispro (ADMELOG) corrective regimen sliding scale   SubCutaneous at bedtime  insulin lispro (ADMELOG) corrective regimen sliding scale   SubCutaneous three times a day before meals  levothyroxine 100 MICROGram(s) Oral daily  midodrine. 10 milliGRAM(s) Oral <User Schedule>  pantoprazole    Tablet 40 milliGRAM(s) Oral before breakfast  polyethylene glycol 3350 17 Gram(s) Oral daily    PRN Inpatient Medications  acetaminophen     Tablet .. 975 milliGRAM(s) Oral every 6 hours PRN  melatonin 3 milliGRAM(s) Oral at bedtime PRN      REVIEW OF SYSTEMS  --------------------------------------------------------------------------------  Gen: No fevers/chills  Head/Eyes/Ears: No HA  Respiratory: No dyspnea  CV: No chest pain  MSK: No edema,  Skin: No rashes    All other systems were reviewed and are negative, except as noted above.    VITALS/PHYSICAL EXAM  --------------------------------------------------------------------------------  T(C): 36.7 (08-30-24 @ 08:00), Max: 36.9 (08-29-24 @ 14:00)  HR: 104 (08-30-24 @ 08:00) (89 - 104)  BP: --  RR: 24 (08-30-24 @ 08:00) (10 - 24)  SpO2: 100% (08-30-24 @ 08:00) (96% - 100%)  Wt(kg): --    08-29-24 @ 07:01  -  08-30-24 @ 07:00  --------------------------------------------------------  IN: 750 mL / OUT: 1325 mL / NET: -575 mL    Physical Exam:  Gen: NAD, well-appearing sitting up in chair.   Pulm: CTA B/L  CV: RRR, S1S2;  : +taylor/urostomy.  Extremities: no bilateral LE edema noted.   Skin: Warm  Vascular access: PIVs    LABS/STUDIES  --------------------------------------------------------------------------------              7.6    8.82  >-----------<  440      [08-30-24 @ 02:27]              22.8     136  |  102  |  46  ----------------------------<  114      [08-29-24 @ 21:53]  3.2   |  20  |  2.82        Ca     8.9     [08-29-24 @ 21:53]      Mg     1.90     [08-29-24 @ 21:53]      Phos  4.2     [08-29-24 @ 21:53]    Creatinine Trend:  SCr 2.82 [08-29 @ 21:53]  SCr 3.09 [08-29 @ 00:48]  SCr 2.94 [08-28 @ 08:27]  SCr 2.77 [08-28 @ 00:30]  SCr 1.98 [08-27 @ 00:50]    Urinalysis - [08-29-24 @ 21:53]      Color  / Appearance  / SG  / pH       Gluc 114 / Ketone   / Bili  / Urobili        Blood  / Protein  / Leuk Est  / Nitrite       RBC  / WBC  / Hyaline  / Gran  / Sq Epi  / Non Sq Epi  / Bacteria

## 2024-08-30 NOTE — PROGRESS NOTE ADULT - SUBJECTIVE AND OBJECTIVE BOX
Interval/Overnight Events:  - Listed  - unable to place midline ovn  - Hb dropped from 7-->6.5, s/p 1 unit PRBC      Subjective:  Pt offers no complaints    Objective:    Vital signs  T(C): , Max: 36.9 (08-29-24 @ 14:00)  HR: 94 (08-30-24 @ 07:24)  BP: 109/61 (08-29-24 @ 08:00)  SpO2: 98% (08-30-24 @ 07:24)  Wt(kg): --    Output   IC: 650 yellow  No OB pads documented overnight  08-29 @ 07:01  -  08-30 @ 07:00  --------------------------------------------------------  IN: 750 mL / OUT: 1325 mL / NET: -575 mL        Gen: NAD sitting in chair  Abd: vinoda pink w/ taylor in place, soft, nontender      Labs                        7.6    8.82  )-----------( 440      ( 30 Aug 2024 02:27 )             22.8     29 Aug 2024 21:53    136    |  102    |  46     ----------------------------<  114    3.2     |  20     |  2.82     Ca    8.9        29 Aug 2024 21:53  Phos  4.2       29 Aug 2024 21:53  Mg     1.90      29 Aug 2024 21:53

## 2024-08-30 NOTE — PROGRESS NOTE ADULT - PROBLEM SELECTOR PLAN 2
Metabolic acidosis in setting of renal failure and sepsis. SCO2 today 20. No further CRRT as above. Monitor Labs as above.      If you have any questions, please feel free to contact me:  Patti Clinton MD PGY-4  Nephrology Fellow  Pager [phone] / [employer] Teams (Preferred)  (After 5pm or on weekends please page the on-call fellow)

## 2024-08-30 NOTE — OCCUPATIONAL THERAPY INITIAL EVALUATION ADULT - FINE MOTOR COORDINATION EXAM
bilateral upper extremity with mild impaired in fine motor coordination and difficulty manipulating objects. Pt with impaired  strength.

## 2024-08-31 LAB
ANION GAP SERPL CALC-SCNC: 15 MMOL/L — HIGH (ref 7–14)
BLD GP AB SCN SERPL QL: NEGATIVE — SIGNIFICANT CHANGE UP
BUN SERPL-MCNC: 44 MG/DL — HIGH (ref 7–23)
CALCIUM SERPL-MCNC: 9.2 MG/DL — SIGNIFICANT CHANGE UP (ref 8.4–10.5)
CHLORIDE SERPL-SCNC: 106 MMOL/L — SIGNIFICANT CHANGE UP (ref 98–107)
CO2 SERPL-SCNC: 18 MMOL/L — LOW (ref 22–31)
CREAT SERPL-MCNC: 2.16 MG/DL — HIGH (ref 0.5–1.3)
EGFR: 24 ML/MIN/1.73M2 — LOW
GLUCOSE BLDC GLUCOMTR-MCNC: 107 MG/DL — HIGH (ref 70–99)
GLUCOSE BLDC GLUCOMTR-MCNC: 109 MG/DL — HIGH (ref 70–99)
GLUCOSE BLDC GLUCOMTR-MCNC: 113 MG/DL — HIGH (ref 70–99)
GLUCOSE BLDC GLUCOMTR-MCNC: 136 MG/DL — HIGH (ref 70–99)
GLUCOSE SERPL-MCNC: 102 MG/DL — HIGH (ref 70–99)
HCT VFR BLD CALC: 24.7 % — LOW (ref 34.5–45)
HGB BLD-MCNC: 7.5 G/DL — LOW (ref 11.5–15.5)
MCHC RBC-ENTMCNC: 30.4 GM/DL — LOW (ref 32–36)
MCHC RBC-ENTMCNC: 30.7 PG — SIGNIFICANT CHANGE UP (ref 27–34)
MCV RBC AUTO: 101.2 FL — HIGH (ref 80–100)
NRBC # BLD: 0 /100 WBCS — SIGNIFICANT CHANGE UP (ref 0–0)
NRBC # FLD: 0 K/UL — SIGNIFICANT CHANGE UP (ref 0–0)
PLATELET # BLD AUTO: 513 K/UL — HIGH (ref 150–400)
POTASSIUM SERPL-MCNC: 4.3 MMOL/L — SIGNIFICANT CHANGE UP (ref 3.5–5.3)
POTASSIUM SERPL-SCNC: 4.3 MMOL/L — SIGNIFICANT CHANGE UP (ref 3.5–5.3)
RBC # BLD: 2.44 M/UL — LOW (ref 3.8–5.2)
RBC # FLD: 20.3 % — HIGH (ref 10.3–14.5)
RH IG SCN BLD-IMP: POSITIVE — SIGNIFICANT CHANGE UP
SODIUM SERPL-SCNC: 139 MMOL/L — SIGNIFICANT CHANGE UP (ref 135–145)
WBC # BLD: 7.93 K/UL — SIGNIFICANT CHANGE UP (ref 3.8–10.5)
WBC # FLD AUTO: 7.93 K/UL — SIGNIFICANT CHANGE UP (ref 3.8–10.5)

## 2024-08-31 PROCEDURE — 99232 SBSQ HOSP IP/OBS MODERATE 35: CPT

## 2024-08-31 RX ORDER — OXYCODONE HYDROCHLORIDE 5 MG/1
5 TABLET ORAL EVERY 6 HOURS
Refills: 0 | Status: DISCONTINUED | OUTPATIENT
Start: 2024-08-31 | End: 2024-09-02

## 2024-08-31 RX ADMIN — ACETAMINOPHEN 975 MILLIGRAM(S): 325 TABLET ORAL at 19:25

## 2024-08-31 RX ADMIN — ACETAMINOPHEN 975 MILLIGRAM(S): 325 TABLET ORAL at 07:45

## 2024-08-31 RX ADMIN — Medication 1 DROP(S): at 06:51

## 2024-08-31 RX ADMIN — Medication 40 MILLIGRAM(S): at 21:02

## 2024-08-31 RX ADMIN — Medication 1 DROP(S): at 19:02

## 2024-08-31 RX ADMIN — OXYCODONE HYDROCHLORIDE 5 MILLIGRAM(S): 5 TABLET ORAL at 20:57

## 2024-08-31 RX ADMIN — ACETAMINOPHEN 975 MILLIGRAM(S): 325 TABLET ORAL at 18:24

## 2024-08-31 RX ADMIN — Medication 40 MILLIGRAM(S): at 06:50

## 2024-08-31 RX ADMIN — Medication 81 MILLIGRAM(S): at 12:27

## 2024-08-31 RX ADMIN — ACETAMINOPHEN 975 MILLIGRAM(S): 325 TABLET ORAL at 08:45

## 2024-08-31 RX ADMIN — MIDODRINE HYDROCHLORIDE 10 MILLIGRAM(S): 5 TABLET ORAL at 07:45

## 2024-08-31 RX ADMIN — APIXABAN 10 MILLIGRAM(S): 5 TABLET, FILM COATED ORAL at 06:50

## 2024-08-31 RX ADMIN — APIXABAN 10 MILLIGRAM(S): 5 TABLET, FILM COATED ORAL at 18:23

## 2024-08-31 RX ADMIN — Medication 100 MICROGRAM(S): at 06:12

## 2024-08-31 RX ADMIN — MIDODRINE HYDROCHLORIDE 10 MILLIGRAM(S): 5 TABLET ORAL at 19:48

## 2024-08-31 RX ADMIN — OXYCODONE HYDROCHLORIDE 5 MILLIGRAM(S): 5 TABLET ORAL at 21:57

## 2024-08-31 NOTE — PROGRESS NOTE ADULT - SUBJECTIVE AND OBJECTIVE BOX
Subjective  No acute events overnight. No complaints this morning.    Objective    Vital signs  T(F): , Max: 98.3 (08-30-24 @ 16:11)  HR: 87 (08-31-24 @ 07:35)  BP: 114/79 (08-31-24 @ 06:00)  SpO2: 98% (08-31-24 @ 07:35)  Wt(kg): --    Output     OUT:    Urostomy (mL): 1495 mL  Total OUT: 1495 mL    Total NET: -1495 mL      OUT:    Urostomy (mL): 200 mL  Total OUT: 200 mL    Total NET: -200 mL          Gen: NAD sitting in chair  Abd: stoma pink w/ taylor in place, soft, nontender    Labs      08-31 @ 06:49    WBC 7.93  / Hct 24.7  / SCr 2.16     08-30 @ 02:27    WBC 8.82  / Hct 22.8  / SCr --

## 2024-08-31 NOTE — PROGRESS NOTE ADULT - SUBJECTIVE AND OBJECTIVE BOX
[facility] DIVISION OF KIDNEY DISEASES AND HYPERTENSION   FOLLOW UP NOTE    --------------------------------------------------------------------------------  Chief Complaint:    24 hour events/subjective: Pt. was seen and examined today. Overnight events noted.       PAST HISTORY  --------------------------------------------------------------------------------  No significant changes to PMH, PSH, FHx, SHx, unless otherwise noted    ALLERGIES & MEDICATIONS  --------------------------------------------------------------------------------  Allergies    No Known Allergies    Intolerances      Standing Inpatient Medications  apixaban 10 milliGRAM(s) Oral two times a day  aspirin  chewable 81 milliGRAM(s) Enteral Tube daily  atorvastatin 40 milliGRAM(s) Oral at bedtime  cycloSPORINE (RESTASIS) 0.05% Emulsion 1 Drop(s) Both EYES two times a day  insulin lispro (ADMELOG) corrective regimen sliding scale   SubCutaneous three times a day before meals  insulin lispro (ADMELOG) corrective regimen sliding scale   SubCutaneous at bedtime  levothyroxine 100 MICROGram(s) Oral daily  midodrine. 10 milliGRAM(s) Oral <User Schedule>  pantoprazole    Tablet 40 milliGRAM(s) Oral before breakfast  polyethylene glycol 3350 17 Gram(s) Oral daily    PRN Inpatient Medications  acetaminophen     Tablet .. 975 milliGRAM(s) Oral every 6 hours PRN  melatonin 3 milliGRAM(s) Oral at bedtime PRN      REVIEW OF SYSTEMS  --------------------------------------------------------------------------------  Gen: No fevers/chills  Head/Eyes/Ears: No HA   Respiratory: No dyspnea, cough  CV: No chest pain  GI: No abdominal pain, diarrhea  : No dysuria, hematuria  MSK: No  edema  Skin: No rashes  Heme: No easy bruising or bleeding    All other systems were reviewed and are negative, except as noted.    VITALS/PHYSICAL EXAM  --------------------------------------------------------------------------------  T(C): 36.7 (08-31-24 @ 09:35), Max: 36.8 (08-30-24 @ 14:00)  HR: 100 (08-31-24 @ 09:35) (65 - 102)  BP: 97/56 (08-31-24 @ 09:35) (97/56 - 120/56)  RR: 18 (08-31-24 @ 09:35) (17 - 25)  SpO2: 100% (08-31-24 @ 09:35) (98% - 100%)  Wt(kg): --        08-30-24 @ 07:01  -  08-31-24 @ 07:00  --------------------------------------------------------  IN: 100 mL / OUT: 1495 mL / NET: -1395 mL    08-31-24 @ 07:01  -  08-31-24 @ 10:49  --------------------------------------------------------  IN: 0 mL / OUT: 200 mL / NET: -200 mL      Physical Exam:  Gen: NAD, well-appearing sitting up in chair.   Pulm: CTA B/L  CV: RRR, S1S2;  : +taylor/urostomy.  Extremities: no bilateral LE edema noted.   Skin: Warm      LABS/STUDIES  --------------------------------------------------------------------------------              7.5    7.93  >-----------<  513      [08-31-24 @ 06:49]              24.7     139  |  106  |  44  ----------------------------<  102      [08-31-24 @ 06:49]  4.3   |  18  |  2.16        Ca     9.2     [08-31-24 @ 06:49]      Mg     1.90     [08-29-24 @ 21:53]      Phos  4.2     [08-29-24 @ 21:53]        Creatinine Trend:  SCr 2.16 [08-31 @ 06:49]  SCr 2.82 [08-29 @ 21:53]  SCr 3.09 [08-29 @ 00:48]  SCr 2.94 [08-28 @ 08:27]  SCr 2.77 [08-28 @ 00:30]    Urinalysis - [08-31-24 @ 06:49]      Color  / Appearance  / SG  / pH       Gluc 102 / Ketone   / Bili  / Urobili        Blood  / Protein  / Leuk Est  / Nitrite       RBC  / WBC  / Hyaline  / Gran  / Sq Epi  / Non Sq Epi  / Bacteria           Tacrolimus  Cyclosporine  Sirolimus  Mycophenolate  BK PCR  CMV PCR  Parvo PCR  EBV PCR [facility] DIVISION OF KIDNEY DISEASES AND HYPERTENSION   FOLLOW UP NOTE    --------------------------------------------------------------------------------  Chief Complaint: BENIGNO    24 hour events/subjective: Pt. was seen and examined today. Overnight events noted.  Reports aching in her hands.  Otherwise feels ok.      PAST HISTORY  --------------------------------------------------------------------------------  No significant changes to PMH, PSH, FHx, SHx, unless otherwise noted    ALLERGIES & MEDICATIONS  --------------------------------------------------------------------------------  Allergies    No Known Allergies    Intolerances      Standing Inpatient Medications  apixaban 10 milliGRAM(s) Oral two times a day  aspirin  chewable 81 milliGRAM(s) Enteral Tube daily  atorvastatin 40 milliGRAM(s) Oral at bedtime  cycloSPORINE (RESTASIS) 0.05% Emulsion 1 Drop(s) Both EYES two times a day  insulin lispro (ADMELOG) corrective regimen sliding scale   SubCutaneous three times a day before meals  insulin lispro (ADMELOG) corrective regimen sliding scale   SubCutaneous at bedtime  levothyroxine 100 MICROGram(s) Oral daily  midodrine. 10 milliGRAM(s) Oral <User Schedule>  pantoprazole    Tablet 40 milliGRAM(s) Oral before breakfast  polyethylene glycol 3350 17 Gram(s) Oral daily    PRN Inpatient Medications  acetaminophen     Tablet .. 975 milliGRAM(s) Oral every 6 hours PRN  melatonin 3 milliGRAM(s) Oral at bedtime PRN      REVIEW OF SYSTEMS  --------------------------------------------------------------------------------  Gen: No fevers/chills  Head/Eyes/Ears: No HA   Respiratory: No dyspnea, cough  CV: No chest pain  GI: No abdominal pain, diarrhea  MSK: complains of arthritis pain in hands    VITALS/PHYSICAL EXAM  --------------------------------------------------------------------------------  T(C): 36.7 (08-31-24 @ 09:35), Max: 36.8 (08-30-24 @ 14:00)  HR: 100 (08-31-24 @ 09:35) (65 - 102)  BP: 97/56 (08-31-24 @ 09:35) (97/56 - 120/56)  RR: 18 (08-31-24 @ 09:35) (17 - 25)  SpO2: 100% (08-31-24 @ 09:35) (98% - 100%)  Wt(kg): --    08-30-24 @ 07:01  -  08-31-24 @ 07:00  --------------------------------------------------------  IN: 100 mL / OUT: 1495 mL / NET: -1395 mL    08-31-24 @ 07:01  -  08-31-24 @ 10:49  --------------------------------------------------------  IN: 0 mL / OUT: 200 mL / NET: -200 mL    Physical Exam:  Gen: NAD, well-appearing sitting up in chair.   Pulm: CTA B/L  CV: RRR, S1S2;  : +taylor/urostomy.  Extremities: no bilateral LE edema noted.   Skin: Warm    LABS/STUDIES  --------------------------------------------------------------------------------              7.5    7.93  >-----------<  513      [08-31-24 @ 06:49]              24.7     139  |  106  |  44  ----------------------------<  102      [08-31-24 @ 06:49]  4.3   |  18  |  2.16        Ca     9.2     [08-31-24 @ 06:49]      Mg     1.90     [08-29-24 @ 21:53]      Phos  4.2     [08-29-24 @ 21:53]    Creatinine Trend:  SCr 2.16 [08-31 @ 06:49]  SCr 2.82 [08-29 @ 21:53]  SCr 3.09 [08-29 @ 00:48]  SCr 2.94 [08-28 @ 08:27]  SCr 2.77 [08-28 @ 00:30]    Urinalysis - [08-31-24 @ 06:49]      Color  / Appearance  / SG  / pH       Gluc 102 / Ketone   / Bili  / Urobili        Blood  / Protein  / Leuk Est  / Nitrite       RBC  / WBC  / Hyaline  / Gran  / Sq Epi  / Non Sq Epi  / Bacteria           Tacrolimus  Cyclosporine  Sirolimus  Mycophenolate  BK PCR  CMV PCR  Parvo PCR  EBV PCR

## 2024-08-31 NOTE — PROGRESS NOTE ADULT - ASSESSMENT
67 yo F h/o HTN and bladder CA, s/p cystectomy/IC creation on 8/6/2024 at [facility] with Dr. Thompson (d/c on 8/10) presented to ED 8/16 w/ generalized weakness and some lower abdominal pain. Since d/c, only have 1-2 bowel movement, and had significantly decrease PO intake due to decreased appetite, intermittent fevers/chills, +bloody discharge from urethra, some burning. Pt stated stents fell out yesterday.  BP 80s/50s for EMS.  Pt remained hypotensive in ED, started on pressors, cultures sent, placed on zosyn and BiPAP and admitted to SICU. CT revealed: Expected postoperative changes status post cystectomy with ileal conduit creation including small amount of complex fluid and free air in the pelvis. No organized fluid collection. Distended ileal conduit with narrowing at the exiting ostomy site and proximally at the site of ureteral insertion. Mild bilateral hydroureteronephrosis with delayed nephrograms. No significant perinephric stranding.    8/16: still requiring pressor support and on BiPAP, pt states she feels better,14fr catheter placed in stoma, Bcx with GPC/GNR, Cr to 4.42 from 5.34, abx changed to leila and diflucan  8/17: still requiring pressor support and supplement oxygen but feels well, decr UO overnight, given albumin and IVF restarted, Cr up slightly to 4.68, electrolytes normal  8/18: RBUS done, no signs of hydro. IR consulted, no need for NTs as no hydro. UOP still very low (overnight 10 cc/hour), given 2 boluses with no improvement. Cr up to 4.95 from 4.68. CT loopogram of conduit pending today to evaluate for leak. still requiring pressors. Ucx from conduit now growing e.coli, sensitivities pending, PT recs rehab   8/19: CT loopogram w/ no anastamotic leak. Cr continues to rise w/ low UOP. UA on admission w/ coarse granular cast. Constellation of signs and symptoms suggestive of Acute tubular necrosis  8/20: Urine culture w/ e.coli sensitivities w/ resistance to floroquinolones. Pt back on abx. Pt w/ slightly increased urine output  8/21: Pt with acute onset of tachypnea, and hypoxia requiring intubation and sedation, CT w/ probably PE, hep gtt started. Nephrology consult obtained and started on CRRT, meropenem restarted  8/22: still intubated and sedated, pressor requirement decreasing, UO increasing, PTT remains supratherapeutic, hep gtt held for one hour this AM, on tube feeds, febrile to 100.9 at 4am  8/23: changed TF as per nutrition recs to start tomorrow AM, check w/ dietician, PTT therapeutic x1 --> repeat PTT at 4:45 AM 50.7, hep gtt @4ml/hr, tolerating CPAP. awake, UO still only 110/shift, still requiring pressor support, CRRT  8//24: extubated, now on RA, still requiring CRRT, /shift and small amount of pressor support, Procal >100, repeat Bcx and Ucx ordered, leila d/c, vanc x 2, then d/c now on zosyn, passed beside S&S now on reg diet, pt offers no complaints, + flatus/BM, will do bedside pelvic to evaluate for retained material  8/25: CRRT, UO less overnight, zosyn, tolerating reg diet, blood tinged vaginal discharge   8/26: UOP 39 overnight, on zosyn and vanc, tolerating reg diet. poss transfer to [facility] today - SICU to discuss on rounds.   8/27: UOP increased to 310 after dose of lasix. off pressors, still getting midodrine.   8/28: Got more lasix and albumin overnight with OP of 642, still down from 1.5 yesterday during the day. Cr scott from 1.98 to 2.77. Will suggest RBUS. Bcx NGTD at 4 days. Off vanc today, still on zosyn.   8/29: stable, midodrine increased,  overnight, still with some blood from vagina (no pads documented) RBUS no hydro, Bcx neg final, IC Cx neg  8/30: stable, midodrine to bid, no OB pads documented, good UO yellow, was transfused 1u pRBC overnight with appropriate response, zosyn d/c, listed for floor  8/31: feels well, good UO, transferred to floor    Plan:  - Labs reviewed  - 1u pRBC today  - Monitor vaginal bleeding (pad count)  - F/u nephrology: no CRRT needed   - Continue midodrine  - CC diet   - Bowel regimen  - PT recs rehab

## 2024-08-31 NOTE — PROGRESS NOTE ADULT - PROBLEM SELECTOR PLAN 2
Metabolic acidosis in setting of renal failure and sepsis. SCO2 today 18. No further CRRT as above. Continue to monitor labs as above.      If you have any questions, please feel free to contact me:  Ignacio De León MD PGY-4  Nephrology Fellow  Pager [phone] / [employer] Teams (Preferred)  (After 5pm or on weekends please page the on-call fellow) Metabolic acidosis in setting of renal failure and sepsis. SCO2 today 18. No further CRRT as above. Continue to monitor labs as above.    If you have any questions, please feel free to contact me:  Ignacio De León MD PGY-4  Nephrology Fellow  Pager [phone] / [employer] Teams (Preferred)  (After 5pm or on weekends please page the on-call fellow)

## 2024-08-31 NOTE — PROGRESS NOTE ADULT - PROBLEM SELECTOR PLAN 1
BENIGNO on CKD2 likely due to ATN in setting of hemodynamic changes and contrast induced. Pt with baseline renal function 1.1-1.4. Most recent SCr from outpatient 1.18 eGFR 50 4/2024. At the time of presentation Scr was 5.34 ( Aug 15, 2024), She received IV abx and fluids and it initially improved to 4.42 and then started worsening gradually to 6.61 and requiring CRRT 8/20 to 8/26. SCr now down trending to 2.16 in setting of holding further renal replacement therapy. UOP in 24 hours -200. No urgent indication for CRRT. Monitor labs and I/Os. Avoid nephrotoxins including, ACE/ARB, NSAIDs, contrast, etc. BENIGNO on CKD2 likely due to ATN in setting of hemodynamic changes and contrast induced. Pt with baseline renal function 1.1-1.4. Most recent SCr from outpatient 1.18 eGFR 50 4/2024. At the time of presentation Scr was 5.34 ( Aug 15, 2024), She received IV abx and fluids and it initially improved to 4.42 and then started worsening gradually to 6.61 and requiring CRRT 8/20 to 8/26. SCr now down trending to 2.16 in setting of holding further renal replacement therapy. UOP in 24 hours -1395. No urgent indication for CRRT. Monitor labs and I/Os. Avoid nephrotoxins including, ACE/ARB, NSAIDs, contrast, etc.

## 2024-09-01 LAB
ANION GAP SERPL CALC-SCNC: 15 MMOL/L — HIGH (ref 7–14)
BUN SERPL-MCNC: 49 MG/DL — HIGH (ref 7–23)
CALCIUM SERPL-MCNC: 8.7 MG/DL — SIGNIFICANT CHANGE UP (ref 8.4–10.5)
CHLORIDE SERPL-SCNC: 108 MMOL/L — HIGH (ref 98–107)
CO2 SERPL-SCNC: 18 MMOL/L — LOW (ref 22–31)
CREAT SERPL-MCNC: 1.94 MG/DL — HIGH (ref 0.5–1.3)
EGFR: 28 ML/MIN/1.73M2 — LOW
GLUCOSE BLDC GLUCOMTR-MCNC: 102 MG/DL — HIGH (ref 70–99)
GLUCOSE BLDC GLUCOMTR-MCNC: 103 MG/DL — HIGH (ref 70–99)
GLUCOSE BLDC GLUCOMTR-MCNC: 107 MG/DL — HIGH (ref 70–99)
GLUCOSE BLDC GLUCOMTR-MCNC: 112 MG/DL — HIGH (ref 70–99)
GLUCOSE SERPL-MCNC: 109 MG/DL — HIGH (ref 70–99)
HCT VFR BLD CALC: 21.8 % — LOW (ref 34.5–45)
HCT VFR BLD CALC: 23.4 % — LOW (ref 34.5–45)
HGB BLD-MCNC: 7.1 G/DL — LOW (ref 11.5–15.5)
HGB BLD-MCNC: 7.7 G/DL — LOW (ref 11.5–15.5)
MAGNESIUM SERPL-MCNC: 1.8 MG/DL — SIGNIFICANT CHANGE UP (ref 1.6–2.6)
MCHC RBC-ENTMCNC: 30.6 PG — SIGNIFICANT CHANGE UP (ref 27–34)
MCHC RBC-ENTMCNC: 30.7 PG — SIGNIFICANT CHANGE UP (ref 27–34)
MCHC RBC-ENTMCNC: 32.1 GM/DL — SIGNIFICANT CHANGE UP (ref 32–36)
MCHC RBC-ENTMCNC: 32.9 GM/DL — SIGNIFICANT CHANGE UP (ref 32–36)
MCV RBC AUTO: 92.9 FL — SIGNIFICANT CHANGE UP (ref 80–100)
MCV RBC AUTO: 95.6 FL — SIGNIFICANT CHANGE UP (ref 80–100)
NRBC # BLD: 0 /100 WBCS — SIGNIFICANT CHANGE UP (ref 0–0)
NRBC # BLD: 0 /100 WBCS — SIGNIFICANT CHANGE UP (ref 0–0)
NRBC # FLD: 0 K/UL — SIGNIFICANT CHANGE UP (ref 0–0)
NRBC # FLD: 0 K/UL — SIGNIFICANT CHANGE UP (ref 0–0)
PHOSPHATE SERPL-MCNC: 3 MG/DL — SIGNIFICANT CHANGE UP (ref 2.5–4.5)
PLATELET # BLD AUTO: 416 K/UL — HIGH (ref 150–400)
PLATELET # BLD AUTO: 475 K/UL — HIGH (ref 150–400)
POTASSIUM SERPL-MCNC: 4.3 MMOL/L — SIGNIFICANT CHANGE UP (ref 3.5–5.3)
POTASSIUM SERPL-SCNC: 4.3 MMOL/L — SIGNIFICANT CHANGE UP (ref 3.5–5.3)
RBC # BLD: 2.28 M/UL — LOW (ref 3.8–5.2)
RBC # BLD: 2.52 M/UL — LOW (ref 3.8–5.2)
RBC # FLD: 20.7 % — HIGH (ref 10.3–14.5)
RBC # FLD: 21.3 % — HIGH (ref 10.3–14.5)
SODIUM SERPL-SCNC: 141 MMOL/L — SIGNIFICANT CHANGE UP (ref 135–145)
WBC # BLD: 8.91 K/UL — SIGNIFICANT CHANGE UP (ref 3.8–10.5)
WBC # BLD: 9.2 K/UL — SIGNIFICANT CHANGE UP (ref 3.8–10.5)
WBC # FLD AUTO: 8.91 K/UL — SIGNIFICANT CHANGE UP (ref 3.8–10.5)
WBC # FLD AUTO: 9.2 K/UL — SIGNIFICANT CHANGE UP (ref 3.8–10.5)

## 2024-09-01 RX ORDER — FUROSEMIDE 40 MG
10 TABLET ORAL ONCE
Refills: 0 | Status: COMPLETED | OUTPATIENT
Start: 2024-09-01 | End: 2024-09-01

## 2024-09-01 RX ADMIN — Medication 100 MICROGRAM(S): at 05:00

## 2024-09-01 RX ADMIN — Medication 1 DROP(S): at 05:55

## 2024-09-01 RX ADMIN — MIDODRINE HYDROCHLORIDE 10 MILLIGRAM(S): 5 TABLET ORAL at 07:26

## 2024-09-01 RX ADMIN — Medication 40 MILLIGRAM(S): at 21:54

## 2024-09-01 RX ADMIN — Medication 40 MILLIGRAM(S): at 05:08

## 2024-09-01 RX ADMIN — Medication 10 MILLIGRAM(S): at 18:44

## 2024-09-01 RX ADMIN — MIDODRINE HYDROCHLORIDE 10 MILLIGRAM(S): 5 TABLET ORAL at 19:29

## 2024-09-01 RX ADMIN — OXYCODONE HYDROCHLORIDE 5 MILLIGRAM(S): 5 TABLET ORAL at 23:22

## 2024-09-01 RX ADMIN — APIXABAN 10 MILLIGRAM(S): 5 TABLET, FILM COATED ORAL at 05:07

## 2024-09-01 RX ADMIN — Medication 1 DROP(S): at 21:54

## 2024-09-01 RX ADMIN — APIXABAN 10 MILLIGRAM(S): 5 TABLET, FILM COATED ORAL at 17:53

## 2024-09-01 RX ADMIN — Medication 81 MILLIGRAM(S): at 13:16

## 2024-09-01 RX ADMIN — OXYCODONE HYDROCHLORIDE 5 MILLIGRAM(S): 5 TABLET ORAL at 22:22

## 2024-09-01 NOTE — PROVIDER CONTACT NOTE (OTHER) - ACTION/TREATMENT ORDERED:
Patient H/H is increasing and VS stable. Save stool sample if patient has BM overnight and notify team to assess sample. Will order fecal occult lab if indicated based off stool sample.

## 2024-09-01 NOTE — PROVIDER CONTACT NOTE (OTHER) - ASSESSMENT
Patient mentioned during second blood transfusion at (19:30) that for the past 3 days she had a Bowel movement in the AM and all of them were black in color and soft. The BMs were not witnessed by the nurse and she did not mention the color to anyone until now.

## 2024-09-01 NOTE — PROGRESS NOTE ADULT - ASSESSMENT
69 yo F h/o HTN and bladder CA, s/p cystectomy/IC creation on 8/6/2024 at [facility] with Dr. Thompson (d/c on 8/10) presented to ED 8/16 w/ generalized weakness and some lower abdominal pain. Since d/c, only have 1-2 bowel movement, and had significantly decrease PO intake due to decreased appetite, intermittent fevers/chills, +bloody discharge from urethra, some burning. Pt stated stents fell out yesterday.  BP 80s/50s for EMS.  Pt remained hypotensive in ED, started on pressors, cultures sent, placed on zosyn and BiPAP and admitted to SICU. CT revealed: Expected postoperative changes status post cystectomy with ileal conduit creation including small amount of complex fluid and free air in the pelvis. No organized fluid collection. Distended ileal conduit with narrowing at the exiting ostomy site and proximally at the site of ureteral insertion. Mild bilateral hydroureteronephrosis with delayed nephrograms. No significant perinephric stranding.    8/16: still requiring pressor support and on BiPAP, pt states she feels better,14fr catheter placed in stoma, Bcx with GPC/GNR, Cr to 4.42 from 5.34, abx changed to leila and diflucan  8/17: still requiring pressor support and supplement oxygen but feels well, decr UO overnight, given albumin and IVF restarted, Cr up slightly to 4.68, electrolytes normal  8/18: RBUS done, no signs of hydro. IR consulted, no need for NTs as no hydro. UOP still very low (overnight 10 cc/hour), given 2 boluses with no improvement. Cr up to 4.95 from 4.68. CT loopogram of conduit pending today to evaluate for leak. still requiring pressors. Ucx from conduit now growing e.coli, sensitivities pending, PT recs rehab   8/19: CT loopogram w/ no anastamotic leak. Cr continues to rise w/ low UOP. UA on admission w/ coarse granular cast. Constellation of signs and symptoms suggestive of Acute tubular necrosis  8/20: Urine culture w/ e.coli sensitivities w/ resistance to floroquinolones. Pt back on abx. Pt w/ slightly increased urine output  8/21: Pt with acute onset of tachypnea, and hypoxia requiring intubation and sedation, CT w/ probably PE, hep gtt started. Nephrology consult obtained and started on CRRT, meropenem restarted  8/22: still intubated and sedated, pressor requirement decreasing, UO increasing, PTT remains supratherapeutic, hep gtt held for one hour this AM, on tube feeds, febrile to 100.9 at 4am  8/23: changed TF as per nutrition recs to start tomorrow AM, check w/ dietician, PTT therapeutic x1 --> repeat PTT at 4:45 AM 50.7, hep gtt @4ml/hr, tolerating CPAP. awake, UO still only 110/shift, still requiring pressor support, CRRT  8//24: extubated, now on RA, still requiring CRRT, /shift and small amount of pressor support, Procal >100, repeat Bcx and Ucx ordered, leila d/c, vanc x 2, then d/c now on zosyn, passed beside S&S now on reg diet, pt offers no complaints, + flatus/BM, will do bedside pelvic to evaluate for retained material  8/25: CRRT, UO less overnight, zosyn, tolerating reg diet, blood tinged vaginal discharge   8/26: UOP 39 overnight, on zosyn and vanc, tolerating reg diet. poss transfer to [facility] today - SICU to discuss on rounds.   8/27: UOP increased to 310 after dose of lasix. off pressors, still getting midodrine.   8/28: Got more lasix and albumin overnight with OP of 642, still down from 1.5 yesterday during the day. Cr scott from 1.98 to 2.77. Will suggest RBUS. Bcx NGTD at 4 days. Off vanc today, still on zosyn.   8/29: stable, midodrine increased,  overnight, still with some blood from vagina (no pads documented) RBUS no hydro, Bcx neg final, IC Cx neg  8/30: stable, midodrine to bid, no OB pads documented, good UO yellow, was transfused 1u pRBC overnight with appropriate response, zosyn d/c, listed for floor  8/31: feels well, good UO, transferred to floor  9/1: still having some pain in joints - medicine consulted and will ask their recs (NSAIDs not feasible due to kidney function). Will also f/u medicine recs for continuing midodrine. Going to give another unit for suboptimal response ot u pRBCs given yesterday (Hgb 7.1 from 7.5).     Plan:  - Labs reviewed  -monitor H/H s/p transfusion   - another u pRBC today  - Monitor vaginal bleeding (pad count)  - F/u nephrology: no CRRT needed   - Continue midodrine  - CC diet   - Bowel regimen  - PT recs rehab  -f/u med recs for midodrine and arthritis pain

## 2024-09-01 NOTE — PROGRESS NOTE ADULT - SUBJECTIVE AND OBJECTIVE BOX
Subjective  still feels somewhat weak while trying to move around. still having arthritis pain, which she says greatly improved with dilaudid and then with oxy.     Objective    Vital signs  T(F): , Max: 98.6 (08-31-24 @ 22:20)  HR: 91 (09-01-24 @ 10:44)  BP: 93/46 (09-01-24 @ 10:44)  SpO2: 100% (09-01-24 @ 10:44)  Wt(kg): --    Output     OUT:    Urostomy (mL): 1450 mL  Total OUT: 1450 mL    Total NET: -1450 mL      OUT:    Urostomy (mL): 400 mL  Total OUT: 400 mL    Total NET: -400 mL          Gen: NAD sitting on chair  Abd: stoma pink w/ taylor in place, soft, nontender    Labs      09-01 @ 05:27    WBC 9.20  / Hct 21.8  / SCr 1.94     08-31 @ 06:49    WBC 7.93  / Hct 24.7  / SCr 2.16           Urine Cx: ?  Blood Cx: ?    Imaging

## 2024-09-02 DIAGNOSIS — I95.9 HYPOTENSION, UNSPECIFIED: ICD-10-CM

## 2024-09-02 DIAGNOSIS — M25.50 PAIN IN UNSPECIFIED JOINT: ICD-10-CM

## 2024-09-02 DIAGNOSIS — Z78.9 OTHER SPECIFIED HEALTH STATUS: ICD-10-CM

## 2024-09-02 DIAGNOSIS — E11.9 TYPE 2 DIABETES MELLITUS WITHOUT COMPLICATIONS: ICD-10-CM

## 2024-09-02 DIAGNOSIS — D62 ACUTE POSTHEMORRHAGIC ANEMIA: ICD-10-CM

## 2024-09-02 LAB
ANION GAP SERPL CALC-SCNC: 12 MMOL/L — SIGNIFICANT CHANGE UP (ref 7–14)
BUN SERPL-MCNC: 49 MG/DL — HIGH (ref 7–23)
CALCIUM SERPL-MCNC: 9.1 MG/DL — SIGNIFICANT CHANGE UP (ref 8.4–10.5)
CHLORIDE SERPL-SCNC: 110 MMOL/L — HIGH (ref 98–107)
CO2 SERPL-SCNC: 20 MMOL/L — LOW (ref 22–31)
CREAT SERPL-MCNC: 1.66 MG/DL — HIGH (ref 0.5–1.3)
EGFR: 33 ML/MIN/1.73M2 — LOW
GLUCOSE BLDC GLUCOMTR-MCNC: 100 MG/DL — HIGH (ref 70–99)
GLUCOSE BLDC GLUCOMTR-MCNC: 102 MG/DL — HIGH (ref 70–99)
GLUCOSE BLDC GLUCOMTR-MCNC: 103 MG/DL — HIGH (ref 70–99)
GLUCOSE BLDC GLUCOMTR-MCNC: 109 MG/DL — HIGH (ref 70–99)
GLUCOSE SERPL-MCNC: 92 MG/DL — SIGNIFICANT CHANGE UP (ref 70–99)
HCT VFR BLD CALC: 23.6 % — LOW (ref 34.5–45)
HCT VFR BLD CALC: 24.7 % — LOW (ref 34.5–45)
HGB BLD-MCNC: 7.8 G/DL — LOW (ref 11.5–15.5)
HGB BLD-MCNC: 8.4 G/DL — LOW (ref 11.5–15.5)
MCHC RBC-ENTMCNC: 30.6 PG — SIGNIFICANT CHANGE UP (ref 27–34)
MCHC RBC-ENTMCNC: 31.2 PG — SIGNIFICANT CHANGE UP (ref 27–34)
MCHC RBC-ENTMCNC: 33.1 GM/DL — SIGNIFICANT CHANGE UP (ref 32–36)
MCHC RBC-ENTMCNC: 34 GM/DL — SIGNIFICANT CHANGE UP (ref 32–36)
MCV RBC AUTO: 91.8 FL — SIGNIFICANT CHANGE UP (ref 80–100)
MCV RBC AUTO: 92.5 FL — SIGNIFICANT CHANGE UP (ref 80–100)
NRBC # BLD: 0 /100 WBCS — SIGNIFICANT CHANGE UP (ref 0–0)
NRBC # BLD: 0 /100 WBCS — SIGNIFICANT CHANGE UP (ref 0–0)
NRBC # FLD: 0 K/UL — SIGNIFICANT CHANGE UP (ref 0–0)
NRBC # FLD: 0 K/UL — SIGNIFICANT CHANGE UP (ref 0–0)
PLATELET # BLD AUTO: 381 K/UL — SIGNIFICANT CHANGE UP (ref 150–400)
PLATELET # BLD AUTO: 440 K/UL — HIGH (ref 150–400)
POTASSIUM SERPL-MCNC: 4.6 MMOL/L — SIGNIFICANT CHANGE UP (ref 3.5–5.3)
POTASSIUM SERPL-SCNC: 4.6 MMOL/L — SIGNIFICANT CHANGE UP (ref 3.5–5.3)
RBC # BLD: 2.55 M/UL — LOW (ref 3.8–5.2)
RBC # BLD: 2.69 M/UL — LOW (ref 3.8–5.2)
RBC # FLD: 19.2 % — HIGH (ref 10.3–14.5)
RBC # FLD: 20.3 % — HIGH (ref 10.3–14.5)
SODIUM SERPL-SCNC: 142 MMOL/L — SIGNIFICANT CHANGE UP (ref 135–145)
WBC # BLD: 7.23 K/UL — SIGNIFICANT CHANGE UP (ref 3.8–10.5)
WBC # BLD: 9.29 K/UL — SIGNIFICANT CHANGE UP (ref 3.8–10.5)
WBC # FLD AUTO: 7.23 K/UL — SIGNIFICANT CHANGE UP (ref 3.8–10.5)
WBC # FLD AUTO: 9.29 K/UL — SIGNIFICANT CHANGE UP (ref 3.8–10.5)

## 2024-09-02 PROCEDURE — 99223 1ST HOSP IP/OBS HIGH 75: CPT

## 2024-09-02 PROCEDURE — 99222 1ST HOSP IP/OBS MODERATE 55: CPT | Mod: GC

## 2024-09-02 RX ORDER — GABAPENTIN 100 MG
100 CAPSULE ORAL THREE TIMES A DAY
Refills: 0 | Status: DISCONTINUED | OUTPATIENT
Start: 2024-09-02 | End: 2024-09-13

## 2024-09-02 RX ORDER — PANTOPRAZOLE SODIUM 40 MG
40 TABLET, DELAYED RELEASE (ENTERIC COATED) ORAL
Refills: 0 | Status: DISCONTINUED | OUTPATIENT
Start: 2024-09-02 | End: 2024-09-03

## 2024-09-02 RX ORDER — MIDODRINE HYDROCHLORIDE 5 MG/1
10 TABLET ORAL EVERY 8 HOURS
Refills: 0 | Status: DISCONTINUED | OUTPATIENT
Start: 2024-09-02 | End: 2024-09-10

## 2024-09-02 RX ADMIN — Medication 40 MILLIGRAM(S): at 22:06

## 2024-09-02 RX ADMIN — Medication 40 MILLIGRAM(S): at 18:53

## 2024-09-02 RX ADMIN — Medication 1 DROP(S): at 22:06

## 2024-09-02 RX ADMIN — POLYETHYLENE GLYCOL 3350 17 GRAM(S): 17 POWDER, FOR SOLUTION ORAL at 13:01

## 2024-09-02 RX ADMIN — MIDODRINE HYDROCHLORIDE 10 MILLIGRAM(S): 5 TABLET ORAL at 07:13

## 2024-09-02 RX ADMIN — Medication 100 MICROGRAM(S): at 05:42

## 2024-09-02 RX ADMIN — MIDODRINE HYDROCHLORIDE 10 MILLIGRAM(S): 5 TABLET ORAL at 22:06

## 2024-09-02 RX ADMIN — Medication 1 DROP(S): at 06:13

## 2024-09-02 RX ADMIN — Medication 81 MILLIGRAM(S): at 13:01

## 2024-09-02 RX ADMIN — Medication 40 MILLIGRAM(S): at 06:13

## 2024-09-02 NOTE — CONSULT NOTE ADULT - ATTENDING COMMENTS
Patient seen/examined.  Assessment/recommendations as noted.  Significant medical comorbidity as noted–recent total cystectomy with ileal conduit at [place] with current hospitalization attributed to urosepsis complicated by respiratory and renal failure.  In addition, suspected PE prompted treatment with heparin followed by starting Eliquis.  Noted to have anemia requiring transfusion and for the past 4 to 5 days patient reports episodes of watery black stool.  Denies abdominal pain, nausea or vomiting.  Has history of GERD but reports no issues of PUD or GI bleeding.  PE/labs as noted.  Currently comfortable/NAD/nontoxic appearing.  Abdomen–soft/nontender.   Experienced melena–probable upper GI blood loss with risk factors of sepsis/respiratory failure requiring intubation, renal failure and AC.  Evaluate for possible PUD, esophagitis or stress erosive gastritis.  Less likely neoplastic  lesion but possible.  Recommendations as noted–monitor serial hemoglobin/hematocrit and a observe for any recurrence of overt GI bleeding.  Pantoprazole 40 mg twice daily for now.  Discussed upper endoscopy for further evaluation and patient hesitant regarding this recommendation but will consider.  Rationale of for endoscopy in terms of diagnosis, potential  endoscopic treatment as well as risk stratification regarding potential need to restart anticoagulation discussed with patient. Tentatively planned for 9/3 pending consent.
Patient seen and evaluated this morning at bedside.  The patient has is awake alert conversant.  Explained about dialysis risks benefits and alternatives.  Sister present by phone.  I explained that at the moment there is no absolute indication for dialysis but indications may arise.  Patient is agreeable to dialysis if needed.  For now will continue with bicarbonate infusion and monitor for kidney function recovery.  Likely in the plateau phase of ATN.
The patient was seen and examined, chart and notes reviewed.  The current diagnosis, plan of care and alternatives have been discussed with the patient.  All questions have been answered and updates have been discussed.  The case was discussed with B team residents/PA's and medical students at morning B surgical rounds and throughout the course of the day.    Severe sepsis secondary to intra-abdominal collection  a.  Admit to SICU  b.  Start empiric IV abx  c.  CT reviewed, consult IR re: possible drainage  d.  Continue IVF resuscitation  e.  Arterial line in place    Hypoxemic respiratory insufficiency  a.  On BiPAP  b.  Transition to high flow vs nasal cannula as tolerated  c.  CXR/ABG to be reviewed    BENIGNO  a Obtain previous creatinine? CKD  B.  Trend  c.  Discuss with urology re: possible intubated conduit secondary to proximal stricture noted on CT  d.  To discuss with [facility] surgeon Dr. Thompson re: recent OR details  At risk for malnutrition  a.  NPO at this time    Hypokalemia  a.  Replete K

## 2024-09-02 NOTE — CONSULT NOTE ADULT - REASON FOR ADMISSION
Sepsis s/p cystectomy

## 2024-09-02 NOTE — CONSULT NOTE ADULT - SUBJECTIVE AND OBJECTIVE BOX
Chief Complaint:  weakness     HPI:    Ms. Lewis is a 68 year old female with HTN, T2DM, DENITA, GERD (on PPI daily) and bladder CA s/p cystectomy/IC creation on 2024 at [facility] who presented to ED  w/ generalized weakness, lower abdominal pain, decrease appetite, intermittent fevers/chills, bloody discharge from urethra and burning. She was admitted to the SICU with septic shock requiring vasopressors 2/2 UTI c/b respiratory failure (intubated -), renal failure s/p CRRT, and probably PE for which she was started on heparin gtt  and transitioned to Eliquis. She was transferred to the floor on  following clinical improvement. Her hospital course is further complicated by anemia requiring pRBC transfusion (.  & 9/1 x2) and melena. Patient reports a single episode of watery black stool daily for the past 4-5 days, last episode yesterday. She denies abd pain, N/V or h/o similar symptoms. She underwent EGD & colonoscopy several years ago for GERD and colon cancer screening with polypectomy, no significant abnormalities are noted otherwise. Eliquis was held by primary, last dose  @ 1300. No recent NSAID use due to renal failure. Pt denies CP or SOB, on RA.     Allergies:  No Known Allergies      Hospital Medications:  acetaminophen     Tablet .. 975 milliGRAM(s) Oral every 6 hours PRN  aspirin  chewable 81 milliGRAM(s) Enteral Tube daily  atorvastatin 40 milliGRAM(s) Oral at bedtime  cycloSPORINE (RESTASIS) 0.05% Emulsion 1 Drop(s) Both EYES two times a day  insulin lispro (ADMELOG) corrective regimen sliding scale   SubCutaneous three times a day before meals  insulin lispro (ADMELOG) corrective regimen sliding scale   SubCutaneous at bedtime  levothyroxine 100 MICROGram(s) Oral daily  melatonin 3 milliGRAM(s) Oral at bedtime PRN  midodrine. 10 milliGRAM(s) Oral <User Schedule>  oxyCODONE    IR 5 milliGRAM(s) Oral every 6 hours PRN  pantoprazole    Tablet 40 milliGRAM(s) Oral before breakfast  polyethylene glycol 3350 17 Gram(s) Oral daily      PMHX/PSHX:  Diabetes 1.5, managed as type 2    Hypothyroid    HTN (hypertension)    Chronic GERD    Meningitis    ETOH abuse    Type 2 diabetes mellitus    Trace cataracts    Dry eyes    Fatty liver    Other microscopic hematuria    Cancer of bladder    Fibroids    Microscopic hematuria    DENITA on CPAP    S/P hysterectomy    S/P knee replacement    Ganglion cyst    History of vocal cord polypectomy    H/O total knee replacement, left    S/P total knee replacement, right    H/O excision of ganglion cyst    H/O transurethral resection of bladder tumor (TURBT)    Family history:      FH: type 2 diabetes (Mother)    FH: hypertension (Mother, Father)    FH: liver cancer (Father)    FH: breast cancer (Sibling)    Family history of DVT (Mother)    Social History:   No smoking    ROS:   See HPI    PHYSICAL EXAM:   GENERAL:  NAD, resting comfortably in bed  HEENT:  Sclera anicteric  RESPIRATORY:  Normal effort  CARDIAC:  HDS  ABDOMEN:  Soft, non-tender, non-distended  EXTREMITIES:  No edema  SKIN:  Warm & Dry. No jaundice.   NEURO:  Alert, conversant, no focal deficit    Vital Signs:  Vital Signs Last 24 Hrs  T(C): 36.7 (02 Sep 2024 09:24), Max: 37.2 (01 Sep 2024 13:50)  T(F): 98 (02 Sep 2024 09:24), Max: 98.9 (01 Sep 2024 13:50)  HR: 89 (02 Sep 2024 09:24) (79 - 98)  BP: 93/47 (02 Sep 2024 09:24) (93/47 - 111/56)  BP(mean): --  RR: 18 (02 Sep 2024 09:24) (17 - 20)  SpO2: 100% (02 Sep 2024 09:24) (98% - 100%)    Parameters below as of 02 Sep 2024 09:24  Patient On (Oxygen Delivery Method): room air      Daily     Daily Weight in k.3 (02 Sep 2024 01:30)    LABS:                        7.8    7.23  )-----------( 440      ( 02 Sep 2024 05:57 )             23.6     Mean Cell Volume: 92.5 fL (24 @ 05:57)        142  |  110<H>  |  49<H>  ----------------------------<  92  4.6   |  20<L>  |  1.66<H>    Ca    9.1      02 Sep 2024 05:57  Phos  3.0     09-  Mg     1.80     09-          Urinalysis Basic - ( 02 Sep 2024 05:57 )    Color: x / Appearance: x / SG: x / pH: x  Gluc: 92 mg/dL / Ketone: x  / Bili: x / Urobili: x   Blood: x / Protein: x / Nitrite: x   Leuk Esterase: x / RBC: x / WBC x   Sq Epi: x / Non Sq Epi: x / Bacteria: x                              7.8    7.23  )-----------( 440      ( 02 Sep 2024 05:57 )             23.6                         7.7    8.91  )-----------( 416      ( 01 Sep 2024 17:00 )             23.4                         7.1    9.20  )-----------( 475      ( 01 Sep 2024 05:27 )             21.8                         7.5    7.93  )-----------( 513      ( 31 Aug 2024 06:49 )             24.7     Imaging:    < from: CT Abdomen and Pelvis w/ IV Cont (24 @ 18:27) >  ABDOMEN AND PELVIS:  LIVER: Within normal limits.  BILE DUCTS: Normal caliber.  GALLBLADDER: Within normal limits.  SPLEEN: Within normal limits.  PANCREAS: Limited evaluation..  BLADDER: Status post cystectomy with ileal conduit. External catheter   terminates in the ileal conduit.  BOWEL: Enteric tube terminates in stomach.. Limited evaluation of the   bowel loops however no evidence for bowel obstruction. Appendix  PERITONEUM/RETROPERITONEUM: Within normal limits.  VESSELS: Within normal limits.  LYMPH NODES: No lymphadenopathy.  < end of copied text >

## 2024-09-02 NOTE — CONSULT NOTE ADULT - ASSESSMENT
Ms. Lewis is a 68 year old female with HTN, T2DM, DENITA, GERD (on PPI daily) and bladder CA s/p cystectomy/IC creation on 8/6/2024 at [facility] who was admitted to the SICU on 8/16 with septic shock 2/2 UTI c/b respiratory failure (intubated 8/21-8/24), renal failure s/p CRRT, PE (stared on heparin gtt 8/21 and transitioned to Eliquis) and now acute blood loss anemia with liquid black stools.     #Melena  #Acute blood loss anemia, hgb danisha 6.7 g/dL from 10 on admission, s/p pRBC 8/29, 8/31, & 9/1 x2  #PE on A/C (last dose Eliquis 9/1 @ 1300)  #Septic shock 2/2 UTI - resolved   #Respiratory failure - resolved, on RA   #Bladder cancer s/p cystectomy/IC creation on 8/6/2024  Suspect potential upper GI bleeding in the setting of septic shock, intubation and MV, and exacerbated by anticoagulant use  DDx includes PUD, AVM, Dieulafoy's lesion, esophagitis, less likely malignancy   No recent EGD. Reports relatively normal EGD/colonoscopy 2-3 years ago  Discussed indication and R/B/A to performing an upper endoscopy to evaluate and potentially treat upper GI bleeding. Patient is hesitant to proceed due requirement of anesthesia and recent critical illness. She will think about her options today and GI team will follow up tomorrow AM    Recommendations:  - IV PPI BID  - Closely monitor vital signs and for clinical signs of bleeding  - Track all stool output and color  - Maintain two large bore peripheral IVs  - Trend CBC, maintain active T&S and transfuse to goal hgb > 7 g/dL   - Hold A/C as able  - NPO after midnight for possible EGD Tuesday pending repeat discussion with patient in the AM    Solitario Avendaño MD  Gastroenterology/Hepatology Fellow  Available via [employer] Teams  Pager: [phone]    On Weekdays after 5 PM to 8AM and Weekends/Holidays (All day)  For non-urgent consults, please email [email or web address]  For urgent consults, please contact on call GI team.  See Dex schedule ([facility]), [employer] paging system - [phone] ([facility]), or call hospital  ([facility])

## 2024-09-02 NOTE — CONSULT NOTE ADULT - SUBJECTIVE AND OBJECTIVE BOX
Patient is a 68y old  Female who presents with a chief complaint of Sepsis s/p cystectomy (02 Sep 2024 08:58)      SUBJECTIVE / OVERNIGHT EVENTS:  New Consult assigned to me by [facility]   Hospitalist admission  : 8/16/24     69 yo F h/o HTN and bladder CA, s/p cystectomy/IC creation on 8/6/2024 at [facility] with Dr. Thompson (d/c on 8/10) presented to ED 8/16 w/ generalized weakness and some lower abdominal pain. Since d/c, only have 1-2 bowel movement, and had significantly decrease PO intake due to decreased appetite, intermittent fevers/chills, +bloody discharge from urethra, some burning.  BP 80s/50s for EMS.  Pt remained hypotensive in ED, started on pressors, cultures sent, placed on zosyn and BiPAP and admitted to SICU. CT revealed: Expected postoperative changes status post cystectomy with ileal conduit creation including small amount of complex fluid and free air in the pelvis. No organized fluid collection. Distended ileal conduit with narrowing at the exiting ostomy site and proximally at the site of ureteral insertion. Mild bilateral hydroureteronephrosis with delayed nephrograms. No significant perinephric stranding.  Antibiotics were later changed to Gabriela, Ucx from conduit grew e.coli.  Pt was intubated on 8/21 due to acute onset of tachypnea, and hypoxia.   Nephrology was consulted and started on CRRT.  Later Gabriela was DC and placed on zosyn, passed beside S&S and placed  on reg diet.    There was plan to transfer patient to  [facility] pm 8/26.    8/30: 1u pRBC  transfuse , zosyn d/c.  Pt was transferred to the floor on 8/31 .  9/1:   pain in joints - medicine consulted for pain management  and for continuing midodrine.   9/2: Reports black stools. Received 2u pRBC yesterday with little response in H/H. No complaints.        MEDICATIONS  (STANDING):  aspirin  chewable 81 milliGRAM(s) Enteral Tube daily  atorvastatin 40 milliGRAM(s) Oral at bedtime  cycloSPORINE (RESTASIS) 0.05% Emulsion 1 Drop(s) Both EYES two times a day  insulin lispro (ADMELOG) corrective regimen sliding scale   SubCutaneous three times a day before meals  insulin lispro (ADMELOG) corrective regimen sliding scale   SubCutaneous at bedtime  levothyroxine 100 MICROGram(s) Oral daily  midodrine. 10 milliGRAM(s) Oral <User Schedule>  pantoprazole    Tablet 40 milliGRAM(s) Oral before breakfast  polyethylene glycol 3350 17 Gram(s) Oral daily    MEDICATIONS  (PRN):  acetaminophen     Tablet .. 975 milliGRAM(s) Oral every 6 hours PRN Mild Pain (1 - 3)  melatonin 3 milliGRAM(s) Oral at bedtime PRN Insomnia  oxyCODONE    IR 5 milliGRAM(s) Oral every 6 hours PRN Severe Pain (7 - 10)      CAPILLARY BLOOD GLUCOSE      POCT Blood Glucose.: 102 mg/dL (02 Sep 2024 11:28)  POCT Blood Glucose.: 109 mg/dL (02 Sep 2024 07:22)  POCT Blood Glucose.: 103 mg/dL (01 Sep 2024 22:08)  POCT Blood Glucose.: 112 mg/dL (01 Sep 2024 16:31)    I&O's Summary    01 Sep 2024 07:01  -  02 Sep 2024 07:00  --------------------------------------------------------  IN: 0 mL / OUT: 1925 mL / NET: -1925 mL        PHYSICAL EXAM:  Vital Signs Last 24 Hrs  T(C): 36.7 (02 Sep 2024 09:24), Max: 37.2 (01 Sep 2024 13:50)  T(F): 98 (02 Sep 2024 09:24), Max: 98.9 (01 Sep 2024 13:50)  HR: 89 (02 Sep 2024 09:24) (79 - 98)  BP: 93/47 (02 Sep 2024 09:24) (93/47 - 111/56)  BP(mean): --  RR: 18 (02 Sep 2024 09:24) (17 - 20)  SpO2: 100% (02 Sep 2024 09:24) (98% - 100%)    Parameters below as of 02 Sep 2024 09:24  Patient On (Oxygen Delivery Method): room air        PHYSICAL EXAM:  GENERAL: NAD, well-developed  HEAD:  Atraumatic, Normocephalic  EYES:  conjunctiva and sclera clear  NECK: Supple, No JVD  CHEST/LUNG: Clear to auscultation bilaterally; No wheeze  HEART: Regular rate and rhythm; No murmurs, rubs, or gallops  ABDOMEN: Soft, Nontender, Nondistended; Bowel sounds present  EXTREMITIES:  2+ Peripheral Pulses, No clubbing, cyanosis, or edema  PSYCH: AAOx3        LABS:                        7.8    7.23  )-----------( 440      ( 02 Sep 2024 05:57 )             23.6     09-02    142  |  110<H>  |  49<H>  ----------------------------<  92  4.6   |  20<L>  |  1.66<H>    Ca    9.1      02 Sep 2024 05:57  Phos  3.0     09-01  Mg     1.80     09-01            Urinalysis Basic - ( 02 Sep 2024 05:57 )    Color: x / Appearance: x / SG: x / pH: x  Gluc: 92 mg/dL / Ketone: x  / Bili: x / Urobili: x   Blood: x / Protein: x / Nitrite: x   Leuk Esterase: x / RBC: x / WBC x   Sq Epi: x / Non Sq Epi: x / Bacteria: x          RADIOLOGY & ADDITIONAL TESTS:    Imaging Personally Reviewed:    Electrocardiogram Personally Reviewed:    COORDINATION OF CARE:  Care Discussed with Consultants/Other Providers [Y/N]:  Prior or Outpatient Records Reviewed [Y/N]:     Patient is a 68y old  Female who presents with a chief complaint of Sepsis s/p cystectomy (02 Sep 2024 08:58)      SUBJECTIVE / OVERNIGHT EVENTS:  New Consult assigned to me by [facility]   Hospitalist admission  : 8/16/24     69 yo F h/o HTN , DM, DENITA on CPAP,GERD on PPI, ETOH use disorder,  OA,  bladder CA, s/p cystectomy/IC creation on 8/6/2024 at [facility] with Dr. Thompson (d/c on 8/10) presented to ED 8/16 w/ generalized weakness and some lower abdominal pain. Since d/c, only have 1-2 bowel movement, and had significantly decrease PO intake due to decreased appetite, intermittent fevers/chills, +bloody discharge from urethra, some burning.  BP 80s/50s for EMS.  Pt remained hypotensive in ED, started on pressors, cultures sent, placed on zosyn and BiPAP and admitted to SICU. CT revealed: Expected postoperative changes status post cystectomy with ileal conduit creation including small amount of complex fluid and free air in the pelvis. No organized fluid collection. Distended ileal conduit with narrowing at the exiting ostomy site and proximally at the site of ureteral insertion. Mild bilateral hydroureteronephrosis with delayed nephrograms. No significant perinephric stranding.  Antibiotics were later changed to Gabriela, Ucx from conduit grew e.coli.  Pt was intubated on 8/21 due to acute onset of tachypnea, and hypoxia.   Nephrology was consulted and started on CRRT.  Later Gabriela was DC and placed on zosyn, passed beside S&S and placed  on reg diet.    There was plan to transfer patient to  [facility] pm 8/26.    8/30: 1u pRBC  transfuse , zosyn d/c.  Pt was transferred to the floor on 8/31 .  9/1:   pain in joints - medicine consulted for pain management  and for continuing midodrine.   9/2: Reports black stools. Received 2u pRBC yesterday with little response in H/H. No complaints.    Patient was seen and states to be doing better with pain on the jiont controlled now at 3/10 compared to 10/10 .  POs constipation which she stated miralax is helping with         MEDICATIONS  (STANDING):  aspirin  chewable 81 milliGRAM(s) Enteral Tube daily  atorvastatin 40 milliGRAM(s) Oral at bedtime  cycloSPORINE (RESTASIS) 0.05% Emulsion 1 Drop(s) Both EYES two times a day  insulin lispro (ADMELOG) corrective regimen sliding scale   SubCutaneous three times a day before meals  insulin lispro (ADMELOG) corrective regimen sliding scale   SubCutaneous at bedtime  levothyroxine 100 MICROGram(s) Oral daily  midodrine. 10 milliGRAM(s) Oral <User Schedule>  pantoprazole    Tablet 40 milliGRAM(s) Oral before breakfast  polyethylene glycol 3350 17 Gram(s) Oral daily    MEDICATIONS  (PRN):  acetaminophen     Tablet .. 975 milliGRAM(s) Oral every 6 hours PRN Mild Pain (1 - 3)  melatonin 3 milliGRAM(s) Oral at bedtime PRN Insomnia  oxyCODONE    IR 5 milliGRAM(s) Oral every 6 hours PRN Severe Pain (7 - 10)      CAPILLARY BLOOD GLUCOSE      POCT Blood Glucose.: 102 mg/dL (02 Sep 2024 11:28)  POCT Blood Glucose.: 109 mg/dL (02 Sep 2024 07:22)  POCT Blood Glucose.: 103 mg/dL (01 Sep 2024 22:08)  POCT Blood Glucose.: 112 mg/dL (01 Sep 2024 16:31)    I&O's Summary    01 Sep 2024 07:01  -  02 Sep 2024 07:00  --------------------------------------------------------  IN: 0 mL / OUT: 1925 mL / NET: -1925 mL        PHYSICAL EXAM:  Vital Signs Last 24 Hrs  T(C): 36.7 (02 Sep 2024 09:24), Max: 37.2 (01 Sep 2024 13:50)  T(F): 98 (02 Sep 2024 09:24), Max: 98.9 (01 Sep 2024 13:50)  HR: 89 (02 Sep 2024 09:24) (79 - 98)  BP: 93/47 (02 Sep 2024 09:24) (93/47 - 111/56)  BP(mean): --  RR: 18 (02 Sep 2024 09:24) (17 - 20)  SpO2: 100% (02 Sep 2024 09:24) (98% - 100%)    Parameters below as of 02 Sep 2024 09:24  Patient On (Oxygen Delivery Method): room air        PHYSICAL EXAM:  GENERAL: NAD, well-developed  HEAD:  Atraumatic, Normocephalic  EYES:  conjunctiva and sclera clear  NECK: Supple, No JVD  CHEST/LUNG: Clear to auscultation bilaterally; No wheeze  HEART: Regular rate and rhythm; No murmurs, rubs, or gallops  ABDOMEN: Soft, Nontender, Nondistended; Bowel sounds present  EXTREMITIES:  2+ Peripheral Pulses, No clubbing, cyanosis, or edema  PSYCH: AAOx3        LABS:                        7.8    7.23  )-----------( 440      ( 02 Sep 2024 05:57 )             23.6     09-02    142  |  110<H>  |  49<H>  ----------------------------<  92  4.6   |  20<L>  |  1.66<H>    Ca    9.1      02 Sep 2024 05:57  Phos  3.0     09-01  Mg     1.80     09-01            Urinalysis Basic - ( 02 Sep 2024 05:57 )    Color: x / Appearance: x / SG: x / pH: x  Gluc: 92 mg/dL / Ketone: x  / Bili: x / Urobili: x   Blood: x / Protein: x / Nitrite: x   Leuk Esterase: x / RBC: x / WBC x   Sq Epi: x / Non Sq Epi: x / Bacteria: x          RADIOLOGY & ADDITIONAL TESTS:    Imaging Personally Reviewed:    Electrocardiogram Personally Reviewed:    COORDINATION OF CARE:  Care Discussed with Consultants/Other Providers [Y/N]:  Prior or Outpatient Records Reviewed [Y/N]:     Patient is a 68y old  Female who presents with a chief complaint of Sepsis s/p cystectomy (02 Sep 2024 08:58)      SUBJECTIVE / OVERNIGHT EVENTS:  New Consult assigned to me by [facility]   Hospitalist admission  : 8/16/24     67 yo F h/o HTN , DM, DENITA on CPAP,GERD on PPI, ETOH use disorder,  OA,  bladder CA, s/p cystectomy/IleoConduit creation on 8/6/2024 at [facility]  (d/c on 8/10) presented to ED 8/16 w/ generalized weakness,  lower abdominal pain, decrease PO ,  decreased appetite, intermittent fevers/chills, +bloody discharge from urethra, and  burning. BP 80s/50s for EMS as reported.  Patient was admitted to the SICU with septic shock requiring vasopressors 2/2 E coli UTI c/b respiratory failure (intubated 8/21-8/24) and completed antibiotics, renal failure s/p CRRT, and probably PE for which she was started on heparin gtt 8/21 and transitioned to Eliquis. She was transferred to the floor on 8/31 following clinical improvement. Her hospital course is further complicated by anemia requiring  multiple pRBC transfusionw with last one on 9/1/24 and melena. Patient reports a single episode of watery black stool daily for the past 4-5 days, last episode yesterday. She denies abd pain, N/V or h/o similar symptoms. Eliquis was held by primary, last dose 9/1.     On 9/1, pain in multiple joints with no swelling - medicine consulted for pain management  and for continuing midodrine.    She underwent EGD & colonoscopy several years ago for GERD and colon cancer screening with polypectomy, no significant abnormalities are noted otherwise  Patient was seen and states to be doing better with pain on the jiont controlled now at 3/10 compared to 10/10 .  POs constipation , but she stated miralax is helping        Past Medical, Past Surgical, and Family History:  PAST MEDICAL HISTORY:  Cancer of bladder   Chronic GERD   ETOH abuse   Fatty liver   Fibroids   HTN (hypertension)   Hypothyroid   Meningitis   Microscopic hematuria   DENITA on CPAP  Type 2 diabetes mellitus.     PAST SURGICAL HISTORY:  H/O excision of ganglion cyst   H/O total knee replacement, left   H/O transurethral resection of bladder tumor (TURBT)   S/P hysterectomy 1984  S/P total knee replacement, right.     MEDICATIONS  (STANDING):  aspirin  chewable 81 milliGRAM(s) Enteral Tube daily  atorvastatin 40 milliGRAM(s) Oral at bedtime  cycloSPORINE (RESTASIS) 0.05% Emulsion 1 Drop(s) Both EYES two times a day  insulin lispro (ADMELOG) corrective regimen sliding scale   SubCutaneous three times a day before meals  insulin lispro (ADMELOG) corrective regimen sliding scale   SubCutaneous at bedtime  levothyroxine 100 MICROGram(s) Oral daily  midodrine. 10 milliGRAM(s) Oral <User Schedule>  pantoprazole    Tablet 40 milliGRAM(s) Oral before breakfast  polyethylene glycol 3350 17 Gram(s) Oral daily    MEDICATIONS  (PRN):  acetaminophen     Tablet .. 975 milliGRAM(s) Oral every 6 hours PRN Mild Pain (1 - 3)  melatonin 3 milliGRAM(s) Oral at bedtime PRN Insomnia  oxyCODONE    IR 5 milliGRAM(s) Oral every 6 hours PRN Severe Pain (7 - 10)      CAPILLARY BLOOD GLUCOSE      POCT Blood Glucose.: 102 mg/dL (02 Sep 2024 11:28)  POCT Blood Glucose.: 109 mg/dL (02 Sep 2024 07:22)  POCT Blood Glucose.: 103 mg/dL (01 Sep 2024 22:08)  POCT Blood Glucose.: 112 mg/dL (01 Sep 2024 16:31)    I&O's Summary    01 Sep 2024 07:01  -  02 Sep 2024 07:00  --------------------------------------------------------  IN: 0 mL / OUT: 1925 mL / NET: -1925 mL        PHYSICAL EXAM:  Vital Signs Last 24 Hrs  T(C): 36.7 (02 Sep 2024 09:24), Max: 37.2 (01 Sep 2024 13:50)  T(F): 98 (02 Sep 2024 09:24), Max: 98.9 (01 Sep 2024 13:50)  HR: 89 (02 Sep 2024 09:24) (79 - 98)  BP: 93/47 (02 Sep 2024 09:24) (93/47 - 111/56)  BP(mean): --  RR: 18 (02 Sep 2024 09:24) (17 - 20)  SpO2: 100% (02 Sep 2024 09:24) (98% - 100%)    Parameters below as of 02 Sep 2024 09:24  Patient On (Oxygen Delivery Method): room air        PHYSICAL EXAM:  GENERAL: NAD, well-developed  HEAD:  Atraumatic, Normocephalic  EYES:  conjunctiva and sclera clear  NECK: Supple, No JVD  CHEST/LUNG: Clear to auscultation bilaterally; No wheeze  HEART: Regular rate and rhythm; No murmurs, rubs, or gallops  ABDOMEN: Soft, Nontender, Nondistended; Bowel sounds present, conduit side in place , clean urine is noted on the taylor , no surrounding tenderness   EXTREMITIES:  2+ Peripheral Pulses, No clubbing, cyanosis, or edema  PSYCH: AAOx3  Musculoskeletal : Healed B/L knee surgical scars , Full ROM of upper and lower extremities and joints and no swelling noted         LABS:                        7.8    7.23  )-----------( 440      ( 02 Sep 2024 05:57 )             23.6     09-02    142  |  110<H>  |  49<H>  ----------------------------<  92  4.6   |  20<L>  |  1.66<H>    Ca    9.1      02 Sep 2024 05:57  Phos  3.0     09-01  Mg     1.80     09-01            Urinalysis Basic - ( 02 Sep 2024 05:57 )    Color: x / Appearance: x / SG: x / pH: x  Gluc: 92 mg/dL / Ketone: x  / Bili: x / Urobili: x   Blood: x / Protein: x / Nitrite: x   Leuk Esterase: x / RBC: x / WBC x   Sq Epi: x / Non Sq Epi: x / Bacteria: x          RADIOLOGY & ADDITIONAL TESTS:    Imaging Personally Reviewed:    Electrocardiogram Personally Reviewed:    COORDINATION OF CARE:  Care Discussed with Consultants/Other Providers [Y/N]:  Prior or Outpatient Records Reviewed [Y/N]:

## 2024-09-02 NOTE — CONSULT NOTE ADULT - PROBLEM SELECTOR RECOMMENDATION 4
S/P RRT   Nephro rec is noted   cont to monitor creatinine S/P RRT   with stable Creatinine now   Nephro rec is noted   cont to monitor creatinine

## 2024-09-02 NOTE — CONSULT NOTE ADULT - PROBLEM SELECTOR RECOMMENDATION 3
Etiology is likely multifactorial ( ANemia , post shock --> if continued despite controlled anemia --< can order AM cortisol for rule any adrenal insufficiency which is less likely   Pt is on Midodrine 10 mg oral twice a day--> can increase to 10 mg oral TID to keep SBP >100 Constipation Etiology is likely multifactorial ( ANemia , post shock --> if continued despite controlled anemia --< can order AM cortisol for rule any adrenal insufficiency which is less likely , TSH with Free T4 ( which could be spuriously abnormal as pt was very sick )   Pt is on Midodrine 10 mg oral twice a day--> can increase to 10 mg oral TID to keep SBP >100 Preoperative examination History of quadriplegia

## 2024-09-02 NOTE — CONSULT NOTE ADULT - PROBLEM SELECTOR RECOMMENDATION 6
Pt states to have ETOH use disorder and would like to speak with  for resources referral , will place consult

## 2024-09-02 NOTE — CONSULT NOTE ADULT - PROBLEM SELECTOR RECOMMENDATION 9
Pt has no hx of kidney disease. No baseline Scr available. At the time of presentation Scr was 5.34 ( Aug 15, 2024) , She received IV abx and fluids and it initially improved to 4.42 and then started worsening gradually to 6.61 today. UA on 8/16/24 showed Turbid urine with small bilirubin, large blood with LE and nitrite with coarse granular casts.  . 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 Conduit was catheterized. Then USG was done and it showed Right kidney: 11.1 cm in length. Left kidney: 10.4 cm in length. Normal in contour and echotexture. No renal mass, hydronephrosis or calculi. BENIGNO vs BENIGNO on CKD 2/2 ATN in setting of hemodynamic changes and contrast induced. Blood in urine could be due to post op changes. Pt does not have signs of uremia. Pt was explained about the possible need for renal replacement therapy - likely CRRT given her low BP. She wanted to talk to her sister before making any decision. Her sister did not answer the call and pt refused to give consent before talking to her sister first.  We recommend to give IV fluids as noted below. Repeat UA. No need of urgent RRT now. If renal funciton further worsens, will do CRRT. Avoid nephrotoxins, strict I/Os. Dose medications as per eGFR.
Pt with known OA as she states that get exacerbated with her current long hospital stay   Pt states that Mobic usually helps but cannot take NSAIDS now   Pain improved with Oxycodone IR 5 mg oral every 6 hours , which we can continue for a short course and can add Gabapentin 100mg oral three times a day  with Lidocaine Patch as needed   Can continue with Miralax for bowel regimen

## 2024-09-02 NOTE — CONSULT NOTE ADULT - ASSESSMENT
69 yo F h/o HTN , DM, DENITA on CPAP,GERD on PPI, ETOH use disorder,  OA,  bladder CA, s/p cystectomy/IC creation on 8/6/2024 at [facility] with Dr. Thompson (d/c on 8/10) presented to ED 8/16 w/ generalized weakness and some lower abdominal pain. Since d/c, only have 1-2 bowel movement, and had significantly decrease PO intake due to decreased appetite, intermittent fevers/chills, +bloody discharge from urethra, some burning. Patient had a complicated hosp course with shock, BENIGNO , Probable ? PE, respiratory failure s/p intubation and now with possible blood loss anemia.   67 yo F h/o HTN , DM, DENITA on CPAP,GERD on PPI, ETOH use disorder,  OA,  bladder CA, s/p cystectomy/IC creation on 8/6/2024 at [facility] with Dr. Thompson (d/c on 8/10) presented to ED 8/16 w/ generalized weakness and some lower abdominal pain, decrease PO intake due to decreased appetite, intermittent fevers/chills, +bloody discharge from urethra, some burning. Patient had a complicated hosp course with shock, BENIGNO , Probable ? PE, respiratory failure s/p intubation and now with possible blood loss anemia.  Hospital medicine team was consulted for help managing  joint pain which pt states is known to her as she has Osteoarthritis for many years and help managing Midodrine started due to hypotension.

## 2024-09-02 NOTE — CONSULT NOTE ADULT - CONSULT REASON
rajwinder
Pelvic fluid drainage
Desaturation, hypotensive
BENIGNO
pain control and Midodrine for Hypotension

## 2024-09-02 NOTE — CONSULT NOTE ADULT - PROBLEM SELECTOR RECOMMENDATION 2
Pt has pH of 7.29, bicarb was low at 15. pCO2 was 33. Metabolic acidosis in setting of renal failure and sepsis. Lactate was normal. We recommend to start her on Bicarb gtt (D5 + 150 mEq NaHCO3) @ 100ml/min. Monitor Labs. If acidosis worsens, will consider renal replacement therapy.    Case discussed with overnight attending, Dr. Ochoa.
S/P multiple PRBC transfusion with hem 7.8   GI rec is noted   cont to monitor hem

## 2024-09-02 NOTE — PROGRESS NOTE ADULT - SUBJECTIVE AND OBJECTIVE BOX
Subjective  Received additional unit of blood overnight. Reports black stools for days, but did not mention to anybody until yesterday evening. No bowel movement in 2 days. No abdominal pain or discomfort. Denies nausea.    Objective    Vital signs  T(F): , Max: 98.9 (09-01-24 @ 13:50)  HR: 94 (09-02-24 @ 05:52)  BP: 103/62 (09-02-24 @ 05:52)  SpO2: 100% (09-02-24 @ 05:52)  Wt(kg): --    Output     OUT:    Urostomy (mL): 1925 mL  Total OUT: 1925 mL    Total NET: -1925 mL          Gen: NAD sitting on chair  Abd: stoma pink w/ taylor in place, soft, nontender    Labs      09-02 @ 05:57    WBC 7.23  / Hct 23.6  / SCr 1.66     09-01 @ 17:00    WBC 8.91  / Hct 23.4  / SCr --

## 2024-09-02 NOTE — CHART NOTE - NSCHARTNOTEFT_GEN_A_CORE
Patient's creatinine now improving.  No further HD plans.  Continue to optimize nutrition/blood pressure per primary team. Monitor I / O BMP.  will sign off call with questions.

## 2024-09-02 NOTE — PROGRESS NOTE ADULT - ASSESSMENT
67 yo F h/o HTN and bladder CA, s/p cystectomy/IC creation on 8/6/2024 at [facility] with Dr. Thompson (d/c on 8/10) presented to ED 8/16 w/ generalized weakness and some lower abdominal pain. Since d/c, only have 1-2 bowel movement, and had significantly decrease PO intake due to decreased appetite, intermittent fevers/chills, +bloody discharge from urethra, some burning. Pt stated stents fell out yesterday.  BP 80s/50s for EMS.  Pt remained hypotensive in ED, started on pressors, cultures sent, placed on zosyn and BiPAP and admitted to SICU. CT revealed: Expected postoperative changes status post cystectomy with ileal conduit creation including small amount of complex fluid and free air in the pelvis. No organized fluid collection. Distended ileal conduit with narrowing at the exiting ostomy site and proximally at the site of ureteral insertion. Mild bilateral hydroureteronephrosis with delayed nephrograms. No significant perinephric stranding.    8/16: still requiring pressor support and on BiPAP, pt states she feels better,14fr catheter placed in stoma, Bcx with GPC/GNR, Cr to 4.42 from 5.34, abx changed to leila and diflucan  8/17: still requiring pressor support and supplement oxygen but feels well, decr UO overnight, given albumin and IVF restarted, Cr up slightly to 4.68, electrolytes normal  8/18: RBUS done, no signs of hydro. IR consulted, no need for NTs as no hydro. UOP still very low (overnight 10 cc/hour), given 2 boluses with no improvement. Cr up to 4.95 from 4.68. CT loopogram of conduit pending today to evaluate for leak. still requiring pressors. Ucx from conduit now growing e.coli, sensitivities pending, PT recs rehab   8/19: CT loopogram w/ no anastamotic leak. Cr continues to rise w/ low UOP. UA on admission w/ coarse granular cast. Constellation of signs and symptoms suggestive of Acute tubular necrosis  8/20: Urine culture w/ e.coli sensitivities w/ resistance to floroquinolones. Pt back on abx. Pt w/ slightly increased urine output  8/21: Pt with acute onset of tachypnea, and hypoxia requiring intubation and sedation, CT w/ probably PE, hep gtt started. Nephrology consult obtained and started on CRRT, meropenem restarted  8/22: still intubated and sedated, pressor requirement decreasing, UO increasing, PTT remains supratherapeutic, hep gtt held for one hour this AM, on tube feeds, febrile to 100.9 at 4am  8/23: changed TF as per nutrition recs to start tomorrow AM, check w/ dietician, PTT therapeutic x1 --> repeat PTT at 4:45 AM 50.7, hep gtt @4ml/hr, tolerating CPAP. awake, UO still only 110/shift, still requiring pressor support, CRRT  8//24: extubated, now on RA, still requiring CRRT, /shift and small amount of pressor support, Procal >100, repeat Bcx and Ucx ordered, leila d/c, vanc x 2, then d/c now on zosyn, passed beside S&S now on reg diet, pt offers no complaints, + flatus/BM, will do bedside pelvic to evaluate for retained material  8/25: CRRT, UO less overnight, zosyn, tolerating reg diet, blood tinged vaginal discharge   8/26: UOP 39 overnight, on zosyn and vanc, tolerating reg diet. poss transfer to [facility] today - SICU to discuss on rounds.   8/27: UOP increased to 310 after dose of lasix. off pressors, still getting midodrine.   8/28: Got more lasix and albumin overnight with OP of 642, still down from 1.5 yesterday during the day. Cr scott from 1.98 to 2.77. Will suggest RBUS. Bcx NGTD at 4 days. Off vanc today, still on zosyn.   8/29: stable, midodrine increased,  overnight, still with some blood from vagina (no pads documented) RBUS no hydro, Bcx neg final, IC Cx neg  8/30: stable, midodrine to bid, no OB pads documented, good UO yellow, was transfused 1u pRBC overnight with appropriate response, zosyn d/c, listed for floor  8/31: feels well, good UO, transferred to floor  9/1: still having some pain in joints - medicine consulted and will ask their recs (NSAIDs not feasible due to kidney function). Will also f/u medicine recs for continuing midodrine. Going to give another unit for suboptimal response ot u pRBCs given yesterday (Hgb 7.1 from 7.5).  9/2: Reports black stools. Received 2u pRBC yesterday with little response in H/H. No complaints.    Plan:  - Labs reviewed  - 1u pRBC today  - Holding Eliquis  - Continue midodrine  - CC diet  - Bowel regimen  - PT recs rehab  - Reconsult medicine for AC, black stools, midodrine, and arthritis pain  - GI consult  - F/u nephrology

## 2024-09-03 ENCOUNTER — TRANSCRIPTION ENCOUNTER (OUTPATIENT)
Age: 69
End: 2024-09-03

## 2024-09-03 LAB
ANION GAP SERPL CALC-SCNC: 10 MMOL/L — SIGNIFICANT CHANGE UP (ref 7–14)
BLD GP AB SCN SERPL QL: NEGATIVE — SIGNIFICANT CHANGE UP
BUN SERPL-MCNC: 45 MG/DL — HIGH (ref 7–23)
CALCIUM SERPL-MCNC: 9.2 MG/DL — SIGNIFICANT CHANGE UP (ref 8.4–10.5)
CHLORIDE SERPL-SCNC: 114 MMOL/L — HIGH (ref 98–107)
CO2 SERPL-SCNC: 20 MMOL/L — LOW (ref 22–31)
CREAT SERPL-MCNC: 1.53 MG/DL — HIGH (ref 0.5–1.3)
EGFR: 37 ML/MIN/1.73M2 — LOW
GLUCOSE BLDC GLUCOMTR-MCNC: 103 MG/DL — HIGH (ref 70–99)
GLUCOSE BLDC GLUCOMTR-MCNC: 109 MG/DL — HIGH (ref 70–99)
GLUCOSE BLDC GLUCOMTR-MCNC: 82 MG/DL — SIGNIFICANT CHANGE UP (ref 70–99)
GLUCOSE BLDC GLUCOMTR-MCNC: 84 MG/DL — SIGNIFICANT CHANGE UP (ref 70–99)
GLUCOSE BLDC GLUCOMTR-MCNC: 89 MG/DL — SIGNIFICANT CHANGE UP (ref 70–99)
GLUCOSE BLDC GLUCOMTR-MCNC: 97 MG/DL — SIGNIFICANT CHANGE UP (ref 70–99)
GLUCOSE SERPL-MCNC: 107 MG/DL — HIGH (ref 70–99)
HCT VFR BLD CALC: 23.5 % — LOW (ref 34.5–45)
HGB BLD-MCNC: 7.7 G/DL — LOW (ref 11.5–15.5)
MAGNESIUM SERPL-MCNC: 1.6 MG/DL — SIGNIFICANT CHANGE UP (ref 1.6–2.6)
MCHC RBC-ENTMCNC: 31 PG — SIGNIFICANT CHANGE UP (ref 27–34)
MCHC RBC-ENTMCNC: 32.8 GM/DL — SIGNIFICANT CHANGE UP (ref 32–36)
MCV RBC AUTO: 94.8 FL — SIGNIFICANT CHANGE UP (ref 80–100)
NRBC # BLD: 0 /100 WBCS — SIGNIFICANT CHANGE UP (ref 0–0)
NRBC # FLD: 0 K/UL — SIGNIFICANT CHANGE UP (ref 0–0)
PHOSPHATE SERPL-MCNC: 2.9 MG/DL — SIGNIFICANT CHANGE UP (ref 2.5–4.5)
PLATELET # BLD AUTO: 432 K/UL — HIGH (ref 150–400)
POTASSIUM SERPL-MCNC: 4.4 MMOL/L — SIGNIFICANT CHANGE UP (ref 3.5–5.3)
POTASSIUM SERPL-SCNC: 4.4 MMOL/L — SIGNIFICANT CHANGE UP (ref 3.5–5.3)
RBC # BLD: 2.48 M/UL — LOW (ref 3.8–5.2)
RBC # FLD: 20.3 % — HIGH (ref 10.3–14.5)
RH IG SCN BLD-IMP: POSITIVE — SIGNIFICANT CHANGE UP
SODIUM SERPL-SCNC: 144 MMOL/L — SIGNIFICANT CHANGE UP (ref 135–145)
WBC # BLD: 8.18 K/UL — SIGNIFICANT CHANGE UP (ref 3.8–10.5)
WBC # FLD AUTO: 8.18 K/UL — SIGNIFICANT CHANGE UP (ref 3.8–10.5)

## 2024-09-03 PROCEDURE — 99233 SBSQ HOSP IP/OBS HIGH 50: CPT

## 2024-09-03 PROCEDURE — 43235 EGD DIAGNOSTIC BRUSH WASH: CPT | Mod: GC

## 2024-09-03 RX ORDER — POLYETHYLENE GLYCOL 3350, SODIUM SULFATE ANHYDROUS, SODIUM BICARBONATE, SODIUM CHLORIDE, POTASSIUM CHLORIDE 236; 22.74; 6.74; 5.86; 2.97 G/4L; G/4L; G/4L; G/4L; G/4L
4000 POWDER, FOR SOLUTION ORAL ONCE
Refills: 0 | Status: COMPLETED | OUTPATIENT
Start: 2024-09-03 | End: 2024-09-03

## 2024-09-03 RX ORDER — DEXTROSE 15 G/33 G
12.5 GEL IN PACKET (GRAM) ORAL ONCE
Refills: 0 | Status: DISCONTINUED | OUTPATIENT
Start: 2024-09-03 | End: 2024-09-13

## 2024-09-03 RX ORDER — DEXTROSE 15 G/33 G
15 GEL IN PACKET (GRAM) ORAL ONCE
Refills: 0 | Status: DISCONTINUED | OUTPATIENT
Start: 2024-09-03 | End: 2024-09-06

## 2024-09-03 RX ORDER — GLUCAGON INJECTION, SOLUTION 1 MG/.2ML
1 INJECTION, SOLUTION SUBCUTANEOUS ONCE
Refills: 0 | Status: DISCONTINUED | OUTPATIENT
Start: 2024-09-03 | End: 2024-09-13

## 2024-09-03 RX ORDER — PANTOPRAZOLE SODIUM 40 MG
40 TABLET, DELAYED RELEASE (ENTERIC COATED) ORAL
Refills: 0 | Status: DISCONTINUED | OUTPATIENT
Start: 2024-09-03 | End: 2024-09-04

## 2024-09-03 RX ORDER — DEXTROSE 15 G/33 G
25 GEL IN PACKET (GRAM) ORAL ONCE
Refills: 0 | Status: DISCONTINUED | OUTPATIENT
Start: 2024-09-03 | End: 2024-09-06

## 2024-09-03 RX ORDER — DEXTROSE 15 G/33 G
25 GEL IN PACKET (GRAM) ORAL ONCE
Refills: 0 | Status: DISCONTINUED | OUTPATIENT
Start: 2024-09-03 | End: 2024-09-13

## 2024-09-03 RX ADMIN — Medication 40 MILLIGRAM(S): at 21:27

## 2024-09-03 RX ADMIN — Medication 1 DROP(S): at 18:12

## 2024-09-03 RX ADMIN — Medication 40 MILLIGRAM(S): at 18:13

## 2024-09-03 RX ADMIN — MIDODRINE HYDROCHLORIDE 10 MILLIGRAM(S): 5 TABLET ORAL at 15:47

## 2024-09-03 RX ADMIN — Medication 25 GRAM(S): at 10:04

## 2024-09-03 RX ADMIN — Medication 81 MILLIGRAM(S): at 12:06

## 2024-09-03 RX ADMIN — MIDODRINE HYDROCHLORIDE 10 MILLIGRAM(S): 5 TABLET ORAL at 05:21

## 2024-09-03 RX ADMIN — MIDODRINE HYDROCHLORIDE 10 MILLIGRAM(S): 5 TABLET ORAL at 21:27

## 2024-09-03 RX ADMIN — Medication 40 MILLIGRAM(S): at 05:28

## 2024-09-03 RX ADMIN — POLYETHYLENE GLYCOL 3350, SODIUM SULFATE ANHYDROUS, SODIUM BICARBONATE, SODIUM CHLORIDE, POTASSIUM CHLORIDE 4000 MILLILITER(S): 236; 22.74; 6.74; 5.86; 2.97 POWDER, FOR SOLUTION ORAL at 18:26

## 2024-09-03 RX ADMIN — Medication 100 MICROGRAM(S): at 05:21

## 2024-09-03 RX ADMIN — Medication 75 MILLILITER(S): at 00:06

## 2024-09-03 RX ADMIN — Medication 1 DROP(S): at 05:21

## 2024-09-03 NOTE — DISCHARGE NOTE NURSING/CASE MANAGEMENT/SOCIAL WORK - PATIENT PORTAL LINK FT
You can access the FollowMyHealth Patient Portal offered by [facility] by registering at the following website: [email or web address]. By joining [employer]’s FollowMyHealth portal, you will also be able to view your health information using other applications (apps) compatible with our system.

## 2024-09-03 NOTE — DISCHARGE NOTE NURSING/CASE MANAGEMENT/SOCIAL WORK - NSDCPNINST_GEN_ALL_CORE
Notify Dr Butler if you experience any further weakness, malaise, decreased appetite or fever >100.5.  Drink plenty o fluids.  Continue to follow a consistent carbohydrate diet and follow up with your PMD for continued management of your diabetes.   Notify Dr Butler if you experience any further weakness, malaise, decreased appetite or fever >100.5.  Drink plenty o fluids.  Continue to follow a consistent carbohydrate diet and follow up with your PMD for continued management of your diabetes.  Ileal conduit care, change appliance as needed.  Inspect your skin during appliance changes for any signs of skin breakdown.

## 2024-09-03 NOTE — PROGRESS NOTE ADULT - ASSESSMENT
Ms. Lewis is a 68 year old female with HTN, T2DM, DENITA, GERD (on PPI daily) and bladder CA s/p cystectomy/IC creation on 8/6/2024 at [facility] who was admitted to the SICU on 8/16 with septic shock 2/2 UTI c/b respiratory failure (intubated 8/21-8/24), renal failure s/p CRRT, PE (stared on heparin gtt 8/21 and transitioned to Eliquis) and now acute blood loss anemia with liquid black stools.     #Melena  #Acute blood loss anemia, hgb danisha 6.7 g/dL from 10 on admission, s/p pRBC 8/29, 8/31, & 9/1 x2  #PE on A/C (last dose Eliquis 9/1 @ 1300)  #Septic shock 2/2 UTI - resolved   #Respiratory failure - resolved, on RA   #Bladder cancer s/p cystectomy/IC creation on 8/6/2024  Suspect potential upper GI bleeding in the setting of septic shock, intubation and MV, and exacerbated by anticoagulant use  DDx includes PUD, AVM, Dieulafoy's lesion, esophagitis, less likely malignancy   No recent EGD. Reports relatively normal EGD/colonoscopy 2-3 years ago  Discussed indication and R/B/A to performing an upper endoscopy to evaluate and potentially treat upper GI bleeding. Patient is hesitant to proceed due requirement of anesthesia and recent critical illness. She will think about her options today and GI team will follow up tomorrow AM    Recommendations:  - NPO after midnight for EGD Wednesday  - Okay for regular diet today  - IV PPI BID  - Closely monitor vital signs and for clinical signs of bleeding  - Track all stool output and color  - Trend CBC, maintain active T&S and transfuse to goal hgb > 7 g/dL   - Hold A/C as able    Solitario Avendaño MD  Gastroenterology/Hepatology Fellow  Available via [employer] Teams  Pager: [phone]    On Weekdays after 5 PM to 8AM and Weekends/Holidays (All day)  For non-urgent consults, please email [email or web address] or [email or web address]  For urgent consults, please contact on call GI team.  See Dex sharif ([facility]), Spok paging system - [phone] ([facility]), or call hospital  ([facility])

## 2024-09-03 NOTE — PROGRESS NOTE ADULT - ASSESSMENT
67 yo F h/o HTN , DM, DENITA on CPAP,GERD on PPI, ETOH use disorder,  OA,  bladder CA, s/p cystectomy/IC creation on 8/6/2024 at [facility] with Dr. Thompson (d/c on 8/10) presented to ED 8/16 w/ generalized weakness and some lower abdominal pain, decrease PO intake due to decreased appetite, intermittent fevers/chills, +bloody discharge from urethra, some burning. Patient had a complicated hosp course with shock, BENIGNO , Probable ? PE, respiratory failure s/p intubation and now with possible blood loss anemia.  Hospital medicine team was consulted for help managing  joint pain which pt states is known to her as she has Osteoarthritis for many years and help managing Midodrine started due to hypotension.

## 2024-09-03 NOTE — PROGRESS NOTE ADULT - SUBJECTIVE AND OBJECTIVE BOX
Interval Events:   no acute events  patient without BM or overt bleeding in 2 days, otherwise feeling well  VSS, exam unchanged  hgb 7.7 g/dL  Patient is amenable to endoscopy and wants to plan for tomorrow    Hospital Medications:  acetaminophen     Tablet .. 975 milliGRAM(s) Oral every 6 hours PRN  aspirin  chewable 81 milliGRAM(s) Enteral Tube daily  atorvastatin 40 milliGRAM(s) Oral at bedtime  cycloSPORINE (RESTASIS) 0.05% Emulsion 1 Drop(s) Both EYES two times a day  dextrose 5% + sodium chloride 0.45%. 1000 milliLiter(s) IV Continuous <Continuous>  dextrose 5%. 1000 milliLiter(s) IV Continuous <Continuous>  dextrose 5%. 1000 milliLiter(s) IV Continuous <Continuous>  dextrose 50% Injectable 25 Gram(s) IV Push once  dextrose 50% Injectable 25 Gram(s) IV Push once  dextrose 50% Injectable 12.5 Gram(s) IV Push once  dextrose Oral Gel 15 Gram(s) Oral once PRN  gabapentin 100 milliGRAM(s) Oral three times a day PRN  glucagon  Injectable 1 milliGRAM(s) IntraMuscular once  insulin lispro (ADMELOG) corrective regimen sliding scale   SubCutaneous every 6 hours  levothyroxine 100 MICROGram(s) Oral daily  magnesium sulfate  IVPB 2 Gram(s) IV Intermittent once  melatonin 3 milliGRAM(s) Oral at bedtime PRN  midodrine. 10 milliGRAM(s) Oral every 8 hours  pantoprazole  Injectable 40 milliGRAM(s) IV Push two times a day  polyethylene glycol 3350 17 Gram(s) Oral daily    ROS: See above.    PHYSICAL EXAM:   Vital Signs:  Vital Signs Last 24 Hrs  T(C): 36.7 (03 Sep 2024 05:34), Max: 36.7 (02 Sep 2024 22:00)  T(F): 98 (03 Sep 2024 05:34), Max: 98.1 (02 Sep 2024 22:00)  HR: 78 (03 Sep 2024 08:21) (77 - 96)  BP: 95/55 (03 Sep 2024 05:34) (95/55 - 103/46)  BP(mean): --  RR: 18 (03 Sep 2024 05:34) (17 - 18)  SpO2: 98% (03 Sep 2024 08:21) (96% - 100%)    Parameters below as of 03 Sep 2024 05:34  Patient On (Oxygen Delivery Method): room air        GENERAL:  NAD, resting comfortably in bed  HEENT:  sclera anicteric  RESPIRATORY:  Normal Effort  CARDIAC:  HDS  ABDOMEN:  Soft, non-tender, non-distended  SKIN:  Warm & Dry. No jaundice  NEURO:  Alert, conversant, no focal deficit    LABS:                        7.7    8.18  )-----------( 432      ( 03 Sep 2024 06:30 )             23.5     Mean Cell Volume: 94.8 fL (09-03-24 @ 06:30)    09-03    144  |  114<H>  |  45<H>  ----------------------------<  107<H>  4.4   |  20<L>  |  1.53<H>    Ca    9.2      03 Sep 2024 06:30  Phos  2.9     09-03  Mg     1.60     09-03

## 2024-09-03 NOTE — PROGRESS NOTE ADULT - PROBLEM SELECTOR PLAN 2
in s/o upper GI bleed  S/P multiple (6 units) PRBC transfusion with inappropriate response   Hgb 7.7 today, plan for EGD per GI  C/w iv protonix 40 mg bid, if recurrent active bleed, consider switch to protonix drip  Transfuse prn to keep Hgb>7-8  check iron panel (iron, ferritin, tibc)

## 2024-09-03 NOTE — PROGRESS NOTE ADULT - SUBJECTIVE AND OBJECTIVE BOX
No new events  Afeb 95/55 88 99%RA    Pt has no c/o; no BM in 2 days  Abd- soft NT ND   IC (with taylor) - 1L  NPO for poss EGD

## 2024-09-03 NOTE — PROGRESS NOTE ADULT - PROBLEM SELECTOR PLAN 1
Pt with known RA and OA as she states that get exacerbated with her current long hospital stay   Hasn't seen her rheumatologist in years  No NAIDS in s/o GI bleed  -pain currently controlled, c/w tylenol prn  - can check RF and anti-CCP to see if pt is having a RA flare, though clinical exam not impressive   - outpt f/up rheumatology

## 2024-09-03 NOTE — PROGRESS NOTE ADULT - SUBJECTIVE AND OBJECTIVE BOX
Dr. Tatiana Fried  Pager [phone]    PROGRESS NOTE:     Patient is a 68y old  Female who presents with a chief complaint of Sepsis s/p cystectomy (03 Sep 2024 09:39)      SUBJECTIVE / OVERNIGHT EVENTS: pt reports dark stool 2 days ago for 2-3 days, no dark stool overnight, reports dizziness on standing  ADDITIONAL REVIEW OF SYSTEMS: denies chest pain/sob, c/o hand joint pains controlled on current regimen, hasn't seen a rheumatologist for her RA in years    MEDICATIONS  (STANDING):  aspirin  chewable 81 milliGRAM(s) Enteral Tube daily  atorvastatin 40 milliGRAM(s) Oral at bedtime  cycloSPORINE (RESTASIS) 0.05% Emulsion 1 Drop(s) Both EYES two times a day  dextrose 5% + sodium chloride 0.45%. 1000 milliLiter(s) (75 mL/Hr) IV Continuous <Continuous>  dextrose 5%. 1000 milliLiter(s) (50 mL/Hr) IV Continuous <Continuous>  dextrose 5%. 1000 milliLiter(s) (100 mL/Hr) IV Continuous <Continuous>  dextrose 50% Injectable 25 Gram(s) IV Push once  dextrose 50% Injectable 25 Gram(s) IV Push once  dextrose 50% Injectable 12.5 Gram(s) IV Push once  glucagon  Injectable 1 milliGRAM(s) IntraMuscular once  insulin lispro (ADMELOG) corrective regimen sliding scale   SubCutaneous every 6 hours  levothyroxine 100 MICROGram(s) Oral daily  midodrine. 10 milliGRAM(s) Oral every 8 hours  pantoprazole  Injectable 40 milliGRAM(s) IV Push two times a day  polyethylene glycol 3350 17 Gram(s) Oral daily    MEDICATIONS  (PRN):  acetaminophen     Tablet .. 975 milliGRAM(s) Oral every 6 hours PRN Mild Pain (1 - 3)  dextrose Oral Gel 15 Gram(s) Oral once PRN Blood Glucose LESS THAN 70 milliGRAM(s)/deciliter  gabapentin 100 milliGRAM(s) Oral three times a day PRN pain  melatonin 3 milliGRAM(s) Oral at bedtime PRN Insomnia      CAPILLARY BLOOD GLUCOSE      POCT Blood Glucose.: 109 mg/dL (03 Sep 2024 07:10)  POCT Blood Glucose.: 103 mg/dL (02 Sep 2024 22:15)  POCT Blood Glucose.: 100 mg/dL (02 Sep 2024 16:27)  POCT Blood Glucose.: 102 mg/dL (02 Sep 2024 11:28)    I&O's Summary    02 Sep 2024 07:01  -  03 Sep 2024 07:00  --------------------------------------------------------  IN: 0 mL / OUT: 2040 mL / NET: -2040 mL        PHYSICAL EXAM:  Vital Signs Last 24 Hrs  T(C): 36.7 (03 Sep 2024 05:34), Max: 36.7 (02 Sep 2024 22:00)  T(F): 98 (03 Sep 2024 05:34), Max: 98.1 (02 Sep 2024 22:00)  HR: 78 (03 Sep 2024 08:21) (77 - 96)  BP: 95/55 (03 Sep 2024 05:34) (95/55 - 103/46)  BP(mean): --  RR: 18 (03 Sep 2024 05:34) (17 - 18)  SpO2: 98% (03 Sep 2024 08:21) (96% - 100%)    Parameters below as of 03 Sep 2024 05:34  Patient On (Oxygen Delivery Method): room air      CONSTITUTIONAL: nad  RESPIRATORY: Normal respiratory effort; lungs are clear to auscultation bilaterally  CARDIOVASCULAR: Regular rate and rhythm, normal S1 and S2, no murmur/rub/gallop; No lower extremity edema; Peripheral pulses are 2+ bilaterally  ABDOMEN: ileal conduit, soft, nontender  MUSCULOSKELETAL: no clubbing or cyanosis of digits; hand joints slightly tender to palpation, DIP OA, no joint swelling PIP/MCP  PSYCH: A+O to person, place, and time; affect appropriate    LABS:                        7.7    8.18  )-----------( 432      ( 03 Sep 2024 06:30 )             23.5     09-03    144  |  114<H>  |  45<H>  ----------------------------<  107<H>  4.4   |  20<L>  |  1.53<H>    Ca    9.2      03 Sep 2024 06:30  Phos  2.9     09-03  Mg     1.60     09-03      Urinalysis Basic - ( 03 Sep 2024 06:30 )    Color: x / Appearance: x / SG: x / pH: x  Gluc: 107 mg/dL / Ketone: x  / Bili: x / Urobili: x   Blood: x / Protein: x / Nitrite: x   Leuk Esterase: x / RBC: x / WBC x   Sq Epi: x / Non Sq Epi: x / Bacteria: x    RADIOLOGY & ADDITIONAL TESTS:  Results Reviewed:   Imaging Personally Reviewed:  < from: US Kidney and Bladder (08.28.24 @ 18:12) >  IMPRESSION:  No obstructive uropathy    < from: Xray Chest 1 View- PORTABLE-Urgent (Xray Chest 1 View- PORTABLE-Urgent .) (08.30.24 @ 11:53) >  IMPRESSION: Clear lungs with elevated left hemidiaphragm.      Electrocardiogram Personally Reviewed:    COORDINATION OF CARE:  Care Discussed with Consultants/Other Providers [Y/N]:  Prior or Outpatient Records Reviewed [Y/N]:

## 2024-09-03 NOTE — PROGRESS NOTE ADULT - ASSESSMENT
67 yo F h/o HTN and bladder CA, s/p cystectomy/IC creation on 8/6/2024 at [facility] with Dr. Thompson (d/c on 8/10) presented to ED 8/16 w/ generalized weakness and some lower abdominal pain. Since d/c, only have 1-2 bowel movement, and had significantly decrease PO intake due to decreased appetite, intermittent fevers/chills, +bloody discharge from urethra, some burning. Pt stated stents fell out yesterday.  BP 80s/50s for EMS.  Pt remained hypotensive in ED, started on pressors, cultures sent, placed on zosyn and BiPAP and admitted to SICU. CT revealed: Expected postoperative changes status post cystectomy with ileal conduit creation including small amount of complex fluid and free air in the pelvis. No organized fluid collection. Distended ileal conduit with narrowing at the exiting ostomy site and proximally at the site of ureteral insertion. Mild bilateral hydroureteronephrosis with delayed nephrograms. No significant perinephric stranding.    8/16: still requiring pressor support and on BiPAP, pt states she feels better,14fr catheter placed in stoma, Bcx with GPC/GNR, Cr to 4.42 from 5.34, abx changed to leila and diflucan  8/17: still requiring pressor support and supplement oxygen but feels well, decr UO overnight, given albumin and IVF restarted, Cr up slightly to 4.68, electrolytes normal  8/18: RBUS done, no signs of hydro. IR consulted, no need for NTs as no hydro. UOP still very low (overnight 10 cc/hour), given 2 boluses with no improvement. Cr up to 4.95 from 4.68. CT loopogram of conduit pending today to evaluate for leak. still requiring pressors. Ucx from conduit now growing e.coli, sensitivities pending, PT recs rehab   8/19: CT loopogram w/ no anastamotic leak. Cr continues to rise w/ low UOP. UA on admission w/ coarse granular cast. Constellation of signs and symptoms suggestive of Acute tubular necrosis  8/20: Urine culture w/ e.coli sensitivities w/ resistance to floroquinolones. Pt back on abx. Pt w/ slightly increased urine output  8/21: Pt with acute onset of tachypnea, and hypoxia requiring intubation and sedation, CT w/ probably PE, hep gtt started. Nephrology consult obtained and started on CRRT, meropenem restarted  8/22: still intubated and sedated, pressor requirement decreasing, UO increasing, PTT remains supratherapeutic, hep gtt held for one hour this AM, on tube feeds, febrile to 100.9 at 4am  8/23: changed TF as per nutrition recs to start tomorrow AM, check w/ dietician, PTT therapeutic x1 --> repeat PTT at 4:45 AM 50.7, hep gtt @4ml/hr, tolerating CPAP. awake, UO still only 110/shift, still requiring pressor support, CRRT  8//24: extubated, now on RA, still requiring CRRT, /shift and small amount of pressor support, Procal >100, repeat Bcx and Ucx ordered, leila d/c, vanc x 2, then d/c now on zosyn, passed beside S&S now on reg diet, pt offers no complaints, + flatus/BM, will do bedside pelvic to evaluate for retained material  8/25: CRRT, UO less overnight, zosyn, tolerating reg diet, blood tinged vaginal discharge   8/26: UOP 39 overnight, on zosyn and vanc, tolerating reg diet. poss transfer to [facility] today - SICU to discuss on rounds.   8/27: UOP increased to 310 after dose of lasix. off pressors, still getting midodrine.   8/28: Got more lasix and albumin overnight with OP of 642, still down from 1.5 yesterday during the day. Cr scott from 1.98 to 2.77. Will suggest RBUS. Bcx NGTD at 4 days. Off vanc today, still on zosyn.   8/29: stable, midodrine increased,  overnight, still with some blood from vagina (no pads documented) RBUS no hydro, Bcx neg final, IC Cx neg  8/30: stable, midodrine to bid, no OB pads documented, good UO yellow, was transfused 1u pRBC overnight with appropriate response, zosyn d/c, listed for floor  8/31: feels well, good UO, transferred to floor  9/1: still having some pain in joints - medicine consulted and will ask their recs (NSAIDs not feasible due to kidney function). Will also f/u medicine recs for continuing midodrine. Going to give another unit for suboptimal response ot u pRBCs given yesterday (Hgb 7.1 from 7.5).  9/2: Reports black stools. Received 2u pRBC yesterday with little response in H/H. No complaints; GI consulted  9/3 - no further stools; no c/o; recieved another unit blood yesterday    Plan:  - Labs  - NPO for poss EGD  - Bowel regimen  - PT recs rehab  - Reconsult medicine for AC, black stools, midodrine, and arthritis pain  - GI consult  - F/u nephrology 69 yo F h/o HTN and bladder CA, s/p cystectomy/IC creation on 8/6/2024 at [facility] with Dr. Thompson (d/c on 8/10) presented to ED 8/16 w/ generalized weakness and some lower abdominal pain. Since d/c, only have 1-2 bowel movement, and had significantly decrease PO intake due to decreased appetite, intermittent fevers/chills, +bloody discharge from urethra, some burning. Pt stated stents fell out yesterday.  BP 80s/50s for EMS.  Pt remained hypotensive in ED, started on pressors, cultures sent, placed on zosyn and BiPAP and admitted to SICU. CT revealed: Expected postoperative changes status post cystectomy with ileal conduit creation including small amount of complex fluid and free air in the pelvis. No organized fluid collection. Distended ileal conduit with narrowing at the exiting ostomy site and proximally at the site of ureteral insertion. Mild bilateral hydroureteronephrosis with delayed nephrograms. No significant perinephric stranding.    8/16: still requiring pressor support and on BiPAP, pt states she feels better,14fr catheter placed in stoma, Bcx with GPC/GNR, Cr to 4.42 from 5.34, abx changed to leila and diflucan  8/17: still requiring pressor support and supplement oxygen but feels well, decr UO overnight, given albumin and IVF restarted, Cr up slightly to 4.68, electrolytes normal  8/18: RBUS done, no signs of hydro. IR consulted, no need for NTs as no hydro. UOP still very low (overnight 10 cc/hour), given 2 boluses with no improvement. Cr up to 4.95 from 4.68. CT loopogram of conduit pending today to evaluate for leak. still requiring pressors. Ucx from conduit now growing e.coli, sensitivities pending, PT recs rehab   8/19: CT loopogram w/ no anastamotic leak. Cr continues to rise w/ low UOP. UA on admission w/ coarse granular cast. Constellation of signs and symptoms suggestive of Acute tubular necrosis  8/20: Urine culture w/ e.coli sensitivities w/ resistance to floroquinolones. Pt back on abx. Pt w/ slightly increased urine output  8/21: Pt with acute onset of tachypnea, and hypoxia requiring intubation and sedation, CT w/ probably PE, hep gtt started. Nephrology consult obtained and started on CRRT, meropenem restarted  8/22: still intubated and sedated, pressor requirement decreasing, UO increasing, PTT remains supratherapeutic, hep gtt held for one hour this AM, on tube feeds, febrile to 100.9 at 4am  8/23: changed TF as per nutrition recs to start tomorrow AM, check w/ dietician, PTT therapeutic x1 --> repeat PTT at 4:45 AM 50.7, hep gtt @4ml/hr, tolerating CPAP. awake, UO still only 110/shift, still requiring pressor support, CRRT  8//24: extubated, now on RA, still requiring CRRT, /shift and small amount of pressor support, Procal >100, repeat Bcx and Ucx ordered, leila d/c, vanc x 2, then d/c now on zosyn, passed beside S&S now on reg diet, pt offers no complaints, + flatus/BM, will do bedside pelvic to evaluate for retained material  8/25: CRRT, UO less overnight, zosyn, tolerating reg diet, blood tinged vaginal discharge   8/26: UOP 39 overnight, on zosyn and vanc, tolerating reg diet. poss transfer to [facility] today - SICU to discuss on rounds.   8/27: UOP increased to 310 after dose of lasix. off pressors, still getting midodrine.   8/28: Got more lasix and albumin overnight with OP of 642, still down from 1.5 yesterday during the day. Cr scott from 1.98 to 2.77. Will suggest RBUS. Bcx NGTD at 4 days. Off vanc today, still on zosyn.   8/29: stable, midodrine increased,  overnight, still with some blood from vagina (no pads documented) RBUS no hydro, Bcx neg final, IC Cx neg  8/30: stable, midodrine to bid, no OB pads documented, good UO yellow, was transfused 1u pRBC overnight with appropriate response, zosyn d/c, listed for floor  8/31: feels well, good UO, transferred to floor  9/1: still having some pain in joints - medicine consulted and will ask their recs (NSAIDs not feasible due to kidney function). Will also f/u medicine recs for continuing midodrine. Going to give another unit for suboptimal response ot u pRBCs given yesterday (Hgb 7.1 from 7.5).  9/2: Reports black stools. Received 2u pRBC yesterday with little response in H/H. No complaints; GI consulted  9/3 - no further stools; no c/o; recieved another unit blood yesterday    Plan:  - Labs  - NPO for poss EGD  - Bowel regimen  - PT recs rehab  - Reconsult medicine for AC, black stools, midodrine, and arthritis pain  - GI consult  - nephrology signed off

## 2024-09-04 DIAGNOSIS — Z29.9 ENCOUNTER FOR PROPHYLACTIC MEASURES, UNSPECIFIED: ICD-10-CM

## 2024-09-04 DIAGNOSIS — G47.33 OBSTRUCTIVE SLEEP APNEA (ADULT) (PEDIATRIC): ICD-10-CM

## 2024-09-04 LAB
ANION GAP SERPL CALC-SCNC: 13 MMOL/L — SIGNIFICANT CHANGE UP (ref 7–14)
BUN SERPL-MCNC: 39 MG/DL — HIGH (ref 7–23)
CALCIUM SERPL-MCNC: 9.4 MG/DL — SIGNIFICANT CHANGE UP (ref 8.4–10.5)
CHLORIDE SERPL-SCNC: 112 MMOL/L — HIGH (ref 98–107)
CO2 SERPL-SCNC: 20 MMOL/L — LOW (ref 22–31)
CREAT SERPL-MCNC: 1.53 MG/DL — HIGH (ref 0.5–1.3)
EGFR: 37 ML/MIN/1.73M2 — LOW
GLUCOSE BLDC GLUCOMTR-MCNC: 100 MG/DL — HIGH (ref 70–99)
GLUCOSE BLDC GLUCOMTR-MCNC: 106 MG/DL — HIGH (ref 70–99)
GLUCOSE BLDC GLUCOMTR-MCNC: 112 MG/DL — HIGH (ref 70–99)
GLUCOSE BLDC GLUCOMTR-MCNC: 112 MG/DL — HIGH (ref 70–99)
GLUCOSE BLDC GLUCOMTR-MCNC: 121 MG/DL — HIGH (ref 70–99)
GLUCOSE SERPL-MCNC: 112 MG/DL — HIGH (ref 70–99)
HAPTOGLOB SERPL-MCNC: 83 MG/DL — SIGNIFICANT CHANGE UP (ref 34–200)
HCT VFR BLD CALC: 22.7 % — LOW (ref 34.5–45)
HGB BLD-MCNC: 7.3 G/DL — LOW (ref 11.5–15.5)
LDH SERPL L TO P-CCNC: 201 U/L — SIGNIFICANT CHANGE UP (ref 135–225)
MAGNESIUM SERPL-MCNC: 2 MG/DL — SIGNIFICANT CHANGE UP (ref 1.6–2.6)
MCHC RBC-ENTMCNC: 31.2 PG — SIGNIFICANT CHANGE UP (ref 27–34)
MCHC RBC-ENTMCNC: 32.2 GM/DL — SIGNIFICANT CHANGE UP (ref 32–36)
MCV RBC AUTO: 97 FL — SIGNIFICANT CHANGE UP (ref 80–100)
NRBC # BLD: 0 /100 WBCS — SIGNIFICANT CHANGE UP (ref 0–0)
NRBC # FLD: 0 K/UL — SIGNIFICANT CHANGE UP (ref 0–0)
PHOSPHATE SERPL-MCNC: 3.1 MG/DL — SIGNIFICANT CHANGE UP (ref 2.5–4.5)
PLATELET # BLD AUTO: 468 K/UL — HIGH (ref 150–400)
POTASSIUM SERPL-MCNC: 4.4 MMOL/L — SIGNIFICANT CHANGE UP (ref 3.5–5.3)
POTASSIUM SERPL-SCNC: 4.4 MMOL/L — SIGNIFICANT CHANGE UP (ref 3.5–5.3)
RBC # BLD: 2.34 M/UL — LOW (ref 3.8–5.2)
RBC # FLD: 21.6 % — HIGH (ref 10.3–14.5)
SODIUM SERPL-SCNC: 145 MMOL/L — SIGNIFICANT CHANGE UP (ref 135–145)
WBC # BLD: 6.88 K/UL — SIGNIFICANT CHANGE UP (ref 3.8–10.5)
WBC # FLD AUTO: 6.88 K/UL — SIGNIFICANT CHANGE UP (ref 3.8–10.5)

## 2024-09-04 PROCEDURE — 99233 SBSQ HOSP IP/OBS HIGH 50: CPT

## 2024-09-04 RX ORDER — POLYETHYLENE GLYCOL 3350, SODIUM SULFATE ANHYDROUS, SODIUM BICARBONATE, SODIUM CHLORIDE, POTASSIUM CHLORIDE 236; 22.74; 6.74; 5.86; 2.97 G/4L; G/4L; G/4L; G/4L; G/4L
2000 POWDER, FOR SOLUTION ORAL ONCE
Refills: 0 | Status: COMPLETED | OUTPATIENT
Start: 2024-09-04 | End: 2024-09-04

## 2024-09-04 RX ORDER — HEPARIN SODIUM,BOVINE 1000/ML
5000 VIAL (ML) INJECTION EVERY 12 HOURS
Refills: 0 | Status: DISCONTINUED | OUTPATIENT
Start: 2024-09-04 | End: 2024-09-13

## 2024-09-04 RX ORDER — PANTOPRAZOLE SODIUM 40 MG
40 TABLET, DELAYED RELEASE (ENTERIC COATED) ORAL
Refills: 0 | Status: DISCONTINUED | OUTPATIENT
Start: 2024-09-04 | End: 2024-09-05

## 2024-09-04 RX ADMIN — Medication 40 MILLIGRAM(S): at 06:29

## 2024-09-04 RX ADMIN — MIDODRINE HYDROCHLORIDE 10 MILLIGRAM(S): 5 TABLET ORAL at 06:30

## 2024-09-04 RX ADMIN — Medication 1 DROP(S): at 06:32

## 2024-09-04 RX ADMIN — Medication 1 DROP(S): at 17:14

## 2024-09-04 RX ADMIN — Medication 5000 UNIT(S): at 17:14

## 2024-09-04 RX ADMIN — MIDODRINE HYDROCHLORIDE 10 MILLIGRAM(S): 5 TABLET ORAL at 21:15

## 2024-09-04 RX ADMIN — MIDODRINE HYDROCHLORIDE 10 MILLIGRAM(S): 5 TABLET ORAL at 13:11

## 2024-09-04 RX ADMIN — Medication 100 MICROGRAM(S): at 06:30

## 2024-09-04 RX ADMIN — Medication 40 MILLIGRAM(S): at 21:15

## 2024-09-04 RX ADMIN — POLYETHYLENE GLYCOL 3350, SODIUM SULFATE ANHYDROUS, SODIUM BICARBONATE, SODIUM CHLORIDE, POTASSIUM CHLORIDE 2000 MILLILITER(S): 236; 22.74; 6.74; 5.86; 2.97 POWDER, FOR SOLUTION ORAL at 21:55

## 2024-09-04 RX ADMIN — Medication 40 MILLIGRAM(S): at 17:52

## 2024-09-04 RX ADMIN — Medication 75 MILLILITER(S): at 21:09

## 2024-09-04 NOTE — PROGRESS NOTE ADULT - ASSESSMENT
68F hx of HTN, DENITA on CPAP, GERD on PPI, ETOH use disorder, bladder CA, s/p cystectomy/IC creation on 8/6/2024 at [facility] with Dr. Thompson (d/c on 8/10) presenting on 8/16 with weakness, found to be hypotensive. Patient had a complicated SICU course: hypotension requiring pressors, Acute renal failure requiring CRRT (now off), presumed PE started on heparin gtt, respiratory failure s/p intubation, now extubated. Now with possible blood loss anemia requiring PRBCs and melena. S/p EGD without source of melena. Awaiting colonoscopy.

## 2024-09-04 NOTE — PROGRESS NOTE ADULT - ASSESSMENT
67 yo F h/o HTN and bladder CA, s/p cystectomy/IC creation on 8/6/2024 at [facility] with Dr. Thompson (d/c on 8/10) presented to ED 8/16 w/ generalized weakness and some lower abdominal pain. Since d/c, only have 1-2 bowel movement, and had significantly decrease PO intake due to decreased appetite, intermittent fevers/chills, +bloody discharge from urethra, some burning. Pt stated stents fell out yesterday.  BP 80s/50s for EMS.  Pt remained hypotensive in ED, started on pressors, cultures sent, placed on zosyn and BiPAP and admitted to SICU. CT revealed: Expected postoperative changes status post cystectomy with ileal conduit creation including small amount of complex fluid and free air in the pelvis. No organized fluid collection. Distended ileal conduit with narrowing at the exiting ostomy site and proximally at the site of ureteral insertion. Mild bilateral hydroureteronephrosis with delayed nephrograms. No significant perinephric stranding.    8/16: still requiring pressor support and on BiPAP, pt states she feels better,14fr catheter placed in stoma, Bcx with GPC/GNR, Cr to 4.42 from 5.34, abx changed to leila and diflucan  8/17: still requiring pressor support and supplement oxygen but feels well, decr UO overnight, given albumin and IVF restarted, Cr up slightly to 4.68, electrolytes normal  8/18: RBUS done, no signs of hydro. IR consulted, no need for NTs as no hydro. UOP still very low (overnight 10 cc/hour), given 2 boluses with no improvement. Cr up to 4.95 from 4.68. CT loopogram of conduit pending today to evaluate for leak. still requiring pressors. Ucx from conduit now growing e.coli, sensitivities pending, PT recs rehab   8/19: CT loopogram w/ no anastamotic leak. Cr continues to rise w/ low UOP. UA on admission w/ coarse granular cast. Constellation of signs and symptoms suggestive of Acute tubular necrosis  8/20: Urine culture w/ e.coli sensitivities w/ resistance to floroquinolones. Pt back on abx. Pt w/ slightly increased urine output  8/21: Pt with acute onset of tachypnea, and hypoxia requiring intubation and sedation, CT w/ probably PE, hep gtt started. Nephrology consult obtained and started on CRRT, meropenem restarted  8/22: still intubated and sedated, pressor requirement decreasing, UO increasing, PTT remains supratherapeutic, hep gtt held for one hour this AM, on tube feeds, febrile to 100.9 at 4am  8/23: changed TF as per nutrition recs to start tomorrow AM, check w/ dietician, PTT therapeutic x1 --> repeat PTT at 4:45 AM 50.7, hep gtt @4ml/hr, tolerating CPAP. awake, UO still only 110/shift, still requiring pressor support, CRRT  8//24: extubated, now on RA, still requiring CRRT, /shift and small amount of pressor support, Procal >100, repeat Bcx and Ucx ordered, leila d/c, vanc x 2, then d/c now on zosyn, passed beside S&S now on reg diet, pt offers no complaints, + flatus/BM, will do bedside pelvic to evaluate for retained material  8/25: CRRT, UO less overnight, zosyn, tolerating reg diet, blood tinged vaginal discharge   8/26: UOP 39 overnight, on zosyn and vanc, tolerating reg diet. poss transfer to [facility] today - SICU to discuss on rounds.   8/27: UOP increased to 310 after dose of lasix. off pressors, still getting midodrine.   8/28: Got more lasix and albumin overnight with OP of 642, still down from 1.5 yesterday during the day. Cr scott from 1.98 to 2.77. Will suggest RBUS. Bcx NGTD at 4 days. Off vanc today, still on zosyn.   8/29: stable, midodrine increased,  overnight, still with some blood from vagina (no pads documented) RBUS no hydro, Bcx neg final, IC Cx neg  8/30: stable, midodrine to bid, no OB pads documented, good UO yellow, was transfused 1u pRBC overnight with appropriate response, zosyn d/c, listed for floor  8/31: feels well, good UO, transferred to floor  9/1: still having some pain in joints - medicine consulted and will ask their recs (NSAIDs not feasible due to kidney function). Will also f/u medicine recs for continuing midodrine. Going to give another unit for suboptimal response ot u pRBCs given yesterday (Hgb 7.1 from 7.5).  9/2: Reports black stools. Received 2u pRBC yesterday with little response in H/H. No complaints; GI consulted  9/3 - no further stools; no c/o; recieved another unit blood yesterday; had EGD, normal study; colonoscopy tomorrow  9/4 - not able to drink all the golytely, but has some watery stool    Plan:  - Labs  - NPO for colonoscopy  - PT recs rehab  - f/u GI   - F/u nephrology 67 yo F h/o HTN and bladder CA, s/p cystectomy/IC creation on 8/6/2024 at [facility] with Dr. Thompson (d/c on 8/10) presented to ED 8/16 w/ generalized weakness and some lower abdominal pain. Since d/c, only have 1-2 bowel movement, and had significantly decrease PO intake due to decreased appetite, intermittent fevers/chills, +bloody discharge from urethra, some burning. Pt stated stents fell out yesterday.  BP 80s/50s for EMS.  Pt remained hypotensive in ED, started on pressors, cultures sent, placed on zosyn and BiPAP and admitted to SICU. CT revealed: Expected postoperative changes status post cystectomy with ileal conduit creation including small amount of complex fluid and free air in the pelvis. No organized fluid collection. Distended ileal conduit with narrowing at the exiting ostomy site and proximally at the site of ureteral insertion. Mild bilateral hydroureteronephrosis with delayed nephrograms. No significant perinephric stranding.    8/16: still requiring pressor support and on BiPAP, pt states she feels better,14fr catheter placed in stoma, Bcx with GPC/GNR, Cr to 4.42 from 5.34, abx changed to leila and diflucan  8/17: still requiring pressor support and supplement oxygen but feels well, decr UO overnight, given albumin and IVF restarted, Cr up slightly to 4.68, electrolytes normal  8/18: RBUS done, no signs of hydro. IR consulted, no need for NTs as no hydro. UOP still very low (overnight 10 cc/hour), given 2 boluses with no improvement. Cr up to 4.95 from 4.68. CT loopogram of conduit pending today to evaluate for leak. still requiring pressors. Ucx from conduit now growing e.coli, sensitivities pending, PT recs rehab   8/19: CT loopogram w/ no anastamotic leak. Cr continues to rise w/ low UOP. UA on admission w/ coarse granular cast. Constellation of signs and symptoms suggestive of Acute tubular necrosis  8/20: Urine culture w/ e.coli sensitivities w/ resistance to floroquinolones. Pt back on abx. Pt w/ slightly increased urine output  8/21: Pt with acute onset of tachypnea, and hypoxia requiring intubation and sedation, CT w/ probably PE, hep gtt started. Nephrology consult obtained and started on CRRT, meropenem restarted  8/22: still intubated and sedated, pressor requirement decreasing, UO increasing, PTT remains supratherapeutic, hep gtt held for one hour this AM, on tube feeds, febrile to 100.9 at 4am  8/23: changed TF as per nutrition recs to start tomorrow AM, check w/ dietician, PTT therapeutic x1 --> repeat PTT at 4:45 AM 50.7, hep gtt @4ml/hr, tolerating CPAP. awake, UO still only 110/shift, still requiring pressor support, CRRT  8//24: extubated, now on RA, still requiring CRRT, /shift and small amount of pressor support, Procal >100, repeat Bcx and Ucx ordered, leila d/c, vanc x 2, then d/c now on zosyn, passed beside S&S now on reg diet, pt offers no complaints, + flatus/BM, will do bedside pelvic to evaluate for retained material  8/25: CRRT, UO less overnight, zosyn, tolerating reg diet, blood tinged vaginal discharge   8/26: UOP 39 overnight, on zosyn and vanc, tolerating reg diet. poss transfer to [facility] today - SICU to discuss on rounds.   8/27: UOP increased to 310 after dose of lasix. off pressors, still getting midodrine.   8/28: Got more lasix and albumin overnight with OP of 642, still down from 1.5 yesterday during the day. Cr scott from 1.98 to 2.77. Will suggest RBUS. Bcx NGTD at 4 days. Off vanc today, still on zosyn.   8/29: stable, midodrine increased,  overnight, still with some blood from vagina (no pads documented) RBUS no hydro, Bcx neg final, IC Cx neg  8/30: stable, midodrine to bid, no OB pads documented, good UO yellow, was transfused 1u pRBC overnight with appropriate response, zosyn d/c, listed for floor  8/31: feels well, good UO, transferred to floor  9/1: still having some pain in joints - medicine consulted and will ask their recs (NSAIDs not feasible due to kidney function). Will also f/u medicine recs for continuing midodrine. Going to give another unit for suboptimal response ot u pRBCs given yesterday (Hgb 7.1 from 7.5).  9/2: Reports black stools. Received 2u pRBC yesterday with little response in H/H. No complaints; GI consulted  9/3 - no further stools; no c/o; recieved another unit blood yesterday; had EGD, normal study; colonoscopy tomorrow  9/4 - not able to drink all the golytely, but has some watery stool    Plan:  - Labs  - NPO for colonoscopy  - PT recs rehab  - f/u GI   - neph has signed off

## 2024-09-04 NOTE — PROGRESS NOTE ADULT - PROBLEM SELECTOR PLAN 2
Etiology is likely multifactorial, GI bleed  - Pt is on Midodrine 10 mg po tid, wean as able   - blood transfusion as needed  - Plan for EGD as above  - Monitor BP

## 2024-09-04 NOTE — CHART NOTE - NSCHARTNOTEFT_GEN_A_CORE
Nutrition Follow-Up Chart Note         Source: Patient A&Ox 4   Family [ ]     RN [  ]    Chart [x ]     Pt is seen for nutrition follow up due to severe/moderate malnutrition, per protocol.    __________________ Medical Course__________________  Per 9/3 Hospitalist Attending chart review, Patient is a 69 yo F h/o HTN , DM, DENITA on CPAP,GERD on PPI, ETOH use disorder,  OA,  bladder CA, s/p cystectomy/IC creation on 8/6/2024 at [facility] with Dr. Thompson (d/c on 8/10) presented to ED 8/16 w/ generalized weakness and some lower abdominal pain, decrease PO intake due to decreased appetite, intermittent fevers/chills, +bloody discharge from urethra, some burning. Patient had a complicated hosp course with shock, BENIGNO , Probable ? PE, respiratory failure s/p intubation and now with possible blood loss anemia.  Hospital medicine team was consulted for help managing  joint pain which pt states is known to her as she has Osteoarthritis for many years and help managing Midodrine started due to hypotension.      Diet, NPO after Midnight:      NPO Start Date: 03-Sep-2024,   NPO Start Time: 23:59  Except Medications (09-03-24 @ 22:12)  Diet, Consistent Carbohydrate Clear Liquid (09-03-24 @ 15:13)         __________________ Nutrition Course__________________   Patient seen in her room, noted to be on NPO for scheduled colonoscopy today.  Patient endorses poor oral intake and appetite in house, consistent with RN intake flowsheet. RD offered nutritional supplements, Patient states she did not like them.  Patient is amenable nourishment Reduced Sugar Mighty Shake (4 oz= 200kcal, 7gm protein) twice daily, Magic Cup Reduced Sugar 4 oz. (290 kcal, 9 gms protein) once daily      __________________ Pertinent Medications__________________   MEDICATIONS  (STANDING):  atorvastatin 40 milliGRAM(s) Oral at bedtime  cycloSPORINE (RESTASIS) 0.05% Emulsion 1 Drop(s) Both EYES two times a day  dextrose 5% + sodium chloride 0.45%. 1000 milliLiter(s) (75 mL/Hr) IV Continuous <Continuous>  dextrose 5%. 1000 milliLiter(s) (50 mL/Hr) IV Continuous <Continuous>  dextrose 5%. 1000 milliLiter(s) (100 mL/Hr) IV Continuous <Continuous>  dextrose 50% Injectable 25 Gram(s) IV Push once  dextrose 50% Injectable 25 Gram(s) IV Push once  dextrose 50% Injectable 12.5 Gram(s) IV Push once  glucagon  Injectable 1 milliGRAM(s) IntraMuscular once  heparin   Injectable 5000 Unit(s) SubCutaneous every 12 hours  insulin lispro (ADMELOG) corrective regimen sliding scale   SubCutaneous every 6 hours  levothyroxine 100 MICROGram(s) Oral daily  midodrine. 10 milliGRAM(s) Oral every 8 hours  pantoprazole  Injectable 40 milliGRAM(s) IV Push two times a day  polyethylene glycol 3350 17 Gram(s) Oral daily    MEDICATIONS  (PRN):  acetaminophen     Tablet .. 975 milliGRAM(s) Oral every 6 hours PRN Mild Pain (1 - 3)  dextrose Oral Gel 15 Gram(s) Oral once PRN Blood Glucose LESS THAN 70 milliGRAM(s)/deciliter  gabapentin 100 milliGRAM(s) Oral three times a day PRN pain  melatonin 3 milliGRAM(s) Oral at bedtime PRN Insomnia      __________________ Pertinent Labs__________________   09-04    145  |  112<H>  |  39<H>  ----------------------------<  112<H>  4.4   |  20<L>  |  1.53<H>    Ca    9.4      04 Sep 2024 06:23  Phos  3.1     09-04  Mg     2.00     09-04                            7.3    6.88  )-----------( 468      ( 04 Sep 2024 06:23 )             22.7          POCT Blood Glucose.: 112 mg/dL (09-04-24 @ 12:42)  POCT Blood Glucose.: 121 mg/dL (09-04-24 @ 06:12)  POCT Blood Glucose.: 112 mg/dL (09-04-24 @ 00:16)  POCT Blood Glucose.: 97 mg/dL (09-03-24 @ 16:28)  POCT Blood Glucose.: 89 mg/dL (09-03-24 @ 15:42)  POCT Blood Glucose.: 82 mg/dL (09-03-24 @ 14:41)  POCT Blood Glucose.: 84 mg/dL (09-03-24 @ 13:27)  POCT Blood Glucose.: 103 mg/dL (09-03-24 @ 12:07)  POCT Blood Glucose.: 109 mg/dL (09-03-24 @ 07:10)  POCT Blood Glucose.: 103 mg/dL (09-02-24 @ 22:15)  POCT Blood Glucose.: 100 mg/dL (09-02-24 @ 16:27)  POCT Blood Glucose.: 102 mg/dL (09-02-24 @ 11:28)  POCT Blood Glucose.: 109 mg/dL (09-02-24 @ 07:22)  POCT Blood Glucose.: 103 mg/dL (09-01-24 @ 22:08)  POCT Blood Glucose.: 112 mg/dL (09-01-24 @ 16:31)        __________________ Anthropometrics__________________   Height (cm): 160 (09-03)  Weight (kg): 96 (09-03)  BMI (kg/m2): 37.5 (09-03)  IBW:   +/- 10%    Weight Assessment: per RN flowsheet in KG  Daily     Daily   % weight change :     Edema:    Skin:     Estimated Needs Assessment:   [  ] No change in need assessment    [  ] recalculated,   Weight Used:   Estimated Energy:  Kcal/kg/day (  kcal/kg)  Estimated Protein:  gm/kg/day (  gm/kg)  Estimated Fluid: per Medical and team discretion.    Nutrition Focused Physical Exam: [  ] conducted  [  ] deferred  [  ] not applicable;    Muscle wasting [  ] temporal [  ] clavicle [  ] shoulder [  ] scapula [  ] thigh [  ] calf [  ] dorsal hand    Fat Loss [  ] buccal [  ] orbital [  ] triceps [  ] ribs     Previous Nutrition Diagnosis:   [  ] Severe/Moderate malnutrition  [  ] Increased Nutrient Needs  [  ] Altered Nutrition Related Labs  [  ] Unintentional Weight Loss  [  ] Inadequate Oral Intake  [  ] Inadequate Protein Energy Intake  [  ] Inadequate Energy Intake  [  ] Food and Nutrition Knowledge Deficit    Nutrition Diagnosis is : [  ] ongoing  [  ] resolved [  ] not applicable     New Nutrition Diagnosis : [  ] ongoing  [  ] resolved [  ] not applicable     Education:  [  ] Given today        Type of education provided:   [  ] Given on previous assessment by RD   [  ] Not applicable 2/2 cognitive deficit  [  ] Pt refusal of education offered   [  ] Not applicable 2/2 current prognosis  [  ] Not warranted at present    Recommendations:  [ x ] Continue present PO diet/EN order as it remains appropriate at this time  [ x ] Honor food and fluid preferences as able.  [ x ] Please consider adding    Monitoring and Evaluation:   [ x ] Monitor PO intake, skin integrity, bowel regimen, and nutrition pertinent labs.  [ x ] Tolerance to diet prescription [ x ] weights [ x ] follow up per protocol Nutrition Follow-Up Chart Note         Source: Patient A&Ox 4   Family [ ]     RN [  ]    Chart [x ]     Pt is seen for nutrition follow up due to severe/moderate malnutrition, per protocol.    __________________ Medical Course__________________  Per 9/3 Hospitalist Attending chart review, Patient is a 69 yo F h/o HTN , DM, DENITA on CPAP,GERD on PPI, ETOH use disorder,  OA,  bladder CA, s/p cystectomy/IC creation on 8/6/2024 at [facility] with Dr. Thompson (d/c on 8/10) presented to ED 8/16 w/ generalized weakness and some lower abdominal pain, decrease PO intake due to decreased appetite, intermittent fevers/chills, +bloody discharge from urethra, some burning. Patient had a complicated hosp course with shock, BENIGNO , Probable ? PE, respiratory failure s/p intubation and now with possible blood loss anemia.  Hospital medicine team was consulted for help managing  joint pain which pt states is known to her as she has Osteoarthritis for many years and help managing Midodrine started due to hypotension.      Diet, NPO after Midnight:      NPO Start Date: 03-Sep-2024,   NPO Start Time: 23:59  Except Medications (09-03-24 @ 22:12)  Diet, Consistent Carbohydrate Clear Liquid (09-03-24 @ 15:13)       __________________ Nutrition Course__________________   Patient seen in her room, noted to be on NPO for scheduled colonoscopy today.  Patient endorses poor oral intake and appetite in house, consistent with RN intake flowsheet. RD offered nutritional supplements, Patient states she did not like them. Food and fluid preferences explored this tour. Patient is amenable nourishment Reduced Sugar Mighty Shake (4 oz= 200kcal, 7gm protein) twice daily, Magic Cup Reduced Sugar 4 oz. (290 kcal, 9 gms protein) once daily to optimize PO intake.  No reported GI issues such as nausea/vomiting/diarrhea/constipation, on bowel regimen for procedure with LBMs. Patient noted low H/H with blood transfusion. Remains meeting criteria for malnutrition at this time due to sub-optimal intake.  RDN to remain available for further nutrition intervention as indicated.       __________________ Pertinent Medications__________________   MEDICATIONS  (STANDING):  atorvastatin 40 milliGRAM(s) Oral at bedtime  cycloSPORINE (RESTASIS) 0.05% Emulsion 1 Drop(s) Both EYES two times a day  dextrose 5% + sodium chloride 0.45%. 1000 milliLiter(s) (75 mL/Hr) IV Continuous <Continuous>  dextrose 5%. 1000 milliLiter(s) (50 mL/Hr) IV Continuous <Continuous>  dextrose 5%. 1000 milliLiter(s) (100 mL/Hr) IV Continuous <Continuous>  dextrose 50% Injectable 25 Gram(s) IV Push once  dextrose 50% Injectable 25 Gram(s) IV Push once  dextrose 50% Injectable 12.5 Gram(s) IV Push once  glucagon  Injectable 1 milliGRAM(s) IntraMuscular once  heparin   Injectable 5000 Unit(s) SubCutaneous every 12 hours  insulin lispro (ADMELOG) corrective regimen sliding scale   SubCutaneous every 6 hours  levothyroxine 100 MICROGram(s) Oral daily  midodrine. 10 milliGRAM(s) Oral every 8 hours  pantoprazole  Injectable 40 milliGRAM(s) IV Push two times a day  polyethylene glycol 3350 17 Gram(s) Oral daily    MEDICATIONS  (PRN):  acetaminophen     Tablet .. 975 milliGRAM(s) Oral every 6 hours PRN Mild Pain (1 - 3)  dextrose Oral Gel 15 Gram(s) Oral once PRN Blood Glucose LESS THAN 70 milliGRAM(s)/deciliter  gabapentin 100 milliGRAM(s) Oral three times a day PRN pain  melatonin 3 milliGRAM(s) Oral at bedtime PRN Insomnia      __________________ Pertinent Labs__________________   09-04    145  |  112<H>  |  39<H>  ----------------------------<  112<H>  4.4   |  20<L>  |  1.53<H>    Ca    9.4      04 Sep 2024 06:23  Phos  3.1     09-04  Mg     2.00     09-04                            7.3    6.88  )-----------( 468      ( 04 Sep 2024 06:23 )             22.7          POCT Blood Glucose.: 112 mg/dL (09-04-24 @ 12:42)  POCT Blood Glucose.: 121 mg/dL (09-04-24 @ 06:12)  POCT Blood Glucose.: 112 mg/dL (09-04-24 @ 00:16)  POCT Blood Glucose.: 97 mg/dL (09-03-24 @ 16:28)  POCT Blood Glucose.: 89 mg/dL (09-03-24 @ 15:42)  POCT Blood Glucose.: 82 mg/dL (09-03-24 @ 14:41)  POCT Blood Glucose.: 84 mg/dL (09-03-24 @ 13:27)  POCT Blood Glucose.: 103 mg/dL (09-03-24 @ 12:07)  POCT Blood Glucose.: 109 mg/dL (09-03-24 @ 07:10)  POCT Blood Glucose.: 103 mg/dL (09-02-24 @ 22:15)  POCT Blood Glucose.: 100 mg/dL (09-02-24 @ 16:27)  POCT Blood Glucose.: 102 mg/dL (09-02-24 @ 11:28)  POCT Blood Glucose.: 109 mg/dL (09-02-24 @ 07:22)  POCT Blood Glucose.: 103 mg/dL (09-01-24 @ 22:08)  POCT Blood Glucose.: 112 mg/dL (09-01-24 @ 16:31)        __________________ Anthropometrics__________________   Height (cm): 160 (09-03)  Weight (kg): 96 (09-03)  BMI (kg/m2): 37.5 (09-03)  IBW: 115 lbs/52.2kg +/- 10%    Weight Assessment: per RN flowsheet in KG  No new weight to assess at this time.   % weight change : n/a    Edema: No edema per RN flowsheets     Skin: No pressure injury per RN flowsheets (+ Moisture Associated Skin Dermatitis )     Estimated Needs Assessment:   [ x ] No change in need assessment    Weight Used:  IBW - 57.8 kg (IBW +10%)  Estimated Energy: 1618 - 1850 Kcal/kg/day (28-32  kcal/kg)  Estimated Protein: 81 - 93 gm/kg/day ( 1.4-1.6 gm/kg)  Estimated Fluid: per Medical and team discretion.    Nutrition Focused Physical Exam:   [ x ] not applicable;  conducted by previous RD      Previous Nutrition Diagnosis:   [ x ] Moderate malnutrition  [ x ] Increased Nutrient Needs    Nutrition Diagnosis is : [ x ] ongoing     New Nutrition Diagnosis :  [ x ] not applicable     Education:  [ x ] Not applicable 2/2 current prognosis      Recommendations:  [ x ] Advance diet consistency when medically feasible per MD and team.   [ x ] Honor food and fluid preferences as able.  [ x ] Food and nutrition to provide Magic Cup Reduced Sugar 4 oz. once daily and Reduced Sugar Mighty Shake once daily when PO diet resumes and align with therapeutic diet.    Monitoring and Evaluation:   [ x ] Monitor PO intake, skin integrity, bowel regimen, and nutrition pertinent labs.  [ x ] Tolerance to diet prescription [ x ] weights [ x ] follow up per protocol Nutrition Follow-Up Chart Note         Source: Patient A&Ox 4   Family [ ]     RN [  ]    Chart [x ]     Pt is seen for nutrition follow up due to moderate malnutrition, per protocol.    __________________ Medical Course__________________  Per 9/3 Hospitalist Attending chart review, Patient is a 69 yo F h/o HTN , DM, DENITA on CPAP,GERD on PPI, ETOH use disorder,  OA,  bladder CA, s/p cystectomy/IC creation on 8/6/2024 at [facility] with Dr. Thompson (d/c on 8/10) presented to ED 8/16 w/ generalized weakness and some lower abdominal pain, decrease PO intake due to decreased appetite, intermittent fevers/chills, +bloody discharge from urethra, some burning. Patient had a complicated hosp course with shock, BENIGNO , Probable ? PE, respiratory failure s/p intubation and now with possible blood loss anemia.  Hospital medicine team was consulted for help managing  joint pain which pt states is known to her as she has Osteoarthritis for many years and help managing Midodrine started due to hypotension.      Diet, NPO after Midnight:      NPO Start Date: 03-Sep-2024,   NPO Start Time: 23:59  Except Medications (09-03-24 @ 22:12)  Diet, Consistent Carbohydrate Clear Liquid (09-03-24 @ 15:13)       __________________ Nutrition Course__________________   Patient seen in her room, noted to be on NPO for scheduled colonoscopy today.  Patient endorses poor oral intake and appetite in house, consistent with RN intake flowsheet. RD offered nutritional supplements, Patient states she did not like them. Food and fluid preferences explored this tour. Patient is amenable nourishment Reduced Sugar Mighty Shake (4 oz= 200kcal, 7gm protein) twice daily, Magic Cup Reduced Sugar 4 oz. (290 kcal, 9 gms protein) once daily to optimize PO intake.  No reported GI issues such as nausea/vomiting/diarrhea/constipation, on bowel regimen for procedure with LBMs. Patient noted low H/H with blood transfusion. Remains meeting criteria for malnutrition at this time due to sub-optimal intake.  RDN to remain available for further nutrition intervention as indicated.       __________________ Pertinent Medications__________________   MEDICATIONS  (STANDING):  atorvastatin 40 milliGRAM(s) Oral at bedtime  cycloSPORINE (RESTASIS) 0.05% Emulsion 1 Drop(s) Both EYES two times a day  dextrose 5% + sodium chloride 0.45%. 1000 milliLiter(s) (75 mL/Hr) IV Continuous <Continuous>  dextrose 5%. 1000 milliLiter(s) (50 mL/Hr) IV Continuous <Continuous>  dextrose 5%. 1000 milliLiter(s) (100 mL/Hr) IV Continuous <Continuous>  dextrose 50% Injectable 25 Gram(s) IV Push once  dextrose 50% Injectable 25 Gram(s) IV Push once  dextrose 50% Injectable 12.5 Gram(s) IV Push once  glucagon  Injectable 1 milliGRAM(s) IntraMuscular once  heparin   Injectable 5000 Unit(s) SubCutaneous every 12 hours  insulin lispro (ADMELOG) corrective regimen sliding scale   SubCutaneous every 6 hours  levothyroxine 100 MICROGram(s) Oral daily  midodrine. 10 milliGRAM(s) Oral every 8 hours  pantoprazole  Injectable 40 milliGRAM(s) IV Push two times a day  polyethylene glycol 3350 17 Gram(s) Oral daily    MEDICATIONS  (PRN):  acetaminophen     Tablet .. 975 milliGRAM(s) Oral every 6 hours PRN Mild Pain (1 - 3)  dextrose Oral Gel 15 Gram(s) Oral once PRN Blood Glucose LESS THAN 70 milliGRAM(s)/deciliter  gabapentin 100 milliGRAM(s) Oral three times a day PRN pain  melatonin 3 milliGRAM(s) Oral at bedtime PRN Insomnia      __________________ Pertinent Labs__________________   09-04    145  |  112<H>  |  39<H>  ----------------------------<  112<H>  4.4   |  20<L>  |  1.53<H>    Ca    9.4      04 Sep 2024 06:23  Phos  3.1     09-04  Mg     2.00     09-04                            7.3    6.88  )-----------( 468      ( 04 Sep 2024 06:23 )             22.7          POCT Blood Glucose.: 112 mg/dL (09-04-24 @ 12:42)  POCT Blood Glucose.: 121 mg/dL (09-04-24 @ 06:12)  POCT Blood Glucose.: 112 mg/dL (09-04-24 @ 00:16)  POCT Blood Glucose.: 97 mg/dL (09-03-24 @ 16:28)  POCT Blood Glucose.: 89 mg/dL (09-03-24 @ 15:42)  POCT Blood Glucose.: 82 mg/dL (09-03-24 @ 14:41)  POCT Blood Glucose.: 84 mg/dL (09-03-24 @ 13:27)  POCT Blood Glucose.: 103 mg/dL (09-03-24 @ 12:07)  POCT Blood Glucose.: 109 mg/dL (09-03-24 @ 07:10)  POCT Blood Glucose.: 103 mg/dL (09-02-24 @ 22:15)  POCT Blood Glucose.: 100 mg/dL (09-02-24 @ 16:27)  POCT Blood Glucose.: 102 mg/dL (09-02-24 @ 11:28)  POCT Blood Glucose.: 109 mg/dL (09-02-24 @ 07:22)  POCT Blood Glucose.: 103 mg/dL (09-01-24 @ 22:08)  POCT Blood Glucose.: 112 mg/dL (09-01-24 @ 16:31)        __________________ Anthropometrics__________________   Height (cm): 160 (09-03)  Weight (kg): 96 (09-03)  BMI (kg/m2): 37.5 (09-03)  IBW: 115 lbs/52.2kg +/- 10%    Weight Assessment: per RN flowsheet in KG  No new weight to assess at this time.   % weight change : n/a    Edema: No edema per RN flowsheets     Skin: No pressure injury per RN flowsheets (+ Moisture Associated Skin Dermatitis )     Estimated Needs Assessment:   [ x ] No change in need assessment    Weight Used:  IBW - 57.8 kg (IBW +10%)  Estimated Energy: 1618 - 1850 Kcal/kg/day (28-32  kcal/kg)  Estimated Protein: 81 - 93 gm/kg/day ( 1.4-1.6 gm/kg)  Estimated Fluid: per Medical and team discretion.    Nutrition Focused Physical Exam:   [ x ] not applicable;  conducted by previous RD      Previous Nutrition Diagnosis:   [ x ] Moderate malnutrition  [ x ] Increased Nutrient Needs    Nutrition Diagnosis is : [ x ] ongoing     New Nutrition Diagnosis :  [ x ] not applicable     Education:  [ x ] Not applicable 2/2 current prognosis      Recommendations:  [ x ] Advance diet consistency when medically feasible per MD and team.   [ x ] Honor food and fluid preferences as able.  [ x ] Food and nutrition to provide Magic Cup Reduced Sugar 4 oz. once daily and Reduced Sugar Mighty Shake once daily when PO diet resumes and align with therapeutic diet.    Monitoring and Evaluation:   [ x ] Monitor PO intake, skin integrity, bowel regimen, and nutrition pertinent labs.  [ x ] Tolerance to diet prescription [ x ] weights [ x ] follow up per protocol

## 2024-09-04 NOTE — PROGRESS NOTE ADULT - SUBJECTIVE AND OBJECTIVE BOX
[facility] Division of Hospital Medicine  Caitlyn Agosto MD  Available on [employer] TEAMS    SUBJECTIVE / OVERNIGHT EVENTS: Patient seen and examined. Reports that she had one large BM with the prep, but that she is having trouble finish the entire prep for colonoscopy. States BM was black but no blood noted. She feels weak. States her vaginal bleeding stopped a "few days ago". GI team also at bedside, discussed will need to complete prep either today or tomorrow in order to proceed with colonoscopy.       MEDICATIONS  (STANDING):  atorvastatin 40 milliGRAM(s) Oral at bedtime  cycloSPORINE (RESTASIS) 0.05% Emulsion 1 Drop(s) Both EYES two times a day  dextrose 5% + sodium chloride 0.45%. 1000 milliLiter(s) (75 mL/Hr) IV Continuous <Continuous>  dextrose 5%. 1000 milliLiter(s) (100 mL/Hr) IV Continuous <Continuous>  dextrose 5%. 1000 milliLiter(s) (50 mL/Hr) IV Continuous <Continuous>  dextrose 50% Injectable 25 Gram(s) IV Push once  dextrose 50% Injectable 12.5 Gram(s) IV Push once  dextrose 50% Injectable 25 Gram(s) IV Push once  glucagon  Injectable 1 milliGRAM(s) IntraMuscular once  heparin   Injectable 5000 Unit(s) SubCutaneous every 12 hours  insulin lispro (ADMELOG) corrective regimen sliding scale   SubCutaneous every 6 hours  levothyroxine 100 MICROGram(s) Oral daily  midodrine. 10 milliGRAM(s) Oral every 8 hours  pantoprazole  Injectable 40 milliGRAM(s) IV Push two times a day  polyethylene glycol 3350 17 Gram(s) Oral daily    MEDICATIONS  (PRN):  acetaminophen     Tablet .. 975 milliGRAM(s) Oral every 6 hours PRN Mild Pain (1 - 3)  dextrose Oral Gel 15 Gram(s) Oral once PRN Blood Glucose LESS THAN 70 milliGRAM(s)/deciliter  gabapentin 100 milliGRAM(s) Oral three times a day PRN pain  melatonin 3 milliGRAM(s) Oral at bedtime PRN Insomnia      I&O's Summary    03 Sep 2024 07:01  -  04 Sep 2024 07:00  --------------------------------------------------------  IN: 0 mL / OUT: 1950 mL / NET: -1950 mL    04 Sep 2024 07:01  -  04 Sep 2024 17:11  --------------------------------------------------------  IN: 0 mL / OUT: 675 mL / NET: -675 mL        PHYSICAL EXAM:  Vital Signs Last 24 Hrs  T(C): 36.5 (04 Sep 2024 16:15), Max: 36.9 (03 Sep 2024 18:08)  T(F): 97.7 (04 Sep 2024 16:15), Max: 98.4 (03 Sep 2024 18:08)  HR: 81 (04 Sep 2024 16:15) (81 - 94)  BP: 105/61 (04 Sep 2024 16:15) (93/55 - 110/66)  BP(mean): --  RR: 18 (04 Sep 2024 16:15) (17 - 19)  SpO2: 100% (04 Sep 2024 16:15) (96% - 100%)    Parameters below as of 04 Sep 2024 16:15  Patient On (Oxygen Delivery Method): room air      CONSTITUTIONAL: NAD, well-developed, well-groomed  EYES: Conjunctiva and sclera clear  ENMT: Moist oral mucosa  RESPIRATORY: Normal respiratory effort; lungs are clear to auscultation bilaterally; No wheezes or rales  CARDIOVASCULAR: Regular rate and rhythm; Normal S1 and S2; No murmurs, rubs, or gallops; No lower extremity edema  ABDOMEN: Soft, Nontender, Nondistended; Bowel sounds present; ileal conduit   MUSCULOSKELETAL:  No clubbing or cyanosis of digits; No joint swelling or tenderness to palpation  PSYCH: AAOx3 (oriented to person, place, and time); affect appropriate      LABS:                        7.3    6.88  )-----------( 468      ( 04 Sep 2024 06:23 )             22.7     09-04    145  |  112<H>  |  39<H>  ----------------------------<  112<H>  4.4   |  20<L>  |  1.53<H>    Ca    9.4      04 Sep 2024 06:23  Phos  3.1     09-04  Mg     2.00     09-04            Urinalysis Basic - ( 04 Sep 2024 06:23 )    Color: x / Appearance: x / SG: x / pH: x  Gluc: 112 mg/dL / Ketone: x  / Bili: x / Urobili: x   Blood: x / Protein: x / Nitrite: x   Leuk Esterase: x / RBC: x / WBC x   Sq Epi: x / Non Sq Epi: x / Bacteria: x

## 2024-09-04 NOTE — CHART NOTE - NSCHARTNOTEFT_GEN_A_CORE
This AM, pt with brown stools; drank 1/3 of Golytely prep; encouraged to consume additional prep for colonoscopy in the afternoon, but patient still with only 1/2 prep complete. Colonoscopy re-scheduled for tomorrow; would recommend completing Golytely tonight and if still with brown stools, to receive an additional 2L Moviprep to be completed by 2AM. If unable to complete, would need NGT placement. Please collect 5AM labs and replete electrolytes as needed. Discussed with primary team.    Lefty Lopez MD  GI/Hepatology Fellow, PGY5  Long Range Pager [phone] or [employer] Pager [phone]  Teams preferred (7AM to 5PM); after 5PM, call GI fellow on call    On Weekends/Holidays (All Day) and Weekdays after 5PM to 8AM  For non-urgent consults, please email [email or web address] and [email or web address]  For urgent consults, please contact on call GI team. See Amion schedule ([facility]), DTT paging system ([facility]), or call hospital  ([facility])

## 2024-09-04 NOTE — PROGRESS NOTE ADULT - SUBJECTIVE AND OBJECTIVE BOX
No new events  Afeb 110/66 81 100%RA    Pt has no c/o; not able to drink all the golytely prep  Abd- soft NT ND; has watery stool  IC (taylor inside) - 300

## 2024-09-04 NOTE — PROGRESS NOTE ADULT - PROBLEM SELECTOR PLAN 1
Patient with vaginal bleeding (now resolved) and melena   - S/P multiple (6 units) PRBC transfusion with inappropriate response   - Hgb 7.3 today, plan for additional PRBCs per   - S/p EGD without source of melena   - GI following, plan for colonoscopy today, but patient did not complete the prep, so will attempt on 9/5  - Recommend stopping aspirin - confirmed with patient, she is on it for "preventative" measures, reports she never had cardiac stents or a stroke.   - If colonoscopy is inconclusive recommend repeat CT A/P to r/o RP bleed   - Check LDH, haptoglobin to r/o hemolysis

## 2024-09-05 LAB
ANION GAP SERPL CALC-SCNC: 14 MMOL/L — SIGNIFICANT CHANGE UP (ref 7–14)
BUN SERPL-MCNC: 32 MG/DL — HIGH (ref 7–23)
CALCIUM SERPL-MCNC: 8.9 MG/DL — SIGNIFICANT CHANGE UP (ref 8.4–10.5)
CHLORIDE SERPL-SCNC: 116 MMOL/L — HIGH (ref 98–107)
CO2 SERPL-SCNC: 18 MMOL/L — LOW (ref 22–31)
CREAT SERPL-MCNC: 1.31 MG/DL — HIGH (ref 0.5–1.3)
EGFR: 44 ML/MIN/1.73M2 — LOW
GLUCOSE BLDC GLUCOMTR-MCNC: 102 MG/DL — HIGH (ref 70–99)
GLUCOSE BLDC GLUCOMTR-MCNC: 103 MG/DL — HIGH (ref 70–99)
GLUCOSE BLDC GLUCOMTR-MCNC: 126 MG/DL — HIGH (ref 70–99)
GLUCOSE BLDC GLUCOMTR-MCNC: 71 MG/DL — SIGNIFICANT CHANGE UP (ref 70–99)
GLUCOSE BLDC GLUCOMTR-MCNC: 81 MG/DL — SIGNIFICANT CHANGE UP (ref 70–99)
GLUCOSE SERPL-MCNC: 108 MG/DL — HIGH (ref 70–99)
HCT VFR BLD CALC: 23.4 % — LOW (ref 34.5–45)
HGB BLD-MCNC: 7.7 G/DL — LOW (ref 11.5–15.5)
MAGNESIUM SERPL-MCNC: 1.8 MG/DL — SIGNIFICANT CHANGE UP (ref 1.6–2.6)
MCHC RBC-ENTMCNC: 30.6 PG — SIGNIFICANT CHANGE UP (ref 27–34)
MCHC RBC-ENTMCNC: 32.9 GM/DL — SIGNIFICANT CHANGE UP (ref 32–36)
MCV RBC AUTO: 92.9 FL — SIGNIFICANT CHANGE UP (ref 80–100)
NRBC # BLD: 0 /100 WBCS — SIGNIFICANT CHANGE UP (ref 0–0)
NRBC # FLD: 0 K/UL — SIGNIFICANT CHANGE UP (ref 0–0)
PHOSPHATE SERPL-MCNC: 2.9 MG/DL — SIGNIFICANT CHANGE UP (ref 2.5–4.5)
PLATELET # BLD AUTO: 369 K/UL — SIGNIFICANT CHANGE UP (ref 150–400)
POTASSIUM SERPL-MCNC: 3.8 MMOL/L — SIGNIFICANT CHANGE UP (ref 3.5–5.3)
POTASSIUM SERPL-SCNC: 3.8 MMOL/L — SIGNIFICANT CHANGE UP (ref 3.5–5.3)
RBC # BLD: 2.52 M/UL — LOW (ref 3.8–5.2)
RBC # FLD: 22.6 % — HIGH (ref 10.3–14.5)
SODIUM SERPL-SCNC: 148 MMOL/L — HIGH (ref 135–145)
WBC # BLD: 6.1 K/UL — SIGNIFICANT CHANGE UP (ref 3.8–10.5)
WBC # FLD AUTO: 6.1 K/UL — SIGNIFICANT CHANGE UP (ref 3.8–10.5)

## 2024-09-05 PROCEDURE — 45378 DIAGNOSTIC COLONOSCOPY: CPT

## 2024-09-05 PROCEDURE — 99232 SBSQ HOSP IP/OBS MODERATE 35: CPT

## 2024-09-05 RX ORDER — PANTOPRAZOLE SODIUM 40 MG
40 TABLET, DELAYED RELEASE (ENTERIC COATED) ORAL DAILY
Refills: 0 | Status: DISCONTINUED | OUTPATIENT
Start: 2024-09-05 | End: 2024-09-13

## 2024-09-05 RX ADMIN — Medication 40 MILLIGRAM(S): at 18:39

## 2024-09-05 RX ADMIN — MIDODRINE HYDROCHLORIDE 10 MILLIGRAM(S): 5 TABLET ORAL at 21:05

## 2024-09-05 RX ADMIN — Medication 100 MICROGRAM(S): at 05:07

## 2024-09-05 RX ADMIN — MIDODRINE HYDROCHLORIDE 10 MILLIGRAM(S): 5 TABLET ORAL at 05:06

## 2024-09-05 RX ADMIN — Medication 1 DROP(S): at 21:05

## 2024-09-05 RX ADMIN — Medication 40 MILLIGRAM(S): at 21:05

## 2024-09-05 RX ADMIN — Medication 40 MILLIGRAM(S): at 05:07

## 2024-09-05 RX ADMIN — Medication 1 DROP(S): at 05:06

## 2024-09-05 RX ADMIN — Medication 5000 UNIT(S): at 18:39

## 2024-09-05 RX ADMIN — MIDODRINE HYDROCHLORIDE 10 MILLIGRAM(S): 5 TABLET ORAL at 16:39

## 2024-09-05 RX ADMIN — Medication 5000 UNIT(S): at 05:07

## 2024-09-05 NOTE — PROGRESS NOTE ADULT - SUBJECTIVE AND OBJECTIVE BOX
[facility] Department of Hospital Medicine  Lisy Clemons MD  Available on MS Teams  Pager: [phone]    Patient is a 68y old  Female who presents with a chief complaint of Sepsis s/p cystectomy (05 Sep 2024 06:59)    OVERNIGHT EVENTS: No acute events overnight.    SUBJECTIVE: Pt seen and examined at bedside. Seen eating lunch. Reports that she has mild left sided abdominal discomfort. Denies any CP or SOB. Aware that she is planned for capsule endoscopy tomorrow.    ADDITIONAL REVIEW OF SYSTEMS: as above.    MEDICATIONS  (STANDING):  atorvastatin 40 milliGRAM(s) Oral at bedtime  cycloSPORINE (RESTASIS) 0.05% Emulsion 1 Drop(s) Both EYES two times a day  dextrose 5% + sodium chloride 0.45%. 1000 milliLiter(s) (75 mL/Hr) IV Continuous <Continuous>  dextrose 5%. 1000 milliLiter(s) (50 mL/Hr) IV Continuous <Continuous>  dextrose 5%. 1000 milliLiter(s) (100 mL/Hr) IV Continuous <Continuous>  dextrose 50% Injectable 25 Gram(s) IV Push once  dextrose 50% Injectable 25 Gram(s) IV Push once  dextrose 50% Injectable 12.5 Gram(s) IV Push once  glucagon  Injectable 1 milliGRAM(s) IntraMuscular once  heparin   Injectable 5000 Unit(s) SubCutaneous every 12 hours  insulin lispro (ADMELOG) corrective regimen sliding scale   SubCutaneous every 6 hours  levothyroxine 100 MICROGram(s) Oral daily  midodrine. 10 milliGRAM(s) Oral every 8 hours  pantoprazole  Injectable 40 milliGRAM(s) IV Push daily  polyethylene glycol 3350 17 Gram(s) Oral daily    MEDICATIONS  (PRN):  acetaminophen     Tablet .. 975 milliGRAM(s) Oral every 6 hours PRN Mild Pain (1 - 3)  dextrose Oral Gel 15 Gram(s) Oral once PRN Blood Glucose LESS THAN 70 milliGRAM(s)/deciliter  gabapentin 100 milliGRAM(s) Oral three times a day PRN pain  melatonin 3 milliGRAM(s) Oral at bedtime PRN Insomnia    CAPILLARY BLOOD GLUCOSE    POCT Blood Glucose.: 81 mg/dL (05 Sep 2024 11:09)  POCT Blood Glucose.: 103 mg/dL (05 Sep 2024 08:29)  POCT Blood Glucose.: 126 mg/dL (05 Sep 2024 05:57)  POCT Blood Glucose.: 100 mg/dL (04 Sep 2024 23:28)  POCT Blood Glucose.: 106 mg/dL (04 Sep 2024 19:20)    I&O's Summary    04 Sep 2024 07:01  -  05 Sep 2024 07:00  --------------------------------------------------------  IN: 0 mL / OUT: 1925 mL / NET: -1925 mL    PHYSICAL EXAM:  Vital Signs Last 24 Hrs  T(C): 36.9 (05 Sep 2024 10:43), Max: 36.9 (04 Sep 2024 17:40)  T(F): 98.4 (05 Sep 2024 10:43), Max: 98.5 (04 Sep 2024 17:40)  HR: 91 (05 Sep 2024 11:13) (80 - 92)  BP: 123/66 (05 Sep 2024 11:13) (96/60 - 123/66)  BP(mean): --  RR: 20 (05 Sep 2024 11:13) (18 - 21)  SpO2: 98% (05 Sep 2024 11:13) (98% - 100%)    Parameters below as of 05 Sep 2024 11:13  Patient On (Oxygen Delivery Method): room air    CONSTITUTIONAL: NAD, well-developed  HEAD: Normocephalic, atraumatic  EYES: EOMI, PERRL  ENT: no rhinorrhea, no pharyngeal erythema  RESPIRATORY: No increased work of breathing, CTAB, no wheezes or crackles appreciated  CARDIOVASCULAR: RRR, S1 and S2 present, no m/r/g  ABDOMEN: soft, mild left sided TTP, ND, bowel sounds present  EXTREMITIES: No LE edema  MUSCULOSKELETAL: no joint swelling, no tenderness to palpation  NEURO: moving all extremities    LABS:                        7.7    6.10  )-----------( 369      ( 05 Sep 2024 06:15 )             23.4     09-05    148<H>  |  116<H>  |  32<H>  ----------------------------<  108<H>  3.8   |  18<L>  |  1.31<H>    Ca    8.9      05 Sep 2024 06:15  Phos  2.9     09-05  Mg     1.80     09-05    Urinalysis Basic - ( 05 Sep 2024 06:15 )    Color: x / Appearance: x / SG: x / pH: x  Gluc: 108 mg/dL / Ketone: x  / Bili: x / Urobili: x   Blood: x / Protein: x / Nitrite: x   Leuk Esterase: x / RBC: x / WBC x   Sq Epi: x / Non Sq Epi: x / Bacteria: x    RADIOLOGY & ADDITIONAL TESTS:  < from: Colonoscopy (09.05.24 @ 09:16) >  Impression:            - Preparation of the colon was poor. There was a moderate amount of black stool and blood clots present throughout the visualized colon.  - The rectum, sigmoid colon, descending colon and transverse colon are normal. No evidence of GI bleeding source. No specimens collected.  - The proximal right colon was not examined.    Recommendation:        - Suspect that GI bleeding is coming proximal to the visualized right colon, perhaps from prior anastomosis given the temporal correlation with recent surgery.   - Consider video capsule endoscopy to confirm.    < end of copied text >    Results Reviewed:   Imaging Personally Reviewed:  Electrocardiogram Personally Reviewed:    COORDINATION OF CARE:  Care Discussed with Consultants/Other Providers [Y/N]:  Prior or Outpatient Records Reviewed [Y/N]:

## 2024-09-05 NOTE — PROGRESS NOTE ADULT - PROBLEM SELECTOR PLAN 2
Etiology is likely multifactorial, GI bleed  - Pt is on Midodrine 10 mg po tid, wean as able   - blood transfusion as needed  - Plan for capsule endoscopy as above  - Monitor BP

## 2024-09-05 NOTE — PROGRESS NOTE ADULT - ASSESSMENT
68F hx of HTN, DENITA on CPAP, GERD on PPI, ETOH use disorder, bladder CA, s/p cystectomy/IC creation on 8/6/2024 at [facility] with Dr. Thompson (d/c on 8/10) presenting on 8/16 with weakness, found to be hypotensive. Patient had a complicated SICU course: hypotension requiring pressors, Acute renal failure requiring CRRT (now off), presumed PE started on heparin gtt, respiratory failure s/p intubation, now extubated. Now with possible blood loss anemia requiring PRBCs and melena. S/p EGD without source of melena. S/p colonoscopy with possible bleed suspected, now planned for capsule endoscopy for further evaluation.

## 2024-09-05 NOTE — PROGRESS NOTE ADULT - ASSESSMENT
69 yo F h/o HTN and bladder CA, s/p cystectomy/IC creation on 8/6/2024 at [facility] with Dr. Thompson (d/c on 8/10) presented to ED 8/16 w/ generalized weakness and some lower abdominal pain. Since d/c, only have 1-2 bowel movement, and had significantly decrease PO intake due to decreased appetite, intermittent fevers/chills, +bloody discharge from urethra, some burning. Pt stated stents fell out yesterday.  BP 80s/50s for EMS.  Pt remained hypotensive in ED, started on pressors, cultures sent, placed on zosyn and BiPAP and admitted to SICU. CT revealed: Expected postoperative changes status post cystectomy with ileal conduit creation including small amount of complex fluid and free air in the pelvis. No organized fluid collection. Distended ileal conduit with narrowing at the exiting ostomy site and proximally at the site of ureteral insertion. Mild bilateral hydroureteronephrosis with delayed nephrograms. No significant perinephric stranding.    8/16: still requiring pressor support and on BiPAP, pt states she feels better,14fr catheter placed in stoma, Bcx with GPC/GNR, Cr to 4.42 from 5.34, abx changed to leila and diflucan  8/17: still requiring pressor support and supplement oxygen but feels well, decr UO overnight, given albumin and IVF restarted, Cr up slightly to 4.68, electrolytes normal  8/18: RBUS done, no signs of hydro. IR consulted, no need for NTs as no hydro. UOP still very low (overnight 10 cc/hour), given 2 boluses with no improvement. Cr up to 4.95 from 4.68. CT loopogram of conduit pending today to evaluate for leak. still requiring pressors. Ucx from conduit now growing e.coli, sensitivities pending, PT recs rehab   8/19: CT loopogram w/ no anastamotic leak. Cr continues to rise w/ low UOP. UA on admission w/ coarse granular cast. Constellation of signs and symptoms suggestive of Acute tubular necrosis  8/20: Urine culture w/ e.coli sensitivities w/ resistance to floroquinolones. Pt back on abx. Pt w/ slightly increased urine output  8/21: Pt with acute onset of tachypnea, and hypoxia requiring intubation and sedation, CT w/ probably PE, hep gtt started. Nephrology consult obtained and started on CRRT, meropenem restarted  8/22: still intubated and sedated, pressor requirement decreasing, UO increasing, PTT remains supratherapeutic, hep gtt held for one hour this AM, on tube feeds, febrile to 100.9 at 4am  8/23: changed TF as per nutrition recs to start tomorrow AM, check w/ dietician, PTT therapeutic x1 --> repeat PTT at 4:45 AM 50.7, hep gtt @4ml/hr, tolerating CPAP. awake, UO still only 110/shift, still requiring pressor support, CRRT  8//24: extubated, now on RA, still requiring CRRT, /shift and small amount of pressor support, Procal >100, repeat Bcx and Ucx ordered, leila d/c, vanc x 2, then d/c now on zosyn, passed beside S&S now on reg diet, pt offers no complaints, + flatus/BM, will do bedside pelvic to evaluate for retained material  8/25: CRRT, UO less overnight, zosyn, tolerating reg diet, blood tinged vaginal discharge   8/26: UOP 39 overnight, on zosyn and vanc, tolerating reg diet. poss transfer to [facility] today - SICU to discuss on rounds.   8/27: UOP increased to 310 after dose of lasix. off pressors, still getting midodrine.   8/28: Got more lasix and albumin overnight with OP of 642, still down from 1.5 yesterday during the day. Cr scott from 1.98 to 2.77. Will suggest RBUS. Bcx NGTD at 4 days. Off vanc today, still on zosyn.   8/29: stable, midodrine increased,  overnight, still with some blood from vagina (no pads documented) RBUS no hydro, Bcx neg final, IC Cx neg  8/30: stable, midodrine to bid, no OB pads documented, good UO yellow, was transfused 1u pRBC overnight with appropriate response, zosyn d/c, listed for floor  8/31: feels well, good UO, transferred to floor  9/1: still having some pain in joints - medicine consulted and will ask their recs (NSAIDs not feasible due to kidney function). Will also f/u medicine recs for continuing midodrine. Going to give another unit for suboptimal response ot u pRBCs given yesterday (Hgb 7.1 from 7.5).  9/2: Reports black stools. Received 2u pRBC yesterday with little response in H/H. No complaints; GI consulted  9/3 - no further stools; no c/o; received another unit blood yesterday; had EGD, normal study; colonoscopy tomorrow  9/4 - not able to drink all the golytely, but has some watery stool, transfused 2u pRBC for acute blood loss anemia, seen by ostomy specialist, taylor removed from stoma  9/5 - still trying to drink moviprep, watery BMs w/ some BRBPR, for colonoscopy today      Plan:  - f/u AM labs  - NPO for colonoscopy  - PT recs rehab  - f/u GI   - protonix decreased to daily per GI recs  - neph signed off  - unable to contact her urologist at [facility]

## 2024-09-05 NOTE — PROGRESS NOTE ADULT - PROBLEM SELECTOR PLAN 1
Patient with vaginal bleeding (now resolved) and melena   - S/P multiple (6 units) PRBC transfusion with inappropriate response   - Hgb 7.3 today, plan for additional PRBCs per   - S/p EGD without source of melena   - S/p Colonoscopy with suspected GI bleeding proximal to area visualized  - GI following, plan video capsule endoscopy on 9/6, NPO at midnight  - Recommend stopping aspirin - confirmed with patient, she is on it for "preventative" measures, reports she never had cardiac stents or a stroke.   - If endoscopic evaluations all negative will need to consider repeat CT A/P to r/o RP bleed   - Check LDH, haptoglobin to r/o hemolysis

## 2024-09-05 NOTE — CHART NOTE - NSCHARTNOTEFT_GEN_A_CORE
Colonoscopy performed without ability to get to the TI given sharp colonic turns- endoscope used. Plan for video capsule study morning of 9/5, can have diet today but NPO at midnight for VCE placement tomorrow morning at bedside.     Pilar Euceda MD  GI/Hepatology Fellow, PGY-4  Long Range Pager: [phone], Short Range Pager: [phone]    MONDAY-FRIDAY 8AM-5PM:  Please message via [employer] or email [email or web address]   On Weekends/Holidays (All day) and Weekdays after 5 PM to 8 AM  For nonurgent consults please email:  Please email [email or web address]    URGENT CONSULTS:  Please contact on call GI team. See Amion schedule ([facility]), [employer] paging system ([facility]), or call hospital  ([facility])

## 2024-09-05 NOTE — PROGRESS NOTE ADULT - SUBJECTIVE AND OBJECTIVE BOX
Overnight events:  Having watery stools with intermittent BRBPR    Subjective:  Feels weak    Objective:    Vital signs  T(C): , Max: 36.9 (09-04-24 @ 17:40)  HR: 92 (09-05-24 @ 05:17)  BP: 104/54 (09-05-24 @ 05:17)  SpO2: 100% (09-05-24 @ 05:17)  Wt(kg): --    Output   IC: 1000  BMs not recorded  09-03 @ 07:01  -  09-04 @ 07:00  --------------------------------------------------------  IN: 0 mL / OUT: 1950 mL / NET: -1950 mL    09-04 @ 07:01  -  09-05 @ 06:59  --------------------------------------------------------  IN: 0 mL / OUT: 1925 mL / NET: -1925 mL        Gen: NAD sitting in chair  Abd: stoma pink soft, nontender      Labs: pending this AM                        7.3    6.88  )-----------( 468      ( 04 Sep 2024 06:23 )             22.7     04 Sep 2024 06:23    145    |  112    |  39     ----------------------------<  112    4.4     |  20     |  1.53     Ca    9.4        04 Sep 2024 06:23  Phos  3.1       04 Sep 2024 06:23  Mg     2.00      04 Sep 2024 06:23

## 2024-09-06 LAB
ANION GAP SERPL CALC-SCNC: 13 MMOL/L — SIGNIFICANT CHANGE UP (ref 7–14)
BLD GP AB SCN SERPL QL: NEGATIVE — SIGNIFICANT CHANGE UP
BUN SERPL-MCNC: 22 MG/DL — SIGNIFICANT CHANGE UP (ref 7–23)
CALCIUM SERPL-MCNC: 8.9 MG/DL — SIGNIFICANT CHANGE UP (ref 8.4–10.5)
CHLORIDE SERPL-SCNC: 112 MMOL/L — HIGH (ref 98–107)
CO2 SERPL-SCNC: 18 MMOL/L — LOW (ref 22–31)
CREAT SERPL-MCNC: 1.33 MG/DL — HIGH (ref 0.5–1.3)
EGFR: 44 ML/MIN/1.73M2 — LOW
GLUCOSE BLDC GLUCOMTR-MCNC: 104 MG/DL — HIGH (ref 70–99)
GLUCOSE BLDC GLUCOMTR-MCNC: 108 MG/DL — HIGH (ref 70–99)
GLUCOSE SERPL-MCNC: 99 MG/DL — SIGNIFICANT CHANGE UP (ref 70–99)
HCT VFR BLD CALC: 27.2 % — LOW (ref 34.5–45)
HGB BLD-MCNC: 9.1 G/DL — LOW (ref 11.5–15.5)
MAGNESIUM SERPL-MCNC: 1.6 MG/DL — SIGNIFICANT CHANGE UP (ref 1.6–2.6)
MCHC RBC-ENTMCNC: 30.3 PG — SIGNIFICANT CHANGE UP (ref 27–34)
MCHC RBC-ENTMCNC: 33.5 GM/DL — SIGNIFICANT CHANGE UP (ref 32–36)
MCV RBC AUTO: 90.7 FL — SIGNIFICANT CHANGE UP (ref 80–100)
NRBC # BLD: 0 /100 WBCS — SIGNIFICANT CHANGE UP (ref 0–0)
NRBC # FLD: 0 K/UL — SIGNIFICANT CHANGE UP (ref 0–0)
PHOSPHATE SERPL-MCNC: 3.4 MG/DL — SIGNIFICANT CHANGE UP (ref 2.5–4.5)
PLATELET # BLD AUTO: 336 K/UL — SIGNIFICANT CHANGE UP (ref 150–400)
POTASSIUM SERPL-MCNC: 3.7 MMOL/L — SIGNIFICANT CHANGE UP (ref 3.5–5.3)
POTASSIUM SERPL-SCNC: 3.7 MMOL/L — SIGNIFICANT CHANGE UP (ref 3.5–5.3)
RBC # BLD: 3 M/UL — LOW (ref 3.8–5.2)
RBC # FLD: 20.8 % — HIGH (ref 10.3–14.5)
RH IG SCN BLD-IMP: POSITIVE — SIGNIFICANT CHANGE UP
SODIUM SERPL-SCNC: 143 MMOL/L — SIGNIFICANT CHANGE UP (ref 135–145)
WBC # BLD: 7.68 K/UL — SIGNIFICANT CHANGE UP (ref 3.8–10.5)
WBC # FLD AUTO: 7.68 K/UL — SIGNIFICANT CHANGE UP (ref 3.8–10.5)

## 2024-09-06 PROCEDURE — 99232 SBSQ HOSP IP/OBS MODERATE 35: CPT

## 2024-09-06 RX ADMIN — MIDODRINE HYDROCHLORIDE 10 MILLIGRAM(S): 5 TABLET ORAL at 13:10

## 2024-09-06 RX ADMIN — Medication 1 DROP(S): at 18:53

## 2024-09-06 RX ADMIN — Medication 5000 UNIT(S): at 05:25

## 2024-09-06 RX ADMIN — MIDODRINE HYDROCHLORIDE 10 MILLIGRAM(S): 5 TABLET ORAL at 21:38

## 2024-09-06 RX ADMIN — Medication 5000 UNIT(S): at 18:54

## 2024-09-06 RX ADMIN — MIDODRINE HYDROCHLORIDE 10 MILLIGRAM(S): 5 TABLET ORAL at 05:25

## 2024-09-06 RX ADMIN — Medication 40 MILLIGRAM(S): at 18:58

## 2024-09-06 RX ADMIN — Medication 1 DROP(S): at 05:26

## 2024-09-06 RX ADMIN — Medication 40 MILLIGRAM(S): at 21:38

## 2024-09-06 RX ADMIN — Medication 25 GRAM(S): at 07:49

## 2024-09-06 RX ADMIN — Medication 100 MICROGRAM(S): at 05:26

## 2024-09-06 NOTE — PROGRESS NOTE ADULT - ASSESSMENT
69 yo F h/o HTN and bladder CA, s/p cystectomy/IC creation on 8/6/2024 at [facility] with Dr. Thompson (d/c on 8/10) presented to ED 8/16 w/ generalized weakness and some lower abdominal pain. Since d/c, only have 1-2 bowel movement, and had significantly decrease PO intake due to decreased appetite, intermittent fevers/chills, +bloody discharge from urethra, some burning. Pt stated stents fell out yesterday.  BP 80s/50s for EMS.  Pt remained hypotensive in ED, started on pressors, cultures sent, placed on zosyn and BiPAP and admitted to SICU. CT revealed: Expected postoperative changes status post cystectomy with ileal conduit creation including small amount of complex fluid and free air in the pelvis. No organized fluid collection. Distended ileal conduit with narrowing at the exiting ostomy site and proximally at the site of ureteral insertion. Mild bilateral hydroureteronephrosis with delayed nephrograms. No significant perinephric stranding.    8/16: still requiring pressor support and on BiPAP, pt states she feels better,14fr catheter placed in stoma, Bcx with GPC/GNR, Cr to 4.42 from 5.34, abx changed to leila and diflucan  8/17: still requiring pressor support and supplement oxygen but feels well, decr UO overnight, given albumin and IVF restarted, Cr up slightly to 4.68, electrolytes normal  8/18: RBUS done, no signs of hydro. IR consulted, no need for NTs as no hydro. UOP still very low (overnight 10 cc/hour), given 2 boluses with no improvement. Cr up to 4.95 from 4.68. CT loopogram of conduit pending today to evaluate for leak. still requiring pressors. Ucx from conduit now growing e.coli, sensitivities pending, PT recs rehab   8/19: CT loopogram w/ no anastamotic leak. Cr continues to rise w/ low UOP. UA on admission w/ coarse granular cast. Constellation of signs and symptoms suggestive of Acute tubular necrosis  8/20: Urine culture w/ e.coli sensitivities w/ resistance to floroquinolones. Pt back on abx. Pt w/ slightly increased urine output  8/21: Pt with acute onset of tachypnea, and hypoxia requiring intubation and sedation, CT w/ probably PE, hep gtt started. Nephrology consult obtained and started on CRRT, meropenem restarted  8/22: still intubated and sedated, pressor requirement decreasing, UO increasing, PTT remains supratherapeutic, hep gtt held for one hour this AM, on tube feeds, febrile to 100.9 at 4am  8/23: changed TF as per nutrition recs to start tomorrow AM, check w/ dietician, PTT therapeutic x1 --> repeat PTT at 4:45 AM 50.7, hep gtt @4ml/hr, tolerating CPAP. awake, UO still only 110/shift, still requiring pressor support, CRRT  8//24: extubated, now on RA, still requiring CRRT, /shift and small amount of pressor support, Procal >100, repeat Bcx and Ucx ordered, leila d/c, vanc x 2, then d/c now on zosyn, passed beside S&S now on reg diet, pt offers no complaints, + flatus/BM, will do bedside pelvic to evaluate for retained material  8/25: CRRT, UO less overnight, zosyn, tolerating reg diet, blood tinged vaginal discharge   8/26: UOP 39 overnight, on zosyn and vanc, tolerating reg diet. poss transfer to [facility] today - SICU to discuss on rounds.   8/27: UOP increased to 310 after dose of lasix. off pressors, still getting midodrine.   8/28: Got more lasix and albumin overnight with OP of 642, still down from 1.5 yesterday during the day. Cr scott from 1.98 to 2.77. Will suggest RBUS. Bcx NGTD at 4 days. Off vanc today, still on zosyn.   8/29: stable, midodrine increased,  overnight, still with some blood from vagina (no pads documented) RBUS no hydro, Bcx neg final, IC Cx neg  8/30: stable, midodrine to bid, no OB pads documented, good UO yellow, was transfused 1u pRBC overnight with appropriate response, zosyn d/c, listed for floor  8/31: feels well, good UO, transferred to floor  9/1: still having some pain in joints - medicine consulted and will ask their recs (NSAIDs not feasible due to kidney function). Will also f/u medicine recs for continuing midodrine. Going to give another unit for suboptimal response ot u pRBCs given yesterday (Hgb 7.1 from 7.5).  9/2: Reports black stools. Received 2u pRBC yesterday with little response in H/H. No complaints; GI consulted  9/3 - no further stools; no c/o; received another unit blood yesterday; had EGD, normal study; colonoscopy tomorrow  9/4 - not able to drink all the golytely, but has some watery stool, transfused 2u pRBC for acute blood loss anemia, seen by ostomy specialist, taylor removed from stoma  9/5 - still trying to drink moviprep, watery BMs w/ some BRBPR, for colonoscopy today, per GI Colonoscopy performed without ability to get to the TI given sharp colonic turns, lots of old and bright red blood, recommend video capsule study 9/6 9/6 - still with watery BMs w/ some BRBPR, c/o weakness, decr appetite, for VCE today, hypomagnesemia, repleted magnesium      Plan:  - AM labs reviewed  - NPO for VCE  - PT recs rehab  - f/u GI   - protonix decreased to daily per GI recs  - neph signed off  - will attempt to contact Dr. Thompson at [facility] again today

## 2024-09-06 NOTE — PROGRESS NOTE ADULT - ASSESSMENT
68F hx of HTN, DENITA on CPAP, GERD on PPI, ETOH use disorder, bladder CA, s/p cystectomy/IC creation on 8/6/2024 at [facility] with Dr. Thompson (d/c on 8/10) presenting on 8/16 with weakness, found to be hypotensive. Patient had a complicated SICU course: hypotension requiring pressors, Acute renal failure requiring CRRT (now off), presumed PE started on heparin gtt, respiratory failure s/p intubation, now extubated. Now with possible blood loss anemia requiring PRBCs and melena. S/p EGD without source of melena. S/p colonoscopy with possible bleed suspected, now planned for capsule endoscopy for further evaluation.

## 2024-09-06 NOTE — CHART NOTE - NSCHARTNOTEFT_GEN_A_CORE
Risks of video capsule endoscopy explained, including risk of retained capsule, potentially requiring surgery for removal.  Patient understands and agrees to risks.    Capsule ID: [id number]    Capsule swallowed at:    NPO now at time of capsule ingestion.  May initiate clear liquid diet 2 hours after capsule ingestion.  May resume previous/regular consistency diet 4 hours after capsule ingestion    GI fellow will retrieve equipment tomorrow.    Patient is unable to have MRI until capsule clearance is confirmed as capsule is not MRI compatible.    Pilar Euceda MD  GI/Hepatology Fellow, PGY-4  Long Range Pager: [phone], Short Range Pager: [phone]    MONDAY-FRIDAY 8AM-5PM:  Please message via [employer] or email [email or web address]   On Weekends/Holidays (All day) and Weekdays after 5 PM to 8 AM  For nonurgent consults please email:  Please email [email or web address]    URGENT CONSULTS:  Please contact on call GI team. See Amion schedule ([facility]), [employer] paging system ([facility]), or call hospital  ([facility]) Risks of video capsule endoscopy explained, including risk of retained capsule, potentially requiring surgery for removal.  Patient understands and agrees to risks.    Capsule ID: [id number]    Capsule swallowed at: 9:45 AM    NPO now at time of capsule ingestion.  May initiate clear liquid diet 2 hours after capsule ingestion.  May resume previous/regular consistency diet 4 hours after capsule ingestion    GI fellow will retrieve equipment tomorrow.    Patient is unable to have MRI until capsule clearance is confirmed as capsule is not MRI compatible.    Pilar Euceda MD  GI/Hepatology Fellow, PGY-4  Long Range Pager: [phone], Short Range Pager: [phone]    MONDAY-FRIDAY 8AM-5PM:  Please message via [employer] or email [email or web address]   On Weekends/Holidays (All day) and Weekdays after 5 PM to 8 AM  For nonurgent consults please email:  Please email [email or web address]    URGENT CONSULTS:  Please contact on call GI team. See Amion schedule ([facility]), [employer] paging system ([facility]), or call hospital  ([facility])

## 2024-09-06 NOTE — PROGRESS NOTE ADULT - PROBLEM SELECTOR PLAN 1
Patient with vaginal bleeding (now resolved) and melena   - S/P 10 units PRBC transfusion total this admission with inappropriate response   - Monitor CBC and transfuse for Hgb < 7 or symptomatic   - S/p EGD without source of melena   - S/p Colonoscopy with suspected GI bleeding proximal to area visualized  - GI following, pt undergoing video capsule endoscopy on 9/6  - Stopped aspirin - confirmed with patient, she is on it for "preventative" measures, reports she never had cardiac stents or a stroke.   - If endoscopic evaluations all negative will need to consider repeat CT A/P to r/o RP bleed

## 2024-09-06 NOTE — PROGRESS NOTE ADULT - SUBJECTIVE AND OBJECTIVE BOX
CHIEF COMPLAINT: f/u     SUBJECTIVE / OVERNIGHT EVENTS: Patient seen and examined. Reports that she is hungry but understands why the video capsule endoscopy is being done. Reports she feels weak and tired, but denies chest pain or shortness of breath. States she started seeing blood in stool while she was in ICU.     MEDICATIONS  (STANDING):  atorvastatin 40 milliGRAM(s) Oral at bedtime  cycloSPORINE (RESTASIS) 0.05% Emulsion 1 Drop(s) Both EYES two times a day  dextrose 5% + sodium chloride 0.45%. 1000 milliLiter(s) (75 mL/Hr) IV Continuous <Continuous>  dextrose 5%. 1000 milliLiter(s) (50 mL/Hr) IV Continuous <Continuous>  dextrose 5%. 1000 milliLiter(s) (100 mL/Hr) IV Continuous <Continuous>  dextrose 50% Injectable 25 Gram(s) IV Push once  dextrose 50% Injectable 12.5 Gram(s) IV Push once  glucagon  Injectable 1 milliGRAM(s) IntraMuscular once  heparin   Injectable 5000 Unit(s) SubCutaneous every 12 hours  levothyroxine 100 MICROGram(s) Oral daily  midodrine. 10 milliGRAM(s) Oral every 8 hours  pantoprazole  Injectable 40 milliGRAM(s) IV Push daily  polyethylene glycol 3350 17 Gram(s) Oral daily    MEDICATIONS  (PRN):  acetaminophen     Tablet .. 975 milliGRAM(s) Oral every 6 hours PRN Mild Pain (1 - 3)  gabapentin 100 milliGRAM(s) Oral three times a day PRN pain  melatonin 3 milliGRAM(s) Oral at bedtime PRN Insomnia      VITALS:  T(F): 98.4 (09-06-24 @ 09:48), Max: 98.5 (09-05-24 @ 22:03)  HR: 73 (09-06-24 @ 09:48) (67 - 92)  BP: 123/67 (09-06-24 @ 09:48) (104/49 - 123/67)  RR: 18 (09-06-24 @ 09:48) (18 - 18)  SpO2: 100% (09-06-24 @ 09:48)  Wt(kg): --      CONSTITUTIONAL: NAD, well-developed, well-groomed  EYES: Conjunctiva and sclera clear  ENMT: Moist oral mucosa  RESPIRATORY: Normal respiratory effort; lungs are clear to auscultation bilaterally; No wheezes or rales  CARDIOVASCULAR: Regular rate and rhythm; Normal S1 and S2; No murmurs, rubs, or gallops; No lower extremity edema  ABDOMEN: Soft, Nontender, Nondistended; Bowel sounds present; ileal conduit   MUSCULOSKELETAL:  No clubbing or cyanosis of digits; No joint swelling or tenderness to palpation  PSYCH: AAOx3 (oriented to person, place, and time); affect appropriate      LABS:              9.1                  143  | 18   | 22           7.68  >-----------< 336     ------------------------< 99                    27.2                 3.7  | 112  | 1.33                                         Ca 8.9   Mg 1.60  Ph 3.4                Urinalysis Basic - ( 06 Sep 2024 05:45 )    Color: x / Appearance: x / SG: x / pH: x  Gluc: 99 mg/dL / Ketone: x  / Bili: x / Urobili: x   Blood: x / Protein: x / Nitrite: x   Leuk Esterase: x / RBC: x / WBC x   Sq Epi: x / Non Sq Epi: x / Bacteria: x    [x] Care Discussed with Consultants/Other Providers: Urology PA - discussed plan for VCE today. If Cr is stable, plan to repeat CTA chest to r/o PE in 1-2 days.

## 2024-09-06 NOTE — PROGRESS NOTE ADULT - SUBJECTIVE AND OBJECTIVE BOX
Overnight events:  still w/ diarrhea she states bloody but have not saved    Subjective:  Pt c/o weakness and decreased appetite, occasional lower abdominal pain    Objective:    Vital signs  T(C): , Max: 36.9 (09-05-24 @ 10:43)  HR: 80 (09-06-24 @ 05:41)  BP: 118/60 (09-06-24 @ 05:41)  SpO2: 100% (09-06-24 @ 05:41)  Wt(kg): --    Output   IC: 520 yellow  09-05 @ 07:01  -  09-06 @ 07:00  --------------------------------------------------------  IN: 0 mL / OUT: 1070 mL / NET: -1070 mL        Gen: NAD  Abd: stoma pink, abdomen soft, nontender      Labs                        9.1    7.68  )-----------( 336      ( 06 Sep 2024 05:45 )             27.2     06 Sep 2024 05:45    143    |  112    |  22     ----------------------------<  99     3.7     |  18     |  1.33     Ca    8.9        06 Sep 2024 05:45  Phos  3.4       06 Sep 2024 05:45  Mg     1.60      06 Sep 2024 05:45

## 2024-09-07 LAB
ANION GAP SERPL CALC-SCNC: 13 MMOL/L — SIGNIFICANT CHANGE UP (ref 7–14)
BUN SERPL-MCNC: 17 MG/DL — SIGNIFICANT CHANGE UP (ref 7–23)
CALCIUM SERPL-MCNC: 8.9 MG/DL — SIGNIFICANT CHANGE UP (ref 8.4–10.5)
CHLORIDE SERPL-SCNC: 109 MMOL/L — HIGH (ref 98–107)
CO2 SERPL-SCNC: 17 MMOL/L — LOW (ref 22–31)
CREAT SERPL-MCNC: 1.38 MG/DL — HIGH (ref 0.5–1.3)
EGFR: 42 ML/MIN/1.73M2 — LOW
GLUCOSE SERPL-MCNC: 102 MG/DL — HIGH (ref 70–99)
HCT VFR BLD CALC: 29.2 % — LOW (ref 34.5–45)
HCT VFR BLD CALC: 31.6 % — LOW (ref 34.5–45)
HGB BLD-MCNC: 10.6 G/DL — LOW (ref 11.5–15.5)
HGB BLD-MCNC: 9.7 G/DL — LOW (ref 11.5–15.5)
MAGNESIUM SERPL-MCNC: 2 MG/DL — SIGNIFICANT CHANGE UP (ref 1.6–2.6)
MCHC RBC-ENTMCNC: 30.7 PG — SIGNIFICANT CHANGE UP (ref 27–34)
MCHC RBC-ENTMCNC: 30.9 PG — SIGNIFICANT CHANGE UP (ref 27–34)
MCHC RBC-ENTMCNC: 33.2 GM/DL — SIGNIFICANT CHANGE UP (ref 32–36)
MCHC RBC-ENTMCNC: 33.5 GM/DL — SIGNIFICANT CHANGE UP (ref 32–36)
MCV RBC AUTO: 91.6 FL — SIGNIFICANT CHANGE UP (ref 80–100)
MCV RBC AUTO: 93 FL — SIGNIFICANT CHANGE UP (ref 80–100)
NRBC # BLD: 0 /100 WBCS — SIGNIFICANT CHANGE UP (ref 0–0)
NRBC # BLD: 0 /100 WBCS — SIGNIFICANT CHANGE UP (ref 0–0)
NRBC # FLD: 0 K/UL — SIGNIFICANT CHANGE UP (ref 0–0)
NRBC # FLD: 0.02 K/UL — HIGH (ref 0–0)
PHOSPHATE SERPL-MCNC: 3.2 MG/DL — SIGNIFICANT CHANGE UP (ref 2.5–4.5)
PLATELET # BLD AUTO: 362 K/UL — SIGNIFICANT CHANGE UP (ref 150–400)
PLATELET # BLD AUTO: 394 K/UL — SIGNIFICANT CHANGE UP (ref 150–400)
POTASSIUM SERPL-MCNC: 3.7 MMOL/L — SIGNIFICANT CHANGE UP (ref 3.5–5.3)
POTASSIUM SERPL-SCNC: 3.7 MMOL/L — SIGNIFICANT CHANGE UP (ref 3.5–5.3)
RBC # BLD: 3.14 M/UL — LOW (ref 3.8–5.2)
RBC # BLD: 3.45 M/UL — LOW (ref 3.8–5.2)
RBC # FLD: 21.2 % — HIGH (ref 10.3–14.5)
RBC # FLD: 21.9 % — HIGH (ref 10.3–14.5)
SODIUM SERPL-SCNC: 139 MMOL/L — SIGNIFICANT CHANGE UP (ref 135–145)
WBC # BLD: 10.37 K/UL — SIGNIFICANT CHANGE UP (ref 3.8–10.5)
WBC # BLD: 10.72 K/UL — HIGH (ref 3.8–10.5)
WBC # FLD AUTO: 10.37 K/UL — SIGNIFICANT CHANGE UP (ref 3.8–10.5)
WBC # FLD AUTO: 10.72 K/UL — HIGH (ref 3.8–10.5)

## 2024-09-07 PROCEDURE — 99233 SBSQ HOSP IP/OBS HIGH 50: CPT

## 2024-09-07 PROCEDURE — 99232 SBSQ HOSP IP/OBS MODERATE 35: CPT | Mod: GC

## 2024-09-07 PROCEDURE — 99232 SBSQ HOSP IP/OBS MODERATE 35: CPT

## 2024-09-07 RX ORDER — POLYETHYLENE GLYCOL 3350, SODIUM SULFATE ANHYDROUS, SODIUM BICARBONATE, SODIUM CHLORIDE, POTASSIUM CHLORIDE 236; 22.74; 6.74; 5.86; 2.97 G/4L; G/4L; G/4L; G/4L; G/4L
4000 POWDER, FOR SOLUTION ORAL ONCE
Refills: 0 | Status: COMPLETED | OUTPATIENT
Start: 2024-09-07 | End: 2024-09-07

## 2024-09-07 RX ADMIN — MIDODRINE HYDROCHLORIDE 10 MILLIGRAM(S): 5 TABLET ORAL at 21:46

## 2024-09-07 RX ADMIN — Medication 5000 UNIT(S): at 17:56

## 2024-09-07 RX ADMIN — Medication 100 MICROGRAM(S): at 05:13

## 2024-09-07 RX ADMIN — Medication 5000 UNIT(S): at 05:13

## 2024-09-07 RX ADMIN — Medication 1 DROP(S): at 05:13

## 2024-09-07 RX ADMIN — MIDODRINE HYDROCHLORIDE 10 MILLIGRAM(S): 5 TABLET ORAL at 05:13

## 2024-09-07 RX ADMIN — POLYETHYLENE GLYCOL 3350, SODIUM SULFATE ANHYDROUS, SODIUM BICARBONATE, SODIUM CHLORIDE, POTASSIUM CHLORIDE 4000 MILLILITER(S): 236; 22.74; 6.74; 5.86; 2.97 POWDER, FOR SOLUTION ORAL at 18:00

## 2024-09-07 RX ADMIN — Medication 40 MILLIGRAM(S): at 21:46

## 2024-09-07 RX ADMIN — Medication 40 MILLIGRAM(S): at 17:58

## 2024-09-07 RX ADMIN — Medication 1 DROP(S): at 17:54

## 2024-09-07 RX ADMIN — MIDODRINE HYDROCHLORIDE 10 MILLIGRAM(S): 5 TABLET ORAL at 14:19

## 2024-09-07 NOTE — PROGRESS NOTE ADULT - PROBLEM SELECTOR PLAN 1
Patient with vaginal bleeding (now resolved) and melena   - S/P 10 units PRBC transfusion total this admission with inappropriate response   - Monitor CBC and transfuse for Hgb < 7 or symptomatic   - S/p EGD without source of melena   - S/p Colonoscopy with suspected GI bleeding proximal to area visualized  - s/p VCE done on 9/6  - Stopped aspirin - confirmed with patient, she is on it for "preventative" measures, reports she never had cardiac stents or a stroke.   - plan for Colonoscopy vs retrograde balloon endoscopy Mon 9/9

## 2024-09-07 NOTE — PROGRESS NOTE ADULT - ASSESSMENT
Ms. Lewis is a 68 year old female with HTN, T2DM, DENITA, GERD (on PPI daily) and bladder CA s/p cystectomy/IC creation on 8/6/2024 at [facility] who was admitted to the SICU on 8/16 with septic shock 2/2 UTI c/b respiratory failure (intubated 8/21-8/24), renal failure s/p CRRT, PE (stared on heparin gtt 8/21 and transitioned to Eliquis) and now acute blood loss anemia with liquid black stools s/p EGDwith no signs of bleeding and colonoscopy on 9/6 with note of possible GI bleeding is coming proximal to the   visualized right colon, perhaps from prior anastomosis given the temporal correlation with recent surgery s/p VCE       #Melena  #Acute blood loss anemia, hgb danisha 6.7 g/dL from 10 on admission, s/p pRBC 8/29, 8/31, & 9/1 x2  #PE on A/C (last dose Eliquis 9/1 @ 1300)  #Septic shock 2/2 UTI - resolved   #Respiratory failure - resolved, on RA   #Bladder cancer s/p cystectomy/IC creation on 8/6/2024  Reports relatively normal EGD/colonoscopy 2-3 years ago. EGD on 9/3 with no pathology to explain melena on upper endoscopy. Colonoscopy on 9/5 with moderate amount of black stool and blood clotspresent  throughout the visualized colon.The rectum, sigmoid colon, descending colon and transverse colon are normal. No evidence of GI bleeding source. No specimens collected.The proximal right colon was not examined.uspect that GI bleeding is coming proximal to the  visualized right colon, perhaps from prior anastomosis   given the temporal correlation with recent surgery. VCE placed on 9/6 although with pt another episode of melena this morning with stable hgb at 10.6. Will post results of VCE once completed into the cahrt     Recommendations:  -Please keep NPO for now pending results of VCE   -Hold AC as able  -Trend CBC q 8 hours, transfuse for goal>8  -Maintain two large bore IV, active T&S    All recommendations are tentative until note is attested by attending.    Ms. Lewis is a 68 year old female with HTN, T2DM, DENITA, GERD (on PPI daily) and bladder CA s/p cystectomy/IC creation on 8/6/2024 at [facility] who was admitted to the SICU on 8/16 with septic shock 2/2 UTI c/b respiratory failure (intubated 8/21-8/24), renal failure s/p CRRT, PE (stared on heparin gtt 8/21 and transitioned to Eliquis) and now acute blood loss anemia with liquid black stools s/p EGDwith no signs of bleeding and colonoscopy on 9/6 with note of possible GI bleeding is coming proximal to the visualized right colon, perhaps from prior anastomosis given the temporal correlation with recent surgery s/p VCE       #Melena  #Acute blood loss anemia, hgb danisha 6.7 g/dL from 10 on admission, s/p pRBC 8/29, 8/31, & 9/1 x2  #PE on A/C (last dose Eliquis 9/1 @ 1300)  #Septic shock 2/2 UTI - resolved   #Respiratory failure - resolved, on RA   #Bladder cancer s/p cystectomy/IC creation on 8/6/2024  Reports relatively normal EGD/colonoscopy 2-3 years ago. EGD on 9/3 with no pathology to explain melena on upper endoscopy. Colonoscopy on 9/5 with moderate amount of black stool and blood clotspresent  throughout the visualized colon.The rectum, sigmoid colon, descending colon and transverse colon are normal. No evidence of GI bleeding source. No specimens collected.The proximal right colon was not examined. Suspect that GI bleeding is coming proximal to the  visualized right colon, perhaps from prior anastomosis   given the temporal correlation with recent surgery. VCE placed on 9/6 although with pt another episode of melena this morning with stable hgb at 10.6. Will post results of VCE once completed into the cahrt     Recommendations:  -Please keep NPO for now pending results of VCE   -Hold AC as able  -Trend CBC q 8 hours, transfuse for goal>8  -Maintain two large bore IV, active T&S    All recommendations are tentative until note is attested by attending.

## 2024-09-07 NOTE — PROGRESS NOTE ADULT - PROBLEM SELECTOR PLAN 2
Etiology is likely multifactorial, GI bleed  - Pt is on Midodrine 10 mg po tid, wean as able   - blood transfusion as needed  - Monitor BP

## 2024-09-07 NOTE — PROGRESS NOTE ADULT - SUBJECTIVE AND OBJECTIVE BOX
Gastroenterology Progress Note    Interval Events:   Pt with another episode of melena this AM   Otherwise without any ongoing nausea, vomiting or abd pain     Allergies:  No Known Allergies      Hospital Medications:  acetaminophen     Tablet .. 975 milliGRAM(s) Oral every 6 hours PRN  atorvastatin 40 milliGRAM(s) Oral at bedtime  cycloSPORINE (RESTASIS) 0.05% Emulsion 1 Drop(s) Both EYES two times a day  dextrose 5% + sodium chloride 0.45%. 1000 milliLiter(s) IV Continuous <Continuous>  dextrose 5%. 1000 milliLiter(s) IV Continuous <Continuous>  dextrose 5%. 1000 milliLiter(s) IV Continuous <Continuous>  dextrose 50% Injectable 25 Gram(s) IV Push once  dextrose 50% Injectable 12.5 Gram(s) IV Push once  gabapentin 100 milliGRAM(s) Oral three times a day PRN  glucagon  Injectable 1 milliGRAM(s) IntraMuscular once  heparin   Injectable 5000 Unit(s) SubCutaneous every 12 hours  levothyroxine 100 MICROGram(s) Oral daily  melatonin 3 milliGRAM(s) Oral at bedtime PRN  midodrine. 10 milliGRAM(s) Oral every 8 hours  pantoprazole  Injectable 40 milliGRAM(s) IV Push daily  polyethylene glycol 3350 17 Gram(s) Oral daily      ROS: 14 point ROS negative unless otherwise state in subjective    PHYSICAL EXAM:   Vital Signs:  Vital Signs Last 24 Hrs  T(C): 36.8 (07 Sep 2024 01:40), Max: 36.9 (06 Sep 2024 09:48)  T(F): 98.3 (07 Sep 2024 01:40), Max: 98.5 (06 Sep 2024 13:09)  HR: 76 (07 Sep 2024 01:40) (73 - 84)  BP: 120/61 (07 Sep 2024 01:40) (107/53 - 123/67)  BP(mean): --  RR: 18 (07 Sep 2024 01:40) (18 - 18)  SpO2: 100% (07 Sep 2024 01:40) (96% - 100%)    Parameters below as of 07 Sep 2024 01:40  Patient On (Oxygen Delivery Method): room air      Daily     Daily     GENERAL:  No acute distress  HEENT:  NCAT, no scleral icterus  CHEST: no resp distress  HEART:  RRR  ABDOMEN:  Soft, non-tender, non-distended, normoactive bowel sounds  NEURO:  Alert and oriented x 3    LABS:                        10.6   10.72 )-----------( 394      ( 07 Sep 2024 06:15 )             31.6     Mean Cell Volume: 91.6 fL (09-07-24 @ 06:15)    09-06    143  |  112<H>  |  22  ----------------------------<  99  3.7   |  18<L>  |  1.33<H>    Ca    8.9      06 Sep 2024 05:45  Phos  3.4     09-06  Mg     1.60     09-06          Urinalysis Basic - ( 06 Sep 2024 05:45 )    Color: x / Appearance: x / SG: x / pH: x  Gluc: 99 mg/dL / Ketone: x  / Bili: x / Urobili: x   Blood: x / Protein: x / Nitrite: x   Leuk Esterase: x / RBC: x / WBC x   Sq Epi: x / Non Sq Epi: x / Bacteria: x      Imaging:    < from: Colonoscopy (09.05.24 @ 09:16) >                                                                                   Impression:          - Preparation of the colon was poor. There was a                        moderate amount of black stool and blood clotspresent                        throughout the visualized colon.                       - The rectum, sigmoid colon, descending colon and                        transverse colon are normal. No evidence of GI bleeding                        source. No specimens collected.                       -The proximal right colon was not examined.  Recommendation:      Suspect that GI bleeding is coming proximal to the                        visualized right colon, perhaps from prior anastomosis           given the temporal correlation with recent surgery.                        Consider video capsule endoscopy to confirm.    < end of copied text >  < from: Upper Endoscopy (09.03.24 @ 13:21) >  Findings:       The examined esophagus was normal.       J-shaped stomach. The entire examined stomach was otherwise normal.       The duodenal bulb and second portion of the duodenum were normal.                                                Impression:          - No pathology to explain melena on upper endoscopy.                       - Normal esophagus.                       - J-shaped stomach. Otherwise, normal stomach.    - Normal duodenal bulb and second portion of the                        duodenum.                       - No specimens collected.  Recommendation:      - Perform a colonoscopy tomorrow.                       - Clear liquid diet today. NPO after midnight                       - Can lower PPI to 40mg daily.                       - Trend H/H and transfuse as needed    < end of copied text >

## 2024-09-07 NOTE — CHART NOTE - NSCHARTNOTEFT_GEN_A_CORE
VCE Results:    Procedure Information and Findings:  First Gastric Image:00:19:30  First Duodenal Image:02:13:52  First Illeocecal image 07:48:59    Specks of old blood seen at 02:41:23 in addition to 02:46:08 likely related to prior procedural trauma with no active bleeding    Presence of dark red blood seen at 07:50:08 although given amount of  blood and clots unable to visualize source of bleeding       Summary and Recommendations:  Consider repeat colonoscopy on Monday 9/8/2024 with extended bowel prep to better identify source of bleeding for possible treatment.    Recommendations:  -Ok for CLD today->will disuses with advanced GI team role for repeat colonoscopy retrograde baloon endoscopy with extended two day\  -Hold AC if no absolute CI   -Trend CBC BID and transufse to keep hgb>8      Rafael Palacio, PGY-6  Gastroenterology/Hepatology Fellow  Available on [employer] Teams   [phone] (Long Range Pager)  [phone] (Short Range Pager [facility])    After 5pm, please contact the on-call GI fellow for any urgent issues via the Hospital Call . VCE Results:    Procedure Information and Findings:  First Gastric Image:00:19:30  First Duodenal Image:02:13:52  First Illeocecal image 07:48:59    Specks of old blood seen at 02:41:23 in addition to 02:46:08 likely related to prior procedural trauma with no active bleeding    Presence of dark red blood seen at 07:50:08 although given amount of  blood and clots unable to visualize source of bleeding       Summary and Recommendations:  Consider repeat colonoscopy on Monday 9/8/2024 with extended bowel prep to better identify source of bleeding for possible treatment.    Recommendations:  -Ok for CLD today->will plan for repeat colonoscopy vs. retrograde baboon endoscopy on Monday with extended two day bowel prep  -If hgb down trend would obtain CTA to asses for any lesions amenable for IR guided intervention  -Hold AC if no absolute CI   -Trend CBC BID and transfuse to keep hgb>8      Rafael Palacio, PGY-6  Gastroenterology/Hepatology Fellow  Available on [employer] Teams   [phone] (Long Range Pager)  [phone] (Short Range Pager [facility])    After 5pm, please contact the on-call GI fellow for any urgent issues via the Hospital Call .

## 2024-09-07 NOTE — PROGRESS NOTE ADULT - SUBJECTIVE AND OBJECTIVE BOX
[facility] Division of Hospital Medicine  Farhad Dailey MD  Contact via Microsoft Teams (Mon-Fri 8AM-4PM)  Otherwise: contact Hospitalist in Charge at [phone]    Patient is a 68y old  Female who presents with a chief complaint of Sepsis s/p cystectomy (07 Sep 2024 08:10)    SUBJECTIVE / OVERNIGHT EVENTS:  Patient offers no new complaints. Still concerned with dark stools. Son by the bedside.  No F/C, N/V, CP, SOB, Cough, lightheadedness, dizziness, abdominal pain, diarrhea, dysuria.    MEDICATIONS  (STANDING):  atorvastatin 40 milliGRAM(s) Oral at bedtime  cycloSPORINE (RESTASIS) 0.05% Emulsion 1 Drop(s) Both EYES two times a day  dextrose 5% + sodium chloride 0.45%. 1000 milliLiter(s) (75 mL/Hr) IV Continuous <Continuous>  dextrose 5%. 1000 milliLiter(s) (50 mL/Hr) IV Continuous <Continuous>  dextrose 5%. 1000 milliLiter(s) (100 mL/Hr) IV Continuous <Continuous>  dextrose 50% Injectable 25 Gram(s) IV Push once  dextrose 50% Injectable 12.5 Gram(s) IV Push once  glucagon  Injectable 1 milliGRAM(s) IntraMuscular once  heparin   Injectable 5000 Unit(s) SubCutaneous every 12 hours  levothyroxine 100 MICROGram(s) Oral daily  midodrine. 10 milliGRAM(s) Oral every 8 hours  pantoprazole  Injectable 40 milliGRAM(s) IV Push daily  polyethylene glycol 3350 17 Gram(s) Oral daily  polyethylene glycol/electrolyte Solution. 4000 milliLiter(s) Oral once    MEDICATIONS  (PRN):  acetaminophen     Tablet .. 975 milliGRAM(s) Oral every 6 hours PRN Mild Pain (1 - 3)  gabapentin 100 milliGRAM(s) Oral three times a day PRN pain  melatonin 3 milliGRAM(s) Oral at bedtime PRN Insomnia      Vital Signs Last 24 Hrs  T(C): 36.8 (07 Sep 2024 14:11), Max: 36.8 (06 Sep 2024 22:08)  T(F): 98.3 (07 Sep 2024 14:11), Max: 98.3 (07 Sep 2024 01:40)  HR: 80 (07 Sep 2024 14:11) (76 - 789)  BP: 113/55 (07 Sep 2024 14:11) (113/55 - 120/61)  BP(mean): --  RR: 18 (07 Sep 2024 14:11) (16 - 18)  SpO2: 100% (07 Sep 2024 14:11) (96% - 100%)    Parameters below as of 07 Sep 2024 14:11  Patient On (Oxygen Delivery Method): room air      CAPILLARY BLOOD GLUCOSE        I&O's Summary    06 Sep 2024 07:01  -  07 Sep 2024 07:00  --------------------------------------------------------  IN: 0 mL / OUT: 1000 mL / NET: -1000 mL    07 Sep 2024 07:01  -  07 Sep 2024 14:48  --------------------------------------------------------  IN: 0 mL / OUT: 500 mL / NET: -500 mL        PHYSICAL EXAM:  CONSTITUTIONAL: NAD, well-developed, well-groomed  EYES: Conjunctiva and sclera clear  ENMT: Moist oral mucosa  RESPIRATORY: Normal respiratory effort; lungs are clear to auscultation bilaterally; No wheezes or rales  CARDIOVASCULAR: Regular rate and rhythm; Normal S1 and S2; No murmurs, rubs, or gallops; No lower extremity edema  ABDOMEN: Soft, Nontender, Nondistended; Bowel sounds present; ileal conduit   MUSCULOSKELETAL:  No clubbing or cyanosis of digits; No joint swelling or tenderness to palpation  PSYCH: AAOx3 (oriented to person, place, and time); affect appropriate    LABS:                        10.6   10.72 )-----------( 394      ( 07 Sep 2024 06:15 )             31.6     09-07    139  |  109<H>  |  17  ----------------------------<  102<H>  3.7   |  17<L>  |  1.38<H>    Ca    8.9      07 Sep 2024 06:15  Phos  3.2     09-07  Mg     2.00     09-07            Urinalysis Basic - ( 07 Sep 2024 06:15 )    Color: x / Appearance: x / SG: x / pH: x  Gluc: 102 mg/dL / Ketone: x  / Bili: x / Urobili: x   Blood: x / Protein: x / Nitrite: x   Leuk Esterase: x / RBC: x / WBC x   Sq Epi: x / Non Sq Epi: x / Bacteria: x        RADIOLOGY & ADDITIONAL TESTS:    Imaging Personally Reviewed:    Care Discussed with Consultants/Other Providers:

## 2024-09-07 NOTE — PROGRESS NOTE ADULT - ASSESSMENT
68F HTN, DM2, CKD3, DENITA-CPAP, GERD, EtOH abuse, Bladder CA s/p Cystectomy/IC creation on 8/6 @ [facility] p/w septic shock from Pyelonephritis, requiring SICU for pressors, AHRF s/p intubation, BENIGNO requring CRRT, c/f PE off A/C d/t acute blood loss anemia, s/p 10u PRBC transfusion total (last on 9/5) c/f GI bleed proximal to Rt colon, ATN on CKD.

## 2024-09-07 NOTE — PROGRESS NOTE ADULT - ATTENDING COMMENTS
GI consulted for melena and anemia.   Pending VCE today.   Further recs to follow pending review of study.

## 2024-09-07 NOTE — PROGRESS NOTE ADULT - SUBJECTIVE AND OBJECTIVE BOX
Overnight events:  One dark/black BM overnight, IC appliance leaked, new one reapplied    Subjective:  Pt offers no complaints, tolerating diet    Objective:    Vital signs  T(C): , Max: 36.9 (09-06-24 @ 09:48)  HR: 76 (09-07-24 @ 07:42)  BP: 116/68 (09-07-24 @ 05:13)  SpO2: 99% (09-07-24 @ 07:42)  Wt(kg): --    Output   IC: 300+  09-06 @ 07:01  -  09-07 @ 07:00  --------------------------------------------------------  IN: 0 mL / OUT: 1000 mL / NET: -1000 mL        Gen: NAD  Abd: stoma pink, soft, nontender      Labs                        10.6   10.72 )-----------( 394      ( 07 Sep 2024 06:15 )             31.6     07 Sep 2024 06:15    139    |  109    |  17     ----------------------------<  102    3.7     |  17     |  1.38     Ca    8.9        07 Sep 2024 06:15  Phos  3.2       07 Sep 2024 06:15  Mg     2.00      07 Sep 2024 06:15

## 2024-09-08 LAB
ANION GAP SERPL CALC-SCNC: 14 MMOL/L — SIGNIFICANT CHANGE UP (ref 7–14)
BUN SERPL-MCNC: 12 MG/DL — SIGNIFICANT CHANGE UP (ref 7–23)
CALCIUM SERPL-MCNC: 9.1 MG/DL — SIGNIFICANT CHANGE UP (ref 8.4–10.5)
CHLORIDE SERPL-SCNC: 108 MMOL/L — HIGH (ref 98–107)
CO2 SERPL-SCNC: 19 MMOL/L — LOW (ref 22–31)
CREAT SERPL-MCNC: 1.46 MG/DL — HIGH (ref 0.5–1.3)
EGFR: 39 ML/MIN/1.73M2 — LOW
GLUCOSE SERPL-MCNC: 96 MG/DL — SIGNIFICANT CHANGE UP (ref 70–99)
HCT VFR BLD CALC: 31.8 % — LOW (ref 34.5–45)
HGB BLD-MCNC: 10.2 G/DL — LOW (ref 11.5–15.5)
MAGNESIUM SERPL-MCNC: 1.8 MG/DL — SIGNIFICANT CHANGE UP (ref 1.6–2.6)
MCHC RBC-ENTMCNC: 30.3 PG — SIGNIFICANT CHANGE UP (ref 27–34)
MCHC RBC-ENTMCNC: 32.1 GM/DL — SIGNIFICANT CHANGE UP (ref 32–36)
MCV RBC AUTO: 94.4 FL — SIGNIFICANT CHANGE UP (ref 80–100)
NRBC # BLD: 0 /100 WBCS — SIGNIFICANT CHANGE UP (ref 0–0)
NRBC # FLD: 0 K/UL — SIGNIFICANT CHANGE UP (ref 0–0)
PHOSPHATE SERPL-MCNC: 3 MG/DL — SIGNIFICANT CHANGE UP (ref 2.5–4.5)
PLATELET # BLD AUTO: 400 K/UL — SIGNIFICANT CHANGE UP (ref 150–400)
POTASSIUM SERPL-MCNC: 3.1 MMOL/L — LOW (ref 3.5–5.3)
POTASSIUM SERPL-SCNC: 3.1 MMOL/L — LOW (ref 3.5–5.3)
RBC # BLD: 3.37 M/UL — LOW (ref 3.8–5.2)
RBC # FLD: 21.6 % — HIGH (ref 10.3–14.5)
SODIUM SERPL-SCNC: 141 MMOL/L — SIGNIFICANT CHANGE UP (ref 135–145)
WBC # BLD: 7.94 K/UL — SIGNIFICANT CHANGE UP (ref 3.8–10.5)
WBC # FLD AUTO: 7.94 K/UL — SIGNIFICANT CHANGE UP (ref 3.8–10.5)

## 2024-09-08 PROCEDURE — 99232 SBSQ HOSP IP/OBS MODERATE 35: CPT

## 2024-09-08 PROCEDURE — 99231 SBSQ HOSP IP/OBS SF/LOW 25: CPT

## 2024-09-08 PROCEDURE — 99232 SBSQ HOSP IP/OBS MODERATE 35: CPT | Mod: GC

## 2024-09-08 RX ORDER — POLYETHYLENE GLYCOL 3350, SODIUM SULFATE ANHYDROUS, SODIUM BICARBONATE, SODIUM CHLORIDE, POTASSIUM CHLORIDE 236; 22.74; 6.74; 5.86; 2.97 G/4L; G/4L; G/4L; G/4L; G/4L
4000 POWDER, FOR SOLUTION ORAL ONCE
Refills: 0 | Status: COMPLETED | OUTPATIENT
Start: 2024-09-08 | End: 2024-09-08

## 2024-09-08 RX ORDER — POTASSIUM CHLORIDE 10 MEQ
40 TABLET, EXT RELEASE, PARTICLES/CRYSTALS ORAL ONCE
Refills: 0 | Status: COMPLETED | OUTPATIENT
Start: 2024-09-08 | End: 2024-09-08

## 2024-09-08 RX ORDER — POTASSIUM CHLORIDE 10 MEQ
10 TABLET, EXT RELEASE, PARTICLES/CRYSTALS ORAL
Refills: 0 | Status: COMPLETED | OUTPATIENT
Start: 2024-09-08 | End: 2024-09-08

## 2024-09-08 RX ADMIN — Medication 1 DROP(S): at 17:10

## 2024-09-08 RX ADMIN — MIDODRINE HYDROCHLORIDE 10 MILLIGRAM(S): 5 TABLET ORAL at 05:18

## 2024-09-08 RX ADMIN — POLYETHYLENE GLYCOL 3350 17 GRAM(S): 17 POWDER, FOR SOLUTION ORAL at 11:04

## 2024-09-08 RX ADMIN — MIDODRINE HYDROCHLORIDE 10 MILLIGRAM(S): 5 TABLET ORAL at 13:01

## 2024-09-08 RX ADMIN — MIDODRINE HYDROCHLORIDE 10 MILLIGRAM(S): 5 TABLET ORAL at 21:33

## 2024-09-08 RX ADMIN — POLYETHYLENE GLYCOL 3350, SODIUM SULFATE ANHYDROUS, SODIUM BICARBONATE, SODIUM CHLORIDE, POTASSIUM CHLORIDE 4000 MILLILITER(S): 236; 22.74; 6.74; 5.86; 2.97 POWDER, FOR SOLUTION ORAL at 19:48

## 2024-09-08 RX ADMIN — Medication 1 DROP(S): at 05:18

## 2024-09-08 RX ADMIN — Medication 100 MILLIEQUIVALENT(S): at 11:04

## 2024-09-08 RX ADMIN — Medication 100 MILLIEQUIVALENT(S): at 12:08

## 2024-09-08 RX ADMIN — Medication 40 MILLIEQUIVALENT(S): at 21:34

## 2024-09-08 RX ADMIN — Medication 100 MICROGRAM(S): at 05:18

## 2024-09-08 RX ADMIN — Medication 5000 UNIT(S): at 17:10

## 2024-09-08 RX ADMIN — Medication 5000 UNIT(S): at 05:18

## 2024-09-08 RX ADMIN — Medication 40 MILLIGRAM(S): at 11:04

## 2024-09-08 RX ADMIN — Medication 40 MILLIGRAM(S): at 21:33

## 2024-09-08 RX ADMIN — Medication 100 MILLIEQUIVALENT(S): at 10:09

## 2024-09-08 NOTE — PROGRESS NOTE ADULT - SUBJECTIVE AND OBJECTIVE BOX
Gastroenterology Progress Note    Interval Events:   -Pt overall feeling tired   -Only able to take small amount of bowel prep with smear of BM since yesterday AM with overall stable hgb at 10       Allergies:  No Known Allergies      Hospital Medications:  acetaminophen     Tablet .. 975 milliGRAM(s) Oral every 6 hours PRN  atorvastatin 40 milliGRAM(s) Oral at bedtime  cycloSPORINE (RESTASIS) 0.05% Emulsion 1 Drop(s) Both EYES two times a day  dextrose 5% + sodium chloride 0.45%. 1000 milliLiter(s) IV Continuous <Continuous>  dextrose 5%. 1000 milliLiter(s) IV Continuous <Continuous>  dextrose 5%. 1000 milliLiter(s) IV Continuous <Continuous>  dextrose 50% Injectable 25 Gram(s) IV Push once  dextrose 50% Injectable 12.5 Gram(s) IV Push once  gabapentin 100 milliGRAM(s) Oral three times a day PRN  glucagon  Injectable 1 milliGRAM(s) IntraMuscular once  heparin   Injectable 5000 Unit(s) SubCutaneous every 12 hours  levothyroxine 100 MICROGram(s) Oral daily  melatonin 3 milliGRAM(s) Oral at bedtime PRN  midodrine. 10 milliGRAM(s) Oral every 8 hours  pantoprazole  Injectable 40 milliGRAM(s) IV Push daily  polyethylene glycol 3350 17 Gram(s) Oral daily      ROS: 14 point ROS negative unless otherwise state in subjective    PHYSICAL EXAM:   Vital Signs:  Vital Signs Last 24 Hrs  T(C): 37.4 (08 Sep 2024 05:20), Max: 37.4 (08 Sep 2024 05:20)  T(F): 99.4 (08 Sep 2024 05:20), Max: 99.4 (08 Sep 2024 05:20)  HR: 75 (08 Sep 2024 08:52) (75 - 789)  BP: 111/56 (08 Sep 2024 05:20) (95/48 - 116/50)  BP(mean): --  RR: 16 (08 Sep 2024 05:20) (16 - 18)  SpO2: 99% (08 Sep 2024 08:52) (98% - 100%)    Parameters below as of 08 Sep 2024 05:20  Patient On (Oxygen Delivery Method): room air      Daily     Daily Weight in k.4 (08 Sep 2024 02:15)    GENERAL:  No acute distress  HEENT:  NCAT, no scleral icterus  CHEST: no resp distress  HEART:  RRR  ABDOMEN:  Soft, non-tender, non-distended, normoactive bowel sounds  NEURO:  Alert and oriented x 3    LABS:                        10.2   7.94  )-----------( 400      ( 08 Sep 2024 05:39 )             31.8     Mean Cell Volume: 94.4 fL (24 @ 05:39)        141  |  108<H>  |  12  ----------------------------<  96  3.1<L>   |  19<L>  |  1.46<H>    Ca    9.1      08 Sep 2024 05:39  Phos  3.0       Mg     1.80               Urinalysis Basic - ( 08 Sep 2024 05:39 )    Color: x / Appearance: x / SG: x / pH: x  Gluc: 96 mg/dL / Ketone: x  / Bili: x / Urobili: x   Blood: x / Protein: x / Nitrite: x   Leuk Esterase: x / RBC: x / WBC x   Sq Epi: x / Non Sq Epi: x / Bacteria: x      Imaging:    < from: Colonoscopy (24 @ 09:16) >                                                                                   Impression:          - Preparation of the colon was poor. There was a                        moderate amount of black stool and blood clotspresent                        throughout the visualized colon.                       - The rectum, sigmoid colon, descending colon and                        transverse colon are normal. No evidence of GI bleeding                        source. No specimens collected.                       -The proximal right colon was not examined.  Recommendation:      Suspect that GI bleeding is coming proximal to the                        visualized right colon, perhaps from prior anastomosis           given the temporal correlation with recent surgery.                        Consider video capsule endoscopy to confirm.    < end of copied text >  < from: Upper Endoscopy (24 @ 13:21) >  Findings:       The examined esophagus was normal.       J-shaped stomach. The entire examined stomach was otherwise normal.       The duodenal bulb and second portion of the duodenum were normal.                                                Impression:          - No pathology to explain melena on upper endoscopy.                       - Normal esophagus.                       - J-shaped stomach. Otherwise, normal stomach.    - Normal duodenal bulb and second portion of the                        duodenum.                       - No specimens collected.  Recommendation:      - Perform a colonoscopy tomorrow.                       - Clear liquid diet today. NPO after midnight                       - Can lower PPI to 40mg daily.                       - Trend H/H and transfuse as needed    < end of copied text >    VCE Results:    Procedure Information and Findings:  First Gastric Image:00:19:30  First Duodenal Image:02:13:52  First Illeocecal image 07:48:59    Specks of old blood seen at 02:41:23 in addition to 02:46:08 likely related to prior procedural trauma with no active bleeding    Presence of dark red blood seen at 07:50:08 although given amount of  blood and clots unable to visualize source of bleeding       Summary and Recommendations:  Consider repeat colonoscopy on 2024 with extended bowel prep to better identify source of bleeding for possible treatment.

## 2024-09-08 NOTE — PROGRESS NOTE ADULT - SUBJECTIVE AND OBJECTIVE BOX
Subjective  Denies fevers, chills, nausea, vomiting, SOB, CP. Tolerating diet. patient having BM's most recent one was like brown stool with a little bit of blood. patient has complaints of lower abdominal pain but otherwise doing well.     Objective    Vital signs  T(F): , Max: 99.4 (09-08-24 @ 05:20)  HR: 75 (09-08-24 @ 08:52)  BP: 111/56 (09-08-24 @ 05:20)  SpO2: 99% (09-08-24 @ 08:52)  Wt(kg): --    Output     OUT:    Urostomy (mL): 1150 mL  Total OUT: 1150 mL    Total NET: -1150 mL          Gen: NAD  Abd: soft, nontender, nondistended  : IC draining cyu.     Labs      09-08 @ 05:39    WBC 7.94  / Hct 31.8  / SCr 1.46     09-07 @ 16:33    WBC 10.37 / Hct 29.2  / SCr --             Urine Cx: ?  Blood Cx: ?    Imaging

## 2024-09-08 NOTE — PROGRESS NOTE ADULT - SUBJECTIVE AND OBJECTIVE BOX
[facility] Division of Hospital Medicine  Farhad Dailey MD  Contact via [employer] Teams (Mon-Fri 8AM-4PM)  Otherwise: contact Hospitalist in Charge at [phone]    Patient is a 68y old  Female who presents with a chief complaint of Sepsis s/p cystectomy (08 Sep 2024 09:53)    SUBJECTIVE / OVERNIGHT EVENTS:  Patient offers no new complaints. No F/C, N/V, CP, SOB, Cough, lightheadedness, dizziness, abdominal pain, diarrhea, dysuria.    MEDICATIONS  (STANDING):  atorvastatin 40 milliGRAM(s) Oral at bedtime  cycloSPORINE (RESTASIS) 0.05% Emulsion 1 Drop(s) Both EYES two times a day  dextrose 5% + sodium chloride 0.45%. 1000 milliLiter(s) (75 mL/Hr) IV Continuous <Continuous>  dextrose 5%. 1000 milliLiter(s) (50 mL/Hr) IV Continuous <Continuous>  dextrose 5%. 1000 milliLiter(s) (100 mL/Hr) IV Continuous <Continuous>  dextrose 50% Injectable 25 Gram(s) IV Push once  dextrose 50% Injectable 12.5 Gram(s) IV Push once  glucagon  Injectable 1 milliGRAM(s) IntraMuscular once  heparin   Injectable 5000 Unit(s) SubCutaneous every 12 hours  levothyroxine 100 MICROGram(s) Oral daily  midodrine. 10 milliGRAM(s) Oral every 8 hours  pantoprazole  Injectable 40 milliGRAM(s) IV Push daily  polyethylene glycol 3350 17 Gram(s) Oral daily  polyethylene glycol/electrolyte Solution. 4000 milliLiter(s) Oral once    MEDICATIONS  (PRN):  acetaminophen     Tablet .. 975 milliGRAM(s) Oral every 6 hours PRN Mild Pain (1 - 3)  gabapentin 100 milliGRAM(s) Oral three times a day PRN pain  melatonin 3 milliGRAM(s) Oral at bedtime PRN Insomnia      Vital Signs Last 24 Hrs  T(C): 36.8 (08 Sep 2024 10:19), Max: 37.4 (08 Sep 2024 05:20)  T(F): 98.2 (08 Sep 2024 10:19), Max: 99.4 (08 Sep 2024 05:20)  HR: 76 (08 Sep 2024 12:20) (75 - 95)  BP: 119/57 (08 Sep 2024 10:19) (95/48 - 119/57)  BP(mean): --  RR: 18 (08 Sep 2024 10:19) (16 - 18)  SpO2: 99% (08 Sep 2024 12:20) (98% - 100%)    Parameters below as of 08 Sep 2024 10:19  Patient On (Oxygen Delivery Method): room air      CAPILLARY BLOOD GLUCOSE        I&O's Summary    07 Sep 2024 07:01  -  08 Sep 2024 07:00  --------------------------------------------------------  IN: 0 mL / OUT: 1150 mL / NET: -1150 mL    08 Sep 2024 07:01  -  08 Sep 2024 12:40  --------------------------------------------------------  IN: 0 mL / OUT: 175 mL / NET: -175 mL        PHYSICAL EXAM:  CONSTITUTIONAL: NAD, well-developed, well-groomed  EYES: Conjunctiva and sclera clear  ENMT: Moist oral mucosa  RESPIRATORY: Normal respiratory effort; lungs are clear to auscultation bilaterally; No wheezes or rales  CARDIOVASCULAR: Regular rate and rhythm; Normal S1 and S2; No murmurs, rubs, or gallops; No lower extremity edema  ABDOMEN: Soft, Nontender, Nondistended; Bowel sounds present; ileal conduit   MUSCULOSKELETAL:  No clubbing or cyanosis of digits; No joint swelling or tenderness to palpation  PSYCH: AAOx3 (oriented to person, place, and time); affect appropriate      LABS:                        10.2   7.94  )-----------( 400      ( 08 Sep 2024 05:39 )             31.8     09-08    141  |  108<H>  |  12  ----------------------------<  96  3.1<L>   |  19<L>  |  1.46<H>    Ca    9.1      08 Sep 2024 05:39  Phos  3.0     09-08  Mg     1.80     09-08            Urinalysis Basic - ( 08 Sep 2024 05:39 )    Color: x / Appearance: x / SG: x / pH: x  Gluc: 96 mg/dL / Ketone: x  / Bili: x / Urobili: x   Blood: x / Protein: x / Nitrite: x   Leuk Esterase: x / RBC: x / WBC x   Sq Epi: x / Non Sq Epi: x / Bacteria: x        RADIOLOGY & ADDITIONAL TESTS:    Imaging Personally Reviewed:    Care Discussed with Consultants/Other Providers:

## 2024-09-08 NOTE — PROGRESS NOTE ADULT - ASSESSMENT
Ms. Lewis is a 68 year old female with HTN, T2DM, DENITA, GERD (on PPI daily) and bladder CA s/p cystectomy/IC creation on 8/6/2024 at [facility] who was admitted to the SICU on 8/16 with septic shock 2/2 UTI c/b respiratory failure (intubated 8/21-8/24), renal failure s/p CRRT, PE (stared on heparin gtt 8/21 and transitioned to Eliquis) and now acute blood loss anemia with liquid black stools s/p EGDwith no signs of bleeding and colonoscopy on 9/6 with note of possible GI bleeding is coming proximal to the visualized right colon, perhaps from prior anastomosis given the temporal correlation with recent surgery s/p VCE       #Melena  #Acute blood loss anemia, hgb danisha 6.7 g/dL from 10 on admission, s/p pRBC 8/29, 8/31, & 9/1 x2  #PE on A/C (last dose Eliquis 9/1 @ 1300)  #Septic shock 2/2 UTI - resolved   #Respiratory failure - resolved, on RA   #Bladder cancer s/p cystectomy/IC creation on 8/6/2024  Reports relatively normal EGD/colonoscopy 2-3 years ago. EGD on 9/3 with no pathology to explain melena on upper endoscopy. Colonoscopy on 9/5 with moderate amount of black stool and blood clotspresent  throughout the visualized colon.The rectum, sigmoid colon, descending colon and transverse colon are normal. No evidence of GI bleeding source. No specimens collected.The proximal right colon was not examined. Suspect that GI bleeding is coming proximal to the  visualized right colon, perhaps from prior anastomosis   given the temporal correlation with recent surgery. VCE placed on 9/6 with presence of dark red blood seen at 07:50:08 although given amount of  blood and clots unable to visualize source of bleeding. No further signs of overt bleeding since yesterdy AM with overall stable hgb. Will give bowel prep today and pending clinical course and hgb trend may consider repeat colonoscopy on Monday 9/9/2024    Recommendations:  -Ok for CLD today, please keep NPO after MN for possible repeat colonoscopy on Monday 9/9/2024  -Bowel prep to be ordered by GI fellow   -Hold AC as able  -Trend CBC q 8 hours, transfuse for goal>8. Monitor for overt signs of bleeding   -Maintain two large bore IV, active T&S    All recommendations are tentative until note is attested by attending.

## 2024-09-08 NOTE — PROGRESS NOTE ADULT - ASSESSMENT
69 yo F h/o HTN and bladder CA, s/p cystectomy/IC creation on 8/6/2024 at [facility] with Dr. Thompson (d/c on 8/10) presented to ED 8/16 w/ generalized weakness and some lower abdominal pain. Since d/c, only have 1-2 bowel movement, and had significantly decrease PO intake due to decreased appetite, intermittent fevers/chills, +bloody discharge from urethra, some burning. Pt stated stents fell out yesterday.  BP 80s/50s for EMS.  Pt remained hypotensive in ED, started on pressors, cultures sent, placed on zosyn and BiPAP and admitted to SICU. CT revealed: Expected postoperative changes status post cystectomy with ileal conduit creation including small amount of complex fluid and free air in the pelvis. No organized fluid collection. Distended ileal conduit with narrowing at the exiting ostomy site and proximally at the site of ureteral insertion. Mild bilateral hydroureteronephrosis with delayed nephrograms. No significant perinephric stranding.    8/16: still requiring pressor support and on BiPAP, pt states she feels better,14fr catheter placed in stoma, Bcx with GPC/GNR, Cr to 4.42 from 5.34, abx changed to leila and diflucan  8/17: still requiring pressor support and supplement oxygen but feels well, decr UO overnight, given albumin and IVF restarted, Cr up slightly to 4.68, electrolytes normal  8/18: RBUS done, no signs of hydro. IR consulted, no need for NTs as no hydro. UOP still very low (overnight 10 cc/hour), given 2 boluses with no improvement. Cr up to 4.95 from 4.68. CT loopogram of conduit pending today to evaluate for leak. still requiring pressors. Ucx from conduit now growing e.coli, sensitivities pending, PT recs rehab   8/19: CT loopogram w/ no anastamotic leak. Cr continues to rise w/ low UOP. UA on admission w/ coarse granular cast. Constellation of signs and symptoms suggestive of Acute tubular necrosis  8/20: Urine culture w/ e.coli sensitivities w/ resistance to floroquinolones. Pt back on abx. Pt w/ slightly increased urine output  8/21: Pt with acute onset of tachypnea, and hypoxia requiring intubation and sedation, CT w/ probably PE, hep gtt started. Nephrology consult obtained and started on CRRT, meropenem restarted  8/22: still intubated and sedated, pressor requirement decreasing, UO increasing, PTT remains supratherapeutic, hep gtt held for one hour this AM, on tube feeds, febrile to 100.9 at 4am  8/23: changed TF as per nutrition recs to start tomorrow AM, check w/ dietician, PTT therapeutic x1 --> repeat PTT at 4:45 AM 50.7, hep gtt @4ml/hr, tolerating CPAP. awake, UO still only 110/shift, still requiring pressor support, CRRT  8//24: extubated, now on RA, still requiring CRRT, /shift and small amount of pressor support, Procal >100, repeat Bcx and Ucx ordered, leila d/c, vanc x 2, then d/c now on zosyn, passed beside S&S now on reg diet, pt offers no complaints, + flatus/BM, will do bedside pelvic to evaluate for retained material  8/25: CRRT, UO less overnight, zosyn, tolerating reg diet, blood tinged vaginal discharge   8/26: UOP 39 overnight, on zosyn and vanc, tolerating reg diet. poss transfer to [facility] today - SICU to discuss on rounds.   8/27: UOP increased to 310 after dose of lasix. off pressors, still getting midodrine.   8/28: Got more lasix and albumin overnight with OP of 642, still down from 1.5 yesterday during the day. Cr scott from 1.98 to 2.77. Will suggest RBUS. Bcx NGTD at 4 days. Off vanc today, still on zosyn.   8/29: stable, midodrine increased,  overnight, still with some blood from vagina (no pads documented) RBUS no hydro, Bcx neg final, IC Cx neg  8/30: stable, midodrine to bid, no OB pads documented, good UO yellow, was transfused 1u pRBC overnight with appropriate response, zosyn d/c, listed for floor  8/31: feels well, good UO, transferred to floor  9/1: still having some pain in joints - medicine consulted and will ask their recs (NSAIDs not feasible due to kidney function). Will also f/u medicine recs for continuing midodrine. Going to give another unit for suboptimal response ot u pRBCs given yesterday (Hgb 7.1 from 7.5)  9/2: Reports black stools. Received 2u pRBC yesterday with little response in H/H. No complaints; GI consulted  9/3 - no further stools; no c/o; received another unit blood yesterday; had EGD, normal study; colonoscopy tomorrow, eliquis d/c  9/4 - not able to drink all the golytely, but has some watery stool, transfused 2u pRBC for acute blood loss anemia, seen by ostomy specialist, taylor removed from stoma  9/5 - still trying to drink moviprep, watery BMs w/ some BRBPR, for colonoscopy today, per GI Colonoscopy performed without ability to get to the TI given sharp colonic turns, lots of old and bright red blood, recommend video capsule study 9/6 9/6 - still with watery BMs w/ some BRBPR, c/o weakness, decr appetite, for VCE today, hypomagnesemia, repleted magnesium, contacted Dr. Thompson who will accept transfer, pt wants to await results of VCE  9/7 - one dark BM this AM, Hbg to 10.6, pt otherwise stable, made NPO per GI note, awaiting VCE results  9/8 - Started golytelty with plan for colonscopy tomorrow, Hgb is 10.2 from 9.7, plan to continue BID cbc's      Plan:  - AM labs reviewed  - CLD today and NPO s/p midnight for colonoscopy tomorrow  - continue to hold eliquis  - strated golytelty today  - PT recs rehab  - f/u GI   - protonix decreased to daily per GI recs  - neph signed off

## 2024-09-08 NOTE — PROGRESS NOTE ADULT - ATTENDING COMMENTS
GI following for dark stools and acute post hemorrhagic anemia.   EGD/Colonoscopy as above, please see report for full details.   VCE completed yesterday, possible GI bleed identified in right colon.   Will plan for repeat colonoscopy with prep as above   monitor bowel movements  trend h/h, transfuse as needed   GI to follow, please call w questions

## 2024-09-08 NOTE — PROGRESS NOTE ADULT - ASSESSMENT
68F HTN, DM2, CKD3, DENITA-CPAP, GERD, EtOH abuse, Bladder CA s/p Cystectomy/IC creation on 8/6 @ [facility] p/w septic shock from Pyelonephritis, requiring SICU for pressors, AHRF s/p intubation, BENIGNO requring CRRT, c/f PE off A/C d/t acute blood loss anemia, s/p 10u PRBC transfusion total (last on 9/5) c/f GI bleed proximal to Rt colon, ATN on CKD.

## 2024-09-09 LAB
ANION GAP SERPL CALC-SCNC: 13 MMOL/L — SIGNIFICANT CHANGE UP (ref 7–14)
BUN SERPL-MCNC: 11 MG/DL — SIGNIFICANT CHANGE UP (ref 7–23)
CALCIUM SERPL-MCNC: 8.7 MG/DL — SIGNIFICANT CHANGE UP (ref 8.4–10.5)
CHLORIDE SERPL-SCNC: 108 MMOL/L — HIGH (ref 98–107)
CO2 SERPL-SCNC: 19 MMOL/L — LOW (ref 22–31)
CREAT SERPL-MCNC: 1.37 MG/DL — HIGH (ref 0.5–1.3)
EGFR: 42 ML/MIN/1.73M2 — LOW
GLUCOSE SERPL-MCNC: 95 MG/DL — SIGNIFICANT CHANGE UP (ref 70–99)
HCT VFR BLD CALC: 31.4 % — LOW (ref 34.5–45)
HGB BLD-MCNC: 10.2 G/DL — LOW (ref 11.5–15.5)
MCHC RBC-ENTMCNC: 31.2 PG — SIGNIFICANT CHANGE UP (ref 27–34)
MCHC RBC-ENTMCNC: 32.5 GM/DL — SIGNIFICANT CHANGE UP (ref 32–36)
MCV RBC AUTO: 96 FL — SIGNIFICANT CHANGE UP (ref 80–100)
NRBC # BLD: 0 /100 WBCS — SIGNIFICANT CHANGE UP (ref 0–0)
NRBC # FLD: 0 K/UL — SIGNIFICANT CHANGE UP (ref 0–0)
PLATELET # BLD AUTO: 429 K/UL — HIGH (ref 150–400)
POTASSIUM SERPL-MCNC: 4 MMOL/L — SIGNIFICANT CHANGE UP (ref 3.5–5.3)
POTASSIUM SERPL-SCNC: 4 MMOL/L — SIGNIFICANT CHANGE UP (ref 3.5–5.3)
RBC # BLD: 3.27 M/UL — LOW (ref 3.8–5.2)
RBC # FLD: 20.6 % — HIGH (ref 10.3–14.5)
SODIUM SERPL-SCNC: 140 MMOL/L — SIGNIFICANT CHANGE UP (ref 135–145)
WBC # BLD: 7.97 K/UL — SIGNIFICANT CHANGE UP (ref 3.8–10.5)
WBC # FLD AUTO: 7.97 K/UL — SIGNIFICANT CHANGE UP (ref 3.8–10.5)

## 2024-09-09 PROCEDURE — 99233 SBSQ HOSP IP/OBS HIGH 50: CPT

## 2024-09-09 PROCEDURE — 99233 SBSQ HOSP IP/OBS HIGH 50: CPT | Mod: GC

## 2024-09-09 RX ORDER — POLYETHYLENE GLYCOL 3350, SODIUM SULFATE ANHYDROUS, SODIUM BICARBONATE, SODIUM CHLORIDE, POTASSIUM CHLORIDE 236; 22.74; 6.74; 5.86; 2.97 G/4L; G/4L; G/4L; G/4L; G/4L
4000 POWDER, FOR SOLUTION ORAL ONCE
Refills: 0 | Status: COMPLETED | OUTPATIENT
Start: 2024-09-09 | End: 2024-09-09

## 2024-09-09 RX ORDER — POLYETHYLENE GLYCOL 3350, SODIUM SULFATE ANHYDROUS, SODIUM BICARBONATE, SODIUM CHLORIDE, POTASSIUM CHLORIDE 236; 22.74; 6.74; 5.86; 2.97 G/4L; G/4L; G/4L; G/4L; G/4L
2000 POWDER, FOR SOLUTION ORAL ONCE
Refills: 0 | Status: COMPLETED | OUTPATIENT
Start: 2024-09-09 | End: 2024-09-09

## 2024-09-09 RX ADMIN — Medication 40 MILLIGRAM(S): at 21:18

## 2024-09-09 RX ADMIN — Medication 100 MICROGRAM(S): at 05:39

## 2024-09-09 RX ADMIN — MIDODRINE HYDROCHLORIDE 10 MILLIGRAM(S): 5 TABLET ORAL at 05:39

## 2024-09-09 RX ADMIN — MIDODRINE HYDROCHLORIDE 10 MILLIGRAM(S): 5 TABLET ORAL at 14:40

## 2024-09-09 RX ADMIN — Medication 40 MILLIGRAM(S): at 11:01

## 2024-09-09 RX ADMIN — MIDODRINE HYDROCHLORIDE 10 MILLIGRAM(S): 5 TABLET ORAL at 21:18

## 2024-09-09 RX ADMIN — Medication 1 DROP(S): at 05:40

## 2024-09-09 RX ADMIN — POLYETHYLENE GLYCOL 3350, SODIUM SULFATE ANHYDROUS, SODIUM BICARBONATE, SODIUM CHLORIDE, POTASSIUM CHLORIDE 4000 MILLILITER(S): 236; 22.74; 6.74; 5.86; 2.97 POWDER, FOR SOLUTION ORAL at 21:28

## 2024-09-09 RX ADMIN — Medication 5000 UNIT(S): at 05:39

## 2024-09-09 NOTE — PROGRESS NOTE ADULT - ASSESSMENT
69 yo F h/o HTN and bladder CA, s/p cystectomy/IC creation on 8/6/2024 at [facility] with Dr. Thompson (d/c on 8/10) presented to ED 8/16 w/ generalized weakness and some lower abdominal pain. Since d/c, only have 1-2 bowel movement, and had significantly decrease PO intake due to decreased appetite, intermittent fevers/chills, +bloody discharge from urethra, some burning. Pt stated stents fell out yesterday.  BP 80s/50s for EMS.  Pt remained hypotensive in ED, started on pressors, cultures sent, placed on zosyn and BiPAP and admitted to SICU. CT revealed: Expected postoperative changes status post cystectomy with ileal conduit creation including small amount of complex fluid and free air in the pelvis. No organized fluid collection. Distended ileal conduit with narrowing at the exiting ostomy site and proximally at the site of ureteral insertion. Mild bilateral hydroureteronephrosis with delayed nephrograms. No significant perinephric stranding.    8/16: still requiring pressor support and on BiPAP, pt states she feels better,14fr catheter placed in stoma, Bcx with GPC/GNR, Cr to 4.42 from 5.34, abx changed to leila and diflucan  8/17: still requiring pressor support and supplement oxygen but feels well, decr UO overnight, given albumin and IVF restarted, Cr up slightly to 4.68, electrolytes normal  8/18: RBUS done, no signs of hydro. IR consulted, no need for NTs as no hydro. UOP still very low (overnight 10 cc/hour), given 2 boluses with no improvement. Cr up to 4.95 from 4.68. CT loopogram of conduit pending today to evaluate for leak. still requiring pressors. Ucx from conduit now growing e.coli, sensitivities pending, PT recs rehab   8/19: CT loopogram w/ no anastamotic leak. Cr continues to rise w/ low UOP. UA on admission w/ coarse granular cast. Constellation of signs and symptoms suggestive of Acute tubular necrosis  8/20: Urine culture w/ e.coli sensitivities w/ resistance to floroquinolones. Pt back on abx. Pt w/ slightly increased urine output  8/21: Pt with acute onset of tachypnea, and hypoxia requiring intubation and sedation, CT w/ probably PE, hep gtt started. Nephrology consult obtained and started on CRRT, meropenem restarted  8/22: still intubated and sedated, pressor requirement decreasing, UO increasing, PTT remains supratherapeutic, hep gtt held for one hour this AM, on tube feeds, febrile to 100.9 at 4am  8/23: changed TF as per nutrition recs to start tomorrow AM, check w/ dietician, PTT therapeutic x1 --> repeat PTT at 4:45 AM 50.7, hep gtt @4ml/hr, tolerating CPAP. awake, UO still only 110/shift, still requiring pressor support, CRRT  8//24: extubated, now on RA, still requiring CRRT, /shift and small amount of pressor support, Procal >100, repeat Bcx and Ucx ordered, leila d/c, vanc x 2, then d/c now on zosyn, passed beside S&S now on reg diet, pt offers no complaints, + flatus/BM, will do bedside pelvic to evaluate for retained material  8/25: CRRT, UO less overnight, zosyn, tolerating reg diet, blood tinged vaginal discharge   8/26: UOP 39 overnight, on zosyn and vanc, tolerating reg diet. poss transfer to [facility] today - SICU to discuss on rounds.   8/27: UOP increased to 310 after dose of lasix. off pressors, still getting midodrine.   8/28: Got more lasix and albumin overnight with OP of 642, still down from 1.5 yesterday during the day. Cr scott from 1.98 to 2.77. Will suggest RBUS. Bcx NGTD at 4 days. Off vanc today, still on zosyn.   8/29: stable, midodrine increased,  overnight, still with some blood from vagina (no pads documented) RBUS no hydro, Bcx neg final, IC Cx neg  8/30: stable, midodrine to bid, no OB pads documented, good UO yellow, was transfused 1u pRBC overnight with appropriate response, zosyn d/c, listed for floor  8/31: feels well, good UO, transferred to floor  9/1: still having some pain in joints - medicine consulted and will ask their recs (NSAIDs not feasible due to kidney function). Will also f/u medicine recs for continuing midodrine. Going to give another unit for suboptimal response ot u pRBCs given yesterday (Hgb 7.1 from 7.5)  9/2: Reports black stools. Received 2u pRBC yesterday with little response in H/H. No complaints; GI consulted  9/3 - no further stools; no c/o; received another unit blood yesterday; had EGD, normal study; colonoscopy tomorrow, eliquis d/c  9/4 - not able to drink all the golytely, but has some watery stool, transfused 2u pRBC for acute blood loss anemia, seen by ostomy specialist, taylor removed from stoma  9/5 - still trying to drink moviprep, watery BMs w/ some BRBPR, for colonoscopy today, per GI Colonoscopy performed without ability to get to the TI given sharp colonic turns, lots of old and bright red blood, recommend video capsule study 9/6 9/6 - still with watery BMs w/ some BRBPR, c/o weakness, decr appetite, for VCE today, hypomagnesemia, repleted magnesium, contacted Dr. Thompson who will accept transfer, pt wants to await results of VCE  9/7 - one dark BM this AM, Hbg to 10.6, pt otherwise stable, made NPO per GI note, awaiting VCE results  9/8 - Started golytelty with plan for colonscopy tomorrow, Hgb is 10.2 from 9.7, plan to continue BID cbcs  9/9 - Pt c/o feeling weak and is hungry, states 3 BMs watery brown, no blood, NPO for possible colonoscopy today      Plan:  - f/u AM labs   - NPO for colonoscopy today  - IVF  - continue to hold eliquis  - PT recs rehab  - f/u GI   - protonix decreased to daily per GI recs  - neph signed off

## 2024-09-09 NOTE — PROGRESS NOTE ADULT - SUBJECTIVE AND OBJECTIVE BOX
Gastroenterology/Hepatology Progress Note    Interval Events:     No acute events overnight. Roselyn only drank half of the golytely solution, passing loose brown stool, no evidence of hematochezia, melena, no abdominal pain.     Allergies:  No Known Allergies      Hospital Medications:  acetaminophen     Tablet .. 975 milliGRAM(s) Oral every 6 hours PRN  atorvastatin 40 milliGRAM(s) Oral at bedtime  cycloSPORINE (RESTASIS) 0.05% Emulsion 1 Drop(s) Both EYES two times a day  dextrose 5% + sodium chloride 0.45%. 1000 milliLiter(s) IV Continuous <Continuous>  dextrose 5%. 1000 milliLiter(s) IV Continuous <Continuous>  dextrose 5%. 1000 milliLiter(s) IV Continuous <Continuous>  dextrose 50% Injectable 25 Gram(s) IV Push once  dextrose 50% Injectable 12.5 Gram(s) IV Push once  gabapentin 100 milliGRAM(s) Oral three times a day PRN  glucagon  Injectable 1 milliGRAM(s) IntraMuscular once  heparin   Injectable 5000 Unit(s) SubCutaneous every 12 hours  levothyroxine 100 MICROGram(s) Oral daily  melatonin 3 milliGRAM(s) Oral at bedtime PRN  midodrine. 10 milliGRAM(s) Oral every 8 hours  pantoprazole  Injectable 40 milliGRAM(s) IV Push daily  polyethylene glycol 3350 17 Gram(s) Oral daily      ROS: 14 point ROS negative unless otherwise state in subjective    PHYSICAL EXAM:   Vital Signs:  Vital Signs Last 24 Hrs  T(C): 36.6 (09 Sep 2024 05:53), Max: 37.1 (08 Sep 2024 17:43)  T(F): 97.9 (09 Sep 2024 05:53), Max: 98.8 (08 Sep 2024 17:43)  HR: 83 (09 Sep 2024 07:13) (70 - 85)  BP: 118/62 (09 Sep 2024 05:53) (112/55 - 129/58)  BP(mean): --  RR: 18 (09 Sep 2024 05:53) (17 - 18)  SpO2: 97% (09 Sep 2024 07:13) (97% - 100%)    Parameters below as of 09 Sep 2024 05:53  Patient On (Oxygen Delivery Method): room air      Daily     Daily     GENERAL:  No acute distress  HEENT:  NCAT, no scleral icterus  CHEST: no resp distress  HEART:  RRR  ABDOMEN:  Soft, non-tender, non-distended, normoactive bowel sounds, no masses  EXTREMITIES:  No cyanosis, clubbing, or edema  SKIN:  No rash/erythema/ecchymoses/petechiae/wounds/abscess/warm/dry  NEURO:  Alert and oriented x 3, no asterixis, no tremor    LABS:                        10.2   7.97  )-----------( 429      ( 09 Sep 2024 07:18 )             31.4     Mean Cell Volume: 96.0 fL (09-09-24 @ 07:18)    09-09    140  |  108<H>  |  11  ----------------------------<  95  4.0   |  19<L>  |  1.37<H>    Ca    8.7      09 Sep 2024 07:18  Phos  3.0     09-08  Mg     1.80     09-08          Urinalysis Basic - ( 09 Sep 2024 07:18 )    Color: x / Appearance: x / SG: x / pH: x  Gluc: 95 mg/dL / Ketone: x  / Bili: x / Urobili: x   Blood: x / Protein: x / Nitrite: x   Leuk Esterase: x / RBC: x / WBC x   Sq Epi: x / Non Sq Epi: x / Bacteria: x            Imaging:

## 2024-09-09 NOTE — PROGRESS NOTE ADULT - TIME BILLING
Preparing to see the patient including review of tests and other providers' notes, confirming history with family member, performing medical examination and evaluation, counseling and educating the patient/family/caregiver, ordering medications, tests and procedures, communicating with other health care professionals, documenting clinical information in the EMR, independently interpreting results and communicating results to the patient/family/caregiven, care coordination.
- Ordering, reviewing, and interpreting labs, testing, and imaging  - Independently obtaining a review of systems and performing a physical exam  - Reviewing consultant documentation/recommendations in addition to discussing plan of care with consultants  - Counselling and educating patient and family regarding interpretation of aforementioned items and plan of care  - Documentation of encounter

## 2024-09-09 NOTE — PROGRESS NOTE ADULT - SUBJECTIVE AND OBJECTIVE BOX
Merlin Mathew, MD   Hospitalist  Pager #[phone]    PROGRESS NOTE:     Patient is a 68y old  Female who presents with a chief complaint of Sepsis s/p cystectomy (09 Sep 2024 10:15)      SUBJECTIVE / OVERNIGHT EVENTS:  NEON  Patient reports frustration over NPO, CLD x days without solid food   On initial evaluation, refusing to finish prep, on second evaluation, willing to drink prep to get colonoscopy done today   Denies any current abdominal pain, N/V     ADDITIONAL REVIEW OF SYSTEMS:    MEDICATIONS  (STANDING):  atorvastatin 40 milliGRAM(s) Oral at bedtime  cycloSPORINE (RESTASIS) 0.05% Emulsion 1 Drop(s) Both EYES two times a day  dextrose 5% + sodium chloride 0.45%. 1000 milliLiter(s) (75 mL/Hr) IV Continuous <Continuous>  dextrose 5%. 1000 milliLiter(s) (100 mL/Hr) IV Continuous <Continuous>  dextrose 5%. 1000 milliLiter(s) (50 mL/Hr) IV Continuous <Continuous>  dextrose 50% Injectable 25 Gram(s) IV Push once  dextrose 50% Injectable 12.5 Gram(s) IV Push once  glucagon  Injectable 1 milliGRAM(s) IntraMuscular once  heparin   Injectable 5000 Unit(s) SubCutaneous every 12 hours  levothyroxine 100 MICROGram(s) Oral daily  midodrine. 10 milliGRAM(s) Oral every 8 hours  pantoprazole  Injectable 40 milliGRAM(s) IV Push daily  polyethylene glycol 3350 17 Gram(s) Oral daily    MEDICATIONS  (PRN):  acetaminophen     Tablet .. 975 milliGRAM(s) Oral every 6 hours PRN Mild Pain (1 - 3)  gabapentin 100 milliGRAM(s) Oral three times a day PRN pain  melatonin 3 milliGRAM(s) Oral at bedtime PRN Insomnia      CAPILLARY BLOOD GLUCOSE        I&O's Summary    08 Sep 2024 07:01  -  09 Sep 2024 07:00  --------------------------------------------------------  IN: 0 mL / OUT: 1135 mL / NET: -1135 mL        PHYSICAL EXAM:  Vital Signs Last 24 Hrs  T(C): 36.7 (09 Sep 2024 09:56), Max: 37.1 (08 Sep 2024 17:43)  T(F): 98 (09 Sep 2024 09:56), Max: 98.8 (08 Sep 2024 17:43)  HR: 82 (09 Sep 2024 11:12) (70 - 85)  BP: 115/60 (09 Sep 2024 09:56) (112/55 - 129/58)  BP(mean): --  RR: 17 (09 Sep 2024 09:56) (17 - 18)  SpO2: 96% (09 Sep 2024 11:12) (96% - 100%)    Parameters below as of 09 Sep 2024 09:56  Patient On (Oxygen Delivery Method): room air        CONSTITUTIONAL: NAD, resting comfortably   RESPIRATORY: Normal respiratory effort; lungs are clear to auscultation bilaterally  CARDIOVASCULAR: Regular rate and rhythm, normal S1 and S2, no murmur/rub/gallop; 1+ LE edema   ABDOMEN: + Stoma w/ clear yellow urine, Nontender to palpation, normoactive bowel sounds, no rebound/guarding;   MUSCULOSKELETAL no clubbing or cyanosis of digits; no joint swelling or tenderness to palpation  PSYCH: A+O to person, place, and time; affect appropriate    LABS:                        10.2   7.97  )-----------( 429      ( 09 Sep 2024 07:18 )             31.4     09-09    140  |  108<H>  |  11  ----------------------------<  95  4.0   |  19<L>  |  1.37<H>    Ca    8.7      09 Sep 2024 07:18  Phos  3.0     09-08  Mg     1.80     09-08            Urinalysis Basic - ( 09 Sep 2024 07:18 )    Color: x / Appearance: x / SG: x / pH: x  Gluc: 95 mg/dL / Ketone: x  / Bili: x / Urobili: x   Blood: x / Protein: x / Nitrite: x   Leuk Esterase: x / RBC: x / WBC x   Sq Epi: x / Non Sq Epi: x / Bacteria: x          RADIOLOGY & ADDITIONAL TESTS:  Results Reviewed:   Imaging Personally Reviewed:  Electrocardiogram Personally Reviewed:    COORDINATION OF CARE:  Care Discussed with Consultants/Other Providers [Y/N]:  Prior or Outpatient Records Reviewed [Y/N]:

## 2024-09-09 NOTE — PROGRESS NOTE ADULT - SUBJECTIVE AND OBJECTIVE BOX
Overnight events:  None    Subjective:  Pt c/o feeling weak and is hungry, states 3 BMs watery brown, no blood    Objective:    Vital signs  T(C): , Max: 37.1 (09-08-24 @ 17:43)  HR: 70 (09-09-24 @ 05:53)  BP: 118/62 (09-09-24 @ 05:53)  SpO2: 98% (09-09-24 @ 05:53)  Wt(kg): --    Output   IC: 460 yellow  09-08 @ 07:01  -  09-09 @ 07:00  --------------------------------------------------------  IN: 0 mL / OUT: 1135 mL / NET: -1135 mL        Gen: NAD  Abd: stoma pink, abdomen soft, nontender      Labs: pending this AM                        10.2   7.94  )-----------( 400      ( 08 Sep 2024 05:39 )             31.8     08 Sep 2024 05:39    141    |  108    |  12     ----------------------------<  96     3.1     |  19     |  1.46     Ca    9.1        08 Sep 2024 05:39  Phos  3.0       08 Sep 2024 05:39  Mg     1.80      08 Sep 2024 05:39

## 2024-09-09 NOTE — PROGRESS NOTE ADULT - ATTENDING COMMENTS
Patient was planned for repeat colonoscopy today, however she did not complete the prep. Reports brown loose stools, Hb stable. Bleeding appeared to have resolved at this time. Patient initially stated she would not like to repeat the prep and colonoscopy at this time and instead to continue observation, but later today decided she would like to complete the prep. Plan for colonoscopy tomorrow after additional prep.

## 2024-09-09 NOTE — PROGRESS NOTE ADULT - ASSESSMENT
Ms. Lewis is a 68 year old female with HTN, T2DM, DENITA, GERD (on PPI daily) and bladder CA s/p cystectomy/IC creation on 8/6/2024 at [facility] who was admitted to the SICU on 8/16 with septic shock 2/2 UTI c/b respiratory failure (intubated 8/21-8/24), renal failure s/p CRRT, PE (stared on heparin gtt 8/21 and transitioned to Eliquis) and now acute blood loss anemia with liquid black stools s/p EGDwith no signs of bleeding and colonoscopy on 9/6 with note of possible GI bleeding is coming proximal to the visualized right colon, perhaps from prior anastomosis given the temporal correlation with recent surgery s/p VCE VCE placed on 9/6 with presence of dark red blood seen at 07:50:08 although given amount of  blood and clots unable to visualize source of bleeding. No further signs of overt bleeding since 9/7 with overall stable hgb.     #Melena  #Acute blood loss anemia, hgb danisha 6.7 g/dL from 10 on admission, s/p pRBC 8/29, 8/31, & 9/1 x2  #PE on A/C (last dose Eliquis 9/1 @ 1300)    Recommendations:  -Ok for CLD today, patient refusing colonoscopy and only drank half of the prep yesterday. Will cancel today's colonoscopy.   -Monitor for further signs of bleeding, currently patient without evidence of further lower GIB.   -Trend CBC per primary team.  -Maintain two large bore IV, active T&S.    All recommendations are tentative until note is attested by attending.     Note incomplete until finalized by attending signature/attestation.    Pilar Euceda MD  GI/Hepatology Fellow, PGY-4  Long Range Pager: [phone], Short Range Pager: [phone]    MONDAY-FRIDAY 8AM-5PM:  Please message via [email or web address] or email [email or web address]   On Weekends/Holidays (All day) and Weekdays after 5 PM to 8 AM  For nonurgent consults please email:  Please email [email or web address]    URGENT CONSULTS:  Please contact on call GI team. See Amion schedule ([facility]), Spok paging system ([facility]), or call hospital  ([facility])           Ms. Lewis is a 68 year old female with HTN, T2DM, DENITA, GERD (on PPI daily) and bladder CA s/p cystectomy/IC creation on 8/6/2024 at [facility] who was admitted to the SICU on 8/16 with septic shock 2/2 UTI c/b respiratory failure (intubated 8/21-8/24), renal failure s/p CRRT, PE (stared on heparin gtt 8/21 and transitioned to Eliquis) and now acute blood loss anemia with liquid black stools s/p EGDwith no signs of bleeding and colonoscopy on 9/6 with note of possible GI bleeding is coming proximal to the visualized right colon, perhaps from prior anastomosis given the temporal correlation with recent surgery s/p VCE VCE placed on 9/6 with presence of dark red blood seen at 07:50:08 although given amount of  blood and clots unable to visualize source of bleeding. No further signs of overt bleeding since 9/7 with overall stable hgb.     #Melena  #Acute blood loss anemia, hgb danisha 6.7 g/dL from 10 on admission, s/p pRBC 8/29, 8/31, & 9/1 x2  #PE on A/C (last dose Eliquis 9/1 @ 1300)    Recommendations:  -Ok for CLD today, patient refusing colonoscopy and only drank half of the prep yesterday. Will cancel today's colonoscopy.   - Prep ordered for tonight  - NPO after midnight  -Monitor for further signs of bleeding, currently patient without evidence of further lower GIB.   -Trend CBC per primary team.  -Maintain two large bore IV, active T&S.    All recommendations are tentative until note is attested by attending.     Note incomplete until finalized by attending signature/attestation.    Pilar Euceda MD  GI/Hepatology Fellow, PGY-4  Long Range Pager: [phone], Short Range Pager: [phone]    MONDAY-FRIDAY 8AM-5PM:  Please message via [employer] or email Dash [email or web address]   On Weekends/Holidays (All day) and Weekdays after 5 PM to 8 AM  For nonurgent consults please email:  Please email [email or web address]    URGENT CONSULTS:  Please contact on call GI team. See Amion schedule ([facility]), OnBeepk paging system ([facility]), or call hospital  ([facility])

## 2024-09-09 NOTE — PROGRESS NOTE ADULT - PROBLEM SELECTOR PLAN 1
Patient with vaginal bleeding (now resolved) and melena   - S/P 10 units PRBC transfusion total this admission with inappropriate response   - Monitor CBC and transfuse for Hgb < 7 or symptomatic   - S/p EGD without source of melena   - S/p Colonoscopy with suspected GI bleeding proximal to area visualized  - s/p VCE done on 9/6  - Stopped aspirin - confirmed with patient, she is on it for "preventative" measures, reports she never had cardiac stents or a stroke.   [ ] Potential plan for Colonoscopy vs retrograde balloon endoscopy Mon 9/9 however, patient did not finish prep. Now willing to finish prep if colonoscopy can be done today

## 2024-09-09 NOTE — CHART NOTE - NSCHARTNOTEFT_GEN_A_CORE
GI Chart Note-  notified later in day that pt now agreeable to colonoscopy, rescheduled for tomorrow 9/10    -clears  -npo p mn  -additional bowel prep ordered  -check early morning labs    dw gi attg    MATTIE Sosa PA-C

## 2024-09-09 NOTE — PROGRESS NOTE ADULT - ASSESSMENT
68F HTN, DM2, CKD3, DENITA-CPAP, GERD, EtOH abuse, Bladder CA s/p Cystectomy/IC creation on 8/6 @ [facility] p/w septic shock from Pyelonephritis, requiring SICU for pressors, AHRF s/p intubation, BENIGNO requring CRRT, c/f PE off A/C d/t acute blood loss anemia, s/p 10u PRBC transfusion total (last on 9/5) c/f GI bleed proximal to Rt colon, ATN on CKD.

## 2024-09-10 LAB
ANION GAP SERPL CALC-SCNC: 13 MMOL/L — SIGNIFICANT CHANGE UP (ref 7–14)
BUN SERPL-MCNC: 7 MG/DL — SIGNIFICANT CHANGE UP (ref 7–23)
CALCIUM SERPL-MCNC: 8.7 MG/DL — SIGNIFICANT CHANGE UP (ref 8.4–10.5)
CHLORIDE SERPL-SCNC: 111 MMOL/L — HIGH (ref 98–107)
CO2 SERPL-SCNC: 19 MMOL/L — LOW (ref 22–31)
CREAT SERPL-MCNC: 1.26 MG/DL — SIGNIFICANT CHANGE UP (ref 0.5–1.3)
EGFR: 46 ML/MIN/1.73M2 — LOW
GLUCOSE BLDC GLUCOMTR-MCNC: 77 MG/DL — SIGNIFICANT CHANGE UP (ref 70–99)
GLUCOSE SERPL-MCNC: 91 MG/DL — SIGNIFICANT CHANGE UP (ref 70–99)
HCT VFR BLD CALC: 29.5 % — LOW (ref 34.5–45)
HGB BLD-MCNC: 9.5 G/DL — LOW (ref 11.5–15.5)
MAGNESIUM SERPL-MCNC: 1.6 MG/DL — SIGNIFICANT CHANGE UP (ref 1.6–2.6)
MCHC RBC-ENTMCNC: 30.4 PG — SIGNIFICANT CHANGE UP (ref 27–34)
MCHC RBC-ENTMCNC: 32.2 GM/DL — SIGNIFICANT CHANGE UP (ref 32–36)
MCV RBC AUTO: 94.2 FL — SIGNIFICANT CHANGE UP (ref 80–100)
NRBC # BLD: 0 /100 WBCS — SIGNIFICANT CHANGE UP (ref 0–0)
NRBC # FLD: 0 K/UL — SIGNIFICANT CHANGE UP (ref 0–0)
PHOSPHATE SERPL-MCNC: 3.1 MG/DL — SIGNIFICANT CHANGE UP (ref 2.5–4.5)
PLATELET # BLD AUTO: 409 K/UL — HIGH (ref 150–400)
POTASSIUM SERPL-MCNC: 3.8 MMOL/L — SIGNIFICANT CHANGE UP (ref 3.5–5.3)
POTASSIUM SERPL-SCNC: 3.8 MMOL/L — SIGNIFICANT CHANGE UP (ref 3.5–5.3)
RBC # BLD: 3.13 M/UL — LOW (ref 3.8–5.2)
RBC # FLD: 20.1 % — HIGH (ref 10.3–14.5)
SODIUM SERPL-SCNC: 143 MMOL/L — SIGNIFICANT CHANGE UP (ref 135–145)
WBC # BLD: 6.04 K/UL — SIGNIFICANT CHANGE UP (ref 3.8–10.5)
WBC # FLD AUTO: 6.04 K/UL — SIGNIFICANT CHANGE UP (ref 3.8–10.5)

## 2024-09-10 PROCEDURE — 99232 SBSQ HOSP IP/OBS MODERATE 35: CPT

## 2024-09-10 PROCEDURE — 45378 DIAGNOSTIC COLONOSCOPY: CPT

## 2024-09-10 PROCEDURE — 99231 SBSQ HOSP IP/OBS SF/LOW 25: CPT

## 2024-09-10 RX ORDER — MIDODRINE HYDROCHLORIDE 5 MG/1
5 TABLET ORAL EVERY 8 HOURS
Refills: 0 | Status: DISCONTINUED | OUTPATIENT
Start: 2024-09-10 | End: 2024-09-12

## 2024-09-10 RX ADMIN — MIDODRINE HYDROCHLORIDE 10 MILLIGRAM(S): 5 TABLET ORAL at 05:56

## 2024-09-10 RX ADMIN — Medication 1 DROP(S): at 05:58

## 2024-09-10 RX ADMIN — Medication 5000 UNIT(S): at 05:58

## 2024-09-10 RX ADMIN — Medication 100 MICROGRAM(S): at 05:55

## 2024-09-10 RX ADMIN — Medication 5000 UNIT(S): at 19:42

## 2024-09-10 RX ADMIN — MIDODRINE HYDROCHLORIDE 5 MILLIGRAM(S): 5 TABLET ORAL at 21:30

## 2024-09-10 RX ADMIN — Medication 40 MILLIGRAM(S): at 21:30

## 2024-09-10 RX ADMIN — Medication 40 MILLIGRAM(S): at 11:26

## 2024-09-10 RX ADMIN — Medication 25 GRAM(S): at 08:35

## 2024-09-10 NOTE — PROGRESS NOTE ADULT - ASSESSMENT
67 yo F h/o HTN and bladder CA, s/p cystectomy/IC creation on 8/6/2024 at [facility] with Dr. Thompson (d/c on 8/10) presented to ED 8/16 w/ generalized weakness and some lower abdominal pain. Since d/c, only have 1-2 bowel movement, and had significantly decrease PO intake due to decreased appetite, intermittent fevers/chills, +bloody discharge from urethra, some burning. Pt stated stents fell out yesterday.  BP 80s/50s for EMS.  Pt remained hypotensive in ED, started on pressors, cultures sent, placed on zosyn and BiPAP and admitted to SICU. CT revealed: Expected postoperative changes status post cystectomy with ileal conduit creation including small amount of complex fluid and free air in the pelvis. No organized fluid collection. Distended ileal conduit with narrowing at the exiting ostomy site and proximally at the site of ureteral insertion. Mild bilateral hydroureteronephrosis with delayed nephrograms. No significant perinephric stranding.    8/16: still requiring pressor support and on BiPAP, pt states she feels better,14fr catheter placed in stoma, Bcx with GPC/GNR, Cr to 4.42 from 5.34, abx changed to leila and diflucan  8/17: still requiring pressor support and supplement oxygen but feels well, decr UO overnight, given albumin and IVF restarted, Cr up slightly to 4.68, electrolytes normal  8/18: RBUS done, no signs of hydro. IR consulted, no need for NTs as no hydro. UOP still very low (overnight 10 cc/hour), given 2 boluses with no improvement. Cr up to 4.95 from 4.68. CT loopogram of conduit pending today to evaluate for leak. still requiring pressors. Ucx from conduit now growing e.coli, sensitivities pending, PT recs rehab   8/19: CT loopogram w/ no anastamotic leak. Cr continues to rise w/ low UOP. UA on admission w/ coarse granular cast. Constellation of signs and symptoms suggestive of Acute tubular necrosis  8/20: Urine culture w/ e.coli sensitivities w/ resistance to floroquinolones. Pt back on abx. Pt w/ slightly increased urine output  8/21: Pt with acute onset of tachypnea, and hypoxia requiring intubation and sedation, CT w/ probably PE, hep gtt started. Nephrology consult obtained and started on CRRT, meropenem restarted  8/22: still intubated and sedated, pressor requirement decreasing, UO increasing, PTT remains supratherapeutic, hep gtt held for one hour this AM, on tube feeds, febrile to 100.9 at 4am  8/23: changed TF as per nutrition recs to start tomorrow AM, check w/ dietician, PTT therapeutic x1 --> repeat PTT at 4:45 AM 50.7, hep gtt @4ml/hr, tolerating CPAP. awake, UO still only 110/shift, still requiring pressor support, CRRT  8//24: extubated, now on RA, still requiring CRRT, /shift and small amount of pressor support, Procal >100, repeat Bcx and Ucx ordered, leila d/c, vanc x 2, then d/c now on zosyn, passed beside S&S now on reg diet, pt offers no complaints, + flatus/BM, will do bedside pelvic to evaluate for retained material  8/25: CRRT, UO less overnight, zosyn, tolerating reg diet, blood tinged vaginal discharge   8/26: UOP 39 overnight, on zosyn and vanc, tolerating reg diet. poss transfer to [facility] today - SICU to discuss on rounds.   8/27: UOP increased to 310 after dose of lasix. off pressors, still getting midodrine.   8/28: Got more lasix and albumin overnight with OP of 642, still down from 1.5 yesterday during the day. Cr scott from 1.98 to 2.77. Will suggest RBUS. Bcx NGTD at 4 days. Off vanc today, still on zosyn.   8/29: stable, midodrine increased,  overnight, still with some blood from vagina (no pads documented) RBUS no hydro, Bcx neg final, IC Cx neg  8/30: stable, midodrine to bid, no OB pads documented, good UO yellow, was transfused 1u pRBC overnight with appropriate response, zosyn d/c, listed for floor  8/31: feels well, good UO, transferred to floor  9/1: still having some pain in joints - medicine consulted and will ask their recs (NSAIDs not feasible due to kidney function). Will also f/u medicine recs for continuing midodrine. Going to give another unit for suboptimal response ot u pRBCs given yesterday (Hgb 7.1 from 7.5)  9/2: Reports black stools. Received 2u pRBC yesterday with little response in H/H. No complaints; GI consulted  9/3 - no further stools; no c/o; received another unit blood yesterday; had EGD, normal study; colonoscopy tomorrow, eliquis d/c  9/4 - not able to drink all the golytely, but has some watery stool, transfused 2u pRBC for acute blood loss anemia, seen by ostomy specialist, taylor removed from stoma  9/5 - still trying to drink moviprep, watery BMs w/ some BRBPR, for colonoscopy today, per GI Colonoscopy performed without ability to get to the TI given sharp colonic turns, lots of old and bright red blood, recommend video capsule study 9/6 9/6 - still with watery BMs w/ some BRBPR, c/o weakness, decr appetite, for VCE today, hypomagnesemia, repleted magnesium, contacted Dr. Thompson who will accept transfer, pt wants to await results of VCE  9/7 - one dark BM this AM, Hbg to 10.6, pt otherwise stable, made NPO per GI note, awaiting VCE results  9/8 - Started golytelty with plan for colonscopy tomorrow, Hgb is 10.2 from 9.7, plan to continue BID cbcs  9/9 - Pt c/o feeling weak and is hungry, states 3 BMs watery brown, no blood, NPO for possible colonoscopy today, colonoscopy cancelled in the evening.  9/10 - pt feels a little better, is hungry, BMs yellow no blood, for colonoscopy today, Mg repleted, midodrine decr to 5mg q 8hr hold if SBP >110      Plan:  - AM labs reviewed  - NPO for colonoscopy today  - IVF  - continue to hold eliquis  - PT recs rehab  - f/u GI   - protonix decreased to daily per GI recs  - neph signed off

## 2024-09-10 NOTE — PROGRESS NOTE ADULT - SUBJECTIVE AND OBJECTIVE BOX
Merlin Mathew, MD   Hospitalist  Pager #[phone]    PROGRESS NOTE:     Patient is a 68y old  Female who presents with a chief complaint of Sepsis s/p cystectomy (10 Sep 2024 09:26)      SUBJECTIVE / OVERNIGHT EVENTS:  NEON   Patient feels fine, reports some frustration over inability to eat solid food   Had 3 BMs overnight     ADDITIONAL REVIEW OF SYSTEMS:    MEDICATIONS  (STANDING):  atorvastatin 40 milliGRAM(s) Oral at bedtime  cycloSPORINE (RESTASIS) 0.05% Emulsion 1 Drop(s) Both EYES two times a day  dextrose 5% + sodium chloride 0.45%. 1000 milliLiter(s) (75 mL/Hr) IV Continuous <Continuous>  dextrose 5%. 1000 milliLiter(s) (50 mL/Hr) IV Continuous <Continuous>  dextrose 5%. 1000 milliLiter(s) (100 mL/Hr) IV Continuous <Continuous>  dextrose 50% Injectable 25 Gram(s) IV Push once  dextrose 50% Injectable 12.5 Gram(s) IV Push once  glucagon  Injectable 1 milliGRAM(s) IntraMuscular once  heparin   Injectable 5000 Unit(s) SubCutaneous every 12 hours  levothyroxine 100 MICROGram(s) Oral daily  midodrine. 5 milliGRAM(s) Oral every 8 hours  pantoprazole  Injectable 40 milliGRAM(s) IV Push daily  polyethylene glycol 3350 17 Gram(s) Oral daily    MEDICATIONS  (PRN):  acetaminophen     Tablet .. 975 milliGRAM(s) Oral every 6 hours PRN Mild Pain (1 - 3)  gabapentin 100 milliGRAM(s) Oral three times a day PRN pain  melatonin 3 milliGRAM(s) Oral at bedtime PRN Insomnia      CAPILLARY BLOOD GLUCOSE        I&O's Summary    09 Sep 2024 07:01  -  10 Sep 2024 07:00  --------------------------------------------------------  IN: 0 mL / OUT: 1320 mL / NET: -1320 mL    10 Sep 2024 07:01  -  10 Sep 2024 11:26  --------------------------------------------------------  IN: 0 mL / OUT: 200 mL / NET: -200 mL        PHYSICAL EXAM:  Vital Signs Last 24 Hrs  T(C): 36.6 (10 Sep 2024 09:51), Max: 36.8 (10 Sep 2024 01:37)  T(F): 97.9 (10 Sep 2024 09:51), Max: 98.3 (10 Sep 2024 01:37)  HR: 74 (10 Sep 2024 09:51) (71 - 85)  BP: 123/64 (10 Sep 2024 09:51) (115/62 - 125/60)  BP(mean): --  RR: 17 (10 Sep 2024 09:51) (17 - 18)  SpO2: 98% (10 Sep 2024 09:51) (96% - 100%)    Parameters below as of 10 Sep 2024 09:51  Patient On (Oxygen Delivery Method): room air        CONSTITUTIONAL: NAD, resting comfortably   RESPIRATORY: Normal respiratory effort; lungs are clear to auscultation bilaterally  CARDIOVASCULAR: Regular rate and rhythm, normal S1 and S2, no murmur/rub/gallop; 1+ LE edema   ABDOMEN: + Stoma w/ clear yellow urine, Nontender to palpation, normoactive bowel sounds, no rebound/guarding;   MUSCULOSKELETAL no clubbing or cyanosis of digits; no joint swelling or tenderness to palpation  PSYCH: A+O to person, place, and time; affect appropriate  LABS:                        9.5    6.04  )-----------( 409      ( 10 Sep 2024 05:48 )             29.5     09-10    143  |  111<H>  |  7   ----------------------------<  91  3.8   |  19<L>  |  1.26    Ca    8.7      10 Sep 2024 05:48  Phos  3.1     09-10  Mg     1.60     09-10            Urinalysis Basic - ( 10 Sep 2024 05:48 )    Color: x / Appearance: x / SG: x / pH: x  Gluc: 91 mg/dL / Ketone: x  / Bili: x / Urobili: x   Blood: x / Protein: x / Nitrite: x   Leuk Esterase: x / RBC: x / WBC x   Sq Epi: x / Non Sq Epi: x / Bacteria: x          RADIOLOGY & ADDITIONAL TESTS:  Results Reviewed:   Imaging Personally Reviewed:  Electrocardiogram Personally Reviewed:    COORDINATION OF CARE:  Care Discussed with Consultants/Other Providers [Y/N]:  Prior or Outpatient Records Reviewed [Y/N]:

## 2024-09-10 NOTE — PROGRESS NOTE ADULT - ASSESSMENT
68F HTN, DM2, CKD3, DENITA-CPAP, GERD, EtOH abuse, Bladder CA s/p Cystectomy/IC creation on 8/6 @ [facility] p/w septic shock from Pyelonephritis, requiring SICU for pressors, AHRF s/p intubation, BENIGNO requring CRRT, c/f PE off A/C d/t acute blood loss anemia, s/p 10u PRBC transfusion total (last on 9/5) c/f GI bleed proximal to Rt colon, ATN on CKD.

## 2024-09-10 NOTE — PROGRESS NOTE ADULT - PROBLEM SELECTOR PLAN 2
Etiology is likely multifactorial, GI bleed  - Wean Midodrine 5 w/ hold parameters   - blood transfusion as needed  - Monitor BP

## 2024-09-10 NOTE — PROGRESS NOTE ADULT - PROBLEM SELECTOR PLAN 1
Patient with vaginal bleeding (now resolved) and melena   - S/P 10 units PRBC transfusion total this admission with inappropriate response   - Monitor CBC and transfuse for Hgb < 7 or symptomatic   - S/p EGD without source of melena   - S/p Colonoscopy with suspected GI bleeding proximal to area visualized  - s/p VCE done on 9/6  - Stopped aspirin - confirmed with patient, she is on it for "preventative" measures, reports she never had cardiac stents or a stroke.   [ ] Potential plan for Colonoscopy vs retrograde balloon endoscopy 9/10

## 2024-09-10 NOTE — PROGRESS NOTE ADULT - SUBJECTIVE AND OBJECTIVE BOX
Overnight events:  None    Subjective:  Pt feels okay today, BMs are yellowish, is hungry    Objective:    Vital signs  T(C): , Max: 36.8 (09-10-24 @ 01:37)  HR: 78 (09-10-24 @ 09:02)  BP: 123/69 (09-10-24 @ 06:05)  SpO2: 99% (09-10-24 @ 09:02)  Wt(kg): --    Output   IC: 820 yellow  09-09 @ 07:01  -  09-10 @ 07:00  --------------------------------------------------------  IN: 0 mL / OUT: 1320 mL / NET: -1320 mL        Gen: NAD  Abd: stoma pink, soft, nontender      Labs                        9.5    6.04  )-----------( 409      ( 10 Sep 2024 05:48 )             29.5     10 Sep 2024 05:48    143    |  111    |  7      ----------------------------<  91     3.8     |  19     |  1.26     Ca    8.7        10 Sep 2024 05:48  Phos  3.1       10 Sep 2024 05:48  Mg     1.60      10 Sep 2024 05:48

## 2024-09-11 LAB
ANION GAP SERPL CALC-SCNC: 13 MMOL/L — SIGNIFICANT CHANGE UP (ref 7–14)
BUN SERPL-MCNC: 7 MG/DL — SIGNIFICANT CHANGE UP (ref 7–23)
CALCIUM SERPL-MCNC: 8.4 MG/DL — SIGNIFICANT CHANGE UP (ref 8.4–10.5)
CHLORIDE SERPL-SCNC: 108 MMOL/L — HIGH (ref 98–107)
CO2 SERPL-SCNC: 19 MMOL/L — LOW (ref 22–31)
CREAT SERPL-MCNC: 1.32 MG/DL — HIGH (ref 0.5–1.3)
EGFR: 44 ML/MIN/1.73M2 — LOW
GLUCOSE SERPL-MCNC: 131 MG/DL — HIGH (ref 70–99)
HCT VFR BLD CALC: 31.6 % — LOW (ref 34.5–45)
HGB BLD-MCNC: 10.2 G/DL — LOW (ref 11.5–15.5)
MCHC RBC-ENTMCNC: 30.5 PG — SIGNIFICANT CHANGE UP (ref 27–34)
MCHC RBC-ENTMCNC: 32.3 GM/DL — SIGNIFICANT CHANGE UP (ref 32–36)
MCV RBC AUTO: 94.6 FL — SIGNIFICANT CHANGE UP (ref 80–100)
NRBC # BLD: 0 /100 WBCS — SIGNIFICANT CHANGE UP (ref 0–0)
NRBC # FLD: 0 K/UL — SIGNIFICANT CHANGE UP (ref 0–0)
PLATELET # BLD AUTO: 457 K/UL — HIGH (ref 150–400)
POTASSIUM SERPL-MCNC: 3.5 MMOL/L — SIGNIFICANT CHANGE UP (ref 3.5–5.3)
POTASSIUM SERPL-SCNC: 3.5 MMOL/L — SIGNIFICANT CHANGE UP (ref 3.5–5.3)
RBC # BLD: 3.34 M/UL — LOW (ref 3.8–5.2)
RBC # FLD: 19 % — HIGH (ref 10.3–14.5)
SODIUM SERPL-SCNC: 140 MMOL/L — SIGNIFICANT CHANGE UP (ref 135–145)
WBC # BLD: 9.04 K/UL — SIGNIFICANT CHANGE UP (ref 3.8–10.5)
WBC # FLD AUTO: 9.04 K/UL — SIGNIFICANT CHANGE UP (ref 3.8–10.5)

## 2024-09-11 PROCEDURE — 99232 SBSQ HOSP IP/OBS MODERATE 35: CPT | Mod: GC

## 2024-09-11 PROCEDURE — 71275 CT ANGIOGRAPHY CHEST: CPT | Mod: 26

## 2024-09-11 PROCEDURE — 99231 SBSQ HOSP IP/OBS SF/LOW 25: CPT

## 2024-09-11 PROCEDURE — 99232 SBSQ HOSP IP/OBS MODERATE 35: CPT

## 2024-09-11 RX ORDER — METHYLPREDNISOLONE 4 MG
32 TABLET ORAL ONCE
Refills: 0 | Status: COMPLETED | OUTPATIENT
Start: 2024-09-11 | End: 2024-09-11

## 2024-09-11 RX ORDER — DIPHENHYDRAMINE HCL 50 MG
50 CAPSULE ORAL ONCE
Refills: 0 | Status: COMPLETED | OUTPATIENT
Start: 2024-09-11 | End: 2024-09-11

## 2024-09-11 RX ADMIN — Medication 5000 UNIT(S): at 18:04

## 2024-09-11 RX ADMIN — Medication 5000 UNIT(S): at 06:15

## 2024-09-11 RX ADMIN — Medication 50 MILLIGRAM(S): at 21:05

## 2024-09-11 RX ADMIN — Medication 32 MILLIGRAM(S): at 10:06

## 2024-09-11 RX ADMIN — Medication 32 MILLIGRAM(S): at 19:57

## 2024-09-11 RX ADMIN — MIDODRINE HYDROCHLORIDE 5 MILLIGRAM(S): 5 TABLET ORAL at 06:15

## 2024-09-11 RX ADMIN — Medication 1 DROP(S): at 18:05

## 2024-09-11 RX ADMIN — Medication 1 DROP(S): at 06:15

## 2024-09-11 RX ADMIN — Medication 100 MICROGRAM(S): at 06:15

## 2024-09-11 RX ADMIN — Medication 75 MILLILITER(S): at 21:04

## 2024-09-11 RX ADMIN — Medication 40 MILLIGRAM(S): at 21:04

## 2024-09-11 RX ADMIN — POLYETHYLENE GLYCOL 3350 17 GRAM(S): 17 POWDER, FOR SOLUTION ORAL at 11:25

## 2024-09-11 RX ADMIN — Medication 40 MILLIGRAM(S): at 11:22

## 2024-09-11 NOTE — PROGRESS NOTE ADULT - ATTENDING SUPERVISION STATEMENT
Fellow
Resident
Fellow

## 2024-09-11 NOTE — PROGRESS NOTE ADULT - SUBJECTIVE AND OBJECTIVE BOX
Interval events:   colonoscopy complete     SUBJECTIVE:  feels well. tolerating diet       OBJECTIVE:  Vital Signs Last 24 Hrs  T(C): 37.1 (11 Sep 2024 06:29), Max: 37.1 (11 Sep 2024 06:29)  T(F): 98.8 (11 Sep 2024 06:29), Max: 98.8 (11 Sep 2024 06:29)  HR: 84 (11 Sep 2024 06:29) (74 - 93)  BP: 110/56 (11 Sep 2024 06:29) (108/65 - 129/68)  BP(mean): 75 (10 Sep 2024 17:56) (75 - 75)  RR: 18 (11 Sep 2024 06:29) (17 - 21)  SpO2: 97% (11 Sep 2024 06:29) (95% - 100%)    Parameters below as of 11 Sep 2024 06:29  Patient On (Oxygen Delivery Method): room air        Physical Examination:  GEN: NAD, resting quietly  PULM: symmetric chest rise bilaterally, no increased WOB  ABD: soft  : IC with clear urine         LABS:                        10.2   9.04  )-----------( 457      ( 11 Sep 2024 06:05 )             31.6       09-11    140  |  108<H>  |  7   ----------------------------<  131<H>  3.5   |  19<L>  |  1.32<H>    Ca    8.4      11 Sep 2024 06:05  Phos  3.1     09-10  Mg     1.60     09-10

## 2024-09-11 NOTE — PROGRESS NOTE ADULT - ASSESSMENT
68F HTN, DM2, CKD3, DENITA-CPAP, GERD, EtOH abuse, Bladder CA s/p Cystectomy/IC creation on 8/6 @ [facility] p/w septic shock from Pyelonephritis, requiring SICU for pressors, AHRF s/p intubation, BENIGNO requring CRRT, c/f PE off A/C d/t acute blood loss anemia, s/p 10u PRBC transfusion total (last on 9/5) c/f GI bleed proximal to Rt colon, ATN on CKD.

## 2024-09-11 NOTE — PROGRESS NOTE ADULT - ATTENDING COMMENTS
S/p colonoscopy with no treatable lesions. Suspect anastomotic bleeding in the setting of AC as the etiology. No further bleeding since. Can consider AC if indicated, but would clarify whether patient truly has a PE given uncertainty on previous CT. If starting AC - monitor for rebleeding. GI will sign off, please reconsult as needed.

## 2024-09-11 NOTE — PROGRESS NOTE ADULT - SUBJECTIVE AND OBJECTIVE BOX
Merlin Mathew, MD   Hospitalist  Pager #[phone]    PROGRESS NOTE:     Patient is a 68y old  Female who presents with a chief complaint of Sepsis s/p cystectomy (11 Sep 2024 07:27)      SUBJECTIVE / OVERNIGHT EVENTS:  NEON   Patient reports last BM yest, no bleeding noted   Tolerating PO  Denies any current complaints   ADDITIONAL REVIEW OF SYSTEMS:    MEDICATIONS  (STANDING):  atorvastatin 40 milliGRAM(s) Oral at bedtime  cycloSPORINE (RESTASIS) 0.05% Emulsion 1 Drop(s) Both EYES two times a day  dextrose 5% + sodium chloride 0.45%. 1000 milliLiter(s) (75 mL/Hr) IV Continuous <Continuous>  dextrose 5%. 1000 milliLiter(s) (100 mL/Hr) IV Continuous <Continuous>  dextrose 5%. 1000 milliLiter(s) (50 mL/Hr) IV Continuous <Continuous>  dextrose 50% Injectable 25 Gram(s) IV Push once  dextrose 50% Injectable 12.5 Gram(s) IV Push once  diphenhydrAMINE Elixir 50 milliGRAM(s) Oral once  glucagon  Injectable 1 milliGRAM(s) IntraMuscular once  heparin   Injectable 5000 Unit(s) SubCutaneous every 12 hours  levothyroxine 100 MICROGram(s) Oral daily  methylPREDNISolone 32 milliGRAM(s) Oral once  midodrine. 5 milliGRAM(s) Oral every 8 hours  pantoprazole  Injectable 40 milliGRAM(s) IV Push daily  polyethylene glycol 3350 17 Gram(s) Oral daily    MEDICATIONS  (PRN):  acetaminophen     Tablet .. 975 milliGRAM(s) Oral every 6 hours PRN Mild Pain (1 - 3)  gabapentin 100 milliGRAM(s) Oral three times a day PRN pain  melatonin 3 milliGRAM(s) Oral at bedtime PRN Insomnia      CAPILLARY BLOOD GLUCOSE      POCT Blood Glucose.: 77 mg/dL (10 Sep 2024 21:11)    I&O's Summary    10 Sep 2024 07:01  -  11 Sep 2024 07:00  --------------------------------------------------------  IN: 0 mL / OUT: 1500 mL / NET: -1500 mL    11 Sep 2024 07:01  -  11 Sep 2024 11:46  --------------------------------------------------------  IN: 0 mL / OUT: 200 mL / NET: -200 mL        PHYSICAL EXAM:  Vital Signs Last 24 Hrs  T(C): 37.3 (11 Sep 2024 09:50), Max: 37.3 (11 Sep 2024 09:50)  T(F): 99.2 (11 Sep 2024 09:50), Max: 99.2 (11 Sep 2024 09:50)  HR: 77 (11 Sep 2024 09:50) (74 - 93)  BP: 135/66 (11 Sep 2024 09:50) (108/65 - 135/66)  BP(mean): 75 (10 Sep 2024 17:56) (75 - 75)  RR: 18 (11 Sep 2024 09:50) (18 - 21)  SpO2: 99% (11 Sep 2024 09:50) (95% - 100%)    Parameters below as of 11 Sep 2024 09:50  Patient On (Oxygen Delivery Method): room air        CONSTITUTIONAL: NAD, resting comfortably   RESPIRATORY: Normal respiratory effort; lungs are clear to auscultation bilaterally  CARDIOVASCULAR: Regular rate and rhythm, normal S1 and S2, no murmur/rub/gallop; 1+ LE edema   ABDOMEN: + Stoma w/ clear yellow urine, Nontender to palpation, normoactive bowel sounds, no rebound/guarding;   MUSCULOSKELETAL no clubbing or cyanosis of digits; no joint swelling or tenderness to palpation  PSYCH: A+O to person, place, and time; affect appropriate    LABS:                        10.2   9.04  )-----------( 457      ( 11 Sep 2024 06:05 )             31.6     09-11    140  |  108<H>  |  7   ----------------------------<  131<H>  3.5   |  19<L>  |  1.32<H>    Ca    8.4      11 Sep 2024 06:05  Phos  3.1     09-10  Mg     1.60     09-10            Urinalysis Basic - ( 11 Sep 2024 06:05 )    Color: x / Appearance: x / SG: x / pH: x  Gluc: 131 mg/dL / Ketone: x  / Bili: x / Urobili: x   Blood: x / Protein: x / Nitrite: x   Leuk Esterase: x / RBC: x / WBC x   Sq Epi: x / Non Sq Epi: x / Bacteria: x          RADIOLOGY & ADDITIONAL TESTS:  Results Reviewed:   Imaging Personally Reviewed:  Electrocardiogram Personally Reviewed:    COORDINATION OF CARE:  Care Discussed with Consultants/Other Providers [Y/N]:  Prior or Outpatient Records Reviewed [Y/N]:

## 2024-09-11 NOTE — ADVANCED PRACTICE NURSE CONSULT - ASSESSMENT
Patient OOB doing morning care. Stating pouch was changed 1-2 days prior. One piece convex urostomy pouch in place, No active leakage noted. Patient educated on use of ostomy belt, applied. Patient showed back how to clip and unclip belt. Rationale for usage explained. 
Patient resting in bed, Ostomy pouch with yellow urine with mucous strands. Pouch intact but barrier ring melting by opening. Attempted to change pouch to one piece convexity with belt. Patient stating "I haven't eaten for three days", and is very tired and is not up for ostomy teaching/pouch change at this time. Rationale for convex pouch discussed and use of ostomy belt. Notified that process is similar to flat pouch but would add belt for support. Demonstrated how to attach and detach belt.   
Patient AxOx4, blood transfusion actively infusing. Patient stating she had done the pouch change independently once at  home with the visiting nurse before being readmitted. Stating she is aware of the steps just gets confused of the order. Patient able to show back how to open and close pouch as well as connect and disconnect drainage bag.  Patient actively participating in pouch change. Removed ostomy pouch using pull and push technique, cleansed skin with water, patted dry. Reinforced how to properly assess stoma viability and peristomal skin. Patient actively participated in stoma measurement, creating oval template, cutting wafer, applying barrier ring, applying pouch. Questions answered.     Stoma size: 3/4" x 1 1/4" See A&I flowsheet for full assessment details. Silicone cathter removed by surgical resident at bedside.

## 2024-09-11 NOTE — PROGRESS NOTE ADULT - PROBLEM SELECTOR PLAN 1
Patient with vaginal bleeding (now resolved) and melena   - S/P 10 units PRBC transfusion total this admission with inappropriate response   - Monitor CBC and transfuse for Hgb < 7 or symptomatic   - S/p EGD without source of melena   - S/p Colonoscopy with suspected GI bleeding proximal to area visualized  - s/p VCE done on 9/6  - 9/10 Colonoscopy: No bleeding noted, unclear etiology of GIB   - Stopped aspirin - confirmed with patient, she is on it for "preventative" measures, reports she never had cardiac stents or a stroke.

## 2024-09-11 NOTE — PROGRESS NOTE ADULT - SUBJECTIVE AND OBJECTIVE BOX
Gastroenterology/Hepatology Progress Note    Interval Events:     Ms. Lewis continues to pass brown stool, no further episodes of melena, hematochezia. She denies any abdominal pain.     Allergies:  No Known Allergies      Hospital Medications:  acetaminophen     Tablet .. 975 milliGRAM(s) Oral every 6 hours PRN  atorvastatin 40 milliGRAM(s) Oral at bedtime  cycloSPORINE (RESTASIS) 0.05% Emulsion 1 Drop(s) Both EYES two times a day  dextrose 5% + sodium chloride 0.45%. 1000 milliLiter(s) IV Continuous <Continuous>  dextrose 5%. 1000 milliLiter(s) IV Continuous <Continuous>  dextrose 5%. 1000 milliLiter(s) IV Continuous <Continuous>  dextrose 50% Injectable 25 Gram(s) IV Push once  dextrose 50% Injectable 12.5 Gram(s) IV Push once  diphenhydrAMINE Elixir 50 milliGRAM(s) Oral once  gabapentin 100 milliGRAM(s) Oral three times a day PRN  glucagon  Injectable 1 milliGRAM(s) IntraMuscular once  heparin   Injectable 5000 Unit(s) SubCutaneous every 12 hours  levothyroxine 100 MICROGram(s) Oral daily  melatonin 3 milliGRAM(s) Oral at bedtime PRN  methylPREDNISolone 32 milliGRAM(s) Oral once  midodrine. 5 milliGRAM(s) Oral every 8 hours  pantoprazole  Injectable 40 milliGRAM(s) IV Push daily  polyethylene glycol 3350 17 Gram(s) Oral daily      ROS: 14 point ROS negative unless otherwise state in subjective    PHYSICAL EXAM:   Vital Signs:  Vital Signs Last 24 Hrs  T(C): 37.1 (11 Sep 2024 13:51), Max: 37.3 (11 Sep 2024 09:50)  T(F): 98.8 (11 Sep 2024 13:51), Max: 99.2 (11 Sep 2024 09:50)  HR: 87 (11 Sep 2024 13:51) (74 - 93)  BP: 119/70 (11 Sep 2024 13:51) (108/65 - 135/66)  BP(mean): 75 (10 Sep 2024 17:56) (75 - 75)  RR: 17 (11 Sep 2024 13:51) (17 - 21)  SpO2: 99% (11 Sep 2024 13:51) (95% - 100%)    Parameters below as of 11 Sep 2024 13:51  Patient On (Oxygen Delivery Method): room air      Daily Height in cm: 160 (10 Sep 2024 15:36)    Daily     GENERAL:  No acute distress  HEENT:  NCAT, no scleral icterus  CHEST: no resp distress  HEART:  RRR  ABDOMEN:  Soft, non-tender, non-distended, normoactive bowel sounds, no masses  EXTREMITIES:  No cyanosis, clubbing, or edema  SKIN:  No rash/erythema/ecchymoses/petechiae/wounds/abscess/warm/dry  NEURO:  Alert and oriented x 3, no asterixis, no tremor    LABS:                        10.2   9.04  )-----------( 457      ( 11 Sep 2024 06:05 )             31.6     Mean Cell Volume: 94.6 fL (09-11-24 @ 06:05)    09-11    140  |  108<H>  |  7   ----------------------------<  131<H>  3.5   |  19<L>  |  1.32<H>    Ca    8.4      11 Sep 2024 06:05  Phos  3.1     09-10  Mg     1.60     09-10          Urinalysis Basic - ( 11 Sep 2024 06:05 )    Color: x / Appearance: x / SG: x / pH: x  Gluc: 131 mg/dL / Ketone: x  / Bili: x / Urobili: x   Blood: x / Protein: x / Nitrite: x   Leuk Esterase: x / RBC: x / WBC x   Sq Epi: x / Non Sq Epi: x / Bacteria: x            Imaging:

## 2024-09-11 NOTE — PROGRESS NOTE ADULT - ASSESSMENT
69 yo F h/o HTN and bladder CA, s/p cystectomy/IC creation on 8/6/2024 at [facility] with Dr. Thompson (d/c on 8/10) presented to ED 8/16 w/ generalized weakness and some lower abdominal pain. Since d/c, only have 1-2 bowel movement, and had significantly decrease PO intake due to decreased appetite, intermittent fevers/chills, +bloody discharge from urethra, some burning. Pt stated stents fell out yesterday.  BP 80s/50s for EMS.  Pt remained hypotensive in ED, started on pressors, cultures sent, placed on zosyn and BiPAP and admitted to SICU. CT revealed: Expected postoperative changes status post cystectomy with ileal conduit creation including small amount of complex fluid and free air in the pelvis. No organized fluid collection. Distended ileal conduit with narrowing at the exiting ostomy site and proximally at the site of ureteral insertion. Mild bilateral hydroureteronephrosis with delayed nephrograms. No significant perinephric stranding.    8/16: still requiring pressor support and on BiPAP, pt states she feels better,14fr catheter placed in stoma, Bcx with GPC/GNR, Cr to 4.42 from 5.34, abx changed to leila and diflucan  8/17: still requiring pressor support and supplement oxygen but feels well, decr UO overnight, given albumin and IVF restarted, Cr up slightly to 4.68, electrolytes normal  8/18: RBUS done, no signs of hydro. IR consulted, no need for NTs as no hydro. UOP still very low (overnight 10 cc/hour), given 2 boluses with no improvement. Cr up to 4.95 from 4.68. CT loopogram of conduit pending today to evaluate for leak. still requiring pressors. Ucx from conduit now growing e.coli, sensitivities pending, PT recs rehab   8/19: CT loopogram w/ no anastamotic leak. Cr continues to rise w/ low UOP. UA on admission w/ coarse granular cast. Constellation of signs and symptoms suggestive of Acute tubular necrosis  8/20: Urine culture w/ e.coli sensitivities w/ resistance to floroquinolones. Pt back on abx. Pt w/ slightly increased urine output  8/21: Pt with acute onset of tachypnea, and hypoxia requiring intubation and sedation, CT w/ probably PE, hep gtt started. Nephrology consult obtained and started on CRRT, meropenem restarted  8/22: still intubated and sedated, pressor requirement decreasing, UO increasing, PTT remains supratherapeutic, hep gtt held for one hour this AM, on tube feeds, febrile to 100.9 at 4am  8/23: changed TF as per nutrition recs to start tomorrow AM, check w/ dietician, PTT therapeutic x1 --> repeat PTT at 4:45 AM 50.7, hep gtt @4ml/hr, tolerating CPAP. awake, UO still only 110/shift, still requiring pressor support, CRRT  8//24: extubated, now on RA, still requiring CRRT, /shift and small amount of pressor support, Procal >100, repeat Bcx and Ucx ordered, leila d/c, vanc x 2, then d/c now on zosyn, passed beside S&S now on reg diet, pt offers no complaints, + flatus/BM, will do bedside pelvic to evaluate for retained material  8/25: CRRT, UO less overnight, zosyn, tolerating reg diet, blood tinged vaginal discharge   8/26: UOP 39 overnight, on zosyn and vanc, tolerating reg diet. poss transfer to [facility] today - SICU to discuss on rounds.   8/27: UOP increased to 310 after dose of lasix. off pressors, still getting midodrine.   8/28: Got more lasix and albumin overnight with OP of 642, still down from 1.5 yesterday during the day. Cr scott from 1.98 to 2.77. Will suggest RBUS. Bcx NGTD at 4 days. Off vanc today, still on zosyn.   8/29: stable, midodrine increased,  overnight, still with some blood from vagina (no pads documented) RBUS no hydro, Bcx neg final, IC Cx neg  8/30: stable, midodrine to bid, no OB pads documented, good UO yellow, was transfused 1u pRBC overnight with appropriate response, zosyn d/c, listed for floor  8/31: feels well, good UO, transferred to floor  9/1: still having some pain in joints - medicine consulted and will ask their recs (NSAIDs not feasible due to kidney function). Will also f/u medicine recs for continuing midodrine. Going to give another unit for suboptimal response ot u pRBCs given yesterday (Hgb 7.1 from 7.5)  9/2: Reports black stools. Received 2u pRBC yesterday with little response in H/H. No complaints; GI consulted  9/3 - no further stools; no c/o; received another unit blood yesterday; had EGD, normal study; colonoscopy tomorrow, eliquis d/c  9/4 - not able to drink all the golytely, but has some watery stool, transfused 2u pRBC for acute blood loss anemia, seen by ostomy specialist, taylor removed from stoma  9/5 - still trying to drink moviprep, watery BMs w/ some BRBPR, for colonoscopy today, per GI Colonoscopy performed without ability to get to the TI given sharp colonic turns, lots of old and bright red blood, recommend video capsule study 9/6 9/6 - still with watery BMs w/ some BRBPR, c/o weakness, decr appetite, for VCE today, hypomagnesemia, repleted magnesium, contacted Dr. Thompson who will accept transfer, pt wants to await results of VCE  9/7 - one dark BM this AM, Hbg to 10.6, pt otherwise stable, made NPO per GI note, awaiting VCE results  9/8 - Started golytelty with plan for colonscopy tomorrow, Hgb is 10.2 from 9.7, plan to continue BID cbcs  9/9 - Pt c/o feeling weak and is hungry, states 3 BMs watery brown, no blood, NPO for possible colonoscopy today  9/11 - colonoscopy yesterday, feels well today     Plan:  - f/u AM labs   - IVF  - continue to hold eliquis  - PT recs rehab  - f/u GI   - protonix decreased to daily per GI recs  - neph signed off  - CTPE today

## 2024-09-11 NOTE — PROGRESS NOTE ADULT - PROBLEM SELECTOR PLAN 2
Etiology is likely multifactorial, GI bleed  - Cont Midodrine 5mg TID w/ hold parameters: hold for SBP <110. If Midodrine has been held for 24 hours, can discontinue order

## 2024-09-11 NOTE — ADVANCED PRACTICE NURSE CONSULT - RECOMMEDATIONS
Supplies for home provided at bedside:   Skin barrier ring- item # [id number]  One piece convex urostomy pouch- item #[id number]    Please contact [facility] service line,  if we can be of further assistance (ext [phone]).   
Supplies utilized:  Skin barrier ring- item # [id number]  One piece urostomy pouch- item #[id number]    Ostomy team will continue to follow.     
Additional convex pouches at bedside.     Ostomy team will continue to follow.

## 2024-09-11 NOTE — CHART NOTE - NSCHARTNOTEFT_GEN_A_CORE
Per chart------ 67 yo F h/o HTN and bladder CA, s/p cystectomy/IC creation on 8/6/2024 at [facility] with Dr. Thompson (d/c on 8/10) presented to ED 8/16 w/ generalized weakness and some lower abdominal pain. Since d/c, only have 1-2 bowel movement, and had significantly decrease PO intake due to decreased appetite, intermittent fevers/chills, +bloody discharge from urethra, some burning. Pt stated stents fell out yesterday.  BP 80s/50s for EMS.  Pt remained hypotensive in ED, started on pressors, cultures sent, placed on zosyn and BiPAP and admitted to SICU. CT revealed: Expected postoperative changes status post cystectomy with ileal conduit creation including small amount of complex fluid and free air in the pelvis. No organized fluid collection. Distended ileal conduit with narrowing at the exiting ostomy site and proximally at the site of ureteral insertion. Mild bilateral hydroureteronephrosis with delayed nephrograms. No significant perinephric stranding.    Nutrition follow up for length of stay.   Pt reports poor appetite, fear of eating and dislikes hospital menu choices. Pt with extended LOS day 26, eating <25% of meals--- discussed at length the importance of increasing PO intakes as able. Reviewed multiple alternate food selections to which Pt replied "it's nasty". Pt also clearly stated she does not want any of the PO supplements as offered. Pt dislikes Mighty Shake and Magic Cup as provided after previous Nutrition follow up. Pt's Nephew Farhad was visiting and stated that Family can bring Pt's favorite meals in an attempt to facilitate increased PO. Pt reported she was able to complete half of a grilled cheese sandwich from Family on 9/10. Pt denies any nausea, vomiting. Usually with difficulty having bowel movements. Recent lose bowel movements from colonoscopy prep. Pt was told by GI re new Dx of Diverticulosis.   Hx of Diabetes with good glucose control. Suggest changing diet to Regular. Pt provided with menu to look over if she changes her mind.   Pt's reported wt prior to hospitalization was 91 kg. Weights in house 96 - 106 kg, likely inaccurate. RN to later obtain standing scale wt. Pt with no overt muscle/fat wasting at this time. Continues to meet criteria for Moderate Malnutrition. However, that may change depending on a more accurate wt as obtained.     DIET : Diet, Consistent Carbohydrate/No Snacks (09-11-24 @ 05:57)    WEIGHT: Weight (kg): (9/10) 96  (9/8) 105   (9/6) 105  (9/5) 96     PERTINENT MEDICATIONS: MEDICATIONS  (STANDING):  atorvastatin 40 milliGRAM(s) Oral at bedtime  cycloSPORINE (RESTASIS) 0.05% Emulsion 1 Drop(s) Both EYES two times a day  dextrose 5% + sodium chloride 0.45%. 1000 milliLiter(s) (75 mL/Hr) IV Continuous <Continuous>  dextrose 5%. 1000 milliLiter(s) (50 mL/Hr) IV Continuous <Continuous>  dextrose 5%. 1000 milliLiter(s) (100 mL/Hr) IV Continuous <Continuous>  dextrose 50% Injectable 25 Gram(s) IV Push once  dextrose 50% Injectable 12.5 Gram(s) IV Push once  diphenhydrAMINE Elixir 50 milliGRAM(s) Oral once  glucagon  Injectable 1 milliGRAM(s) IntraMuscular once  heparin   Injectable 5000 Unit(s) SubCutaneous every 12 hours  levothyroxine 100 MICROGram(s) Oral daily  methylPREDNISolone 32 milliGRAM(s) Oral once  midodrine. 5 milliGRAM(s) Oral every 8 hours  pantoprazole  Injectable 40 milliGRAM(s) IV Push daily  polyethylene glycol 3350 17 Gram(s) Oral daily    MEDICATIONS  (PRN):  acetaminophen     Tablet .. 975 milliGRAM(s) Oral every 6 hours PRN Mild Pain (1 - 3)  gabapentin 100 milliGRAM(s) Oral three times a day PRN pain  melatonin 3 milliGRAM(s) Oral at bedtime PRN Insomnia    LABS:  09-11 Na140 mmol/L Glu 131 mg/dL<H> K+ 3.5 mmol/L Cr  1.32 mg/dL<H> BUN 7 mg/dL 09-10 Phos 3.1 mg/dL  CAPILLARY BLOOD GLUCOSE  POCT Blood Glucose.: 77 mg/dL (10 Sep 2024 21:11)    SKIN: no pressure injury.    EDEMA: 2+edema to R Arm.    Nutrient Needs: IBW - 57.8 kg  [X] No Change from Initial Assessment.    1618 - 1850 Kcal/kg/day (28-32  kcal/kg)    81 - 93 gm/kg/day ( 1.4-1.6 gm/kg)     Previous Nutrition Diagnosis:    Moderate Nutrition Dx is ongoing.     Diet Education:   [X] Given - Pt encouraged small, frequent meals as able.                                         ~~~~~RECOMMEND~~~~~  1.  Change diet to Regular.  2.  Please obtain current weight via standing scale.  3.  Please document % of meals completed from in house and Family.        MITRA Alves RD, CDN, CDCES

## 2024-09-11 NOTE — PROGRESS NOTE ADULT - ASSESSMENT
Ms. Lewis is a 68 year old female with HTN, T2DM, DENITA, GERD (on PPI daily) and bladder CA s/p cystectomy/IC creation on 8/6/2024 at [facility] who was admitted to the SICU on 8/16 with septic shock 2/2 UTI c/b respiratory failure (intubated 8/21-8/24), renal failure s/p CRRT, PE (stared on heparin gtt 8/21 and transitioned to Eliquis) and now acute blood loss anemia with liquid black stools s/p EGDwith no signs of bleeding and colonoscopy on 9/6 with note of possible GI bleeding is coming proximal to the visualized right colon, perhaps from prior anastomosis given the temporal correlation with recent surgery s/p VCE VCE placed on 9/6 with presence of dark red blood seen at 07:50:08 although given amount of  blood and clots unable to visualize source of bleeding. Colonoscopy 9/10 without evidence of active bleeding. No further signs of overt bleeding since 9/7 with overall stable hgb.     #Melena  #Acute blood loss anemia, hgb danisha 6.7 g/dL from 10 on admission, s/p pRBC 8/29, 8/31, & 9/1 x2#PE on A/C (last dose Eliquis 9/1 @ 1300)    Recommendations:  -Monitor for further signs of bleeding, currently patient without evidence of further lower GIB.   -Trend CBC per primary team.  -Consider repeat CT angio chest to determine whether patient needs AC for PE, if so, can consider restarting, but would do so inpatient and trend CBC here, monitor stools.    We will sign off at this time, however please call back with questions or should any worsening/persistent issues arise.  Please provide patient with Gastroenterology Clinic to confirm appointment; [phone] (Faculty Practice at [address] [facility]) or [phone] ([facility] at [address] [place] [address]) or [phone] ([facility] at [address] [facility]).    Note incomplete until finalized by attending signature/attestation.    Pilar Euceda MD  GI/Hepatology Fellow, PGY-4  Long Range Pager: [phone], Short Range Pager: [phone]    MONDAY-FRIDAY 8AM-5PM:  Please message via CoinBatch or email [email or web address]   On Weekends/Holidays (All day) and Weekdays after 5 PM to 8 AM  For nonurgent consults please email:  Please email [email or web address]    URGENT CONSULTS:  Please contact on call GI team. See Amion schedule ([facility]), JumpTime paging system ([facility]), or call hospital  ([facility])   Ms. Lewis is a 68 year old female with HTN, T2DM, DENITA, GERD (on PPI daily) and bladder CA s/p cystectomy/IC creation on 8/6/2024 at [facility] who was admitted to the SICU on 8/16 with septic shock 2/2 UTI c/b respiratory failure (intubated 8/21-8/24), renal failure s/p CRRT, PE (stared on heparin gtt 8/21 and transitioned to Eliquis) and now acute blood loss anemia with liquid black stools s/p EGDwith no signs of bleeding and colonoscopy on 9/6 with note of possible GI bleeding is coming proximal to the visualized right colon, perhaps from prior anastomosis given the temporal correlation with recent surgery s/p VCE VCE placed on 9/6 with presence of dark red blood seen at 07:50:08 although given amount of  blood and clots unable to visualize source of bleeding. Colonoscopy 9/10 without evidence of active bleeding. No further signs of overt bleeding since 9/7 with overall stable hgb.     #Melena  #Acute blood loss anemia, hgb danisha 6.7 g/dL from 10 on admission, s/p pRBC 8/29, 8/31, & 9/1 x2#PE on A/C (last dose Eliquis 9/1 @ 1300)    Recommendations:  -Monitor for further signs of bleeding, currently patient without evidence of further lower GIB.   -Trend CBC per primary team.  -Consider re-evaluation of need for AC given questionable PE, if so, can consider restarting, but would do so inpatient and trend CBC here, monitor stools.    We will sign off at this time, however please call back with questions or should any worsening/persistent issues arise.  Please provide patient with Gastroenterology Clinic to confirm appointment; [phone] (Faculty Practice at [address] [facility]) or [phone] ([facility] at [address] [place] [address]) or [phone] ([facility] at [address] [facility]).    Note incomplete until finalized by attending signature/attestation.    Pilar Euceda MD  GI/Hepatology Fellow, PGY-4  Long Range Pager: [phone], Short Range Pager: [phone]    MONDAY-FRIDAY 8AM-5PM:  Please message via [employer] / GoNoodle or email [email or web address]   On Weekends/Holidays (All day) and Weekdays after 5 PM to 8 AM  For nonurgent consults please email:  Please email [email or web address]    URGENT CONSULTS:  Please contact on call GI team. See Amion schedule ([facility]), [employer] paging system ([facility]), or call hospital  ([facility])

## 2024-09-11 NOTE — ADVANCED PRACTICE NURSE CONSULT - REASON FOR CONSULT
Patient seen for urostomy assessment. Patient with h/o HTN , DM, DENITA on CPAP,GERD on PPI, ETOH use disorder,  OA,  bladder CA, s/p cystectomy/IC creation on 8/6/2024 at [facility] with Dr. Thompson (d/c on 8/10) presented to ED 8/16 w/ generalized weakness and some lower abdominal pain, decrease PO intake due to decreased appetite, intermittent fevers/chills, +bloody discharge from urethra, some burning. Patient had a complicated hosp course with shock, BENIGNO , Probable ? PE, respiratory failure s/p intubation and now with possible blood loss anemia. 
Patient seen for urostomy follow up. 
Patient seen for ostomy follow up. Capsule endoscopy in progress.

## 2024-09-12 LAB
ANION GAP SERPL CALC-SCNC: 14 MMOL/L — SIGNIFICANT CHANGE UP (ref 7–14)
BUN SERPL-MCNC: 9 MG/DL — SIGNIFICANT CHANGE UP (ref 7–23)
CALCIUM SERPL-MCNC: 8.6 MG/DL — SIGNIFICANT CHANGE UP (ref 8.4–10.5)
CHLORIDE SERPL-SCNC: 106 MMOL/L — SIGNIFICANT CHANGE UP (ref 98–107)
CO2 SERPL-SCNC: 18 MMOL/L — LOW (ref 22–31)
CREAT SERPL-MCNC: 1.22 MG/DL — SIGNIFICANT CHANGE UP (ref 0.5–1.3)
EGFR: 48 ML/MIN/1.73M2 — LOW
GLUCOSE SERPL-MCNC: 135 MG/DL — HIGH (ref 70–99)
HCT VFR BLD CALC: 32 % — LOW (ref 34.5–45)
HGB BLD-MCNC: 10.3 G/DL — LOW (ref 11.5–15.5)
MCHC RBC-ENTMCNC: 30.4 PG — SIGNIFICANT CHANGE UP (ref 27–34)
MCHC RBC-ENTMCNC: 32.2 GM/DL — SIGNIFICANT CHANGE UP (ref 32–36)
MCV RBC AUTO: 94.4 FL — SIGNIFICANT CHANGE UP (ref 80–100)
NRBC # BLD: 0 /100 WBCS — SIGNIFICANT CHANGE UP (ref 0–0)
NRBC # FLD: 0 K/UL — SIGNIFICANT CHANGE UP (ref 0–0)
PLATELET # BLD AUTO: 485 K/UL — HIGH (ref 150–400)
POTASSIUM SERPL-MCNC: 3.7 MMOL/L — SIGNIFICANT CHANGE UP (ref 3.5–5.3)
POTASSIUM SERPL-SCNC: 3.7 MMOL/L — SIGNIFICANT CHANGE UP (ref 3.5–5.3)
RBC # BLD: 3.39 M/UL — LOW (ref 3.8–5.2)
RBC # FLD: 19.2 % — HIGH (ref 10.3–14.5)
SODIUM SERPL-SCNC: 138 MMOL/L — SIGNIFICANT CHANGE UP (ref 135–145)
WBC # BLD: 9.94 K/UL — SIGNIFICANT CHANGE UP (ref 3.8–10.5)
WBC # FLD AUTO: 9.94 K/UL — SIGNIFICANT CHANGE UP (ref 3.8–10.5)

## 2024-09-12 PROCEDURE — 99232 SBSQ HOSP IP/OBS MODERATE 35: CPT

## 2024-09-12 PROCEDURE — 99231 SBSQ HOSP IP/OBS SF/LOW 25: CPT

## 2024-09-12 RX ADMIN — Medication 100 MICROGRAM(S): at 05:38

## 2024-09-12 RX ADMIN — Medication 5000 UNIT(S): at 18:30

## 2024-09-12 RX ADMIN — Medication 40 MILLIGRAM(S): at 21:25

## 2024-09-12 RX ADMIN — Medication 1 DROP(S): at 05:38

## 2024-09-12 RX ADMIN — Medication 1 DROP(S): at 18:31

## 2024-09-12 RX ADMIN — Medication 40 MILLIGRAM(S): at 13:08

## 2024-09-12 NOTE — PROGRESS NOTE ADULT - PROBLEM SELECTOR PLAN 1
Patient with vaginal bleeding (now resolved) and melena   - S/P 10 units PRBC transfusion total this admission with inappropriate response   - Monitor CBC and transfuse for Hgb < 7 or symptomatic   - S/p EGD without source of melena   - S/p Colonoscopy with suspected GI bleeding proximal to area visualized  - s/p VCE done on 9/6  - 9/10 Colonoscopy: No bleeding noted, GIB maybe 2/2 anastomotic bleeding in the setting of AC per GI   - Stopped aspirin - confirmed with patient, she is on it for "preventative" measures, reports she never had cardiac stents or a stroke.

## 2024-09-12 NOTE — PROGRESS NOTE ADULT - SUBJECTIVE AND OBJECTIVE BOX
Overnight events:  None    Subjective:  Pt offers no complaints, anxious to start rehab planning    Objective:    Vital signs  T(C): , Max: 37.3 (09-11-24 @ 09:50)  HR: 78 (09-12-24 @ 05:46)  BP: 114/67 (09-12-24 @ 05:46)  SpO2: 100% (09-12-24 @ 05:46)  Wt(kg): --    Output   IC: 750 yellow  No BMs recorded  09-10 @ 07:01  -  09-11 @ 07:00  --------------------------------------------------------  IN: 0 mL / OUT: 1500 mL / NET: -1500 mL    09-11 @ 07:01  -  09-12 @ 06:58  --------------------------------------------------------  IN: 825 mL / OUT: 1650 mL / NET: -825 mL        Gen: NAD  Abd: stoma pink, soft, nontender      Labs: pending today                        10.2   9.04  )-----------( 457      ( 11 Sep 2024 06:05 )             31.6     11 Sep 2024 06:05    140    |  108    |  7      ----------------------------<  131    3.5     |  19     |  1.32     Ca    8.4        11 Sep 2024 06:05

## 2024-09-12 NOTE — PROGRESS NOTE ADULT - NS ATTEND AMEND GEN_ALL_CORE FT
Low UOP, BENIGNO, sepsis 2/2 UTI  Stoma cath readjusted - stlll w oliguria  Renal sono pending  Cont abx, f/u cxs  If unrevealing would consider CT pouchogram to assess for leak/extravastion  Appr SICU care
I agree with the plan as indicated above and will continue the current care.
Sepsis 2/2 UTI, BENIGNO  CT pouchogram - reviewed no obvious significant extravasation, possible tiny wisp but no collection noted, B/l VUR noted explaining b/l hydro  Appr SICU care, f/u cxs, IV abx  Nephrology c/s
Late Entry for 8/22    Sepsis 2/2 UTI, BENIGNO  CT pouchogram - reviewed no obvious significant extravasation, possible tiny wisp but no collection noted, B/l VUR noted explaining b/l hydro  PE - on hep gtt, intubated  Appr SICU care, f/u cxs, IV abx  Nephrology c/s
67 yo F h/o HTN and bladder CA, s/p cystectomy/IC creation on 8/6/2024 at [facility] with Dr. Thompson (d/c on 8/10) presented to ED 8/16 w/ generalized weakness and some lower abdominal pain.  Sepsis 2/2 UTI/pyelo and urinary retention with poss stoma stenosis  IC Stoma output has increased with fluids and stomal catheter  Cont abx, supportive care  F/u cxs  Appreciate SICU care

## 2024-09-12 NOTE — PROGRESS NOTE ADULT - SUBJECTIVE AND OBJECTIVE BOX
Merlin Mathew, MD   Hospitalist  Pager #[phone]    PROGRESS NOTE:     Patient is a 68y old  Female who presents with a chief complaint of Sepsis s/p cystectomy (12 Sep 2024 06:56)      SUBJECTIVE / OVERNIGHT EVENTS:  Feels well, no complaints, tolerating PO   Denies N/V   ADDITIONAL REVIEW OF SYSTEMS:    MEDICATIONS  (STANDING):  atorvastatin 40 milliGRAM(s) Oral at bedtime  cycloSPORINE (RESTASIS) 0.05% Emulsion 1 Drop(s) Both EYES two times a day  dextrose 5%. 1000 milliLiter(s) (50 mL/Hr) IV Continuous <Continuous>  dextrose 5%. 1000 milliLiter(s) (100 mL/Hr) IV Continuous <Continuous>  dextrose 50% Injectable 25 Gram(s) IV Push once  dextrose 50% Injectable 12.5 Gram(s) IV Push once  glucagon  Injectable 1 milliGRAM(s) IntraMuscular once  heparin   Injectable 5000 Unit(s) SubCutaneous every 12 hours  levothyroxine 100 MICROGram(s) Oral daily  midodrine. 5 milliGRAM(s) Oral every 8 hours  pantoprazole  Injectable 40 milliGRAM(s) IV Push daily  polyethylene glycol 3350 17 Gram(s) Oral daily    MEDICATIONS  (PRN):  acetaminophen     Tablet .. 975 milliGRAM(s) Oral every 6 hours PRN Mild Pain (1 - 3)  gabapentin 100 milliGRAM(s) Oral three times a day PRN pain  melatonin 3 milliGRAM(s) Oral at bedtime PRN Insomnia      CAPILLARY BLOOD GLUCOSE        I&O's Summary    11 Sep 2024 07:01  -  12 Sep 2024 07:00  --------------------------------------------------------  IN: 825 mL / OUT: 1650 mL / NET: -825 mL    12 Sep 2024 07:01  -  12 Sep 2024 10:38  --------------------------------------------------------  IN: 0 mL / OUT: 400 mL / NET: -400 mL        PHYSICAL EXAM:  Vital Signs Last 24 Hrs  T(C): 36.8 (12 Sep 2024 09:22), Max: 37.1 (11 Sep 2024 13:51)  T(F): 98.2 (12 Sep 2024 09:22), Max: 98.8 (11 Sep 2024 13:51)  HR: 76 (12 Sep 2024 09:39) (67 - 93)  BP: 113/57 (12 Sep 2024 09:22) (112/61 - 125/62)  BP(mean): --  RR: 17 (12 Sep 2024 09:22) (17 - 18)  SpO2: 98% (12 Sep 2024 09:39) (97% - 100%)    Parameters below as of 12 Sep 2024 09:22  Patient On (Oxygen Delivery Method): room air        CONSTITUTIONAL: NAD, resting comfortably   RESPIRATORY: Normal respiratory effort; lungs are clear to auscultation bilaterally  CARDIOVASCULAR: Regular rate and rhythm, normal S1 and S2, no murmur/rub/gallop; 1+ LE edema   ABDOMEN: + Stoma w/ clear yellow urine, Nontender to palpation, normoactive bowel sounds, no rebound/guarding;   MUSCULOSKELETAL no clubbing or cyanosis of digits; no joint swelling or tenderness to palpation  PSYCH: A+O to person, place, and time; affect appropriate  LABS:                        10.3   9.94  )-----------( 485      ( 12 Sep 2024 06:00 )             32.0     09-12    138  |  106  |  9   ----------------------------<  135<H>  3.7   |  18<L>  |  1.22    Ca    8.6      12 Sep 2024 06:00            Urinalysis Basic - ( 12 Sep 2024 06:00 )    Color: x / Appearance: x / SG: x / pH: x  Gluc: 135 mg/dL / Ketone: x  / Bili: x / Urobili: x   Blood: x / Protein: x / Nitrite: x   Leuk Esterase: x / RBC: x / WBC x   Sq Epi: x / Non Sq Epi: x / Bacteria: x          RADIOLOGY & ADDITIONAL TESTS:  Results Reviewed:   Imaging Personally Reviewed:  Electrocardiogram Personally Reviewed:    COORDINATION OF CARE:  Care Discussed with Consultants/Other Providers [Y/N]:  Prior or Outpatient Records Reviewed [Y/N]:

## 2024-09-12 NOTE — CHART NOTE - NSCHARTNOTESELECT_GEN_ALL_CORE
GI/Event Note
Moderate Malnutrition Follow Up/Nutrition Services
Nephrology Fellow/Event Note
Nephrology/Off Service Note
gi chart note/Event Note
GI/Event Note
GI/Event Note
IR Fellow
No chemo/rad
Nutrition Follow Up/Nutrition Services
Nutrition Follow Up/Nutrition Services
Nutrition Services
VCE Results/Event Note

## 2024-09-12 NOTE — PROGRESS NOTE ADULT - ASSESSMENT
68F HTN, DM2, CKD3, DENITA-CPAP, GERD, EtOH abuse, Bladder CA s/p Cystectomy/IC creation on 8/6 @ [facility] p/w septic shock from Pyelonephritis, requiring SICU for pressors, AHRF s/p intubation, BENIGNO requring CRRT, c/f PE off A/C d/t acute blood loss anemia, s/p 10u PRBC transfusion total (last on 9/5) c/f GI bleed proximal to Rt colon, ATN on CKD.

## 2024-09-12 NOTE — PROGRESS NOTE ADULT - ASSESSMENT
69 yo F h/o HTN and bladder CA, s/p cystectomy/IC creation on 8/6/2024 at [facility] with Dr. Thompson (d/c on 8/10) presented to ED 8/16 w/ generalized weakness and some lower abdominal pain. Since d/c, only have 1-2 bowel movement, and had significantly decrease PO intake due to decreased appetite, intermittent fevers/chills, +bloody discharge from urethra, some burning. Pt stated stents fell out yesterday.  BP 80s/50s for EMS.  Pt remained hypotensive in ED, started on pressors, cultures sent, placed on zosyn and BiPAP and admitted to SICU. CT revealed: Expected postoperative changes status post cystectomy with ileal conduit creation including small amount of complex fluid and free air in the pelvis. No organized fluid collection. Distended ileal conduit with narrowing at the exiting ostomy site and proximally at the site of ureteral insertion. Mild bilateral hydroureteronephrosis with delayed nephrograms. No significant perinephric stranding.    8/16: still requiring pressor support and on BiPAP, pt states she feels better,14fr catheter placed in stoma, Bcx with GPC/GNR, Cr to 4.42 from 5.34, abx changed to leila and diflucan  8/17: still requiring pressor support and supplement oxygen but feels well, decr UO overnight, given albumin and IVF restarted, Cr up slightly to 4.68, electrolytes normal  8/18: RBUS done, no signs of hydro. IR consulted, no need for NTs as no hydro. UOP still very low (overnight 10 cc/hour), given 2 boluses with no improvement. Cr up to 4.95 from 4.68. CT loopogram of conduit pending today to evaluate for leak. still requiring pressors. Ucx from conduit now growing e.coli, sensitivities pending, PT recs rehab   8/19: CT loopogram w/ no anastamotic leak. Cr continues to rise w/ low UOP. UA on admission w/ coarse granular cast. Constellation of signs and symptoms suggestive of Acute tubular necrosis  8/20: Urine culture w/ e.coli sensitivities w/ resistance to floroquinolones. Pt back on abx. Pt w/ slightly increased urine output  8/21: Pt with acute onset of tachypnea, and hypoxia requiring intubation and sedation, CT w/ probably PE, hep gtt started. Nephrology consult obtained and started on CRRT, meropenem restarted  8/22: still intubated and sedated, pressor requirement decreasing, UO increasing, PTT remains supratherapeutic, hep gtt held for one hour this AM, on tube feeds, febrile to 100.9 at 4am  8/23: changed TF as per nutrition recs to start tomorrow AM, check w/ dietician, PTT therapeutic x1 --> repeat PTT at 4:45 AM 50.7, hep gtt @4ml/hr, tolerating CPAP. awake, UO still only 110/shift, still requiring pressor support, CRRT  8//24: extubated, now on RA, still requiring CRRT, /shift and small amount of pressor support, Procal >100, repeat Bcx and Ucx ordered, leila d/c, vanc x 2, then d/c now on zosyn, passed beside S&S now on reg diet, pt offers no complaints, + flatus/BM, will do bedside pelvic to evaluate for retained material  8/25: CRRT, UO less overnight, zosyn, tolerating reg diet, blood tinged vaginal discharge   8/26: UOP 39 overnight, on zosyn and vanc, tolerating reg diet. poss transfer to [facility] today - SICU to discuss on rounds.   8/27: UOP increased to 310 after dose of lasix. off pressors, still getting midodrine.   8/28: Got more lasix and albumin overnight with OP of 642, still down from 1.5 yesterday during the day. Cr scott from 1.98 to 2.77. Will suggest RBUS. Bcx NGTD at 4 days. Off vanc today, still on zosyn.   8/29: stable, midodrine increased,  overnight, still with some blood from vagina (no pads documented) RBUS no hydro, Bcx neg final, IC Cx neg  8/30: stable, midodrine to bid, no OB pads documented, good UO yellow, was transfused 1u pRBC overnight with appropriate response, zosyn d/c, listed for floor  8/31: feels well, good UO, transferred to floor  9/1: still having some pain in joints - medicine consulted and will ask their recs (NSAIDs not feasible due to kidney function). Will also f/u medicine recs for continuing midodrine. Going to give another unit for suboptimal response ot u pRBCs given yesterday (Hgb 7.1 from 7.5)  9/2: Reports black stools. Received 2u pRBC yesterday with little response in H/H. No complaints; GI consulted  9/3 - no further stools; no c/o; received another unit blood yesterday; had EGD, normal study; colonoscopy tomorrow, eliquis d/c  9/4 - not able to drink all the golytely, but has some watery stool, transfused 2u pRBC for acute blood loss anemia, seen by ostomy specialist, taylor removed from stoma  9/5 - still trying to drink moviprep, watery BMs w/ some BRBPR, for colonoscopy today, per GI Colonoscopy performed without ability to get to the TI given sharp colonic turns, lots of old and bright red blood, recommend video capsule study 9/6 9/6 - still with watery BMs w/ some BRBPR, c/o weakness, decr appetite, for VCE today, hypomagnesemia, repleted magnesium, contacted Dr. Thompson who will accept transfer, pt wants to await results of VCE  9/7 - one dark BM this AM, Hbg to 10.6, pt otherwise stable, made NPO per GI note, awaiting VCE results  9/8 - Started golytelty with plan for colonscopy tomorrow, Hgb is 10.2 from 9.7, plan to continue BID cbcs  9/9 - Pt c/o feeling weak and is hungry, states 3 BMs watery brown, no blood, NPO for possible colonoscopy today  9/11 - colonoscopy yesterday, feels well today  9/12 - pt feels well, colonoscopy with diverticulosis, CTPA prelim no PE, GI signed off    Plan:  - f/u AM labs   - IVF  - continue to hold eliquis  - f/u GI   - protonix decreased to daily per GI recs  - f/u CTPE offical report  - rehab planninf

## 2024-09-13 ENCOUNTER — TRANSCRIPTION ENCOUNTER (OUTPATIENT)
Age: 69
End: 2024-09-13

## 2024-09-13 VITALS
HEART RATE: 90 BPM | DIASTOLIC BLOOD PRESSURE: 69 MMHG | RESPIRATION RATE: 18 BRPM | SYSTOLIC BLOOD PRESSURE: 110 MMHG | TEMPERATURE: 98 F | OXYGEN SATURATION: 99 %

## 2024-09-13 PROCEDURE — 99232 SBSQ HOSP IP/OBS MODERATE 35: CPT

## 2024-09-13 RX ORDER — POLYETHYLENE GLYCOL 3350 17 G/17G
17 POWDER, FOR SOLUTION ORAL
Qty: 0 | Refills: 0 | DISCHARGE
Start: 2024-09-13

## 2024-09-13 RX ORDER — ACETAMINOPHEN 325 MG/1
3 TABLET ORAL
Qty: 0 | Refills: 0 | DISCHARGE
Start: 2024-09-13

## 2024-09-13 RX ADMIN — Medication 1 DROP(S): at 05:19

## 2024-09-13 RX ADMIN — Medication 100 MICROGRAM(S): at 05:20

## 2024-09-13 RX ADMIN — Medication 40 MILLIGRAM(S): at 11:35

## 2024-09-13 RX ADMIN — Medication 1 DROP(S): at 17:44

## 2024-09-13 RX ADMIN — Medication 5000 UNIT(S): at 05:20

## 2024-09-13 NOTE — DISCHARGE NOTE PROVIDER - CARE PROVIDER_API CALL
RUPESH MCCLAIN  [address] [place] [address]  Phone: [phone]  Fax: [phone]  Follow Up Time:    RUPESH MCCLAIN  [address] [place] [address]  Phone: [phone]  Fax: [phone]  Follow Up Time:     Jose Armando Obrien  Gastroenterology  [address] [place] [id number]  Phone: [phone]  Fax: [phone]  Follow Up Time:

## 2024-09-13 NOTE — PROGRESS NOTE ADULT - PROBLEM SELECTOR PROBLEM 1
Acute kidney injury superimposed on CKD
BENIGNO (acute kidney injury)
Acute kidney injury superimposed on CKD
BENIGNO (acute kidney injury)
Anemia due to acute blood loss
BENIGNO (acute kidney injury)
Anemia due to acute blood loss
Acute kidney injury superimposed on CKD
BENIGNO (acute kidney injury)
Acute kidney injury superimposed on CKD
Anemia due to acute blood loss
Acute kidney injury superimposed on CKD
Anemia due to acute blood loss
BENIGNO (acute kidney injury)
BENIGNO (acute kidney injury)
Anemia due to acute blood loss
Pain in the joints
Anemia due to acute blood loss

## 2024-09-13 NOTE — PROGRESS NOTE ADULT - ASSESSMENT
68F HTN, DM2, CKD3, DENITA-CPAP, GERD, EtOH abuse, Bladder CA s/p Cystectomy/IC creation on 8/6 @ [facility] p/w septic shock from Pyelonephritis, requiring SICU for pressors, AHRF s/p intubation, BENIGNO requring CRRT, c/f PE off A/C d/t acute blood loss anemia, s/p 10u PRBC transfusion total (last on 9/5) c/f GI bleed proximal to Rt colon, ATN on CKD.

## 2024-09-13 NOTE — DISCHARGE NOTE PROVIDER - NSDCQMSTROKE_NEU_ALL_CORE
No Hpi Title: Evaluation of Skin Lesions Hpi Title: Evaluation of a Skin Lesion How Severe Are Your Spot(S)?: mild Have Your Spot(S) Been Treated In The Past?: has not been treated

## 2024-09-13 NOTE — PROGRESS NOTE ADULT - PROBLEM SELECTOR PLAN 3
ATN in s/o sepsis/GIB, s/p CRRT in SICU. Now with recovery of renal function and HD discontinued    - Cr downtrending to 1.3  - Nephro following, recs appreciated   - cont to monitor creatinine and dose medications per GFR
Etiology is likely multifactorial, GI bleed  Pt is on Midodrine 10 mg po tid, wean as able   blood transfusion as needed  Plan for EGD as above
ATN in s/o sepsis/GIB, s/p CRRT in SICU. Now with recovery of renal function and HD discontinued    - Seen by Nephro   - cont to monitor creatinine and dose medications per GFR
ATN in s/o sepsis/GIB, s/p CRRT in SICU. Now with recovery of renal function and HD discontinued    - Seen by Nephro   - cont to monitor creatinine and dose medications per GFR
ATN in s/o sepsis/GIB, s/p CRRT in SICU. Now with recovery of renal function and HD discontinued    - Cr downtrending to 1.3  - Nephro following, recs appreciated   - cont to monitor creatinine and dose medications per GFR
ATN in s/o sepsis/GIB, s/p CRRT in SICU. Now with recovery of renal function and HD discontinued    - Cr downtrending to 1.5  - Nephro following, recs appreciated   - cont to monitor creatinine and dose medications per GFR
ATN in s/o sepsis/GIB, s/p CRRT in SICU. Now with recovery of renal function and HD discontinued    - Seen by Nephro   - cont to monitor creatinine and dose medications per GFR
ATN in s/o sepsis/GIB, s/p CRRT in SICU. Now with recovery of renal function and HD discontinued    - Cr downtrending to 1.3  - Nephro following, recs appreciated   - cont to monitor creatinine and dose medications per GFR
ATN in s/o sepsis/GIB, s/p CRRT in SICU. Now with recovery of renal function and HD discontinued    - Seen by Nephro   - cont to monitor creatinine and dose medications per GFR
ATN in s/o sepsis/GIB, s/p CRRT in SICU. Now with recovery of renal function and HD discontinued    - Cr downtrending to 1.3  - Nephro following, recs appreciated   - cont to monitor creatinine and dose medications per GFR
ATN in s/o sepsis/GIB, s/p CRRT in SICU. Now with recovery of renal function and HD discontinued    - Cr downtrending to 1.5  - Nephro following, recs appreciated   - cont to monitor creatinine and dose medications per GFR

## 2024-09-13 NOTE — PROGRESS NOTE ADULT - COVID-19 NEGATIVE LAB RESULT
Chief Complaint   Patient presents with     Port Draw     blood collected from portacath. vitals taken and line flushed wtih NS and heparin      Ana Soto RN    
COVID-19 ruled out

## 2024-09-13 NOTE — DISCHARGE NOTE PROVIDER - HOSPITAL COURSE
yo F h/o HTN and bladder CA, s/p cystectomy/IC creation on 8/6/2024 at [facility] with Dr. Thompson (d/c on 8/10) presented to ED 8/16 w/ generalized weakness and some lower abdominal pain. Since d/c, only have 1-2 bowel movement, and had significantly decrease PO intake due to decreased appetite, intermittent fevers/chills, +bloody discharge from urethra, some burning. Pt stated stents fell out yesterday.  BP 80s/50s for EMS.  Pt remained hypotensive in ED, started on pressors, cultures sent, placed on zosyn and BiPAP and admitted to SICU. CT revealed: Expected postoperative changes status post cystectomy with ileal conduit creation including small amount of complex fluid and free air in the pelvis. No organized fluid collection. Distended ileal conduit with narrowing at the exiting ostomy site and proximally at the site of ureteral insertion. Mild bilateral hydroureteronephrosis with delayed nephrograms. No significant perinephric stranding.    8/16: still requiring pressor support and on BiPAP, pt states she feels better,14fr catheter placed in stoma, Bcx with GPC/GNR, Cr to 4.42 from 5.34, abx changed to leila and diflucan  8/17: still requiring pressor support and supplement oxygen but feels well, decr UO overnight, given albumin and IVF restarted, Cr up slightly to 4.68, electrolytes normal  8/18: RBUS done, no signs of hydro. IR consulted, no need for NTs as no hydro. UOP still very low (overnight 10 cc/hour), given 2 boluses with no improvement. Cr up to 4.95 from 4.68. CT loopogram of conduit pending today to evaluate for leak. still requiring pressors. Ucx from conduit now growing e.coli, sensitivities pending, PT recs rehab   8/19: CT loopogram w/ no anastamotic leak. Cr continues to rise w/ low UOP. UA on admission w/ coarse granular cast. Constellation of signs and symptoms suggestive of Acute tubular necrosis  8/20: Urine culture w/ e.coli sensitivities w/ resistance to floroquinolones. Pt back on abx. Pt w/ slightly increased urine output  8/21: Pt with acute onset of tachypnea, and hypoxia requiring intubation and sedation, CT w/ probably PE, hep gtt started. Nephrology consult obtained and started on CRRT, meropenem restarted  8/22: still intubated and sedated, pressor requirement decreasing, UO increasing, PTT remains supratherapeutic, hep gtt held for one hour this AM, on tube feeds, febrile to 100.9 at 4am  8/23: changed TF as per nutrition recs to start tomorrow AM, check w/ dietician, PTT therapeutic x1 --> repeat PTT at 4:45 AM 50.7, hep gtt @4ml/hr, tolerating CPAP. awake, UO still only 110/shift, still requiring pressor support, CRRT  8//24: extubated, now on RA, still requiring CRRT, /shift and small amount of pressor support, Procal >100, repeat Bcx and Ucx ordered, leila d/c, vanc x 2, then d/c now on zosyn, passed beside S&S now on reg diet, pt offers no complaints, + flatus/BM, will do bedside pelvic to evaluate for retained material  8/25: CRRT, UO less overnight, zosyn, tolerating reg diet, blood tinged vaginal discharge   8/26: UOP 39 overnight, on zosyn and vanc, tolerating reg diet. poss transfer to [facility] today - SICU to discuss on rounds.   8/27: UOP increased to 310 after dose of lasix. off pressors, still getting midodrine.   8/28: Got more lasix and albumin overnight with OP of 642, still down from 1.5 yesterday during the day. Cr scott from 1.98 to 2.77. Will suggest RBUS. Bcx NGTD at 4 days. Off vanc today, still on zosyn.   8/29: stable, midodrine increased,  overnight, still with some blood from vagina (no pads documented) RBUS no hydro, Bcx neg final, IC Cx neg  8/30: stable, midodrine to bid, no OB pads documented, good UO yellow, was transfused 1u pRBC overnight with appropriate response, zosyn d/c, listed for floor  8/31: feels well, good UO, transferred to floor  9/1: still having some pain in joints - medicine consulted and will ask their recs (NSAIDs not feasible due to kidney function). Will also f/u medicine recs for continuing midodrine. Going to give another unit for suboptimal response ot u pRBCs given yesterday (Hgb 7.1 from 7.5)  9/2: Reports black stools. Received 2u pRBC yesterday with little response in H/H. No complaints; GI consulted  9/3 - no further stools; no c/o; received another unit blood yesterday; had EGD, normal study; colonoscopy tomorrow, eliquis d/c  9/4 - not able to drink all the golytely, but has some watery stool, transfused 2u pRBC for acute blood loss anemia, seen by ostomy specialist, taylor removed from stoma  9/5 - still trying to drink moviprep, watery BMs w/ some BRBPR, for colonoscopy today, per GI Colonoscopy performed without ability to get to the TI given sharp colonic turns, lots of old and bright red blood, recommend video capsule study 9/6 9/6 - still with watery BMs w/ some BRBPR, c/o weakness, decr appetite, for VCE today, hypomagnesemia, repleted magnesium, contacted Dr. Thompson who will accept transfer, pt wants to await results of VCE  9/7 - one dark BM this AM, Hbg to 10.6, pt otherwise stable, made NPO per GI note, awaiting VCE results  9/8 - Started golytelty with plan for colonscopy tomorrow, Hgb is 10.2 from 9.7, plan to continue BID cbcs  9/9 - Pt c/o feeling weak and is hungry, states 3 BMs watery brown, no blood, NPO for possible colonoscopy today  9/11 - colonoscopy yesterday, feels well today  9/12 - pt feels well, colonoscopy with diverticulosis, CTPA prelim no PE, GI signed off  9/13 - CTPA neg, pt feels well, BMs back to normal brown, tolerating diet, d/c to rehab to f/u with Dr. Thompson at [facility]

## 2024-09-13 NOTE — PROGRESS NOTE ADULT - PROBLEM SELECTOR PROBLEM 5
Pain in the joints
DM (diabetes mellitus)
Pain in the joints

## 2024-09-13 NOTE — DISCHARGE NOTE PROVIDER - CARE PROVIDERS DIRECT ADDRESSES
,DirectAddress_Unknown ,DirectAddress_Unknown,[email or web address]

## 2024-09-13 NOTE — PROGRESS NOTE ADULT - SUBJECTIVE AND OBJECTIVE BOX
Overnight events:  None    Subjective:  Pt offers no complaints, anxious to go to rehab, states BMs back to normal    Objective:    Vital signs  T(C): , Max: 37.1 (09-12-24 @ 22:00)  HR: 80 (09-13-24 @ 05:20)  BP: 111/70 (09-13-24 @ 05:20)  SpO2: 98% (09-13-24 @ 05:20)  Wt(kg): --    Output   IC: 550 yellow  09-12 @ 07:01  -  09-13 @ 07:00  --------------------------------------------------------  IN: 0 mL / OUT: 1450 mL / NET: -1450 mL        Gen: NAD  Abd: stoma pink abdomen soft, nontender        Labs: none today                        10.3   9.94  )-----------( 485      ( 12 Sep 2024 06:00 )             32.0     12 Sep 2024 06:00    138    |  106    |  9      ----------------------------<  135    3.7     |  18     |  1.22     Ca    8.6        12 Sep 2024 06:00

## 2024-09-13 NOTE — PROGRESS NOTE ADULT - SUBJECTIVE AND OBJECTIVE BOX
Merlin Mathew, MD   Hospitalist  Pager #[phone]    PROGRESS NOTE:     Patient is a 68y old  Female who presents with a chief complaint of Sepsis s/p cystectomy (13 Sep 2024 07:10)      SUBJECTIVE / OVERNIGHT EVENTS:  NEON   Patient feels well- last BM was yesterday, brown   Tolerating PO   ADDITIONAL REVIEW OF SYSTEMS:    MEDICATIONS  (STANDING):  atorvastatin 40 milliGRAM(s) Oral at bedtime  cycloSPORINE (RESTASIS) 0.05% Emulsion 1 Drop(s) Both EYES two times a day  dextrose 5%. 1000 milliLiter(s) (100 mL/Hr) IV Continuous <Continuous>  dextrose 5%. 1000 milliLiter(s) (50 mL/Hr) IV Continuous <Continuous>  dextrose 50% Injectable 25 Gram(s) IV Push once  dextrose 50% Injectable 12.5 Gram(s) IV Push once  glucagon  Injectable 1 milliGRAM(s) IntraMuscular once  heparin   Injectable 5000 Unit(s) SubCutaneous every 12 hours  levothyroxine 100 MICROGram(s) Oral daily  pantoprazole  Injectable 40 milliGRAM(s) IV Push daily  polyethylene glycol 3350 17 Gram(s) Oral daily    MEDICATIONS  (PRN):  acetaminophen     Tablet .. 975 milliGRAM(s) Oral every 6 hours PRN Mild Pain (1 - 3)  gabapentin 100 milliGRAM(s) Oral three times a day PRN pain  melatonin 3 milliGRAM(s) Oral at bedtime PRN Insomnia      CAPILLARY BLOOD GLUCOSE        I&O's Summary    12 Sep 2024 07:01  -  13 Sep 2024 07:00  --------------------------------------------------------  IN: 0 mL / OUT: 1450 mL / NET: -1450 mL    13 Sep 2024 07:01  -  13 Sep 2024 11:17  --------------------------------------------------------  IN: 0 mL / OUT: 150 mL / NET: -150 mL        PHYSICAL EXAM:  Vital Signs Last 24 Hrs  T(C): 36.3 (13 Sep 2024 09:29), Max: 37.1 (12 Sep 2024 22:00)  T(F): 97.4 (13 Sep 2024 09:29), Max: 98.8 (12 Sep 2024 22:00)  HR: 84 (13 Sep 2024 11:13) (72 - 92)  BP: 107/79 (13 Sep 2024 09:29) (107/79 - 126/65)  BP(mean): --  RR: 18 (13 Sep 2024 09:29) (16 - 18)  SpO2: 98% (13 Sep 2024 11:13) (96% - 100%)    Parameters below as of 13 Sep 2024 09:29  Patient On (Oxygen Delivery Method): room air      CONSTITUTIONAL: NAD, resting comfortably   RESPIRATORY: Normal respiratory effort; lungs are clear to auscultation bilaterally  CARDIOVASCULAR: Regular rate and rhythm, normal S1 and S2, no murmur/rub/gallop; 1+ LE edema   ABDOMEN: + Stoma w/ clear yellow urine, Nontender to palpation, normoactive bowel sounds, no rebound/guarding;   MUSCULOSKELETAL no clubbing or cyanosis of digits; no joint swelling or tenderness to palpation  PSYCH: A+O to person, place, and time; affect appropriate    LABS:                        10.3   9.94  )-----------( 485      ( 12 Sep 2024 06:00 )             32.0     09-12    138  |  106  |  9   ----------------------------<  135<H>  3.7   |  18<L>  |  1.22    Ca    8.6      12 Sep 2024 06:00            Urinalysis Basic - ( 12 Sep 2024 06:00 )    Color: x / Appearance: x / SG: x / pH: x  Gluc: 135 mg/dL / Ketone: x  / Bili: x / Urobili: x   Blood: x / Protein: x / Nitrite: x   Leuk Esterase: x / RBC: x / WBC x   Sq Epi: x / Non Sq Epi: x / Bacteria: x          RADIOLOGY & ADDITIONAL TESTS:  Results Reviewed:   Imaging Personally Reviewed:  Electrocardiogram Personally Reviewed:    COORDINATION OF CARE:  Care Discussed with Consultants/Other Providers [Y/N]:  Prior or Outpatient Records Reviewed [Y/N]:

## 2024-09-13 NOTE — PROGRESS NOTE ADULT - REASON FOR ADMISSION
Sepsis s/p cystectomy

## 2024-09-13 NOTE — DISCHARGE NOTE PROVIDER - NSDCCPCAREPLAN_GEN_ALL_CORE_FT
PRINCIPAL DISCHARGE DIAGNOSIS  Diagnosis: Sepsis secondary to UTI  Assessment and Plan of Treatment: You have completed a full course of antibiotics for your infection.  Change ostomy appliance as directed.  Follow up with Dr. Thompson for further management.  Call his office if you have fever greater than 101, no urine in bag, pain not relieved with pain medication, nausea/vomiting.        SECONDARY DISCHARGE DIAGNOSES  Diagnosis: DENITA on CPAP  Assessment and Plan of Treatment: Continue to use CPAP overnight     PRINCIPAL DISCHARGE DIAGNOSIS  Diagnosis: History of total cystectomy  Assessment and Plan of Treatment: You have completed a full course of antibiotics for your infection.  Change ostomy appliance as directed.  Follow up with Dr. Thompson for further management.  Call his office if you have fever greater than 101, no urine in bag, pain not relieved with pain medication, nausea/vomiting.      SECONDARY DISCHARGE DIAGNOSES  Diagnosis: DENITA on CPAP  Assessment and Plan of Treatment: Continue to use CPAP overnight    Diagnosis: Diverticulosis  Assessment and Plan of Treatment: Follow up with Gastroenterology

## 2024-09-13 NOTE — PROGRESS NOTE ADULT - PROBLEM SELECTOR PROBLEM 3
Acute kidney injury superimposed on CKD
Hypotension
Acute kidney injury superimposed on CKD

## 2024-09-13 NOTE — PROGRESS NOTE ADULT - PROBLEM SELECTOR PLAN 6
C/w CPAP qHS
C/w CPAP qHS
Pt states to have ETOH use disorder and would like to speak with  for resources referral , will place consult.
C/w CPAP qHS

## 2024-09-13 NOTE — PROGRESS NOTE ADULT - PROBLEM SELECTOR PLAN 5
Pt with known RA and OA as she states that get exacerbated with her current long hospital stay   - Hasn't seen her rheumatologist in years  - No NAIDS in s/o GI bleed  - Pain currently controlled, c/w tylenol prn  - Can check RF and anti-CCP to see if pt is having a RA flare, though clinical exam not impressive   - Outpt f/up rheumatology
Pt with known RA and OA as she states that get exacerbated with her current long hospital stay   - Hasn't seen her rheumatologist in years  - No NAIDS in s/o GI bleed  - Pain currently controlled, c/w tylenol prn  - Can check RF and anti-CCP to see if pt is having a RA flare, though clinical exam not impressive   - Outpt f/up rheumatology
A1C =5.1 with stable glucose   cont low CHO diet.
Pt with known RA and OA as she states that get exacerbated with her current long hospital stay   - Hasn't seen her rheumatologist in years  - No NAIDS in s/o GI bleed  - Pain currently controlled, c/w tylenol prn  - Outpt f/up rheumatology
Pt with known RA and OA as she states that get exacerbated with her current long hospital stay   - Hasn't seen her rheumatologist in years  - No NAIDS in s/o GI bleed  - Pain currently controlled, c/w tylenol prn  - Outpt f/up rheumatology
Pt with known RA and OA as she states that get exacerbated with her current long hospital stay   - Hasn't seen her rheumatologist in years  - No NAIDS in s/o GI bleed  - Pain currently controlled, c/w tylenol prn  - Can check RF and anti-CCP to see if pt is having a RA flare, though clinical exam not impressive   - Outpt f/up rheumatology
Pt with known RA and OA as she states that get exacerbated with her current long hospital stay   - Hasn't seen her rheumatologist in years  - No NAIDS in s/o GI bleed  - Pain currently controlled, c/w tylenol prn  - Outpt f/up rheumatology
Pt with known RA and OA as she states that get exacerbated with her current long hospital stay   - Hasn't seen her rheumatologist in years  - No NAIDS in s/o GI bleed  - Pain currently controlled, c/w tylenol prn  - Outpt f/up rheumatology
Pt with known RA and OA as she states that get exacerbated with her current long hospital stay   - Hasn't seen her rheumatologist in years  - No NAIDS in s/o GI bleed  - Pain currently controlled, c/w tylenol prn  - Can check RF and anti-CCP to see if pt is having a RA flare, though clinical exam not impressive   - Outpt f/up rheumatology

## 2024-09-13 NOTE — PROGRESS NOTE ADULT - PROBLEM SELECTOR PROBLEM 2
Hypotension
Metabolic acidosis
Anemia due to acute blood loss
Hypotension
Hypotension
Metabolic acidosis
Hypotension

## 2024-09-13 NOTE — PROGRESS NOTE ADULT - PROBLEM SELECTOR PROBLEM 7
Alcohol use

## 2024-09-13 NOTE — PROGRESS NOTE ADULT - PROVIDER SPECIALTY LIST ADULT
Hospitalist
Hospitalist
Nephrology
Nephrology
SICU
Urology
Gastroenterology
Hospitalist
Nephrology
SICU
Urology
Gastroenterology
Nephrology
SICU
Urology
Gastroenterology
Hospitalist
Nephrology
SICU
Urology
Nephrology
SICU
Urology
Nephrology
Hospitalist
Nephrology
Hospitalist

## 2024-09-13 NOTE — DISCHARGE NOTE PROVIDER - NSDCMRMEDTOKEN_GEN_ALL_CORE_FT
acetaminophen 325 mg oral tablet: 3 tab(s) orally every 6 hours as needed for pain  melatonin 3 mg oral tablet: 1 tab(s) orally once a day (at bedtime) As needed Insomnia  omeprazole 20 mg oral delayed release capsule: 1 cap(s) orally once a day  polyethylene glycol 3350 oral powder for reconstitution: 17 gram(s) orally once a day as needed for  constipation  Restasis 0.05% ophthalmic emulsion: 1 drop(s) to each affected eye every 12 hours  rosuvastatin 40 mg oral capsule: 1 tab(s) orally once a day  Synthroid 100 mcg (0.1 mg) oral tablet: 1 tab(s) orally once a day

## 2024-09-13 NOTE — PROGRESS NOTE ADULT - PROBLEM SELECTOR PLAN 4
Requiring intubation in SICU, unclear etiology, possibly volume overload/renal failure. Now doing well on room air   - Concern for PE in SICU, CTA Chest on 8/20 showing "Significant breathing motion artifacts, as streak artifacts limiting evaluation with no pulmonary emboli; a questionable linear filling defect   is visible in the left upper lobe pulmonary artery branch... which may be a small embolus however uncertain; follow-up recommended."  - Dopplers 8/21 negative for DVT   - Patient was started on AC, however now discontinued due to anemia/melena   - Awaiting capsule endoscopy as above  - Would consider repeating CTA chest to definitively r/o PE given patient is unable to tolerate AC at this time. If PE is present, would need to consider IVC filter
Requiring intubation in SICU, unclear etiology, possibly volume overload/renal failure. Now doing well on room air   - Concern for PE in SICU, CTA Chest on 8/20 showing "Significant breathing motion artifacts, as streak artifacts limiting evaluation with no pulmonary emboli; a questionable linear filling defect   is visible in the left upper lobe pulmonary artery branch... which may be a small embolus however uncertain; follow-up recommended."  - Dopplers 8/21 negative for DVT   - Patient was started on AC, however now discontinued due to anemia/melena   - Awaiting capsule endoscopy as above  - Would consider repeating CTA chest to definitively r/o PE given patient is unable to tolerate AC at this time. If PE is present, would need to consider IVC filter
ATN in s/o sepsis/GIB, S/P CRRT   Cr downtrending to 1.5  Nephro rec is noted   cont to monitor creatinine
Requiring intubation in SICU, unclear etiology, possibly volume overload/renal failure. Now doing well on room air   - Concern for PE in SICU, CTA Chest on 8/20 showing "Significant breathing motion artifacts, as streak artifacts limiting evaluation with no pulmonary emboli; a questionable linear filling defect is visible in the left upper lobe pulmonary artery branch... which may be a small embolus however uncertain; follow-up recommended."  - Dopplers 8/21 negative for DVT   - Patient was started on AC, however now discontinued due to anemia/melena   - CTA chest negative for PE
Requiring intubation in SICU, unclear etiology, possibly volume overload/renal failure. Now doing well on room air   - Concern for PE in SICU, CTA Chest on 8/20 showing "Significant breathing motion artifacts, as streak artifacts limiting evaluation with no pulmonary emboli; a questionable linear filling defect is visible in the left upper lobe pulmonary artery branch... which may be a small embolus however uncertain; follow-up recommended."  - Dopplers 8/21 negative for DVT   - Patient was started on AC, however now discontinued due to anemia/melena   [ ] Plan for CTA chest today
Requiring intubation in SICU, unclear etiology, possibly volume overload/renal failure. Now doing well on room air   - Concern for PE in SICU, CTA Chest on 8/20 showing "Significant breathing motion artifacts, as streak artifacts limiting evaluation with no pulmonary emboli; a questionable linear filling defect   is visible in the left upper lobe pulmonary artery branch... which may be a small embolus however uncertain; follow-up recommended."  - Dopplers 8/21 negative for DVT   - Patient was started on AC, however now discontinued due to anemia/melena   - Awaiting capsule endoscopy as above  [ ] Would consider repeating CTA chest to definitively r/o PE given patient is unable to tolerate AC at this time. If PE is present, would need to consider IVC filter
Requiring intubation in SICU, unclear etiology, possibly volume overload/renal failure. Now doing well on room air   - Concern for PE in SICU, CTA Chest on 8/20 showing "Significant breathing motion artifacts, as streak artifacts limiting evaluation with no pulmonary emboli; a questionable linear filling defect   is visible in the left upper lobe pulmonary artery branch... which may be a small embolus however uncertain; follow-up recommended."  - Dopplers 8/21 negative for DVT   - Patient was started on AC, however now discontinued due to anemia/melena   - Awaiting colonoscopy as above  - Would consider repeating CTA chest to definitively r/o PE given patient is unable to tolerate AC at this time. If PE is present, would need to consider IVC filter
Requiring intubation in SICU, unclear etiology, possibly volume overload/renal failure. Now doing well on room air   - Concern for PE in SICU, CTA Chest on 8/20 showing "Significant breathing motion artifacts, as streak artifacts limiting evaluation with no pulmonary emboli; a questionable linear filling defect   is visible in the left upper lobe pulmonary artery branch... which may be a small embolus however uncertain; follow-up recommended."  - Dopplers 8/21 negative for DVT   - Patient was started on AC, however now discontinued due to anemia/melena   [ ] Would consider repeating CTA chest to definitively r/o PE given patient is unable to tolerate AC at this time. If PE is present, would need to consider IVC filter
Requiring intubation in SICU, unclear etiology, possibly volume overload/renal failure. Now doing well on room air   - Concern for PE in SICU, CTA Chest on 8/20 showing "Significant breathing motion artifacts, as streak artifacts limiting evaluation with no pulmonary emboli; a questionable linear filling defect   is visible in the left upper lobe pulmonary artery branch... which may be a small embolus however uncertain; follow-up recommended."  - Dopplers 8/21 negative for DVT   - Patient was started on AC, however now discontinued due to anemia/melena   - Awaiting capsule endoscopy as above  - Would consider repeating CTA chest to definitively r/o PE given patient is unable to tolerate AC at this time. If PE is present, would need to consider IVC filter
Requiring intubation in SICU, unclear etiology, possibly volume overload/renal failure. Now doing well on room air   - Concern for PE in SICU, CTA Chest on 8/20 showing "Significant breathing motion artifacts, as streak artifacts limiting evaluation with no pulmonary emboli; a questionable linear filling defect is visible in the left upper lobe pulmonary artery branch... which may be a small embolus however uncertain; follow-up recommended."  - Dopplers 8/21 negative for DVT   - Patient was started on AC, however now discontinued due to anemia/melena   - CTA chest negative for PE
Requiring intubation in SICU, unclear etiology, possibly volume overload/renal failure. Now doing well on room air   - Concern for PE in SICU, CTA Chest on 8/20 showing "Significant breathing motion artifacts, as streak artifacts limiting evaluation with no pulmonary emboli; a questionable linear filling defect   is visible in the left upper lobe pulmonary artery branch... which may be a small embolus however uncertain; follow-up recommended."  - Dopplers 8/21 negative for DVT   - Patient was started on AC, however now discontinued due to anemia/melena   - Awaiting capsule endoscopy as above  - Would consider repeating CTA chest to definitively r/o PE given patient is unable to tolerate AC at this time. If PE is present, would need to consider IVC filter

## 2024-09-13 NOTE — DISCHARGE NOTE PROVIDER - PROVIDER TOKENS
PROVIDER:[TOKEN:[[id number]]] PROVIDER:[TOKEN:[[id number]]],PROVIDER:[TOKEN:[[id number]]]

## 2024-09-13 NOTE — PROGRESS NOTE ADULT - PROBLEM SELECTOR PLAN 7
Pt states to have ETOH use disorder and would like to speak with  for resources referral , will place consult.
Pt states to have ETOH use disorder and would like to speak with  for resources referral
Pt states to have ETOH use disorder and would like to speak with  for resources referral , will place consult.
Pt states to have ETOH use disorder and would like to speak with  for resources referral
Pt states to have ETOH use disorder and would like to speak with  for resources referral , will place consult.
Pt states to have ETOH use disorder and would like to speak with  for resources referral , will place consult.
Pt states to have ETOH use disorder and would like to speak with  for resources referral
Pt states to have ETOH use disorder and would like to speak with  for resources referral

## 2024-09-13 NOTE — PROGRESS NOTE ADULT - PROBLEM SELECTOR PROBLEM 6
DENITA on CPAP
DENITA on CPAP
Alcohol use
DENITA on CPAP

## 2024-09-13 NOTE — PROGRESS NOTE ADULT - PROBLEM SELECTOR PLAN 8
DVT ppx: HSQ q12h for now  Dispo: TBD, remains medically active
DVT ppx: HSQ q12h for now  Dispo: TBD, remains medically active     Discussed with  team
DVT ppx: HSQ q12h for now  Dispo: TBD, remains medically active
DVT ppx: HSQ q12h for now  Dispo: TBD, remains medically active     Discussed with  team on 9/5
DVT ppx: HSQ q12h for now  Dispo: TBD, remains medically active
DVT ppx: HSQ q12h for now  Dispo: ASA, medically stable
DVT ppx: HSQ q12h for now  Dispo: TBD, remains medically active
DVT ppx: HSQ q12h for now  Dispo: ASA

## 2024-09-13 NOTE — PROGRESS NOTE ADULT - ASSESSMENT
69 yo F h/o HTN and bladder CA, s/p cystectomy/IC creation on 8/6/2024 at [facility] with Dr. Thompson (d/c on 8/10) presented to ED 8/16 w/ generalized weakness and some lower abdominal pain. Since d/c, only have 1-2 bowel movement, and had significantly decrease PO intake due to decreased appetite, intermittent fevers/chills, +bloody discharge from urethra, some burning. Pt stated stents fell out yesterday.  BP 80s/50s for EMS.  Pt remained hypotensive in ED, started on pressors, cultures sent, placed on zosyn and BiPAP and admitted to SICU. CT revealed: Expected postoperative changes status post cystectomy with ileal conduit creation including small amount of complex fluid and free air in the pelvis. No organized fluid collection. Distended ileal conduit with narrowing at the exiting ostomy site and proximally at the site of ureteral insertion. Mild bilateral hydroureteronephrosis with delayed nephrograms. No significant perinephric stranding.    8/16: still requiring pressor support and on BiPAP, pt states she feels better,14fr catheter placed in stoma, Bcx with GPC/GNR, Cr to 4.42 from 5.34, abx changed to leila and diflucan  8/17: still requiring pressor support and supplement oxygen but feels well, decr UO overnight, given albumin and IVF restarted, Cr up slightly to 4.68, electrolytes normal  8/18: RBUS done, no signs of hydro. IR consulted, no need for NTs as no hydro. UOP still very low (overnight 10 cc/hour), given 2 boluses with no improvement. Cr up to 4.95 from 4.68. CT loopogram of conduit pending today to evaluate for leak. still requiring pressors. Ucx from conduit now growing e.coli, sensitivities pending, PT recs rehab   8/19: CT loopogram w/ no anastamotic leak. Cr continues to rise w/ low UOP. UA on admission w/ coarse granular cast. Constellation of signs and symptoms suggestive of Acute tubular necrosis  8/20: Urine culture w/ e.coli sensitivities w/ resistance to floroquinolones. Pt back on abx. Pt w/ slightly increased urine output  8/21: Pt with acute onset of tachypnea, and hypoxia requiring intubation and sedation, CT w/ probably PE, hep gtt started. Nephrology consult obtained and started on CRRT, meropenem restarted  8/22: still intubated and sedated, pressor requirement decreasing, UO increasing, PTT remains supratherapeutic, hep gtt held for one hour this AM, on tube feeds, febrile to 100.9 at 4am  8/23: changed TF as per nutrition recs to start tomorrow AM, check w/ dietician, PTT therapeutic x1 --> repeat PTT at 4:45 AM 50.7, hep gtt @4ml/hr, tolerating CPAP. awake, UO still only 110/shift, still requiring pressor support, CRRT  8//24: extubated, now on RA, still requiring CRRT, /shift and small amount of pressor support, Procal >100, repeat Bcx and Ucx ordered, leila d/c, vanc x 2, then d/c now on zosyn, passed beside S&S now on reg diet, pt offers no complaints, + flatus/BM, will do bedside pelvic to evaluate for retained material  8/25: CRRT, UO less overnight, zosyn, tolerating reg diet, blood tinged vaginal discharge   8/26: UOP 39 overnight, on zosyn and vanc, tolerating reg diet. poss transfer to [facility] today - SICU to discuss on rounds.   8/27: UOP increased to 310 after dose of lasix. off pressors, still getting midodrine.   8/28: Got more lasix and albumin overnight with OP of 642, still down from 1.5 yesterday during the day. Cr scott from 1.98 to 2.77. Will suggest RBUS. Bcx NGTD at 4 days. Off vanc today, still on zosyn.   8/29: stable, midodrine increased,  overnight, still with some blood from vagina (no pads documented) RBUS no hydro, Bcx neg final, IC Cx neg  8/30: stable, midodrine to bid, no OB pads documented, good UO yellow, was transfused 1u pRBC overnight with appropriate response, zosyn d/c, listed for floor  8/31: feels well, good UO, transferred to floor  9/1: still having some pain in joints - medicine consulted and will ask their recs (NSAIDs not feasible due to kidney function). Will also f/u medicine recs for continuing midodrine. Going to give another unit for suboptimal response ot u pRBCs given yesterday (Hgb 7.1 from 7.5)  9/2: Reports black stools. Received 2u pRBC yesterday with little response in H/H. No complaints; GI consulted  9/3 - no further stools; no c/o; received another unit blood yesterday; had EGD, normal study; colonoscopy tomorrow, eliquis d/c  9/4 - not able to drink all the golytely, but has some watery stool, transfused 2u pRBC for acute blood loss anemia, seen by ostomy specialist, taylor removed from stoma  9/5 - still trying to drink moviprep, watery BMs w/ some BRBPR, for colonoscopy today, per GI Colonoscopy performed without ability to get to the TI given sharp colonic turns, lots of old and bright red blood, recommend video capsule study 9/6 9/6 - still with watery BMs w/ some BRBPR, c/o weakness, decr appetite, for VCE today, hypomagnesemia, repleted magnesium, contacted Dr. Thompson who will accept transfer, pt wants to await results of VCE  9/7 - one dark BM this AM, Hbg to 10.6, pt otherwise stable, made NPO per GI note, awaiting VCE results  9/8 - Started golytelty with plan for colonscopy tomorrow, Hgb is 10.2 from 9.7, plan to continue BID cbcs  9/9 - Pt c/o feeling weak and is hungry, states 3 BMs watery brown, no blood, NPO for possible colonoscopy today  9/11 - colonoscopy yesterday, feels well today  9/12 - pt feels well, colonoscopy with diverticulosis, CTPA prelim no PE, GI signed off  9/13 - CTPA neg, pt feels well, BMs back to normal brown, tolerating diet    Plan:  - IVL  - no need for eliquis or ASA  - GI signed off to f/u as outpt  - f/u medicine  - rehab planning  - DVT prophy, IS, OOB, ambulate

## 2024-09-13 NOTE — DISCHARGE NOTE PROVIDER - DETAILS OF MALNUTRITION DIAGNOSIS/DIAGNOSES
This patient has been assessed with a concern for Malnutrition and was treated during this hospitalization for the following Nutrition diagnosis/diagnoses:     -  08/17/2024: Moderate protein-calorie malnutrition

## 2024-09-13 NOTE — PROGRESS NOTE ADULT - NUTRITIONAL ASSESSMENT
This patient has been assessed with a concern for Malnutrition and has been determined to have a diagnosis/diagnoses of Moderate protein-calorie malnutrition.    This patient is being managed with:   Diet NPO after Midnight-     NPO Start Date: 03-Sep-2024   NPO Start Time: 23:59  Except Medications  Entered: Sep  3 2024 10:12PM    Diet Consistent Carbohydrate Clear Liquid-  Entered: Sep  3 2024  3:12PM  
This patient has been assessed with a concern for Malnutrition and has been determined to have a diagnosis/diagnoses of Moderate protein-calorie malnutrition.    This patient is being managed with:   Diet NPO with Tube Feed-  Tube Feeding Modality: Nasogastric  Pivot 1.5 Frederick (PIVOT1.5RTH)  Total Volume for 24 Hours (mL): 864  Continuous  Starting Tube Feed Rate {mL per Hour}: 20  Increase Tube Feed Rate by (mL): 25     Every 4 hours  Until Goal Tube Feed Rate (mL per Hour): 36  Tube Feed Duration (in Hours): 24  Tube Feed Start Time: 09:30  Entered: Aug 21 2024  9:25AM  
This patient has been assessed with a concern for Malnutrition and has been determined to have a diagnosis/diagnoses of Moderate protein-calorie malnutrition.    This patient is being managed with:   Diet Regular-  Consistent Carbohydrate {Evening Snack} (CSTCHOSN)  Entered: Aug 17 2024  6:06PM  
This patient has been assessed with a concern for Malnutrition and has been determined to have a diagnosis/diagnoses of Moderate protein-calorie malnutrition.    This patient is being managed with:   Diet Regular-  Consistent Carbohydrate {Evening Snack} (CSTCHOSN)  Entered: Aug 23 2024  1:31PM  
This patient has been assessed with a concern for Malnutrition and has been determined to have a diagnosis/diagnoses of Moderate protein-calorie malnutrition.    This patient is being managed with:   Diet Regular-  Consistent Carbohydrate {Evening Snack} (CSTCHOSN)  Supplement Feeding Modality:  Oral  Ensure Clear Cans or Servings Per Day:  1       Frequency:  Three Times a day  Entered: Aug 26 2024  9:17AM  
This patient has been assessed with a concern for Malnutrition and has been determined to have a diagnosis/diagnoses of Moderate protein-calorie malnutrition.    This patient is being managed with:   Diet Regular-  Consistent Carbohydrate {Evening Snack} (CSTCHOSN)  Entered: Aug 17 2024  6:06PM  
This patient has been assessed with a concern for Malnutrition and has been determined to have a diagnosis/diagnoses of Moderate protein-calorie malnutrition.    This patient is being managed with:   Diet Regular-  Consistent Carbohydrate {Evening Snack} (CSTCHOSN)  Supplement Feeding Modality:  Oral  Ensure Clear Cans or Servings Per Day:  1       Frequency:  Three Times a day  Entered: Aug 26 2024  9:17AM  
This patient has been assessed with a concern for Malnutrition and has been determined to have a diagnosis/diagnoses of Moderate protein-calorie malnutrition.    This patient is being managed with:   Diet Regular-  Consistent Carbohydrate {Evening Snack} (CSTCHOSN)  Supplement Feeding Modality:  Oral  Ensure Clear Cans or Servings Per Day:  1       Frequency:  Three Times a day  Entered: Aug 26 2024  9:17AM  
This patient has been assessed with a concern for Malnutrition and has been determined to have a diagnosis/diagnoses of Moderate protein-calorie malnutrition.    This patient is being managed with:   Diet Clear Liquid-  Entered: Sep  7 2024 12:55PM  
This patient has been assessed with a concern for Malnutrition and has been determined to have a diagnosis/diagnoses of Moderate protein-calorie malnutrition.    This patient is being managed with:   Diet NPO after Midnight-     NPO Start Date: 08-Sep-2024   NPO Start Time: 23:59  Entered: Sep  8 2024  9:57AM    Diet Clear Liquid-  Entered: Sep  7 2024 12:55PM  
This patient has been assessed with a concern for Malnutrition and has been determined to have a diagnosis/diagnoses of Moderate protein-calorie malnutrition.    This patient is being managed with:   Diet Regular-  Consistent Carbohydrate {Evening Snack} (CSTCHOSN)  Entered: Aug 17 2024  6:06PM  
This patient has been assessed with a concern for Malnutrition and has been determined to have a diagnosis/diagnoses of Moderate protein-calorie malnutrition.    This patient is being managed with:   Diet Regular-  Consistent Carbohydrate {Evening Snack} (CSTCHOSN)  Entered: Aug 17 2024  6:06PM  
This patient has been assessed with a concern for Malnutrition and has been determined to have a diagnosis/diagnoses of Moderate protein-calorie malnutrition.    This patient is being managed with:   Diet Regular-  Consistent Carbohydrate {Evening Snack} (CSTCHOSN)  Supplement Feeding Modality:  Oral  Ensure Clear Cans or Servings Per Day:  1       Frequency:  Three Times a day  Entered: Aug 26 2024  9:17AM  
This patient has been assessed with a concern for Malnutrition and has been determined to have a diagnosis/diagnoses of Moderate protein-calorie malnutrition.    This patient is being managed with:   Diet Regular-  Consistent Carbohydrate {Evening Snack} (CSTCHOSN)  Supplement Feeding Modality:  Oral  Ensure Clear Cans or Servings Per Day:  1       Frequency:  Three Times a day  Entered: Aug 26 2024  9:17AM  
This patient has been assessed with a concern for Malnutrition and has been determined to have a diagnosis/diagnoses of Moderate protein-calorie malnutrition.    This patient is being managed with:   Diet Consistent Carbohydrate/No Snacks-  Entered: Sep 11 2024  5:57AM  
This patient has been assessed with a concern for Malnutrition and has been determined to have a diagnosis/diagnoses of Moderate protein-calorie malnutrition.    This patient is being managed with:   Diet NPO after Midnight-     NPO Start Date: 02-Sep-2024   NPO Start Time: 23:59  Except Medications  Entered: Sep  2 2024 12:16PM    Diet Regular-  Consistent Carbohydrate {Evening Snack} (CSTCHOSN)  Supplement Feeding Modality:  Oral  Ensure Clear Cans or Servings Per Day:  1       Frequency:  Three Times a day  Entered: Aug 26 2024  9:17AM  
This patient has been assessed with a concern for Malnutrition and has been determined to have a diagnosis/diagnoses of Moderate protein-calorie malnutrition.    This patient is being managed with:   Diet NPO with Tube Feed-  Tube Feeding Modality: Nasogastric  Pivot 1.5 [place] (PIVOT1.5RTH)  Total Volume for 24 Hours (mL): 864  Continuous  Starting Tube Feed Rate {mL per Hour}: 960  Increase Tube Feed Rate by (mL): 25     Every 4 hours  Until Goal Tube Feed Rate (mL per Hour): 36  Tube Feed Duration (in Hours): 24  Tube Feed Start Time: 20:30  Entered: Aug 22 2024  8:31PM    Diet NPO with Tube Feed-  Tube Feeding Modality: Nasogastric  Total Volume for 24 Hours (mL): 960  Continuous  Starting Tube Feed Rate {mL per Hour}: 20  Increase Tube Feed Rate by (mL): 25     Every 4 hours  Until Goal Tube Feed Rate (mL per Hour): 40  Tube Feed Duration (in Hours): 24  Tube Feed Start Time: 09:30  Free Water Flush  Bolus   Total Volume per Flush (mL): 728   Frequency: Every 24 Hours   Total Daily Volume of Flush (mL): 960    Start Time: 09:00  Liquid Protein Supplement     Qty per Day:  1  Entered: Aug 22 2024  8:30PM  
This patient has been assessed with a concern for Malnutrition and has been determined to have a diagnosis/diagnoses of Moderate protein-calorie malnutrition.    This patient is being managed with:   Diet Regular-  Consistent Carbohydrate {Evening Snack} (CSTCHOSN)  Entered: Aug 23 2024  1:31PM  
This patient has been assessed with a concern for Malnutrition and has been determined to have a diagnosis/diagnoses of Moderate protein-calorie malnutrition.    This patient is being managed with:   Diet Clear Liquid-  Entered: Sep  7 2024 12:55PM  
This patient has been assessed with a concern for Malnutrition and has been determined to have a diagnosis/diagnoses of Moderate protein-calorie malnutrition.    This patient is being managed with:   Diet NPO after Midnight-     NPO Start Date: 08-Sep-2024   NPO Start Time: 23:59  Entered: Sep  8 2024  9:57AM    Diet Clear Liquid-  Entered: Sep  7 2024 12:55PM  

## 2024-09-13 NOTE — PROGRESS NOTE ADULT - PROBLEM SELECTOR PLAN 1
Patient with vaginal bleeding (now resolved) and melena   - S/P 10 units PRBC transfusion total this admission   - Monitor CBC and transfuse for Hgb < 7 or symptomatic   - S/p EGD without source of melena   - S/p Colonoscopy with suspected GI bleeding proximal to area visualized  - s/p VCE done on 9/6  - 9/10 Colonoscopy: No bleeding noted, GIB maybe 2/2 anastomotic bleeding in the setting of AC per GI   - Stopped aspirin - confirmed with patient, she is on it for "preventative" measures, reports she never had cardiac stents or a stroke.

## 2024-10-09 NOTE — H&P PST ADULT - ENDOCRINE
Patient : Masha Schaeffer Age: 4 year old Sex: female   MRN: [id number] Encounter Date: 10/8/2024    History     Chief Complaint   Patient presents with    Fever    Cough       HPI    Masha Schaeffer is a 4 year old female with no ongoing medical history presents emergency room with cough, congestion and fever that began today.  Tmax 105.  Tylenol last given at 5 PM. No vomiting. No diarrhea. Normal oral intake. No throat pain. No dysuria. Sibling also sick at home.     Immunizations up to date      No Known Allergies    Home Medications: denies       History reviewed. No pertinent past medical history.    History reviewed. No pertinent surgical history.    No family history on file.    Social: Lives at home with family       Physical Exam     Vitals:    10/08/24 1850 10/08/24 2153   BP: 102/68 (!) 110/75   Pulse: 98 (!) 163   Resp: 28 23   Temp: 99.1 °F (37.3 °C) 98.2 °F (36.8 °C)   TempSrc: Oral Oral   SpO2: 98% 97%      Physical Exam  Vitals and nursing note reviewed.   Constitutional:       General: She is active. She is not in acute distress.     Appearance: She is not toxic-appearing.   HENT:      Head: Atraumatic.      Right Ear: Tympanic membrane, ear canal and external ear normal.      Left Ear: Tympanic membrane, ear canal and external ear normal.      Nose: Congestion and rhinorrhea present.      Mouth/Throat:      Mouth: Mucous membranes are moist.      Pharynx: No oropharyngeal exudate or posterior oropharyngeal erythema.      Neck: Normal range of motion and neck supple.   Eyes:      Conjunctiva/sclera: Conjunctivae normal.      Pupils: Pupils are equal, round, and reactive to light.   Cardiovascular:      Rate and Rhythm: Normal rate and regular rhythm.      Pulses: Normal pulses.      Heart sounds: Normal heart sounds.   Pulmonary:      Effort: Pulmonary effort is normal. No respiratory distress, nasal flaring or retractions.      Breath sounds: No stridor or decreased air movement. No wheezing,  rhonchi or rales.   Abdominal:      Palpations: Abdomen is soft.      Tenderness: There is no abdominal tenderness.   Musculoskeletal:         General: No tenderness. Normal range of motion.   Lymphadenopathy:      Cervical: No cervical adenopathy.   Skin:     General: Skin is warm.      Capillary Refill: Capillary refill takes less than 2 seconds.      Findings: No rash.   Neurological:      General: No focal deficit present.      Mental Status: She is alert.         Medical Decision Making         Medical Decision Making  Masha Schaeffer is a 4 year old female with no ongoing medical history presents emergency room with cough, congestion and fever that began today. Physical exam findings as noted above. Positive for nasal congestion.  Patient is well-appearing, well hydrated, and in no distress on exam. No focal lung findings.No stridor. No evidence of OM. Abd exam benign.     Differential diagnosis:  URI, viral syndrome  No wheezing to suggest bronchospasm  No concern for pneumonia or serious bacterial infection based on clinical examination.     Initial Plan:   - Quad panel  - check dexi  - No indication for imaging  - No indication for IV fluids given patient is taking oral intake   - No requirement for respiratory support with oxygen type devices      Amount and/or Complexity of Data Reviewed  Independent Historian: parent  Labs: ordered. Decision-making details documented in ED Course.    Risk  OTC drugs.        ED Medications   Medications - No data to display     Procedures     Procedures    Lab Results     Results for orders placed or performed during the hospital encounter of 10/08/24   COVID/Flu/RSV panel    Specimen: Nasal, Mid-turbinate; Swab   Result Value Ref Range    Rapid SARS-COV-2 by PCR Not Detected Not Detected / Detected / Presumptive Positive / Inhibitors present    Influenza A by PCR Not Detected Not Detected    Influenza B by PCR Not Detected Not Detected    RSV BY PCR Not Detected Not  Detected    Isolation Guidelines      Procedural Comment     GLUCOSE, BEDSIDE - POINT OF CARE    Specimen: Blood   Result Value Ref Range    GLUCOSE, BEDSIDE - POINT OF CARE 115 (H) 70 - 99 mg/dL       Radiology Results     Imaging Results    None       ED Course     ED Course as of 10/08/24 2202   Tue Oct 08, 2024   2048 Quad neg [NO]   2159 POC blood sugar reviewed, no hypoglycemia.    Pt is not febrile   [NO]      ED Course User Index  [NO] Ciara Leach APNP       Disposition     1. Acute febrile illness in child    2. Nasal congestion         Below instructions copied from After Visit Summary   Thank you for allowing me to care for your child. Please follow the below instructions. Always make a follow-up appt with your regular provider for follow-up and routine preventive care.    Push fluids as this will help with cough  Use cool mist humidifer at night  Try 1 teaspoon of honey 1-2 times a day to help with cough suppression    Please return to the emergency department for any worsening symptoms including fever for 5 days in a row, periods of labored breathing or shortness of breath, unable to tolerate oral fluids, or any other concerning symptoms.     Summary of your Discharge Medications      You have not been prescribed any medications.        Follow-up with Shayne Meyer, DO  [address] [address]  [phone]    Schedule an appointment as soon as possible for a visit in 2 days           Discharge 10/8/2024 10:00 PM  Masha Schaeffer discharge to home/self care.                 Ciara Leach APNP  10/08/24 2202     negative

## 2024-12-21 NOTE — PROCEDURE NOTE - NSPATIENTPOSTION_GEN_A_CORE
Trendelenburg
supine

## 2025-01-13 NOTE — PROGRESS NOTE ADULT - PROBLEM SELECTOR PROBLEM 4
No care due was identified.  [facility] Embedded Care Due Messages. Reference number: [id number].   1/13/2025 10:13:35 AM CST  
Acute respiratory failure with hypoxia
Acute kidney injury superimposed on CKD
Acute respiratory failure with hypoxia

## 2025-02-17 NOTE — ED PROVIDER NOTE - NS ED MD DISPO DISCHARGE CCDA
2nd attempt to contact patient. Hub ok to schedule.    Patient/Caregiver provided printed discharge information.

## 2025-03-05 ENCOUNTER — APPOINTMENT (OUTPATIENT)
Dept: OPHTHALMOLOGY | Facility: CLINIC | Age: 70
End: 2025-03-05

## 2025-03-12 ENCOUNTER — EMERGENCY (EMERGENCY)
Facility: HOSPITAL | Age: 70
LOS: 1 days | Discharge: HOME | End: 2025-03-12
Attending: STUDENT IN AN ORGANIZED HEALTH CARE EDUCATION/TRAINING PROGRAM | Admitting: EMERGENCY MEDICINE
Payer: MEDICARE

## 2025-03-12 VITALS
SYSTOLIC BLOOD PRESSURE: 98 MMHG | TEMPERATURE: 99 F | OXYGEN SATURATION: 97 % | RESPIRATION RATE: 17 BRPM | WEIGHT: 195.11 LBS | DIASTOLIC BLOOD PRESSURE: 70 MMHG | HEART RATE: 68 BPM

## 2025-03-12 DIAGNOSIS — Z98.890 OTHER SPECIFIED POSTPROCEDURAL STATES: Chronic | ICD-10-CM

## 2025-03-12 DIAGNOSIS — Z90.710 ACQUIRED ABSENCE OF BOTH CERVIX AND UTERUS: Chronic | ICD-10-CM

## 2025-03-12 DIAGNOSIS — Z96.652 PRESENCE OF LEFT ARTIFICIAL KNEE JOINT: Chronic | ICD-10-CM

## 2025-03-12 DIAGNOSIS — Z96.651 PRESENCE OF RIGHT ARTIFICIAL KNEE JOINT: Chronic | ICD-10-CM

## 2025-03-12 LAB
ALBUMIN SERPL ELPH-MCNC: 4.1 G/DL — SIGNIFICANT CHANGE UP (ref 3.3–5)
ALP SERPL-CCNC: 72 U/L — SIGNIFICANT CHANGE UP (ref 40–120)
ALT FLD-CCNC: 11 U/L — SIGNIFICANT CHANGE UP (ref 4–33)
ANION GAP SERPL CALC-SCNC: 10 MMOL/L — SIGNIFICANT CHANGE UP (ref 7–14)
APPEARANCE UR: ABNORMAL
AST SERPL-CCNC: 25 U/L — SIGNIFICANT CHANGE UP (ref 4–32)
BACTERIA # UR AUTO: ABNORMAL /HPF
BASOPHILS # BLD AUTO: 0.02 K/UL — SIGNIFICANT CHANGE UP (ref 0–0.2)
BASOPHILS NFR BLD AUTO: 0.4 % — SIGNIFICANT CHANGE UP (ref 0–2)
BILIRUB SERPL-MCNC: 1.4 MG/DL — HIGH (ref 0.2–1.2)
BILIRUB UR-MCNC: NEGATIVE — SIGNIFICANT CHANGE UP
BLOOD GAS VENOUS COMPREHENSIVE RESULT: SIGNIFICANT CHANGE UP
BUN SERPL-MCNC: 19 MG/DL — SIGNIFICANT CHANGE UP (ref 7–23)
CALCIUM SERPL-MCNC: 9.9 MG/DL — SIGNIFICANT CHANGE UP (ref 8.4–10.5)
CHLORIDE SERPL-SCNC: 107 MMOL/L — SIGNIFICANT CHANGE UP (ref 98–107)
CO2 SERPL-SCNC: 25 MMOL/L — SIGNIFICANT CHANGE UP (ref 22–31)
COLOR SPEC: ABNORMAL
CREAT SERPL-MCNC: 0.84 MG/DL — SIGNIFICANT CHANGE UP (ref 0.5–1.3)
DIFF PNL FLD: ABNORMAL
EGFR: 75 ML/MIN/1.73M2 — SIGNIFICANT CHANGE UP
EOSINOPHIL # BLD AUTO: 0.05 K/UL — SIGNIFICANT CHANGE UP (ref 0–0.5)
EOSINOPHIL NFR BLD AUTO: 1 % — SIGNIFICANT CHANGE UP (ref 0–6)
GLUCOSE SERPL-MCNC: 101 MG/DL — HIGH (ref 70–99)
GLUCOSE UR QL: NEGATIVE MG/DL — SIGNIFICANT CHANGE UP
HCT VFR BLD CALC: 41.7 % — SIGNIFICANT CHANGE UP (ref 34.5–45)
HGB BLD-MCNC: 13.4 G/DL — SIGNIFICANT CHANGE UP (ref 11.5–15.5)
IANC: 3.69 K/UL — SIGNIFICANT CHANGE UP (ref 1.8–7.4)
IMM GRANULOCYTES NFR BLD AUTO: 0.2 % — SIGNIFICANT CHANGE UP (ref 0–0.9)
KETONES UR-MCNC: NEGATIVE MG/DL — SIGNIFICANT CHANGE UP
LEUKOCYTE ESTERASE UR-ACNC: ABNORMAL
LYMPHOCYTES # BLD AUTO: 0.79 K/UL — LOW (ref 1–3.3)
LYMPHOCYTES # BLD AUTO: 15.7 % — SIGNIFICANT CHANGE UP (ref 13–44)
MCHC RBC-ENTMCNC: 28.5 PG — SIGNIFICANT CHANGE UP (ref 27–34)
MCHC RBC-ENTMCNC: 32.1 G/DL — SIGNIFICANT CHANGE UP (ref 32–36)
MCV RBC AUTO: 88.7 FL — SIGNIFICANT CHANGE UP (ref 80–100)
MONOCYTES # BLD AUTO: 0.48 K/UL — SIGNIFICANT CHANGE UP (ref 0–0.9)
MONOCYTES NFR BLD AUTO: 9.5 % — SIGNIFICANT CHANGE UP (ref 2–14)
NEUTROPHILS # BLD AUTO: 3.69 K/UL — SIGNIFICANT CHANGE UP (ref 1.8–7.4)
NEUTROPHILS NFR BLD AUTO: 73.2 % — SIGNIFICANT CHANGE UP (ref 43–77)
NITRITE UR-MCNC: POSITIVE
NRBC # BLD AUTO: 0 K/UL — SIGNIFICANT CHANGE UP (ref 0–0)
NRBC # FLD: 0 K/UL — SIGNIFICANT CHANGE UP (ref 0–0)
NRBC BLD AUTO-RTO: 0 /100 WBCS — SIGNIFICANT CHANGE UP (ref 0–0)
PH UR: 6.5 — SIGNIFICANT CHANGE UP (ref 5–8)
PLATELET # BLD AUTO: 243 K/UL — SIGNIFICANT CHANGE UP (ref 150–400)
POTASSIUM SERPL-MCNC: 4 MMOL/L — SIGNIFICANT CHANGE UP (ref 3.5–5.3)
POTASSIUM SERPL-SCNC: 4 MMOL/L — SIGNIFICANT CHANGE UP (ref 3.5–5.3)
PROT SERPL-MCNC: 7.3 G/DL — SIGNIFICANT CHANGE UP (ref 6–8.3)
PROT UR-MCNC: 100 MG/DL
RBC # BLD: 4.7 M/UL — SIGNIFICANT CHANGE UP (ref 3.8–5.2)
RBC # FLD: 14.8 % — HIGH (ref 10.3–14.5)
RBC CASTS # UR COMP ASSIST: 2 /HPF — SIGNIFICANT CHANGE UP (ref 0–4)
SODIUM SERPL-SCNC: 142 MMOL/L — SIGNIFICANT CHANGE UP (ref 135–145)
SP GR SPEC: 1.04 — HIGH (ref 1–1.03)
UROBILINOGEN FLD QL: 1 MG/DL — SIGNIFICANT CHANGE UP (ref 0.2–1)
WBC # BLD: 5.04 K/UL — SIGNIFICANT CHANGE UP (ref 3.8–10.5)
WBC # FLD AUTO: 5.04 K/UL — SIGNIFICANT CHANGE UP (ref 3.8–10.5)
WBC UR QL: SIGNIFICANT CHANGE UP /HPF (ref 0–5)

## 2025-03-12 PROCEDURE — 74177 CT ABD & PELVIS W/CONTRAST: CPT | Mod: 26

## 2025-03-12 PROCEDURE — 99285 EMERGENCY DEPT VISIT HI MDM: CPT | Mod: GC

## 2025-03-12 RX ORDER — CEFTRIAXONE 500 MG/1
1000 INJECTION, POWDER, FOR SOLUTION INTRAMUSCULAR; INTRAVENOUS ONCE
Refills: 0 | Status: COMPLETED | OUTPATIENT
Start: 2025-03-12 | End: 2025-03-12

## 2025-03-12 RX ORDER — LIDOCAINE HYDROCHLORIDE 20 MG/ML
1 JELLY TOPICAL ONCE
Refills: 0 | Status: COMPLETED | OUTPATIENT
Start: 2025-03-12 | End: 2025-03-12

## 2025-03-12 RX ORDER — CEFPODOXIME PROXETIL 200 MG/1
1 TABLET, FILM COATED ORAL
Qty: 28 | Refills: 0
Start: 2025-03-12 | End: 2025-03-25

## 2025-03-12 RX ORDER — OXYCODONE HYDROCHLORIDE 30 MG/1
5 TABLET ORAL ONCE
Refills: 0 | Status: DISCONTINUED | OUTPATIENT
Start: 2025-03-12 | End: 2025-03-12

## 2025-03-12 RX ORDER — ACETAMINOPHEN 500 MG/5ML
1000 LIQUID (ML) ORAL ONCE
Refills: 0 | Status: COMPLETED | OUTPATIENT
Start: 2025-03-12 | End: 2025-03-12

## 2025-03-12 RX ADMIN — CEFTRIAXONE 100 MILLIGRAM(S): 500 INJECTION, POWDER, FOR SOLUTION INTRAMUSCULAR; INTRAVENOUS at 14:34

## 2025-03-12 RX ADMIN — Medication 400 MILLIGRAM(S): at 13:24

## 2025-03-12 RX ADMIN — Medication 1000 MILLILITER(S): at 13:07

## 2025-03-12 RX ADMIN — OXYCODONE HYDROCHLORIDE 5 MILLIGRAM(S): 30 TABLET ORAL at 15:28

## 2025-03-12 NOTE — ED PROVIDER NOTE - PATIENT PORTAL LINK FT
You can access the FollowMyHealth Patient Portal offered by [facility] by registering at the following website: [email or web address]. By joining [employer]’s FollowMyHealth portal, you will also be able to view your health information using other applications (apps) compatible with our system.

## 2025-03-12 NOTE — ED PROVIDER NOTE - CLINICAL SUMMARY MEDICAL DECISION MAKING FREE TEXT BOX
69-year-old female history of bladder cancer status post conduit placement, hypertension, diabetes, hyperlipidemia, presenting with right lower back pain.  Patient reports over the past few days he started aching pain to the right lower back.  No abdominal pain fevers nausea or vomiting. Non septic upon presentation. Does have history of septic shock secondary to urinary infection therefore the emergency department.  On exam abdomen soft nontender nondistended has tenderness to the daniel lumbar region minimal CVA tenderness.  Will obtain labs, urine and CT scan.

## 2025-03-12 NOTE — ED PROVIDER NOTE - PHYSICAL EXAMINATION
Physical Exam:  General: NAD, Conversive  Eyes: EOMI, Conjunctiva and sclera clear. Pupils equal and reactive  Neck: No JVD  Lungs: No respiratory distress  Heart: Normal peripheral perfusion  Abdomen: Soft, nontender, nondistended  -tenderness to the R daniel lumbar region minimal CVA tenderness.    Extremities: 2+ peripheral pulses, no edema  Psych: AAO X3  Neurologic: Non-focal, ambulating, CN I-XII intact

## 2025-03-12 NOTE — ED ADULT NURSE NOTE - NSHOSCREENINGQ1_ED_ALL_ED
"  ----------------------------------------------------------------------------------------------------------  [facility]  Psychiatry Progress Note  Hospital Day #8     Interim History:     The patient's care was discussed with the treatment team and chart notes were reviewed.      Staff Report:   Endorsed SI but no intent and socializes with peers. Limited participation in group and milieu prog.      Subjective:     Patient Interview:    Reports feeling sl groggy from the med adjustments but overall feeling better. Socializing with peers. Anxious for a discharge plan. Working with his CM on placement, but also considering connecting with his parents for a return home.     ROS:  Negative unless stated above     Objective:     Vitals:  /84   Pulse 76   Temp 97.1  F (36.2  C) (Temporal)   Resp 18   Ht 1.727 m (5' 8\")   Wt 111.9 kg (246 lb 9.6 oz)   SpO2 98%   BMI 37.50 kg/m      Allergies:  No Known Allergies    Current Medications:  Scheduled:  Current Facility-Administered Medications   Medication Dose Route Frequency Provider Last Rate Last Admin    acetaminophen (TYLENOL) tablet 325 mg  325 mg Oral Q4H PRN Tyrese Moe MD        ARIPiprazole (ABILIFY) half-tab 7.5 mg  7.5 mg Oral At Bedtime Rojelio Baumann MD   7.5 mg at 05/01/24 2036    erythromycin (ROMYCIN) ophthalmic ointment   Both Eyes 4x Daily Ursula Strange PA-C   1 g at 05/01/24 2036    FLUoxetine (PROzac) capsule 30 mg  30 mg Oral QAM Rojelio Baumann MD   30 mg at 05/01/24 0818    hydrOXYzine HCl (ATARAX) tablet 25 mg  25 mg Oral BID PRN Tyrese Moe MD   25 mg at 05/01/24 1339    OLANZapine (zyPREXA) tablet 5 mg  5 mg Oral TID PRN Tyrese Moe MD        Or    OLANZapine (zyPREXA) injection 5 mg  5 mg Intramuscular TID PRN Tyrese Moe MD        ondansetron (ZOFRAN) tablet 4 mg  4 mg Oral Q6H PRN Lydia Dhaliwal MD   4 mg at 04/29/24 1800    polyethylene " glycol (MIRALAX) Packet 17 g  17 g Oral Daily PRN Tyrese Moe MD        polyethylene glycol-propylene glycol PF (SYSTANE ULTRA PF) opthalmic solution 1 drop  1 drop Both Eyes Q1H PRN Johnny Jay MD   1 drop at 04/28/24 1702    prazosin (MINIPRESS) capsule 2 mg  2 mg Oral At Bedtime Tyrese Moe MD   2 mg at 05/01/24 2036       PRN:  Current Facility-Administered Medications   Medication Dose Route Frequency Provider Last Rate Last Admin    acetaminophen (TYLENOL) tablet 325 mg  325 mg Oral Q4H PRN Tyrese Moe MD        ARIPiprazole (ABILIFY) half-tab 7.5 mg  7.5 mg Oral At Bedtime Rojelio Baumann MD   7.5 mg at 05/01/24 2036    erythromycin (ROMYCIN) ophthalmic ointment   Both Eyes 4x Daily Ursula Strange PA-C   1 g at 05/01/24 2036    FLUoxetine (PROzac) capsule 30 mg  30 mg Oral QAM Rojelio Baumann MD   30 mg at 05/01/24 0818    hydrOXYzine HCl (ATARAX) tablet 25 mg  25 mg Oral BID PRN Tyrese Moe MD   25 mg at 05/01/24 1339    OLANZapine (zyPREXA) tablet 5 mg  5 mg Oral TID PRN Tyrese Moe MD        Or    OLANZapine (zyPREXA) injection 5 mg  5 mg Intramuscular TID PRN Tyrese Moe MD        ondansetron (ZOFRAN) tablet 4 mg  4 mg Oral Q6H PRN Lydia Dhaliwal MD   4 mg at 04/29/24 1800    polyethylene glycol (MIRALAX) Packet 17 g  17 g Oral Daily PRN Tyrese Moe MD        polyethylene glycol-propylene glycol PF (SYSTANE ULTRA PF) opthalmic solution 1 drop  1 drop Both Eyes Q1H PRN Johnny Jay MD   1 drop at 04/28/24 1702    prazosin (MINIPRESS) capsule 2 mg  2 mg Oral At Bedtime Tyrese Moe MD   2 mg at 05/01/24 2036       Labs and Imaging:  New results:   No results found for this or any previous visit (from the past 24 hour(s)).      Data this admission:  - CBC unremarkable  - CMP unremarkable  - TSH normal  - UDS negative  - Hgb A1c normal, 5.3  - Lipids remarkable for low HDL cholesterol, high LDL cholesterol  - EKG normal  "sinus rhythm, QTc 427     Mental Status Exam:     Oriented to:  Grossly Oriented  General:  Awake and Alert  Appearance:  appears stated age, overweight, and phenotype consistent with velocardiofacial syndrome  Behavior/Attitude:  Calm, Cooperative, and Engaged  Eye Contact: Appropriate  Psychomotor: Normal and No evidence of tics, dystonia, or tardive dyskinesia  no catatonia present  Speech:  appropriate volume/tone, paucity, and hesitant  Language: Fluent in English with appropriate syntax and vocabulary.  Mood:  \"no change\"  Affect:  appropriate, congruent with mood, restricted, and anxious  Thought Process:  linear and coherent  Thought Content:    SIB involving cutting self with knife, suicidal ideation with plan (without intent), No HI, does not appear to be responding to internal stimuli, No VH, and No AH; No apparent delusions  Associations:  intact  Insight:  fair due to understanding of condition, but limited 2/2 unclear about goals for admission  Judgment:  fair due to voluntary presentation  Impulse control: partial  Attention Span:  grossly intact and adequate for conversation  Concentration:  grossly intact  Recent and Remote Memory:  not formally assessed  Fund of Knowledge: estimated below average  Muscle Strength and Tone:  not formally assessed  Gait and Station: Normal     Psychiatric Assessment     Pj Tucker is a 31 year old male previously diagnosed with MDD, ADHD, autism spectrum disorder who presented voluntarily by himself with suicidal  in the context of increased psychosocial stressors, medication non-adherence. No prior psychiatric hospitalizations. Significant symptoms on admission include suicidal ideation with plan, increased impulsivity. The MSE on admission was pertinent for dysphoric but reactive affect, linear and goal directed in thought. Biological contributions to mental health presentation include diagnoses of autism spectrum disorder, medication non-adherence. " Psychological contributions to mental health presentation include diagnoses of ADHD, MDD as well as insight, coping, personality.  Social factors contributing to mental health presentation include financial stressors, communication challenges. Protective factors include willingness to engage in treatment, lack of prior suicide attempts/SIB.      In summary, the patient's reported symptoms of suicidal ideation in the context of recent psychosocial stressors are consistent with depressive disorder, most likely major depressive episode. Differentials include unspecified depressive disorder, adjustment disorder, intrusive thoughts 2/2 OCD.  He will likely benefit from medication management, stabilization and observation this admission.     Given that he currently has SI, patient warrants inpatient psychiatric hospitalization to maintain his safety.      Psychiatric Plan by Diagnosis      Today's changes:       # Suicidal Ideation with Plan (likely without Intent)  # SIB with Plan (likely without intent)  2/2  # MDD, decompensated  1. Medications:  - ABILIFY 7.5 mg PO qHS  - PROZAC 30 mg PO qAM  - PRAZOSIN 2 mg PO at bedtime  - ATARAX 25 mg PO TID PRN (for anxiety)     2. Pertinent Labs/Monitoring:   - Qtc 427 ms     3. Additional Plans:  - Patient will be treated in therapeutic milieu with appropriate individual and group therapies as described    # ASD  # ADHD       Psychiatric Hospital Course:      Pj Tucker was admitted to [facility] as a voluntary patient.  Medications:  PTA Abilify, Prozac, prazosin, and hydroxyzine were continued:    ABILIFY  04/29: increase dose to 7.5 mg PO at bedtime (up from 5 mg PO)  PROZAC  04/30: increase dose to 30 mg PO & re-scheduled to qAM (in lieu of 20 mg PO at bedtime)    PTA Lamictal was discontinued 4/25 due to status decline since initiation 1-2 weeks ago.  No new medications started at the time of admission.     The risks, benefits, alternatives, and side effects were  discussed and understood by the patient.     Medical Assessment and Plan     Medical diagnoses to be addressed this admission:  N/A    Medical course: Patient was physically examined by the ED prior to being transferred to the unit and was found to be medically stable and appropriate for admission.     Consults: medicine     Checklist     Legal Status: Voluntary     Safety Assessment:   Behavioral Orders   Procedures    Code 1 - Restrict to Unit    Routine Programming     As clinically indicated    Self Injury Precaution    Status 15     Every 15 minutes.    Suicide precautions: Suicide Risk: MODERATE; Clinical rationale to override score: modification to the care environment, lack of access to a plan for self-harm     Patients on Suicide Precautions should have a Combination Diet ordered that includes a Diet selection(s) AND a Behavioral Tray selection for Safe Tray - with utensils, or Safe Tray - NO utensils       Order Specific Question:   Suicide Risk     Answer:   MODERATE     Order Specific Question:   Clinical rationale to override score:     Answer:   modification to the care environment     Order Specific Question:   Clinical rationale to override score:     Answer:   lack of access to a plan for self-harm       Risk Assessment:  Risk for harm is elevated.  Risk factors: SI, impulsive, and psychosocial stressors  Protective factors: engaged in treatment     SIO: none    Disposition: Pending stabilization, medication optimization, & development of a safe discharge plan.     Attestations     I saw and evaluated the patient.   I personally spent a total of 35 minutes on care for this patient on the date of the encounter. This includes face-to-face as well as non-face-to-face time reviewing the chart, lab review, and coordination of care.        No

## 2025-03-12 NOTE — ED ADULT NURSE NOTE - NSFALLRISKINTERV_ED_ALL_ED

## 2025-03-12 NOTE — ED ADULT NURSE NOTE - NSFALLCONCLUSION_ED_ALL_ED
Outpatient Rehab    Physical Therapy Visit    Patient Name: Erica Vang  MRN: [id number]  YOB: 1955  Encounter Date: 2/24/2025    Therapy Diagnosis:   Encounter Diagnosis   Name Primary?    Pelvic floor dysfunction Yes     Physician: Carolina Koroma NP    Physician Orders: Eval and Treat  Medical Diagnosis: Urinary incontinence, unspecified type [R32]     Visit # / Visits Authorized:  2 / 20   Date of Evaluation:  1/6/2025  Insurance Authorization Period: 1/13/2025 to 12/31/2025  Plan of Care Certification:  1/6/2025 to 4/6/2025      Time In: 0100   Time Out: 0140  Total Time: 40   Total Billable Time: 40 minutes    Precautions     universal      Subjective   pt with no new c/o.  Still can't tell if she is truly leaking while sleeping..  Pain reported as 0/10.      Objective            Treatment:  Balance/Neuromuscular Re-Education  Balance/Neuromuscular Re-Education Activity 1: pelvic floor muscle training with assist of transabdominal RUSI (good lift and drop of the bladder base was noted; bladder volume 89cc; limited drop of the bladder base was noted with diaphragmatic breath.)  Balance/Neuromuscular Re-Education Activity 2: supine short duration Kegels with assist of SEMG (elevated baseline (baseline offset noted); complete and timely derecruitment)  Balance/Neuromuscular Re-Education Activity 3: 10 sec Kegels in sitting (elevated baseline which normalized with Kegels; fair holding; fair/good WR rise.  Watching the BF monitor improved her performance.)    Assessment & Plan   Assessment: pt with reassuring lift and drop of the bladder base in Kegel- will progress forces if holding ability and derecruitment improve.  Evaluation/Treatment Tolerance: Patient tolerated treatment well    Patient will continue to benefit from skilled outpatient physical therapy to address the deficits listed in the problem list box on initial evaluation, provide pt/family education and to maximize pt's level  of independence in the home and community environment.     Patient's spiritual, cultural, and educational needs considered and patient agreeable to plan of care and goals.     Education  Education was done with Patient. The patient's learning style includes Listening and Reading. The patient Verbalizes understanding.         HEP revisions       Plan: Outpatient Physical Therapy 1 times per 2 week(s)  for 12 weeks to include the following interventions: therapeutic exercises, therapeutic activity, neuromuscular re-education, manual therapy, modalities PRN, patient/family education, and self care/home management    Goals: Short Term Goals: 2 weeks  Pt to report increased awareness of PFM lift/drop during exercises and functional activities.  Pt to be I with double voiding techniques.  Pt to be I with diaphragmatic breathing.       Long Term Goals: 12 weeks   Pt will report improved ability to perform ADLs (ie. dressing, bathing, functional transfers) with little (drops) to no urinary leakage 7/7 days per week.  Pt will report improved ability to perform iADLs (ie. driving, home chores, grocery shopping) with little (drops) to no urinary leakage 7/7 days per week.   Pt will report improved ability to cough/sneeze/laugh/yell with little (drops) to no urinary leakage 7/7 days per week.   Pt/family will be independent with HEP for continued self-management of symptoms.     Sudha Coreas, PT, BCB-PMD     Fall Risk

## 2025-03-12 NOTE — ED PROVIDER NOTE - NSFOLLOWUPINSTRUCTIONS_ED_ALL_ED_FT
A kidney infection (pyelonephritis) is a type of urinary tract infection, or UTI. Most UTIs are bladder infections. Kidney infections tend to make people much sicker than bladder infections do. A kidney infection is also more serious because it can cause lasting damage if it is not treated quickly.    Follow-up care is a key part of your treatment and safety. Be sure to make and go to all appointments, and call your doctor if you are having problems. It's also a good idea to know your test results and keep a list of the medicines you take.    How can you care for yourself at home?  Take your antibiotics as directed. Do not stop taking them just because you feel better. You need to take the full course of antibiotics.  Drink plenty of water. This may help wash out bacteria that are causing the infection. If you have kidney, heart, or liver disease and have to limit fluids, talk with your doctor before you increase the amount of fluids you drink.  Urinate often. Try to empty your bladder each time.  To relieve pain, take a hot shower or lay a heating pad (set on low) over your lower belly. Never go to sleep with a heating pad in place. Put a thin cloth between the heating pad and your skin.  To help prevent kidney infections  Drink plenty of water each day. This helps you urinate often, which clears bacteria from your system. If you have kidney, heart, or liver disease and have to limit fluids, talk with your doctor before you increase the amount of fluids you drink.  Urinate when you have the urge. Do not hold your urine for a long time. Urinate before you go to sleep.  If you have symptoms of a bladder infection, such as burning when you urinate or having to urinate often, call your doctor so you can treat the problem before it gets worse. If you do not treat a bladder infection quickly, it can spread to the kidney.  Men should keep the tip of the penis clean.  If you are a woman, keep these ideas in mind:    Urinate right after you have sex.  Change sanitary pads often. Avoid douches, feminine hygiene sprays, and other feminine hygiene products that have deodorants.  After going to the bathroom, wipe from front to back.  When should you call for help?  	  Call your doctor now or seek immediate medical care if:    You have symptoms that a kidney infection is getting worse. These may include:  Pain or burning when you urinate.  A frequent need to urinate without being able to pass much urine.  Pain in the flank, which is just below the rib cage and above the waist on either side of the back.  Blood in the urine.  A fever.  You are vomiting or nauseated.  Watch closely for changes in your health, and be sure to contact your doctor if:    You do not get better as expected.

## 2025-03-12 NOTE — ED ADULT NURSE NOTE - OBJECTIVE STATEMENT
Pt. c/o non-radiating right flank pain x few days worsening this AM. Denies nausea, vomiting, diarrhea, dysuria, hematuria or fevers. hx of bladder cancer, ileostomy noted. #20g IV placed to right AC, labs sent as ordered. Vitals stable, breathing well on RA. Respirations even and unlabored. Will continue to monitor.

## 2025-03-12 NOTE — ED PROVIDER NOTE - PROGRESS NOTE DETAILS
MO - CT negative for any acute pathology, urine concerning for infection.  In the setting of acute back pain will treat for pyelonephritis.  No prior cultures in chart gave 1 dose of CTX own will discharge with cefpodoxime.  Gave strict return precautions.

## 2025-03-12 NOTE — ED ADULT NURSE NOTE - DISCHARGE DATE/TIME
[facility])  Pediatric HematologyOncology  [phone] [place] [address]  Phone: [phone]  Fax: [phone]  Follow Up Time:    12-Mar-2025 15:25

## 2025-03-15 LAB
-  AMOXICILLIN/CLAVULANIC ACID: SIGNIFICANT CHANGE UP
-  AMPICILLIN/SULBACTAM: SIGNIFICANT CHANGE UP
-  AMPICILLIN: SIGNIFICANT CHANGE UP
-  AZTREONAM: SIGNIFICANT CHANGE UP
-  CEFAZOLIN: SIGNIFICANT CHANGE UP
-  CEFEPIME: SIGNIFICANT CHANGE UP
-  CEFOXITIN: SIGNIFICANT CHANGE UP
-  CEFTRIAXONE: SIGNIFICANT CHANGE UP
-  CEFUROXIME: SIGNIFICANT CHANGE UP
-  CIPROFLOXACIN: SIGNIFICANT CHANGE UP
-  ERTAPENEM: SIGNIFICANT CHANGE UP
-  GENTAMICIN: SIGNIFICANT CHANGE UP
-  IMIPENEM: SIGNIFICANT CHANGE UP
-  LEVOFLOXACIN: SIGNIFICANT CHANGE UP
-  MEROPENEM: SIGNIFICANT CHANGE UP
-  NITROFURANTOIN: SIGNIFICANT CHANGE UP
-  PIPERACILLIN/TAZOBACTAM: SIGNIFICANT CHANGE UP
-  TOBRAMYCIN: SIGNIFICANT CHANGE UP
-  TRIMETHOPRIM/SULFAMETHOXAZOLE: SIGNIFICANT CHANGE UP
METHOD TYPE: SIGNIFICANT CHANGE UP

## 2025-03-16 LAB
-  AMPICILLIN: SIGNIFICANT CHANGE UP
-  CIPROFLOXACIN: SIGNIFICANT CHANGE UP
-  LEVOFLOXACIN: SIGNIFICANT CHANGE UP
-  NITROFURANTOIN: SIGNIFICANT CHANGE UP
-  TETRACYCLINE: SIGNIFICANT CHANGE UP
-  VANCOMYCIN: SIGNIFICANT CHANGE UP
CULTURE RESULTS: ABNORMAL
METHOD TYPE: SIGNIFICANT CHANGE UP
ORGANISM # SPEC MICROSCOPIC CNT: ABNORMAL
SPECIMEN SOURCE: SIGNIFICANT CHANGE UP

## 2025-03-16 RX ORDER — AMOXICILLIN AND CLAVULANATE POTASSIUM 500; 125 MG/1; MG/1
875 TABLET, FILM COATED ORAL
Qty: 1 | Refills: 0
Start: 2025-03-16 | End: 2025-03-22

## 2025-03-24 ENCOUNTER — EMERGENCY (EMERGENCY)
Facility: HOSPITAL | Age: 70
LOS: 1 days | Discharge: HOME | End: 2025-03-24
Attending: STUDENT IN AN ORGANIZED HEALTH CARE EDUCATION/TRAINING PROGRAM | Admitting: STUDENT IN AN ORGANIZED HEALTH CARE EDUCATION/TRAINING PROGRAM
Payer: MEDICARE

## 2025-03-24 VITALS
WEIGHT: 184.97 LBS | DIASTOLIC BLOOD PRESSURE: 72 MMHG | RESPIRATION RATE: 18 BRPM | OXYGEN SATURATION: 95 % | HEART RATE: 96 BPM | TEMPERATURE: 98 F | SYSTOLIC BLOOD PRESSURE: 108 MMHG

## 2025-03-24 VITALS
SYSTOLIC BLOOD PRESSURE: 119 MMHG | TEMPERATURE: 96 F | DIASTOLIC BLOOD PRESSURE: 73 MMHG | OXYGEN SATURATION: 99 % | HEART RATE: 96 BPM | RESPIRATION RATE: 18 BRPM

## 2025-03-24 DIAGNOSIS — Z98.890 OTHER SPECIFIED POSTPROCEDURAL STATES: Chronic | ICD-10-CM

## 2025-03-24 DIAGNOSIS — Z96.651 PRESENCE OF RIGHT ARTIFICIAL KNEE JOINT: Chronic | ICD-10-CM

## 2025-03-24 DIAGNOSIS — Z96.652 PRESENCE OF LEFT ARTIFICIAL KNEE JOINT: Chronic | ICD-10-CM

## 2025-03-24 DIAGNOSIS — Z90.710 ACQUIRED ABSENCE OF BOTH CERVIX AND UTERUS: Chronic | ICD-10-CM

## 2025-03-24 LAB
ALBUMIN SERPL ELPH-MCNC: 3.6 G/DL — SIGNIFICANT CHANGE UP (ref 3.3–5)
ALP SERPL-CCNC: 69 U/L — SIGNIFICANT CHANGE UP (ref 40–120)
ALT FLD-CCNC: 10 U/L — SIGNIFICANT CHANGE UP (ref 4–33)
ANION GAP SERPL CALC-SCNC: 11 MMOL/L — SIGNIFICANT CHANGE UP (ref 7–14)
APPEARANCE UR: ABNORMAL
AST SERPL-CCNC: 32 U/L — SIGNIFICANT CHANGE UP (ref 4–32)
BACTERIA # UR AUTO: ABNORMAL /HPF
BASOPHILS # BLD AUTO: 0.02 K/UL — SIGNIFICANT CHANGE UP (ref 0–0.2)
BASOPHILS NFR BLD AUTO: 0.4 % — SIGNIFICANT CHANGE UP (ref 0–2)
BILIRUB SERPL-MCNC: 0.7 MG/DL — SIGNIFICANT CHANGE UP (ref 0.2–1.2)
BILIRUB UR-MCNC: NEGATIVE — SIGNIFICANT CHANGE UP
BUN SERPL-MCNC: 20 MG/DL — SIGNIFICANT CHANGE UP (ref 7–23)
CALCIUM SERPL-MCNC: 10 MG/DL — SIGNIFICANT CHANGE UP (ref 8.4–10.5)
CAST: 0 /LPF — SIGNIFICANT CHANGE UP (ref 0–4)
CHLORIDE SERPL-SCNC: 106 MMOL/L — SIGNIFICANT CHANGE UP (ref 98–107)
CO2 SERPL-SCNC: 24 MMOL/L — SIGNIFICANT CHANGE UP (ref 22–31)
COLOR SPEC: YELLOW — SIGNIFICANT CHANGE UP
CREAT SERPL-MCNC: 0.83 MG/DL — SIGNIFICANT CHANGE UP (ref 0.5–1.3)
DIFF PNL FLD: ABNORMAL
EGFR: 76 ML/MIN/1.73M2 — SIGNIFICANT CHANGE UP
EGFR: 76 ML/MIN/1.73M2 — SIGNIFICANT CHANGE UP
EOSINOPHIL # BLD AUTO: 0.05 K/UL — SIGNIFICANT CHANGE UP (ref 0–0.5)
EOSINOPHIL NFR BLD AUTO: 1.1 % — SIGNIFICANT CHANGE UP (ref 0–6)
GLUCOSE SERPL-MCNC: 71 MG/DL — SIGNIFICANT CHANGE UP (ref 70–99)
GLUCOSE UR QL: NEGATIVE MG/DL — SIGNIFICANT CHANGE UP
HCT VFR BLD CALC: 42 % — SIGNIFICANT CHANGE UP (ref 34.5–45)
HGB BLD-MCNC: 13.1 G/DL — SIGNIFICANT CHANGE UP (ref 11.5–15.5)
IANC: 2.91 K/UL — SIGNIFICANT CHANGE UP (ref 1.8–7.4)
IMM GRANULOCYTES NFR BLD AUTO: 0.2 % — SIGNIFICANT CHANGE UP (ref 0–0.9)
KETONES UR-MCNC: NEGATIVE MG/DL — SIGNIFICANT CHANGE UP
LEUKOCYTE ESTERASE UR-ACNC: ABNORMAL
LYMPHOCYTES # BLD AUTO: 1.13 K/UL — SIGNIFICANT CHANGE UP (ref 1–3.3)
LYMPHOCYTES # BLD AUTO: 24.3 % — SIGNIFICANT CHANGE UP (ref 13–44)
MCHC RBC-ENTMCNC: 27.9 PG — SIGNIFICANT CHANGE UP (ref 27–34)
MCHC RBC-ENTMCNC: 31.2 G/DL — LOW (ref 32–36)
MCV RBC AUTO: 89.6 FL — SIGNIFICANT CHANGE UP (ref 80–100)
MONOCYTES # BLD AUTO: 0.53 K/UL — SIGNIFICANT CHANGE UP (ref 0–0.9)
MONOCYTES NFR BLD AUTO: 11.4 % — SIGNIFICANT CHANGE UP (ref 2–14)
NEUTROPHILS # BLD AUTO: 2.91 K/UL — SIGNIFICANT CHANGE UP (ref 1.8–7.4)
NEUTROPHILS NFR BLD AUTO: 62.6 % — SIGNIFICANT CHANGE UP (ref 43–77)
NITRITE UR-MCNC: NEGATIVE — SIGNIFICANT CHANGE UP
NRBC # BLD AUTO: 0 K/UL — SIGNIFICANT CHANGE UP (ref 0–0)
NRBC # FLD: 0 K/UL — SIGNIFICANT CHANGE UP (ref 0–0)
NRBC BLD AUTO-RTO: 0 /100 WBCS — SIGNIFICANT CHANGE UP (ref 0–0)
PH UR: 6 — SIGNIFICANT CHANGE UP (ref 5–8)
PLATELET # BLD AUTO: 326 K/UL — SIGNIFICANT CHANGE UP (ref 150–400)
POTASSIUM SERPL-MCNC: 5 MMOL/L — SIGNIFICANT CHANGE UP (ref 3.5–5.3)
POTASSIUM SERPL-SCNC: 5 MMOL/L — SIGNIFICANT CHANGE UP (ref 3.5–5.3)
PROT SERPL-MCNC: 7.8 G/DL — SIGNIFICANT CHANGE UP (ref 6–8.3)
PROT UR-MCNC: 30 MG/DL
RBC # BLD: 4.69 M/UL — SIGNIFICANT CHANGE UP (ref 3.8–5.2)
RBC # FLD: 14.6 % — HIGH (ref 10.3–14.5)
RBC CASTS # UR COMP ASSIST: 7 /HPF — HIGH (ref 0–4)
REVIEW: SIGNIFICANT CHANGE UP
SODIUM SERPL-SCNC: 141 MMOL/L — SIGNIFICANT CHANGE UP (ref 135–145)
SP GR SPEC: 1.02 — SIGNIFICANT CHANGE UP (ref 1–1.03)
SQUAMOUS # UR AUTO: 1 /HPF — SIGNIFICANT CHANGE UP (ref 0–5)
UROBILINOGEN FLD QL: 0.2 MG/DL — SIGNIFICANT CHANGE UP (ref 0.2–1)
WBC # BLD: 4.65 K/UL — SIGNIFICANT CHANGE UP (ref 3.8–10.5)
WBC # FLD AUTO: 4.65 K/UL — SIGNIFICANT CHANGE UP (ref 3.8–10.5)
WBC UR QL: 64 /HPF — HIGH (ref 0–5)

## 2025-03-24 PROCEDURE — 99284 EMERGENCY DEPT VISIT MOD MDM: CPT | Mod: GC

## 2025-03-24 RX ORDER — CIPROFLOXACIN HCL 250 MG
1 TABLET ORAL
Qty: 20 | Refills: 0
Start: 2025-03-24 | End: 2025-04-02

## 2025-03-24 RX ORDER — ACETAMINOPHEN 500 MG/5ML
1000 LIQUID (ML) ORAL ONCE
Refills: 0 | Status: COMPLETED | OUTPATIENT
Start: 2025-03-24 | End: 2025-03-24

## 2025-03-24 RX ORDER — LIDOCAINE HYDROCHLORIDE 20 MG/ML
1 JELLY TOPICAL ONCE
Refills: 0 | Status: COMPLETED | OUTPATIENT
Start: 2025-03-24 | End: 2025-03-24

## 2025-03-24 RX ORDER — CEFUROXIME SODIUM 1.5 G
1 VIAL (EA) INJECTION
Qty: 20 | Refills: 0
Start: 2025-03-24 | End: 2025-04-02

## 2025-03-24 RX ORDER — KETOROLAC TROMETHAMINE 30 MG/ML
15 INJECTION, SOLUTION INTRAMUSCULAR; INTRAVENOUS ONCE
Refills: 0 | Status: DISCONTINUED | OUTPATIENT
Start: 2025-03-24 | End: 2025-03-24

## 2025-03-24 RX ORDER — CYCLOBENZAPRINE HYDROCHLORIDE 15 MG/1
10 CAPSULE, EXTENDED RELEASE ORAL ONCE
Refills: 0 | Status: COMPLETED | OUTPATIENT
Start: 2025-03-24 | End: 2025-03-24

## 2025-03-24 RX ORDER — CIPROFLOXACIN HCL 250 MG
1 TABLET ORAL
Qty: 14 | Refills: 0
Start: 2025-03-24 | End: 2025-03-30

## 2025-03-24 RX ADMIN — Medication 400 MILLIGRAM(S): at 14:28

## 2025-03-24 RX ADMIN — KETOROLAC TROMETHAMINE 15 MILLIGRAM(S): 30 INJECTION, SOLUTION INTRAMUSCULAR; INTRAVENOUS at 14:27

## 2025-03-24 RX ADMIN — CYCLOBENZAPRINE HYDROCHLORIDE 10 MILLIGRAM(S): 15 CAPSULE, EXTENDED RELEASE ORAL at 14:28

## 2025-03-24 RX ADMIN — LIDOCAINE HYDROCHLORIDE 1 PATCH: 20 JELLY TOPICAL at 14:26

## 2025-03-24 NOTE — ED ADULT TRIAGE NOTE - CHIEF COMPLAINT QUOTE
pt c/o right flank pain x 3 days.  pt  treated for pyelonephritis.  pt still having pain.  Hx:  bladder ca, urostomy bag, DM

## 2025-03-24 NOTE — ED PROVIDER NOTE - NS ED ROS FT
General: denies fever,   CV: denies chest pain,   Resp: denies difficulty breathing, cough  Abdominal: denies nausea, vomiting, diarrhea, abdominal pain  : denies urinary pain  MSK: r low back pain radiating down R leg  Neuro: denies numbness, tingling  Skin: denies rashes,

## 2025-03-24 NOTE — ED PROVIDER NOTE - ATTENDING CONTRIBUTION TO CARE
I personally made the management plan, and take responsibility for the patient's management. I have discussed with and reviewed the Resident's note and agree with the History, ROS, Physical Exam and MDM unless otherwise indicated. A brief summary of my personal evaluation and impression can be found below.    69-year-old female with a past medical history of ileal conduit secondary to bladder cancer, hypertension, diabetes, hyperlipidemia presenting due to the concern of right low back pain.  Was evaluated here initially 12 days ago and discharged on cefpodoxime.  She received a call that the antibiotic she was started on did not cover the infection, received a second antibiotic prescription for amoxicillin.  She reports finishing that yesterday.  Now has recurrence of the right sided pain.  She was initially taking oxycodone during the infection, had resolution of pain and then stopped taking oxycodone and so she is unsure as if stopping the oxycodone was why she had recurrence of pain.    General: Well-appearing, NAD  Head: Atraumatic, normocephalic  Eyes: EOM grossly in tact, no scleral icterus  ENT: moist mucous membranes  Neurology: A&Ox 3  Respiratory: normal respiratory effort  CV: Extremities warm and well perfused  Abdominal: Soft, non-distended, non tender, no CVAT, ileal conduit pink appearing, no discharge, yellow urine in bag  + SLR on L  Extremities: No edema  Skin: No rashes    Patient well-appearing, hemodynamically stable temperature normal, positive straight leg raise concern for possible musculoskeletal back pain.  Will send CBC, CMP, urine culture, urinalysis.  Will treat for back pain, check urine and urine culture if negative would likely discharge.

## 2025-03-24 NOTE — ED ADULT NURSE NOTE - OBJECTIVE STATEMENT
patient alert ox4 came in c/o right flank pain  with recent h/o UTI , h/o bladder Ca with ileal conduit , not on chemo or radiation at present. patient denies any fever/chills. respirations even and unlabored. skin warm and  dry. abdomen soft non distended non tender, changed her bag this morning. labs done and meds given as ordered. awaiting results.

## 2025-03-24 NOTE — ED PROVIDER NOTE - PROGRESS NOTE DETAILS
able to ambulate, has walker at home. will send abx that are sensitive for both e coli and enterococcus SG Had shared decision making discussion with patient regarding staying until UC results vs leaving.  Given positive urine could be contaminant given ileal conduit sent culture patient would like to go home and wait for the culture and take 2 antibiotics to cover both prior bugs and her culture from the past.  If she has persistent symptoms patient will return to the ED including suprapubic pain flank pain nausea vomiting fevers.

## 2025-03-24 NOTE — ED PROVIDER NOTE - PHYSICAL EXAMINATION
GENERAL: well appearing in no acute distress, non-toxic appearing  HEENT: normal conjunctiva, oral mucosa moist, uvula midline, no tonsilar exudates, neck supple, no JVD  CARDIAC: regular rate and rhythm, normal S1S2, no appreciable murmurs, 2+ pulses in UE/LE b/l  PULM: normal breath sounds, clear to ascultation bilaterally, no rales, rhonchi, wheezing  GI: abdomen nondistended, soft, nontender, no guarding, rebound tenderness  : no CVA tenderness b/l, no suprapubic tenderness; normal ileal conduit bag output 200 cc yellow urine  NEURO: no focal motor or sensory deficits,   MSK: nomidline spinal tenderness, + straight leg R

## 2025-03-24 NOTE — ED PROVIDER NOTE - CLINICAL SUMMARY MEDICAL DECISION MAKING FREE TEXT BOX
69yf hx bladder cancer status post conduit placement, hypertension, diabetes, hyperlipidemia p resents for atraumatic R low back pain, was seen here 3/12 for same complaint and found to have UTI w cx growing enterococcus and e colik finished amox yesterday. thinks the pain may have been triggered by lifting a heavy box. states pain radiates from low back down her leg. has been able to ambulate. denies fevers, n/v, diarrhea. has had normal bm, and normal output in ileal conduit bag. on arrival bp 108/72 HR 96 afebrile. pt well appearing, normal gait on exam, no midline spinal tenderness, + straight leg on R, neuro intact motor and sensory intact, likely sciatica rather then cva tenderness,  but will resend urine  and uc given hx and risk for infection.

## 2025-03-24 NOTE — ED PROVIDER NOTE - PATIENT PORTAL LINK FT
You can access the FollowMyHealth Patient Portal offered by [facility] by registering at the following website: [email or web address]. By joining [facility]’s FollowMyHealth portal, you will also be able to view your health information using other applications (apps) compatible with our system.

## 2025-03-24 NOTE — ED PROVIDER NOTE - NSFOLLOWUPINSTRUCTIONS_ED_ALL_ED_FT
You were seen in the ED for back pain presumptive diagnosis is sciatica. Use warm compresses, and lidocaine patch on the area. continue to walk and stretch. Take antibiotics as instructed. Follow up with your Primary Doctor within one week. Return with any fevers/chills or any other concerning symptoms.

## 2025-03-25 LAB
CULTURE RESULTS: SIGNIFICANT CHANGE UP
SPECIMEN SOURCE: SIGNIFICANT CHANGE UP

## 2025-07-09 NOTE — DISCHARGE NOTE PROVIDER - NSRESEARCHGRANT_PROPHYLAXISRECOMFT_GEN_A_CORE
This is a surgical and/or non-medical patient. Swallowing Guidelines: Seated Upright during Mealtimes; Slow Pacing; Take Small Bites & Chew Well; Single Sips; One Bite/Sip at a Time; Maintain Adequate Oral Hygiene

## (undated) DEVICE — FOLEY CATH 3-WAY 20FR 5CC LATEX LUBRICATH

## (undated) DEVICE — UNDERPAD LINEN SAVER 17 X 24"

## (undated) DEVICE — GLV 8 PROTEXIS (WHITE)

## (undated) DEVICE — TUBING IV SET GRAVITY 3Y 100" MACRO

## (undated) DEVICE — SOL IRR BAG H2O 3000ML

## (undated) DEVICE — FOLEY CATH 3-WAY 24FR 5CC LATEX LUBRICATH

## (undated) DEVICE — GOWN LG

## (undated) DEVICE — GLV 7 PROTEXIS (WHITE)

## (undated) DEVICE — DRAINAGE BAG URINARY 2L

## (undated) DEVICE — TUBING SUCTION NONCONDUCTIVE 6MM X 12FT

## (undated) DEVICE — VENODYNE/SCD SLEEVE CALF LARGE

## (undated) DEVICE — ACMI SELF-SEALING SEAL UP TO 7FR

## (undated) DEVICE — BIOPSY FORCEP COLD DISP

## (undated) DEVICE — FOLEY CATH 2-WAY 18FR 5CC LATEX HYDROGEL

## (undated) DEVICE — ELCTR PLASMA LOOP MEDIUM 24FR 12-16 DEG

## (undated) DEVICE — GLV 7.5 PROTEXIS (WHITE)

## (undated) DEVICE — GLV 6.5 PROTEXIS (WHITE)

## (undated) DEVICE — TUBING RANGER FLUID IRRIGATION SET DISP

## (undated) DEVICE — GOWN TRIMAX LG

## (undated) DEVICE — POSITIONER FOAM HEADREST (PINK)

## (undated) DEVICE — ADAPTER CHECK FLO 9FR STERILE

## (undated) DEVICE — FOLEY HOLDER STATLOCK 2 WAY ADULT

## (undated) DEVICE — FOLEY CATH 2-WAY 22FR 5CC LATEX COUNCIL RED

## (undated) DEVICE — CLAMP BX HOT RAD JAW 3

## (undated) DEVICE — WARMING BLANKET UPPER ADULT

## (undated) DEVICE — BITE BLOCK ADULT 20 X 27MM (GREEN)

## (undated) DEVICE — BIOPSY FORCEP RADIAL JAW 4 STANDARD WITH NEEDLE

## (undated) DEVICE — POSITIONER FOAM EGG CRATE ULNAR 2PCS (PINK)

## (undated) DEVICE — SOL IRR POUR NS 0.9% 500ML

## (undated) DEVICE — GLV 8.5 PROTEXIS (WHITE)

## (undated) DEVICE — FOLEY CATH 3-WAY 26FR 30CC LATEX LUBRICATH

## (undated) DEVICE — TUBING SUCTION 20FT

## (undated) DEVICE — ELCTR CUTTING LOOP RIGHT ANGLE 24FR

## (undated) DEVICE — DRAPE EQUIPMENT BANDED BAG 30 X 30" (SHOWER CAP)

## (undated) DEVICE — PACK IV START WITH CHG

## (undated) DEVICE — ELCTR BUGBEE FULGATING 5FR X 58CM

## (undated) DEVICE — DENTURE CUP PINK

## (undated) DEVICE — IRRIGATION TRAY W PISTON SYRINGE 60ML

## (undated) DEVICE — TUBING MEDI-VAC W MAXIGRIP CONNECTORS 1/4"X6'

## (undated) DEVICE — ELCTR GROUNDING PAD ADULT COVIDIEN

## (undated) DEVICE — PACK CYSTO

## (undated) DEVICE — FOLEY CATH 3-WAY 18FR 30CC LATEX LUBRICATH

## (undated) DEVICE — CONTAINER FORMALIN 80ML YELLOW

## (undated) DEVICE — SOL IRR POUR H2O 1500ML

## (undated) DEVICE — BAG URINE W METER 2L

## (undated) DEVICE — SOL IRR BAG NS 0.9% 3000ML

## (undated) DEVICE — FOLEY CATH 2-WAY 20F 5CC LATEX LUBRICATH

## (undated) DEVICE — FOLEY TRAY 16FR 5CC LTX UMETER CLOSED

## (undated) DEVICE — ELCTR PLASMA ROLLER 24FR 12-30 DEG

## (undated) DEVICE — FOLEY CATH 2-WAY 24FR 5CC LATEX HYDROGEL

## (undated) DEVICE — DRAPE LAPAROSCOPIC FLUID CONTROL PACK

## (undated) DEVICE — SYR LUER LOK 30CC

## (undated) DEVICE — SPECIMEN CONTAINER 100ML

## (undated) DEVICE — FOLEY CATH 2-WAY 16FR 5CC LATEX LUBRICATH

## (undated) DEVICE — BASIN EMESIS 10IN GRADUATED MAUVE

## (undated) DEVICE — FOLEY CATH 3-WAY 20FR 30CC LATEX LUBRICATH

## (undated) DEVICE — ELCTR BUTTON

## (undated) DEVICE — SYR ASEPTO

## (undated) DEVICE — DRSG BANDAID 0.75X3"

## (undated) DEVICE — ELCTR GROOVED VAPOR

## (undated) DEVICE — CABLE DAC ACTIVE CORD

## (undated) DEVICE — ELCTR PLASMA LOOP MEDIUM ANGLED 24FR 12-30 DEG

## (undated) DEVICE — ELCTR VAPORIZT GRV BLK

## (undated) DEVICE — PREP BETADINE 5% STERILE OPTHALMIC SOLUTION

## (undated) DEVICE — FOLEY CATH 3-WAY 22FR 30CC LATEX LUBRICATH

## (undated) DEVICE — IRR BULB PATHFINDER + 10"

## (undated) DEVICE — DRSG CURITY GAUZE SPONGE 4 X 4" 12-PLY NON-STERILE

## (undated) DEVICE — CATH IV SAFE BC 22G X 1" (BLUE)

## (undated) DEVICE — DRSG 2X2

## (undated) DEVICE — ELCTR ECG CONDUCTIVE ADHESIVE

## (undated) DEVICE — LUBRICATING JELLY HR ONE SHOT 3G

## (undated) DEVICE — ELCTR ROLLER BALL BLK 24-26FR

## (undated) DEVICE — PRESSURE INFUSOR BAG 3000ML

## (undated) DEVICE — SALIVA EJECTOR (BLUE)

## (undated) DEVICE — BOSTON SCIENTIFC PUMPING SYSTEM SAPS SINGLE ACTION 10CC

## (undated) DEVICE — FOLEY CATH 3-WAY 24FR 30CC LATEX LUBRICATH